# Patient Record
Sex: MALE | Race: WHITE | NOT HISPANIC OR LATINO | Employment: OTHER | URBAN - METROPOLITAN AREA
[De-identification: names, ages, dates, MRNs, and addresses within clinical notes are randomized per-mention and may not be internally consistent; named-entity substitution may affect disease eponyms.]

---

## 2017-03-17 RX ORDER — FLECAINIDE ACETATE 50 MG/1
TABLET ORAL
Qty: 60 TABLET | Refills: 5 | Status: SHIPPED | OUTPATIENT
Start: 2017-03-17 | End: 2017-10-26 | Stop reason: SDUPTHER

## 2017-03-28 ENCOUNTER — OFFICE VISIT (OUTPATIENT)
Dept: CARDIOLOGY | Facility: CLINIC | Age: 77
End: 2017-03-28

## 2017-03-28 VITALS
BODY MASS INDEX: 27.4 KG/M2 | HEIGHT: 70 IN | SYSTOLIC BLOOD PRESSURE: 122 MMHG | DIASTOLIC BLOOD PRESSURE: 70 MMHG | WEIGHT: 191.4 LBS | HEART RATE: 53 BPM

## 2017-03-28 DIAGNOSIS — I48.0 PAF (PAROXYSMAL ATRIAL FIBRILLATION) (HCC): Primary | ICD-10-CM

## 2017-03-28 DIAGNOSIS — I48.3 TYPICAL ATRIAL FLUTTER (HCC): ICD-10-CM

## 2017-03-28 DIAGNOSIS — R06.02 SHORTNESS OF BREATH: ICD-10-CM

## 2017-03-28 DIAGNOSIS — R00.1 SINUS BRADYCARDIA: ICD-10-CM

## 2017-03-28 PROCEDURE — 93000 ELECTROCARDIOGRAM COMPLETE: CPT | Performed by: INTERNAL MEDICINE

## 2017-03-28 PROCEDURE — 99212 OFFICE O/P EST SF 10 MIN: CPT | Performed by: INTERNAL MEDICINE

## 2017-03-28 NOTE — PROGRESS NOTES
"  Chief Complaint: PAF    History of Present Illness:    The stable established chief complaint is being appropriately managed with the current medical regimen (flecainide, Eliquis), is minimal to asymptomatic with treatment, and random in occurrence when happening.    Patient Active Problem List   Diagnosis   • Hypertensive cardiovascular disease   • Intermittent palpitations   • MINO on CPAP   • Seasonal rhinitis   • Chronic hypertension   • Lower extremity edema   • PAF (paroxysmal atrial fibrillation)   • Sinus bradycardia   • Paroxysmal atrial fibrillation   • Atrial flutter          Current Outpatient Prescriptions:   •  apixaban (ELIQUIS) 5 MG tablet tablet, Take 1 tablet by mouth 2 (two) times a day., Disp: , Rfl:   •  Cyanocobalamin (VITAMIN B 12 PO), Take 1,000 mcg by mouth daily., Disp: , Rfl:   •  flecainide (TAMBOCOR) 50 MG tablet, take 1 tablet by mouth twice a day, Disp: 60 tablet, Rfl: 5  •  loratadine (CLARITIN) 10 MG tablet, Take 10 mg by mouth daily as needed., Disp: , Rfl:   •  Misc Natural Products (PROSTATE HEALTH PO), Take 1 tablet by mouth daily., Disp: , Rfl:   •  Multiple Vitamins-Minerals (MULTIVITAMIN ADULT PO), Take 1 tablet by mouth daily., Disp: , Rfl:   •  telmisartan-hydrochlorothiazide (MICARDIS HCT) 80-25 MG per tablet, take 1 tablet by mouth once daily, Disp: 90 tablet, Rfl: 3   No Known Allergies     ROS:    Denies chest pain, tightness, palpitations, CALDERON, PND, or edema      /70 (BP Location: Left arm, Patient Position: Sitting)  Pulse 53  Ht 70\" (177.8 cm)  Wt 191 lb 6.4 oz (86.8 kg)  BMI 27.46 kg/m2.    Physical Exam:    Lungs: CTA, no wheezing, equal air entry bilaterally, resonant to percussion  Cor: RRR, physiologic S1, S2, no rubs, gallops, murmurs or thrills  Ext: warm, negative edema      ECG 12 Lead  Date/Time: 3/28/2017 1:52 PM  Performed by: SYLWIA TADEO  Authorized by: SYLWIA TADEO   Comparison: compared with previous ECG from 9/27/2016  Rhythm: " sinus bradycardia  Clinical impression: abnormal ECG              Diagnosis Plan   1. PAF (paroxysmal atrial fibrillation), controlled on flecainide Continue current Rx, follow-up 6 months   2. Shortness of breath     3. Sinus bradycardia, asymptomatic    4. Typical atrial flutter

## 2017-04-27 ENCOUNTER — OFFICE VISIT (OUTPATIENT)
Dept: CARDIOLOGY | Facility: CLINIC | Age: 77
End: 2017-04-27

## 2017-04-27 VITALS
HEART RATE: 66 BPM | BODY MASS INDEX: 27.94 KG/M2 | SYSTOLIC BLOOD PRESSURE: 150 MMHG | DIASTOLIC BLOOD PRESSURE: 74 MMHG | WEIGHT: 195.2 LBS | HEIGHT: 70 IN

## 2017-04-27 DIAGNOSIS — R06.09 DYSPNEA ON EXERTION: ICD-10-CM

## 2017-04-27 DIAGNOSIS — I48.0 PAF (PAROXYSMAL ATRIAL FIBRILLATION) (HCC): ICD-10-CM

## 2017-04-27 DIAGNOSIS — G47.33 OSA ON CPAP: ICD-10-CM

## 2017-04-27 DIAGNOSIS — J30.2 SEASONAL ALLERGIC RHINITIS, UNSPECIFIED ALLERGIC RHINITIS TRIGGER: ICD-10-CM

## 2017-04-27 DIAGNOSIS — R79.89 ABNORMAL THYROID STIMULATING HORMONE LEVEL: ICD-10-CM

## 2017-04-27 DIAGNOSIS — I11.9 HYPERTENSIVE HEART DISEASE WITHOUT HEART FAILURE: Primary | ICD-10-CM

## 2017-04-27 DIAGNOSIS — Z99.89 OSA ON CPAP: ICD-10-CM

## 2017-04-27 PROCEDURE — 99213 OFFICE O/P EST LOW 20 MIN: CPT | Performed by: INTERNAL MEDICINE

## 2017-04-27 RX ORDER — TELMISARTAN AND HYDROCHLORTHIAZIDE 80; 25 MG/1; MG/1
0.5 TABLET ORAL DAILY
COMMUNITY
End: 2018-02-20 | Stop reason: SDUPTHER

## 2017-04-27 NOTE — PROGRESS NOTES
Subjective:     Encounter Date:04/27/2017 - [place] Office    REFERRING PHYSICIAN/INTERNIST: Derick Phillip MD  ELECTROPHYSIOLOGIST: Gilberto Shaikh MD, FAC, RS, BSE  INTERVENTIONAL CARDIOLOGIST: hCris De La Vega MD, FAC, [facility]   SLEEP SPECIALIST: Judie Mc MD       Patient ID: Kai Dodson is a 76 y.o.  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     Chief Complaint:   Chief Complaint   Patient presents with   • Shortness of Breath     Problem List:  1. Possible hypertensive cardiovascular disease:  a. Remote progressive CCS class III-IV chest pain syndrome with normal EKG and exertional dyspnea and fatigue with normal codominant coronary arteries and preserved systolic left ventricular function, July 2015.   b. Residual CCS class I chest pain syndrome with NYHA class II exertional dyspnea and fatigue.   c. Remote apparent atrial fibrillation/flutter with increasing dyspnea and [facility] ED evaluation with unremarkable laboratory studies including negative troponin and BNP 29 with normal portable chest x-ray with spontaneous rexsolution of atrial tachyarrhythmia and subsequent acceptable 30-day event recorder, data deficit (August 2016).  d. Residual CCS class I chest pain syndrome with NYHA class II exertional dyspnea and fatigue syndrome.  2. Intermittent tachypalpitations:  a. Remote abnormal 24-hour Holter monitor demonstrating intermittent bradycardia with occasional PACs, May 2014.  b. Current abnormal 48-hour Holter monitor demonstrating occasional marked bradycardia with persistent PACs with acceptable combination Doppler echo with mild-to-moderate tricuspid regurgitation and an RVSP of 39 TORR, June 2014.  c. [facility] Emergency Department evaluation for episode of tachypalpitations. [facility] Telemetry demonstrates atrial fibrillation/flutter, April 2015, with subsequent recorder demonstrating  "predominantly sinus rhythm with occasional PVCs with an elevated CHADS VASc score of 3 and initiation of anticoagulation with apixaban, spring 2015.   d. Remote Electrophysiology consultation and evaluation with acceptable followup, February 2016.  3. Obstructive sleep apnea with CPAP therapy.   4. Intermittent seasonal rhinitis.   5. Remote nephrolithiasis.   6. Chronic hypertension, probably essential.   7. Intermittent lower extremity edema felt secondary to antihypertensive therapy.   8. Abnormal thyroid function test with reduced TSH (February 2017).    No Known Allergies      Current Outpatient Prescriptions:   •  apixaban (ELIQUIS) 5 MG tablet tablet, Take 5 mg bid., Disp: , Rfl:   •  Cyanocobalamin (VITAMIN B 12 PO), Take 1,000 mcg by mouth daily., Disp: , Rfl:   •  flecainide (TAMBOCOR) 50 MG tablet, take 1 tablet by mouth twice a day, Disp: 60 tablet, Rfl: 5  •  Misc Natural Products (PROSTATE HEALTH PO), Take 1 tablet by mouth daily., Disp: , Rfl:   •  Multiple Vitamins-Minerals (MULTIVITAMIN ADULT PO), Take 1 tablet by mouth daily., Disp: , Rfl:   · Telmisartan-hydrochlorothiazide (MICARDIS HCT) 80-25 mg one-half tablet daily    History of Present Illness Patient returns for scheduled 6-month followup. He notes that he has \"been doing about like usual, you know, I have a little shortness of breath.\"  He states that his heart rate seems to \"have been pretty good.\"  He has felt an occasional flutter but nothing that lasts long.  He has been riding a stationary bicycle for exercise \"but not enough.\"  He says he is going to start walking outside when the weather is nice, but he did not get out yesterday because he \"got busy.\"  He does not report a cough or wheeze.  Patient otherwise denies chest pain, PND, edema, palpitations, syncope or presyncope at this time.      Review of Systems   Respiratory: Positive for shortness of breath.       Obtained and otherwise negative except as outlined in problem list and " "HPI.    Procedures       Objective:       Vitals:    04/27/17 1326 04/27/17 1328   BP: 171/86 150/74   BP Location: Left arm Left arm   Patient Position: Sitting Standing   Pulse: 71 66   Weight:  195 lb 3.2 oz (88.5 kg)   Height:  70\" (177.8 cm)     Body mass index is 28.01 kg/(m^2).   Last weight  193 lbs.    Physical Exam   Constitutional: He is oriented to person, place, and time. He appears well-developed and well-nourished.   Neck: No JVD present. Carotid bruit is not present. No thyromegaly present.   Cardiovascular: Regular rhythm, S1 normal, S2 normal and normal heart sounds.  Exam reveals no gallop, no S3 and no friction rub.    No murmur heard.  Pulses:       Dorsalis pedis pulses are 2+ on the right side, and 2+ on the left side.        Posterior tibial pulses are 2+ on the right side, and 2+ on the left side.   Pulmonary/Chest: Effort normal. He has decreased breath sounds. He has no wheezes. He has no rhonchi. He has no rales.   Abdominal: Soft. He exhibits no mass. There is no hepatosplenomegaly. There is no tenderness. There is no guarding.   Lymphadenopathy:     He has no cervical adenopathy.   Neurological: He is alert and oriented to person, place, and time.   Skin: Skin is warm, dry and intact. No rash noted.   Vitals reviewed.      Lab Review:   Lab Results   Component Value Date    GLUCOSE 106 (H) 09/20/2016    BUN 16 09/20/2016    CREATININE 1.00 09/20/2016    EGFRIFNONA 73 09/20/2016    BCR 16.0 09/20/2016    CO2 24.0 09/20/2016    CALCIUM 9.5 09/20/2016    ALBUMIN 4.20 09/20/2016    LABIL2 1.6 09/20/2016    AST 33 09/20/2016    ALT 22 09/20/2016       Lab Results   Component Value Date    WBC 7.96 09/20/2016    HGB 14.5 09/20/2016    HCT 42.8 09/20/2016    MCV 90.5 09/20/2016     09/20/2016       Lab Results   Component Value Date    HGBA1C 5.2 07/01/2015       Lab Results   Component Value Date    TSH 0.214 (L) 04/17/2015       Lab Results   Component Value Date    TRIG 108 " 07/01/2015     Lab Results   Component Value Date    HDL 48 07/01/2015         Assessment:   Overall continued acceptable course with no interim cardiopulmonary complaints with acceptable functional status. We will defer additional diagnostic or therapeutic intervention from a cardiac perspective at this time but will initiate a trial of Symbicort, at his request, due to his exertional dyspnea, al though his cardiopulmonary exam is unremarkable currently.  If he has progressive disabling symptoms, he may need to consider a high resolution spiral CT scan, pulmonary function test, and possible pulmonary ventilation-perfusion scan.       Diagnosis Plan   1. Hypertensive heart disease without heart failure     2. PAF (paroxysmal atrial fibrillation)     3. MINO on CPAP     4. Seasonal allergic rhinitis, unspecified allergic rhinitis trigger     5. Dyspnea on exertion     6. Abnormal thyroid stimulating hormone level            Plan:         1. Patient to continue current medications and close follow up with the above providers.  2. Initiate Symbicort 80/4.5 one puff bid  3. Tentative cardiology follow up in October 2017, or patient may return sooner PRN.    Transcribed by Priya Dorantes for Dr. Misael Walls at 8:12 AM on 04/27/2017        IMisael MD, [facility], personally performed the services described in this documentation as scribed by the above named individual in my presence, and it is both accurate and complete. At 1:29 PM on 04/27/2017

## 2017-10-26 RX ORDER — FLECAINIDE ACETATE 50 MG/1
50 TABLET ORAL 2 TIMES DAILY
Qty: 60 TABLET | Refills: 6 | Status: SHIPPED | OUTPATIENT
Start: 2017-10-26 | End: 2018-11-19 | Stop reason: SDUPTHER

## 2017-10-27 RX ORDER — FLECAINIDE ACETATE 50 MG/1
TABLET ORAL
Qty: 60 TABLET | Refills: 6 | Status: SHIPPED | OUTPATIENT
Start: 2017-10-27 | End: 2017-11-09 | Stop reason: SDUPTHER

## 2017-11-09 ENCOUNTER — OFFICE VISIT (OUTPATIENT)
Dept: CARDIOLOGY | Facility: CLINIC | Age: 77
End: 2017-11-09

## 2017-11-09 VITALS
DIASTOLIC BLOOD PRESSURE: 62 MMHG | HEART RATE: 56 BPM | HEIGHT: 70 IN | BODY MASS INDEX: 27.77 KG/M2 | SYSTOLIC BLOOD PRESSURE: 108 MMHG | WEIGHT: 194 LBS

## 2017-11-09 DIAGNOSIS — I11.9 HYPERTENSIVE HEART DISEASE WITHOUT HEART FAILURE: Primary | ICD-10-CM

## 2017-11-09 DIAGNOSIS — R06.09 DYSPNEA ON EXERTION: ICD-10-CM

## 2017-11-09 DIAGNOSIS — G47.33 OSA ON CPAP: ICD-10-CM

## 2017-11-09 DIAGNOSIS — I48.0 PAF (PAROXYSMAL ATRIAL FIBRILLATION) (HCC): ICD-10-CM

## 2017-11-09 DIAGNOSIS — Z99.89 OSA ON CPAP: ICD-10-CM

## 2017-11-09 PROCEDURE — 93000 ELECTROCARDIOGRAM COMPLETE: CPT | Performed by: INTERNAL MEDICINE

## 2017-11-09 PROCEDURE — 99214 OFFICE O/P EST MOD 30 MIN: CPT | Performed by: INTERNAL MEDICINE

## 2017-11-09 NOTE — PROGRESS NOTES
Subjective:     Encounter Date:11/09/2017 - [place] Office    Patient ID: Kai Dodson is a 77 y.o.  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    REFERRING PHYSICIAN/INTERNIST: Derick Phillip MD  ELECTROPHYSIOLOGIST: ERNA  INTERVENTIONAL CARDIOLOGIST: Chris De La Vega MD, [facility], [facility]   SLEEP SPECIALIST: Judie Mc MD      Chief Complaint:   Chief Complaint   Patient presents with   • hypertensive heart disease   • Atrial Fibrillation     Problem List:  1. Possible hypertensive cardiovascular disease:  a. Remote progressive CCS class III-IV chest pain syndrome with normal EKG and exertional dyspnea and fatigue with normal codominant coronary arteries and preserved systolic left ventricular function, July 2015.   b. Residual CCS class I chest pain syndrome with NYHA class II exertional dyspnea and fatigue.   c. Remote apparent atrial fibrillation/flutter with increasing dyspnea and [facility] ED evaluation with unremarkable laboratory studies including negative troponin and BNP 29 with normal portable chest x-ray with spontaneous rexsolution of atrial tachyarrhythmia and subsequent acceptable 30-day event recorder, data deficit (August 2016).  d. Residual CCS class I chest pain syndrome with NYHA class II exertional dyspnea and fatigue syndrome.  2. Intermittent tachypalpitations:  a. Remote abnormal 24-hour Holter monitor demonstrating intermittent bradycardia with occasional PACs, May 2014.  b. Current abnormal 48-hour Holter monitor demonstrating occasional marked bradycardia with persistent PACs with acceptable combination Doppler echo with mild-to-moderate tricuspid regurgitation and an RVSP of 39 TORR, June 2014.  c. [facility] Emergency Department evaluation for episode of tachypalpitations. [facility] Telemetry demonstrates atrial fibrillation/flutter, April  "2015, with subsequent recorder demonstrating predominantly sinus rhythm with occasional PVCs with an elevated CHADS VASc score of 3 and initiation of anticoagulation with apixaban, spring 2015.   d. Remote Electrophysiology consultation and evaluation with acceptable followup, February 2016.  3. Obstructive sleep apnea with CPAP therapy.   4. Intermittent seasonal rhinitis.   5. Remote nephrolithiasis.   6. Chronic hypertension, probably essential.   7. Intermittent lower extremity edema felt secondary to antihypertensive therapy.   8. Abnormal thyroid function test with reduced TSH (February 2017).    No Known Allergies      Current Outpatient Prescriptions:   •  apixaban (ELIQUIS) 5 MG tablet tablet, Take 5 mg by mouth Every 12 (Twelve) Hours., Disp: , Rfl:   •  Cyanocobalamin (VITAMIN B 12 PO), Take 1,000 mcg by mouth daily., Disp: , Rfl:   •  flecainide (TAMBOCOR) 50 MG tablet, Take 1 tablet by mouth 2 (Two) Times a Day., Disp: 60 tablet, Rfl: 6  •  Multiple Vitamins-Minerals (MULTIVITAMIN ADULT PO), Take 1 tablet by mouth daily., Disp: , Rfl:   •  telmisartan-hydrochlorothiazide (MICARDIS HCT) 80-25 MG per tablet, Take 0.5 tablets by mouth Daily. Taking 0.5 tablet daily, Disp: , Rfl:     HISTORY OF PRESENT ILLNESS: Patient returns for scheduled 6-1/2 month followup. He has done well since last being seen in our office.  He is up and about every day, but sometimes he gets short of breath.  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  When he has the shortness of breath, he does not wheeze.  He does not notice his heart getting out of rhythm but will occasionally feel a \"kick.\"  He has not had any recent lab work drawn but knows that he needs to.  He has had influenza immunization and a pneumonia shot.  He has had no hospitalizations or ER " "visits since his  last visit.  He does state that he gets tired easily; he is told that is why it is important to walk on a daily basis.  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  Patient otherwise denies chest pain, severe shortness of breath, PND, edema, palpitations, syncope or presyncope at this time.      Review of Systems   Constitution: Positive for malaise/fatigue.   Respiratory: Positive for shortness of breath and snoring.    Hematologic/Lymphatic: Bruises/bleeds easily.   Neurological: Positive for excessive daytime sleepiness and focal weakness.      Obtained and otherwise negative except as outlined in problem list and HPI.      ECG 12 Lead  Date/Time: 11/9/2017 4:18 PM  Performed by: MILAGRO CAMPBELL  Authorized by: MILAGRO CAMPBELL   Comparison: not compared with previous ECG   Rhythm: sinus rhythm  Ectopy: atrial premature contractions  BPM: 60  Clinical impression: abnormal ECG  Comments: Nonspecific ST-T changes  QRS 90 ms  QTc 402 ms   ms               Objective:       Vitals:    11/09/17 1506 11/09/17 1508   BP: 122/78 108/62   BP Location: Left arm Left arm   Patient Position: Sitting Standing   Pulse: 56    Weight: 194 lb (88 kg)    Height: 70\" (177.8 cm)      Body mass index is 27.84 kg/(m^2).   Last weight:  195 lbs.    Physical Exam   Constitutional: He is oriented to person, place, and time. He appears well-developed and well-nourished.   Neck: No JVD present. Carotid bruit is not present. No thyromegaly present.   Cardiovascular: Regular rhythm, S1 normal and S2 normal.  Exam reveals no gallop, no S3 and no friction rub.    Murmur heard.   Medium-pitched harsh early systolic murmur is present with a grade of 1/6  at the upper right sternal border  Pulses:       Dorsalis pedis pulses are 2+ on the right side, and 2+ on the left side.        Posterior tibial pulses are 2+ on the right side, and 2+ on the left side.   Pulmonary/Chest: Effort normal. He has " decreased breath sounds. He has no wheezes. He has no rhonchi. He has no rales.   Abdominal: Soft. He exhibits no mass. There is no hepatosplenomegaly. There is no tenderness. There is no guarding.   Bowel sounds audible x4   Musculoskeletal: Normal range of motion. He exhibits no edema.   Lymphadenopathy:     He has no cervical adenopathy.   Neurological: He is alert and oriented to person, place, and time.   Skin: Skin is warm, dry and intact. No rash noted.   Vitals reviewed.        Lab Review:   Lab Results   Component Value Date    GLUCOSE 106 (H) 09/20/2016    BUN 16 09/20/2016    CREATININE 1.00 09/20/2016    EGFRIFNONA 73 09/20/2016    BCR 16.0 09/20/2016    CO2 24.0 09/20/2016    CALCIUM 9.5 09/20/2016    ALBUMIN 4.20 09/20/2016    LABIL2 1.6 09/20/2016    AST 33 09/20/2016    ALT 22 09/20/2016       Lab Results   Component Value Date    WBC 7.96 09/20/2016    HGB 14.5 09/20/2016    HCT 42.8 09/20/2016    MCV 90.5 09/20/2016     09/20/2016       Lab Results   Component Value Date    HGBA1C 5.2 07/01/2015       Lab Results   Component Value Date    TSH 0.214 (L) 04/17/2015       Lab Results   Component Value Date    TRIG 108 07/01/2015     Lab Results   Component Value Date    HDL 48 07/01/2015       Lab Results   Component Value Date    .0 (H) 09/20/2016           Assessment:   Overall continued acceptable course with no interim cardiopulmonary complaints with acceptable functional status. We will defer additional diagnostic or therapeutic intervention from a cardiac perspective at this time.  Hopefully, the patient will have lab work drawn in Dr. Phillip's office, and we would suggest that he have a BNP and D-dimer included.  I remain perplexed by his complaints of intermittent tachypnea and dyspnea but suspect he does have some component of physical deconditioning and have encouraged him to walk on a regular basis.       Diagnosis Plan   1. Hypertensive heart disease without heart  failure  Continue current treatment   2. PAF (paroxysmal atrial fibrillation)  Continue current treatment; no documented recurrence   3. MINO on CPAP  Compliance stressed   4. Dyspnea on exertion  Regular exercise and walking program encouraged, as well as consideration of BNP and D-dimer during followup with Dr. Phillip          Plan:         1. Patient to continue current medications and close follow up with the above providers.  2. Tentative cardiology follow up in May 2018, or patient may return sooner PRN.    Transcribed by Priya Dorantes for Dr. Misael Walls at 8:42 AM on 11/09/2017      I, Misael Walls MD, [facility], personally performed the services described in this documentation as scribed by the above named individual in my presence, and it is both accurate and complete. At 3:24 PM on 11/09/2017

## 2018-02-21 RX ORDER — TELMISARTAN AND HYDROCHLORTHIAZIDE 80; 25 MG/1; MG/1
TABLET ORAL
Qty: 90 TABLET | Refills: 3 | Status: SHIPPED | OUTPATIENT
Start: 2018-02-21 | End: 2018-05-24 | Stop reason: SDUPTHER

## 2018-05-23 NOTE — PROGRESS NOTES
Subjective:     Encounter Date:05/24/2018 - [place] Office    Patient ID: Kai Dodson is a 78 y.o.  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     REFERRING PHYSICIAN/INTERNIST: Derick Phillip MD  ELECTROPHYSIOLOGIST: ERNA  INTERVENTIONAL CARDIOLOGIST: Chris De La Vega MD, Located within [facility], [facility]   SLEEP SPECIALIST: Judie Mc MD     Chief Complaint:   Chief Complaint   Patient presents with   • hypertensive heart diseae     Problem List:  1. Possible hypertensive cardiovascular disease:  a. Remote progressive CCS class III-IV chest pain syndrome with normal EKG and exertional dyspnea and fatigue with normal codominant coronary arteries and preserved systolic left ventricular function, July 2015.   b. Residual CCS class I chest pain syndrome with NYHA class II exertional dyspnea and fatigue.   c. Remote apparent atrial fibrillation/flutter with increasing dyspnea and  ED evaluation with unremarkable laboratory studies including negative troponin and BNP 29 with normal portable chest x-ray with spontaneous rexsolution of atrial tachyarrhythmia and subsequent acceptable 30-day event recorder, data deficit (August 2016).  d. Residual CCS class I chest pain syndrome with NYHA class II exertional dyspnea and fatigue syndrome.  2. Intermittent tachypalpitations:  a. Remote abnormal 24-hour Holter monitor demonstrating intermittent bradycardia with occasional PACs, May 2014.  b. Current abnormal 48-hour Holter monitor demonstrating occasional marked bradycardia with persistent PACs with acceptable combination Doppler echo with mild-to-moderate tricuspid regurgitation and an RVSP of 39 TORR, June 2014.  c.  Emergency Department evaluation for episode of tachypalpitations. [facility] Telemetry demonstrates atrial fibrillation/flutter, April 2015, with subsequent  DISCHARGE PLANNING     Discharge to home or other facility with appropriate resources Progressing        INFECTION - ADULT     Absence or prevention of progression during hospitalization Progressing        Knowledge Deficit     Patient/family/caregiver demonstrates understanding of disease process, treatment plan, medications, and discharge instructions Progressing        PAIN - ADULT     Verbalizes/displays adequate comfort level or baseline comfort level Progressing        Potential for Falls     Patient will remain free of falls Progressing        RESPIRATORY - ADULT     Achieves optimal ventilation and oxygenation Progressing        SAFETY ADULT     Maintain or return to baseline ADL function Progressing     Maintain or return mobility status to optimal level Progressing recorder demonstrating predominantly sinus rhythm with occasional PVCs with an elevated CHADS VASc score of 3 and initiation of anticoagulation with apixaban, spring 2015.   d. Remote Electrophysiology consultation and evaluation with acceptable followup, February 2016.  3. Obstructive sleep apnea with CPAP therapy.   4. Intermittent seasonal rhinitis.   5. Remote nephrolithiasis.   6. Chronic hypertension, probably essential.   7. Intermittent lower extremity edema felt secondary to antihypertensive therapy.   8. Abnormal thyroid function test with reduced TSH (February 2017).     Not on File      Current Outpatient Prescriptions:   •  apixaban (ELIQUIS) 5 MG tablet tablet, Take 5 mg by mouth Every 12 (Twelve) Hours., Disp: , Rfl:   •  Cyanocobalamin (VITAMIN B 12 PO), Take 1,000 mcg by mouth daily., Disp: , Rfl:   •  flecainide (TAMBOCOR) 50 MG tablet, Take 1 tablet by mouth 2 (Two) Times a Day., Disp: 60 tablet, Rfl: 6  •  Multiple Vitamins-Minerals (MULTIVITAMIN ADULT PO), Take 1 tablet by mouth daily., Disp: , Rfl:   •  telmisartan-hydrochlorothiazide (MICARDIS HCT) 80-25 MG per tablet, take 1 tablet by mouth once daily, Disp: 90 tablet, Rfl: 3    HISTORY OF PRESENT ILLNESS: Patient returns for scheduled 6-month followup. He says that his blood pressure usually runs a little high when he checks it at home, and he is surprised that it is low today.  He has been trying to lose weight and has avoided breads, potatoes, and sweets.  He has been unable to walk too much because his energy level has been low.  He is encouraged that his EKG looks good today.  He has not noticed his heart beating fast.  He says that the person who took his seat in the [facility] was beat in the primary 2 days ago by a , but he was pretty sure that would happen.  The patient is up and about on a daily basis and has no chest pain, tightness, or pressure with his activities.  Patient otherwise denies chest pain, shortness  "of breath, PND, edema, palpitations, syncope or presyncope at this time other than complaints of marked fatigue.        Review of Systems   Constitution: Positive for malaise/fatigue and weight loss.   Respiratory: Positive for shortness of breath.    Endocrine: Positive for polyuria.   Hematologic/Lymphatic: Bruises/bleeds easily.      Obtained and otherwise negative except as outlined in problem list and HPI.      ECG 12 Lead  Date/Time: 5/24/2018 3:29 PM  Performed by: MILAGRO CAMPBELL  Authorized by: MILAGRO CAMPBELL   Comparison: compared with previous ECG from 11/9/2017  Similar to previous ECG  Rhythm: sinus bradycardia  BPM: 55  Clinical impression: abnormal ECG  Comments: Nonspecific ST-T changes  QRS 96 ms  QTc 394 ms   ms               Objective:       Vitals:    05/24/18 1423 05/24/18 1424 05/24/18 1446   BP: 93/57 90/58 112/76   BP Location: Left arm Left arm Left arm   Patient Position: Sitting Standing Sitting   Pulse: 59 61    Weight: 84.4 kg (186 lb)     Height: 177.8 cm (70\")       Body mass index is 26.69 kg/m².   Last weight:  194 lbs.    Physical Exam   Constitutional: He is oriented to person, place, and time. He appears well-developed and well-nourished.   Neck: No JVD present. Carotid bruit is not present. No thyromegaly present.   Cardiovascular: Regular rhythm, S1 normal, S2 normal and normal heart sounds.  Exam reveals no gallop, no S3 and no friction rub.    No murmur heard.  Pulses:       Dorsalis pedis pulses are 2+ on the right side, and 2+ on the left side.        Posterior tibial pulses are 2+ on the right side, and 2+ on the left side.   Pulmonary/Chest: Effort normal and breath sounds normal. He has no wheezes. He has no rhonchi. He has no rales.   Abdominal: Soft. He exhibits no mass. There is no hepatosplenomegaly. There is no tenderness. There is no guarding.   Bowel sounds audible x4   Musculoskeletal: Normal range of motion. He exhibits no edema.   Lymphadenopathy:     He " has no cervical adenopathy.   Neurological: He is alert and oriented to person, place, and time.   Skin: Skin is warm, dry and intact. No rash noted.   Vitals reviewed.        Lab Review:   Lab Results   Component Value Date    GLUCOSE 106 (H) 09/20/2016    BUN 16 09/20/2016    CREATININE 1.00 09/20/2016    EGFRIFNONA 73 09/20/2016    BCR 16.0 09/20/2016    CO2 24.0 09/20/2016    CALCIUM 9.5 09/20/2016    ALBUMIN 4.20 09/20/2016    LABIL2 1.6 09/20/2016    AST 33 09/20/2016    ALT 22 09/20/2016       Lab Results   Component Value Date    WBC 7.96 09/20/2016    HGB 14.5 09/20/2016    HCT 42.8 09/20/2016    MCV 90.5 09/20/2016     09/20/2016       Lab Results   Component Value Date    HGBA1C 5.2 07/01/2015       Lab Results   Component Value Date    TSH 0.214 (L) 04/17/2015       Lab Results   Component Value Date    TRIG 108 07/01/2015     Lab Results   Component Value Date    HDL 48 07/01/2015     Lab Results   Component Value Date    LDL 83 07/01/2015       Lab Results   Component Value Date    .0 (H) 09/20/2016           Assessment:   Overall continued acceptable course with no interim cardiopulmonary complaints with acceptable functional status. We will defer additional diagnostic or therapeutic intervention from a cardiac perspective at this time.       Diagnosis Plan   1. Hypertensive heart disease without heart failure  No recurrent angina pectoris or CHF.     2. Chronic hypertension  Excellent control   3. PAF (paroxysmal atrial fibrillation)  No documented recurrence   4. MINO on CPAP  Compliance stressed          Plan:         1. Patient to continue current medications and close follow up with the above providers other than to cut his telmisartan-HCTZ dose in half (40/12.5) and call us in 2 weeks and let us know how his blood pressure is running.  2. Tentative cardiology follow up in November 2018, or patient may return sooner PRN.        Transcribed by Priya Dorantes for Dr. Misael Walls  at 2:40 PM on 05/24/2018    I, Misael Walls MD, [facility], personally performed the services described in this documentation as scribed by the above named individual in my presence, and it is both accurate and complete. At 3:31 PM on 05/24/2018

## 2018-05-24 ENCOUNTER — OFFICE VISIT (OUTPATIENT)
Dept: CARDIOLOGY | Facility: CLINIC | Age: 78
End: 2018-05-24

## 2018-05-24 VITALS
DIASTOLIC BLOOD PRESSURE: 76 MMHG | SYSTOLIC BLOOD PRESSURE: 112 MMHG | BODY MASS INDEX: 26.63 KG/M2 | HEART RATE: 61 BPM | HEIGHT: 70 IN | WEIGHT: 186 LBS

## 2018-05-24 DIAGNOSIS — I10 CHRONIC HYPERTENSION: ICD-10-CM

## 2018-05-24 DIAGNOSIS — Z99.89 OSA ON CPAP: ICD-10-CM

## 2018-05-24 DIAGNOSIS — I11.9 HYPERTENSIVE HEART DISEASE WITHOUT HEART FAILURE: Primary | ICD-10-CM

## 2018-05-24 DIAGNOSIS — G47.33 OSA ON CPAP: ICD-10-CM

## 2018-05-24 DIAGNOSIS — I48.0 PAF (PAROXYSMAL ATRIAL FIBRILLATION) (HCC): ICD-10-CM

## 2018-05-24 PROCEDURE — 93000 ELECTROCARDIOGRAM COMPLETE: CPT | Performed by: INTERNAL MEDICINE

## 2018-05-24 PROCEDURE — 99214 OFFICE O/P EST MOD 30 MIN: CPT | Performed by: INTERNAL MEDICINE

## 2018-05-24 RX ORDER — TELMISARTAN AND HYDROCHLORTHIAZIDE 80; 25 MG/1; MG/1
0.5 TABLET ORAL DAILY
Qty: 90 TABLET | Refills: 3
Start: 2018-05-24 | End: 2019-03-28 | Stop reason: SDUPTHER

## 2018-09-14 ENCOUNTER — TELEPHONE (OUTPATIENT)
Dept: CARDIOLOGY | Facility: CLINIC | Age: 78
End: 2018-09-14

## 2018-09-14 NOTE — TELEPHONE ENCOUNTER
Patient called with complaints of shortness of breath and overall feeling of not feeling well.  He wants to get in to see Dr. Walls sooner than his appointment. He stated that he would come to [place] since we will not be in [place] for two weeks. States his BP is low and his heart rate is 49-50

## 2018-09-19 ENCOUNTER — OFFICE VISIT (OUTPATIENT)
Dept: CARDIOLOGY | Facility: CLINIC | Age: 78
End: 2018-09-19

## 2018-09-19 VITALS
HEART RATE: 60 BPM | SYSTOLIC BLOOD PRESSURE: 119 MMHG | WEIGHT: 190 LBS | BODY MASS INDEX: 28.14 KG/M2 | DIASTOLIC BLOOD PRESSURE: 58 MMHG | HEIGHT: 69 IN

## 2018-09-19 DIAGNOSIS — I10 CHRONIC HYPERTENSION: ICD-10-CM

## 2018-09-19 DIAGNOSIS — I48.0 PAF (PAROXYSMAL ATRIAL FIBRILLATION) (HCC): ICD-10-CM

## 2018-09-19 DIAGNOSIS — I11.9 HYPERTENSIVE HEART DISEASE WITHOUT HEART FAILURE: ICD-10-CM

## 2018-09-19 DIAGNOSIS — I48.0 PAROXYSMAL ATRIAL FIBRILLATION (HCC): Primary | ICD-10-CM

## 2018-09-19 PROCEDURE — 99214 OFFICE O/P EST MOD 30 MIN: CPT | Performed by: INTERNAL MEDICINE

## 2018-09-19 PROCEDURE — 93000 ELECTROCARDIOGRAM COMPLETE: CPT | Performed by: INTERNAL MEDICINE

## 2018-09-19 NOTE — PROGRESS NOTES
Subjective:     Encounter Date:09/19/2018    Patient ID: Kai Dodson is a 78 y.o.  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     REFERRING PHYSICIAN/INTERNIST: Derick Phillip MD  ELECTROPHYSIOLOGIST: ERNA  INTERVENTIONAL CARDIOLOGIST: Chris De La Vega MD, [facility], [facility]   SLEEP SPECIALIST: Judie Mc MD     Chief Complaint:   Chief Complaint   Patient presents with   • Hypertension     Problem List:  1. Possible hypertensive cardiovascular disease:  a. Remote progressive CCS class III-IV chest pain syndrome with normal EKG and exertional dyspnea and fatigue with normal codominant coronary arteries and preserved systolic left ventricular function, July 2015.   b. Residual CCS class I chest pain syndrome with NYHA class II exertional dyspnea and fatigue.   c. Remote apparent atrial fibrillation/flutter with increasing dyspnea and [facility] ED evaluation with unremarkable laboratory studies including negative troponin and BNP 29 with normal portable chest x-ray with spontaneous rexsolution of atrial tachyarrhythmia and subsequent acceptable 30-day event recorder, data deficit (August 2016).  d. Residual CCS class I chest pain syndrome with NYHA class II exertional dyspnea and fatigue syndrome.  2. Intermittent tachypalpitations:  a. Remote abnormal 24-hour Holter monitor demonstrating intermittent bradycardia with occasional PACs, May 2014.  b. Current abnormal 48-hour Holter monitor demonstrating occasional marked bradycardia with persistent PACs with acceptable combination Doppler echo with mild-to-moderate tricuspid regurgitation and an RVSP of 39 TORR, June 2014.  c. [facility] Emergency Department evaluation for episode of tachypalpitations. [facility] Telemetry demonstrates atrial fibrillation/flutter, April 2015, with subsequent recorder demonstrating  predominantly sinus rhythm with occasional PVCs with an elevated CHADS VASc score of 3 and initiation of anticoagulation with apixaban, spring 2015.   d. Remote Electrophysiology consultation and evaluation with acceptable followup, February 2016.  e. Recent increasing exertional dyspnea and fatigue with occasional presyncope and outpatient junctional rhythm with escape PACs, September 2018  3. Obstructive sleep apnea with CPAP therapy.   4. Intermittent seasonal rhinitis.   5. Remote nephrolithiasis.   6. Chronic hypertension, probably essential.   7. Intermittent lower extremity edema felt secondary to antihypertensive therapy.   8. Abnormal thyroid function test with reduced TSH (February 2017).    No Known Allergies      Current Outpatient Prescriptions:   •  apixaban (ELIQUIS) 5 MG tablet tablet, Take 5 mg by mouth Every 12 (Twelve) Hours., Disp: , Rfl:   •  Cyanocobalamin (VITAMIN B 12 PO), Take 1,000 mcg by mouth As Needed., Disp: , Rfl:   •  flecainide (TAMBOCOR) 50 MG tablet, Take 1 tablet by mouth 2 (Two) Times a Day., Disp: 60 tablet, Rfl: 6  •  Multiple Vitamins-Minerals (MULTIVITAMIN ADULT PO), Take 1 tablet by mouth As Needed., Disp: , Rfl:   •  telmisartan-hydrochlorothiazide (MICARDIS HCT) 80-25 MG per tablet, Take 0.5 tablets by mouth Daily., Disp: 90 tablet, Rfl: 3    HISTORY OF PRESENT ILLNESS: Patient returns early for followup after a 4-month hiatus.  He called our office 5 days ago and complained of shortness of breath and not feeling well. He was feeling fatigued for one day and stopped by EMS for them to check him out, and they did an EKG strip which showed bradycardia and occasional PACs; he hand carries that strip today.  His heart rates have been in the high 40s to 50s.  He believes he may be a little bit more short of breath but not much.  He denies any sputum production, recent illness, fevers, chills, edema, chest pressure, palpitations, presyncope, or syncope.  He has difficulties with  "urination and feels that he can start his stream but has difficulty stopping it.  His physician gave him a prescription for Cialis, and he was wondering if he could get another refill. He states that it is very expensive.  He is compliant with his CPAP nightly.  Occasionally, he has lightheadedness if he bends down to pick something up and stands up quickly.  Patient otherwise denies chest pain, severe shortness of breath, PND, edema, palpitations, syncope or presyncope at this time.      Review of Systems   Cardiovascular: Positive for irregular heartbeat.   Respiratory: Positive for shortness of breath, sleep disturbances due to breathing and snoring.    Neurological: Positive for dizziness.      Obtained and otherwise negative except as outlined in problem list and HPI.      ECG 12 Lead  Date/Time: 9/19/2018 3:10 PM  Performed by: MILAGRO CAMPBELL  Authorized by: MILAGRO CAMPBELL   Rhythm comments: Sinus bradycardia with sinus arrhythmia, otherwise normal ECG, 57 bpm,  ms,  ms,  ms                 Objective:       Vitals:    09/19/18 1441 09/19/18 1442   BP: 136/70 119/58   BP Location: Right arm Right arm   Patient Position: Sitting Standing   Pulse: 60 60   Weight: 86.2 kg (190 lb)    Height: 175.3 cm (69\")      Body mass index is 28.06 kg/m².   Last weight:  186 lbs.    Physical Exam   Constitutional: He is oriented to person, place, and time. He appears well-developed and well-nourished.   Neck: No JVD present. Carotid bruit is not present. No thyromegaly present.   Cardiovascular: S1 normal and S2 normal.  An irregular rhythm present. Exam reveals no gallop, no S3 and no friction rub.    Murmur heard.   Medium-pitched harsh early systolic murmur is present with a grade of 1/6  at the upper right sternal border  Pulses:       Dorsalis pedis pulses are 2+ on the right side, and 2+ on the left side.        Posterior tibial pulses are 2+ on the right side, and 2+ on the left side. "   Pulmonary/Chest: Effort normal and breath sounds normal. He has no wheezes. He has no rhonchi. He has no rales.   Abdominal: Soft. He exhibits no mass. There is no hepatosplenomegaly. There is no tenderness. There is no guarding.   Bowel sounds audible x4   Musculoskeletal: Normal range of motion. He exhibits no edema.   Lymphadenopathy:     He has no cervical adenopathy.   Neurological: He is alert and oriented to person, place, and time.   Skin: Skin is warm, dry and intact. No rash noted.   Vitals reviewed.        Lab Review:   Lab Results   Component Value Date    GLUCOSE 106 (H) 09/20/2016    BUN 16 09/20/2016    CREATININE 1.00 09/20/2016    EGFRIFNONA 73 09/20/2016    BCR 16.0 09/20/2016    CO2 24.0 09/20/2016    CALCIUM 9.5 09/20/2016    ALBUMIN 4.20 09/20/2016    AST 33 09/20/2016    ALT 22 09/20/2016       Lab Results   Component Value Date    WBC 7.96 09/20/2016    HGB 14.5 09/20/2016    HCT 42.8 09/20/2016    MCV 90.5 09/20/2016     09/20/2016       Lab Results   Component Value Date    HGBA1C 5.2 07/01/2015       Lab Results   Component Value Date    TSH 0.214 (L) 04/17/2015     Lab Results   Component Value Date    TRIG 108 07/01/2015     Lab Results   Component Value Date    HDL 48 07/01/2015     Lab Results   Component Value Date    LDL 83 07/01/2015       Lab Results   Component Value Date    .0 (H) 09/20/2016     Room air oximetry 97% with pulse of 60/minute; following 3 minutes of brisk walk, room air oximetry 97%, with pulse of 79/minute and regular.      Assessment:   Patient has persistent bradycardia and had a recent EKG strip obtained from EMS that demonstrated sinus bradycardia with occasional PACs and type II Mobitz.  We will have him wear a ZIO/XT patch and then possibly refer to electrophysiology (Dr. Harley Dacosta) for possible pacemaker implantation.  We are unsure if this is developing into sick sinus syndrome.     Diagnosis Plan   1. Paroxysmal atrial fibrillation  (CMS/[facility])  ZioXT placed   2. Hypertensive heart disease without heart failure  Stable   3. Chronic hypertension  Stable          Plan:         1. Patient to continue current medications and close follow up with the above providers.  2. Tentative cardiology follow up in March 2019, or patient may return sooner PRN.  3. ZIO/XT ordered and placed today  4. EP consultation with Dr. Dacosta following ZIO/XT.    Scribed for Misael Walls MD by Anita Childs, APRN. 9/19/2018  3:08 PM    I, Misael Walls MD, [facility], personally performed the services described in this documentation as scribed by the above named individual in my presence, and it is both accurate and complete. At 3:34 PM on 09/19/2018

## 2018-10-16 ENCOUNTER — OFFICE VISIT (OUTPATIENT)
Dept: CARDIOLOGY | Facility: CLINIC | Age: 78
End: 2018-10-16

## 2018-10-16 ENCOUNTER — PREP FOR SURGERY (OUTPATIENT)
Dept: OTHER | Facility: HOSPITAL | Age: 78
End: 2018-10-16

## 2018-10-16 ENCOUNTER — APPOINTMENT (OUTPATIENT)
Dept: PREADMISSION TESTING | Facility: HOSPITAL | Age: 78
End: 2018-10-16

## 2018-10-16 VITALS
SYSTOLIC BLOOD PRESSURE: 178 MMHG | DIASTOLIC BLOOD PRESSURE: 82 MMHG | WEIGHT: 195.4 LBS | BODY MASS INDEX: 28.94 KG/M2 | HEIGHT: 69 IN | HEART RATE: 60 BPM

## 2018-10-16 DIAGNOSIS — I49.5 TACHY-BRADY SYNDROME (HCC): Primary | ICD-10-CM

## 2018-10-16 DIAGNOSIS — R00.1 SINUS BRADYCARDIA: ICD-10-CM

## 2018-10-16 DIAGNOSIS — I49.5 TACHY-BRADY SYNDROME (HCC): ICD-10-CM

## 2018-10-16 DIAGNOSIS — I48.0 PAF (PAROXYSMAL ATRIAL FIBRILLATION) (HCC): Primary | ICD-10-CM

## 2018-10-16 DIAGNOSIS — I48.0 PAROXYSMAL ATRIAL FIBRILLATION (HCC): Primary | ICD-10-CM

## 2018-10-16 LAB
ALBUMIN SERPL-MCNC: 4.24 G/DL (ref 3.2–4.8)
ALBUMIN/GLOB SERPL: 2.4 G/DL (ref 1.5–2.5)
ALP SERPL-CCNC: 82 U/L (ref 25–100)
ALT SERPL W P-5'-P-CCNC: 24 U/L (ref 7–40)
ANION GAP SERPL CALCULATED.3IONS-SCNC: 5 MMOL/L (ref 3–11)
AST SERPL-CCNC: 23 U/L (ref 0–33)
BILIRUB SERPL-MCNC: 1.3 MG/DL (ref 0.3–1.2)
BUN BLD-MCNC: 17 MG/DL (ref 9–23)
BUN/CREAT SERPL: 15.9 (ref 7–25)
CALCIUM SPEC-SCNC: 9.1 MG/DL (ref 8.7–10.4)
CHLORIDE SERPL-SCNC: 105 MMOL/L (ref 99–109)
CO2 SERPL-SCNC: 32 MMOL/L (ref 20–31)
CREAT BLD-MCNC: 1.07 MG/DL (ref 0.6–1.3)
DEPRECATED RDW RBC AUTO: 46 FL (ref 37–54)
ERYTHROCYTE [DISTWIDTH] IN BLOOD BY AUTOMATED COUNT: 13.5 % (ref 11.3–14.5)
GFR SERPL CREATININE-BSD FRML MDRD: 67 ML/MIN/1.73
GLOBULIN UR ELPH-MCNC: 1.8 GM/DL
GLUCOSE BLD-MCNC: 108 MG/DL (ref 70–100)
HCT VFR BLD AUTO: 43.5 % (ref 38.9–50.9)
HGB BLD-MCNC: 14.1 G/DL (ref 13.1–17.5)
MAGNESIUM SERPL-MCNC: 1.9 MG/DL (ref 1.3–2.7)
MCH RBC QN AUTO: 30.3 PG (ref 27–31)
MCHC RBC AUTO-ENTMCNC: 32.4 G/DL (ref 32–36)
MCV RBC AUTO: 93.3 FL (ref 80–99)
PLATELET # BLD AUTO: 161 10*3/MM3 (ref 150–450)
PMV BLD AUTO: 10.1 FL (ref 6–12)
POTASSIUM BLD-SCNC: 4 MMOL/L (ref 3.5–5.5)
PROT SERPL-MCNC: 6 G/DL (ref 5.7–8.2)
RBC # BLD AUTO: 4.66 10*6/MM3 (ref 4.2–5.76)
SODIUM BLD-SCNC: 142 MMOL/L (ref 132–146)
WBC NRBC COR # BLD: 5.53 10*3/MM3 (ref 3.5–10.8)

## 2018-10-16 PROCEDURE — 85027 COMPLETE CBC AUTOMATED: CPT | Performed by: PHYSICIAN ASSISTANT

## 2018-10-16 PROCEDURE — 93000 ELECTROCARDIOGRAM COMPLETE: CPT | Performed by: INTERNAL MEDICINE

## 2018-10-16 PROCEDURE — 36415 COLL VENOUS BLD VENIPUNCTURE: CPT

## 2018-10-16 PROCEDURE — 80053 COMPREHEN METABOLIC PANEL: CPT | Performed by: PHYSICIAN ASSISTANT

## 2018-10-16 PROCEDURE — 99204 OFFICE O/P NEW MOD 45 MIN: CPT | Performed by: INTERNAL MEDICINE

## 2018-10-16 PROCEDURE — 83735 ASSAY OF MAGNESIUM: CPT | Performed by: PHYSICIAN ASSISTANT

## 2018-10-16 RX ORDER — SODIUM CHLORIDE 0.9 % (FLUSH) 0.9 %
3 SYRINGE (ML) INJECTION EVERY 12 HOURS SCHEDULED
Status: CANCELLED | OUTPATIENT
Start: 2018-10-16

## 2018-10-16 RX ORDER — SODIUM CHLORIDE 0.9 % (FLUSH) 0.9 %
3-10 SYRINGE (ML) INJECTION AS NEEDED
Status: CANCELLED | OUTPATIENT
Start: 2018-10-16

## 2018-10-16 RX ORDER — CEFAZOLIN SODIUM 2 G/100ML
2 INJECTION, SOLUTION INTRAVENOUS ONCE
Status: CANCELLED | OUTPATIENT
Start: 2018-10-16

## 2018-10-16 NOTE — PROGRESS NOTES
Kai Dodson  1940  PCP: Derick Phillip MD    SUBJECTIVE:   Kai Dodson is a 78 y.o. male seen for a consultation visit regarding the following:     Chief Complaint:   Chief Complaint   Patient presents with   • Atrial Fibrillation          Consultation is requested by Misael Walls MD for evaluation of Atrial Fibrillation        History:    Patient is a 78 year old male with a history of PAF and PACs that presents today in consultations regarding abnormal monitor. He recently presented to Dr. Walls's office with complaints of increased SOB and fatigue. He was reporting rates frequently in 40s and 50s. He wore a monitor that revealed an average rate of 57bpm and a minimum of 35. He was also in a junctional rhythm at times. He stated this has all progressed over the last couple of months. Main issue is CALDERON and fatigue with trying to do daily activities. He has not had very many episodes of afib and is well controlled on Flecainide. He denies any issues with chest pain, palpitations, or orthopnea.       Cardiac PMH: (Old records have been reviewed and summarized below)    1. Atrial Fibrillation- diagnosed in 2015.   2. Bradycardia  3. C 6/2015- normal coronaries. EF 65%   4. Monitor 9/2018- avg 57, min 35, max 94. Junctional rhythm at times. No afib. Low rates predominately while sleeping.       Past Medical History, Past Surgical History, Family history, Social History, and Medications were all reviewed with the patient today and updated as necessary.       Current Outpatient Prescriptions:   •  apixaban (ELIQUIS) 5 MG tablet tablet, Take 5 mg by mouth Every 12 (Twelve) Hours., Disp: , Rfl:   •  Cyanocobalamin (VITAMIN B 12 PO), Take 1,000 mcg by mouth As Needed., Disp: , Rfl:   •  flecainide (TAMBOCOR) 50 MG tablet, Take 1 tablet by mouth 2 (Two) Times a Day., Disp: 60 tablet, Rfl: 6  •  Multiple Vitamins-Minerals (MULTIVITAMIN ADULT PO), Take 1 tablet by mouth As Needed., Disp: , Rfl:    •  telmisartan-hydrochlorothiazide (MICARDIS HCT) 80-25 MG per tablet, Take 0.5 tablets by mouth Daily. (Patient taking differently: Take 0.5 tablets by mouth As Needed.), Disp: 90 tablet, Rfl: 3    No Known Allergies      Past Medical History:   Diagnosis Date   • Acid reflux    • Atrial fibrillation (CMS/HCC)    • Bradycardia    • Chronic hypertension     Probably essential   • Hypertension    • Hypertensive cardiovascular disease 07/2015    Recent progressive CCS class III-IV chest pain syndrome with normal EKG and exertional dyspnea and fatigue with normal codominant coronary arteries and preserved systolic  left ventricular function, July 2015.   • Intermittent palpitations 05/01/2014    Remote abnormal 24-hour Holter monitor demonstrating intermittent bradycardia with occasional PACs, May 2014.   • Lower extremity edema     Intermittent lower extremity edema felt secondary to antihypertensive therapy.    • Nephrolithiasis     Remote   • MINO on CPAP    • Prostate troubles    • Seasonal rhinitis     intermittent     Past Surgical History:   Procedure Laterality Date   • CARDIAC CATHETERIZATION       Family History   Problem Relation Age of Onset   • Other Father         cardiac arrhythmia     Social History   Substance Use Topics   • Smoking status: Former Smoker     Types: Cigarettes     Quit date: 1990   • Smokeless tobacco: Never Used   • Alcohol use No       ROS:  Review of Systems:  General: no recent weight loss/gain, + weakness and fatigue  Skin: no rashes, lumps, or other skin changes  HEENT: no dizziness, lightheadedness, or vision changes  Respiratory: no cough or hemoptysis  Cardiovascular: no palpitations, and tachycardia  Gastrointestinal: no black/tarry stools or diarrhea  Urinary: no change in frequency or urgency  Peripheral Vascular: no claudication or leg cramps  Musculoskeletal: no muscle or joint pain/stiffness  Psychiatric: no depression or excessive stress  Neurological: no sensory or  "motor loss, no syncope  Hematologic: no anemia, easy bruising or bleeding  Endocrine: no thyroid problems, nor heat or cold intolerance       PHYSICAL EXAM:   /82 (BP Location: Left arm, Patient Position: Sitting)   Pulse 60   Ht 175.3 cm (69\")   Wt 88.6 kg (195 lb 6.4 oz)   BMI 28.86 kg/m²      Wt Readings from Last 5 Encounters:   10/16/18 88.6 kg (195 lb 6.4 oz)   09/19/18 86.2 kg (190 lb)   05/24/18 84.4 kg (186 lb)   11/09/17 88 kg (194 lb)   04/27/17 88.5 kg (195 lb 3.2 oz)     BP Readings from Last 5 Encounters:   10/16/18 178/82   09/19/18 119/58   05/24/18 112/76   11/09/17 108/62   04/27/17 150/74       General-Well Nourished, Well developed. Pleasant male in NAD   Eyes - PERRLA  Neck- supple, No mass  CV- regular rate and rhythm, no MRG  Lung- clear bilaterally  Abd- soft, +BS  Musc/skel - Norm strength and range of motion  Skin- warm and dry  Neuro - Alert & Oriented x 3, appropriate mood.    Medical problems and test results were reviewed with the patient today.     Results for orders placed or performed during the hospital encounter of 09/20/16   Comprehensive Metabolic Panel   Result Value Ref Range    Glucose 106 (H) 70 - 100 mg/dL    BUN 16 9 - 23 mg/dL    Creatinine 1.00 0.60 - 1.30 mg/dL    Sodium 139 132 - 146 mmol/L    Potassium 4.4 3.5 - 5.5 mmol/L    Chloride 104 99 - 109 mmol/L    CO2 24.0 20.0 - 31.0 mmol/L    Calcium 9.5 8.7 - 10.4 mg/dL    Total Protein 6.8 5.7 - 8.2 g/dL    Albumin 4.20 3.20 - 4.80 g/dL    ALT (SGPT) 22 7 - 40 U/L    AST (SGOT) 33 0 - 33 U/L    Alkaline Phosphatase 83 25 - 100 U/L    Total Bilirubin 1.0 0.3 - 1.2 mg/dL    eGFR Non African Amer 73 >60 mL/min/1.73    Globulin 2.6 gm/dL    A/G Ratio 1.6 g/dL    BUN/Creatinine Ratio 16.0 7.0 - 25.0    Anion Gap 11.0 3.0 - 11.0 mmol/L   Lipase   Result Value Ref Range    Lipase 34 6 - 51 U/L   BNP   Result Value Ref Range    .0 (H) 0.0 - 100.0 pg/mL   CBC Auto Differential   Result Value Ref Range    WBC 7.96 " 3.50 - 10.80 10*3/mm3    RBC 4.73 4.20 - 5.76 10*6/mm3    Hemoglobin 14.5 13.1 - 17.5 g/dL    Hematocrit 42.8 38.9 - 50.9 %    MCV 90.5 80.0 - 99.0 fL    MCH 30.7 27.0 - 31.0 pg    MCHC 33.9 32.0 - 36.0 g/dL    RDW 13.6 11.3 - 14.5 %    RDW-SD 44.7 37.0 - 54.0 fl    MPV 10.1 6.0 - 12.0 fL    Platelets 196 150 - 450 10*3/mm3    Neutrophil % 63.4 41.0 - 71.0 %    Lymphocyte % 22.0 (L) 24.0 - 44.0 %    Monocyte % 11.6 0.0 - 12.0 %    Eosinophil % 2.3 0.0 - 3.0 %    Basophil % 0.3 0.0 - 1.0 %    Immature Grans % 0.4 0.0 - 0.6 %    Neutrophils, Absolute 5.06 1.50 - 8.30 10*3/mm3    Lymphocytes, Absolute 1.75 0.60 - 4.80 10*3/mm3    Monocytes, Absolute 0.92 0.00 - 1.00 10*3/mm3    Eosinophils, Absolute 0.18 0.10 - 0.30 10*3/mm3    Basophils, Absolute 0.02 0.00 - 0.20 10*3/mm3    Immature Grans, Absolute 0.03 0.00 - 0.03 10*3/mm3   POC Troponin, Rapid   Result Value Ref Range    Troponin I 0.01 0.00 - 0.60 ng/mL   POC Troponin, Rapid   Result Value Ref Range    Troponin I 0.01 0.00 - 0.60 ng/mL   Light Blue Top   Result Value Ref Range    Extra Tube hold for add-on    Green Top (Gel)   Result Value Ref Range    Extra Tube Hold for add-ons.    Lavender Top   Result Value Ref Range    Extra Tube hold for add-on    Gold Top - SST   Result Value Ref Range    Extra Tube Hold for add-ons.          Lab Results   Component Value Date    HDL 48 07/01/2015    LDL 83 07/01/2015       EKG:  (EKG/Tracing has been independently visualized by me and summarized below)      ECG 12 Lead  Date/Time: 10/16/2018 2:18 PM  Performed by: ОЛЕГ MCLAIN  Authorized by: ОЛЕГ MCLAIN   Rhythm: sinus rhythm  Rate: normal  BPM: 60  Conduction: incomplete LBBB  ST Segments: ST segments normal  T Waves: T waves normal  QRS axis: normal  Other: no other findings              ASSESSMENT and PLAN    1. Symptomatic Bradycardia/Tachybrady syndrome- recent monitor revealing bradycardia and junctional rhythm and patient is symptomatic. Normal LVEF.  Will plan for dual chamber pacemaker implant next week.     2. PAF- controlled on Flecainide. Will plan to start beta blocker following device implant.     No Follow-up on file.    Scribed for Harley Dacosta MD by Olga Vila PA-C. 10/16/2018  2:18 PM    IHarley MD, personally performed the services described in this documentation as scribed by the above named individual in my presence, and it is both accurate and complete. At 2:24 PM on 10/16/2018    Harley Dacosta M.D., F.A.C.C, F.H.R.S.  Cardiology/Electrophysiology  10/16/18  2:18 PM

## 2018-10-16 NOTE — DISCHARGE INSTRUCTIONS

## 2018-10-16 NOTE — PAT
Patient to apply Chlorhexadine wipes  to surgical area (as instructed) the night before procedure and the AM of procedure. Wipes provided.    Patient reports instructed by MD to stop taking Eliquis on 10/22. Instructed to bring all medications in original bottle on day of procedure. Verbalized understanding.

## 2018-10-24 ENCOUNTER — HOSPITAL ENCOUNTER (OUTPATIENT)
Facility: HOSPITAL | Age: 78
Discharge: HOME | End: 2018-10-25
Attending: INTERNAL MEDICINE | Admitting: INTERNAL MEDICINE

## 2018-10-24 DIAGNOSIS — I48.0 PAROXYSMAL ATRIAL FIBRILLATION (HCC): ICD-10-CM

## 2018-10-24 PROCEDURE — 94799 UNLISTED PULMONARY SVC/PX: CPT

## 2018-10-24 PROCEDURE — 25010000002 FENTANYL CITRATE (PF) 100 MCG/2ML SOLUTION: Performed by: INTERNAL MEDICINE

## 2018-10-24 PROCEDURE — A9270 NON-COVERED ITEM OR SERVICE: HCPCS | Performed by: PHYSICIAN ASSISTANT

## 2018-10-24 PROCEDURE — C1892 INTRO/SHEATH,FIXED,PEEL-AWAY: HCPCS | Performed by: INTERNAL MEDICINE

## 2018-10-24 PROCEDURE — C1898 LEAD, PMKR, OTHER THAN TRANS: HCPCS | Performed by: INTERNAL MEDICINE

## 2018-10-24 PROCEDURE — 25010000002 MIDAZOLAM PER 1 MG: Performed by: INTERNAL MEDICINE

## 2018-10-24 PROCEDURE — A9270 NON-COVERED ITEM OR SERVICE: HCPCS | Performed by: INTERNAL MEDICINE

## 2018-10-24 PROCEDURE — 99152 MOD SED SAME PHYS/QHP 5/>YRS: CPT | Performed by: INTERNAL MEDICINE

## 2018-10-24 PROCEDURE — S0260 H&P FOR SURGERY: HCPCS | Performed by: INTERNAL MEDICINE

## 2018-10-24 PROCEDURE — 33208 INSRT HEART PM ATRIAL & VENT: CPT | Performed by: INTERNAL MEDICINE

## 2018-10-24 PROCEDURE — 94660 CPAP INITIATION&MGMT: CPT

## 2018-10-24 PROCEDURE — 63710000001 FLECAINIDE 50 MG TABLET: Performed by: PHYSICIAN ASSISTANT

## 2018-10-24 PROCEDURE — 63710000001 METOPROLOL TARTRATE 50 MG TABLET: Performed by: INTERNAL MEDICINE

## 2018-10-24 PROCEDURE — 25010000003 CEFAZOLIN IN DEXTROSE 2-4 GM/100ML-% SOLUTION: Performed by: INTERNAL MEDICINE

## 2018-10-24 PROCEDURE — C1785 PMKR, DUAL, RATE-RESP: HCPCS | Performed by: INTERNAL MEDICINE

## 2018-10-24 PROCEDURE — G0378 HOSPITAL OBSERVATION PER HR: HCPCS

## 2018-10-24 PROCEDURE — 25010000003 CEFAZOLIN IN DEXTROSE 2-4 GM/100ML-% SOLUTION: Performed by: PHYSICIAN ASSISTANT

## 2018-10-24 DEVICE — IMPLANTABLE DEVICE
Type: IMPLANTABLE DEVICE | Status: FUNCTIONAL
Brand: EDORA 8 DR-T

## 2018-10-24 DEVICE — LD PM SOLIA S 45: Type: IMPLANTABLE DEVICE | Status: FUNCTIONAL

## 2018-10-24 DEVICE — LD PM SETROX S53 350974: Type: IMPLANTABLE DEVICE | Status: FUNCTIONAL

## 2018-10-24 RX ORDER — SODIUM CHLORIDE 0.9 % (FLUSH) 0.9 %
3-10 SYRINGE (ML) INJECTION AS NEEDED
Status: DISCONTINUED | OUTPATIENT
Start: 2018-10-24 | End: 2018-10-24 | Stop reason: HOSPADM

## 2018-10-24 RX ORDER — SODIUM CHLORIDE 0.9 % (FLUSH) 0.9 %
1-10 SYRINGE (ML) INJECTION AS NEEDED
Status: DISCONTINUED | OUTPATIENT
Start: 2018-10-24 | End: 2018-10-25 | Stop reason: HOSPADM

## 2018-10-24 RX ORDER — CEFAZOLIN SODIUM 2 G/100ML
2 INJECTION, SOLUTION INTRAVENOUS EVERY 8 HOURS
Status: COMPLETED | OUTPATIENT
Start: 2018-10-24 | End: 2018-10-25

## 2018-10-24 RX ORDER — METOPROLOL TARTRATE 50 MG/1
50 TABLET, FILM COATED ORAL EVERY 12 HOURS SCHEDULED
Status: DISCONTINUED | OUTPATIENT
Start: 2018-10-24 | End: 2018-10-25 | Stop reason: HOSPADM

## 2018-10-24 RX ORDER — MIDAZOLAM HYDROCHLORIDE 1 MG/ML
INJECTION INTRAMUSCULAR; INTRAVENOUS AS NEEDED
Status: DISCONTINUED | OUTPATIENT
Start: 2018-10-24 | End: 2018-10-24 | Stop reason: HOSPADM

## 2018-10-24 RX ORDER — ACETAMINOPHEN 325 MG/1
650 TABLET ORAL EVERY 4 HOURS PRN
Status: DISCONTINUED | OUTPATIENT
Start: 2018-10-24 | End: 2018-10-25 | Stop reason: HOSPADM

## 2018-10-24 RX ORDER — OXYCODONE HYDROCHLORIDE AND ACETAMINOPHEN 5; 325 MG/1; MG/1
1 TABLET ORAL EVERY 4 HOURS PRN
Status: DISCONTINUED | OUTPATIENT
Start: 2018-10-24 | End: 2018-10-25 | Stop reason: HOSPADM

## 2018-10-24 RX ORDER — FENTANYL CITRATE 50 UG/ML
INJECTION, SOLUTION INTRAMUSCULAR; INTRAVENOUS AS NEEDED
Status: DISCONTINUED | OUTPATIENT
Start: 2018-10-24 | End: 2018-10-24 | Stop reason: HOSPADM

## 2018-10-24 RX ORDER — OXYCODONE AND ACETAMINOPHEN 7.5; 325 MG/1; MG/1
2 TABLET ORAL EVERY 4 HOURS PRN
Status: DISCONTINUED | OUTPATIENT
Start: 2018-10-24 | End: 2018-10-25 | Stop reason: HOSPADM

## 2018-10-24 RX ORDER — TEMAZEPAM 15 MG/1
15 CAPSULE ORAL NIGHTLY PRN
Status: DISCONTINUED | OUTPATIENT
Start: 2018-10-24 | End: 2018-10-25 | Stop reason: HOSPADM

## 2018-10-24 RX ORDER — SODIUM CHLORIDE 9 MG/ML
1 INJECTION, SOLUTION INTRAVENOUS CONTINUOUS
Status: DISCONTINUED | OUTPATIENT
Start: 2018-10-24 | End: 2018-10-24

## 2018-10-24 RX ORDER — ONDANSETRON 2 MG/ML
4 INJECTION INTRAMUSCULAR; INTRAVENOUS EVERY 6 HOURS PRN
Status: DISCONTINUED | OUTPATIENT
Start: 2018-10-24 | End: 2018-10-25 | Stop reason: HOSPADM

## 2018-10-24 RX ORDER — LIDOCAINE HYDROCHLORIDE AND EPINEPHRINE 10; 10 MG/ML; UG/ML
INJECTION, SOLUTION INFILTRATION; PERINEURAL AS NEEDED
Status: DISCONTINUED | OUTPATIENT
Start: 2018-10-24 | End: 2018-10-24 | Stop reason: HOSPADM

## 2018-10-24 RX ORDER — BUPIVACAINE HYDROCHLORIDE 5 MG/ML
INJECTION, SOLUTION EPIDURAL; INTRACAUDAL AS NEEDED
Status: DISCONTINUED | OUTPATIENT
Start: 2018-10-24 | End: 2018-10-24 | Stop reason: HOSPADM

## 2018-10-24 RX ORDER — SODIUM CHLORIDE 0.9 % (FLUSH) 0.9 %
3 SYRINGE (ML) INJECTION EVERY 12 HOURS SCHEDULED
Status: DISCONTINUED | OUTPATIENT
Start: 2018-10-24 | End: 2018-10-24 | Stop reason: HOSPADM

## 2018-10-24 RX ORDER — SODIUM CHLORIDE 9 MG/ML
INJECTION, SOLUTION INTRAVENOUS CONTINUOUS PRN
Status: COMPLETED | OUTPATIENT
Start: 2018-10-24 | End: 2018-10-24

## 2018-10-24 RX ORDER — METOPROLOL TARTRATE 5 MG/5ML
INJECTION INTRAVENOUS AS NEEDED
Status: DISCONTINUED | OUTPATIENT
Start: 2018-10-24 | End: 2018-10-24 | Stop reason: HOSPADM

## 2018-10-24 RX ORDER — FLECAINIDE ACETATE 50 MG/1
50 TABLET ORAL EVERY 12 HOURS SCHEDULED
Status: DISCONTINUED | OUTPATIENT
Start: 2018-10-24 | End: 2018-10-25 | Stop reason: HOSPADM

## 2018-10-24 RX ORDER — ACETAMINOPHEN 650 MG/1
650 SUPPOSITORY RECTAL EVERY 4 HOURS PRN
Status: DISCONTINUED | OUTPATIENT
Start: 2018-10-24 | End: 2018-10-25 | Stop reason: HOSPADM

## 2018-10-24 RX ORDER — SODIUM CHLORIDE 0.9 % (FLUSH) 0.9 %
3 SYRINGE (ML) INJECTION EVERY 12 HOURS SCHEDULED
Status: DISCONTINUED | OUTPATIENT
Start: 2018-10-24 | End: 2018-10-25 | Stop reason: HOSPADM

## 2018-10-24 RX ORDER — CEFAZOLIN SODIUM 2 G/100ML
2 INJECTION, SOLUTION INTRAVENOUS ONCE
Status: COMPLETED | OUTPATIENT
Start: 2018-10-24 | End: 2018-10-25

## 2018-10-24 RX ORDER — ACETAMINOPHEN 160 MG/5ML
650 SOLUTION ORAL EVERY 4 HOURS PRN
Status: DISCONTINUED | OUTPATIENT
Start: 2018-10-24 | End: 2018-10-25 | Stop reason: HOSPADM

## 2018-10-24 RX ADMIN — FLECAINIDE ACETATE 50 MG: 50 TABLET ORAL at 19:59

## 2018-10-24 RX ADMIN — METOPROLOL TARTRATE 50 MG: 50 TABLET ORAL at 22:50

## 2018-10-24 RX ADMIN — CEFAZOLIN SODIUM 2 G: 2 INJECTION, SOLUTION INTRAVENOUS at 13:01

## 2018-10-24 RX ADMIN — CEFAZOLIN SODIUM 2 G: 2 INJECTION, SOLUTION INTRAVENOUS at 22:50

## 2018-10-24 RX ADMIN — SODIUM CHLORIDE 1 ML/KG/HR: 9 INJECTION, SOLUTION INTRAVENOUS at 10:36

## 2018-10-24 NOTE — PROCEDURES
PRE-ELECTROPHYSIOLOGY STUDY DIAGNOSES:  1. Bradycardia due to nonreversible symptomatic sinus node dysfunction with HR<60 BPM    PROCEDURE PERFORMED:  1. Insertion of a Biotronik MRI DDDR pacemaker.  2. Moderate sedation    Anesthesia: Cath lab moderate sedation    I was present with the patient for the duration of moderate sedation and supervised staff who had no other duties and monitored the patient for the entire procedure     Name of independent trained observer: Guillermina Chamberlain RN  Intra-Service start time: 1301  Intra-Service end time: 1349    Estimated Blood Loss: Less than 10 mL     Specimens: None     PROCEDURE IN DETAIL: The patient was brought into the EP lab in a fasting  state. The left shoulder was prepped and draped in the usual sterile  fashion. Skin anesthetized with lidocaine with epinephrine. Incision made  in the region of the deltopectoral groove. Pocket was made for the  pacemaker. Access obtained in the left subclavian vein via  the Seldinger technique x 2 over which 2 separate guidewires were placed.  Over the first guidewire, a 7-St Lucian sheath was placed through which a  Biotronik right ventricle lead, model Setrox , 53 cm was placed. The lead  achieved the following values: R waves 11.5mV, threshold  was 0.7 V at 0.5 msec pulse width. This lead was then secured to the  pectoral fascia with 0 Ti-Cron x2. Over the second guidewire, a 7-St Lucian sheath was placed through which a Biotronik right  atrial lead, model Solia , 45 cm was placed, which achieved the following  values: P waves were 5.0mV, threshold was 1.2 volt at 0.4msec pulse width.  This lead was then secured to the pectoral fascia with 0 Ti-Cron x2.  Pocket was irrigated with triple antibiotic flush. The leads were  connected to a Biotronik pacemaker, model Edora  serial #[id number]. The leads and pacemaker were then placed  in the pocket area. Fascial layer was closed with 2-0 Vicryl, followed by  a next layer of 3-0 Vicryl, followed by  a superficial layer of staples.  The wound was dressed. The patient recovered from his sedation and  transferred from the lab in a stable condition.    IMPRESSION: Successful implantation of a Biotronik MRI pacemaker  for treatment of symptomatic sinus node dysfunction bradycardia    FOLLOW UP PLAN:  1. Wound check in 12-14 days for staple removal

## 2018-10-24 NOTE — H&P
Primary Cardiologist: Dr. Walls     Chief Complaint: afib/tachybrady syndrome      Subjective:     Patient is a 78 y.o. male who presents with tachybrady syndrome for dual chamber pacemaker implant.     History:     Patient is a 78 year old male with a history of PAF and PACs that presents today in consultations regarding abnormal monitor. He recently presented to Dr. Walls's office with complaints of increased SOB and fatigue. He was reporting rates frequently in 40s and 50s. He wore a monitor that revealed an average rate of 57bpm and a minimum of 35. He was also in a junctional rhythm at times. He stated this has all progressed over the last couple of months. Main issue is CALDERON and fatigue with trying to do daily activities. He has not had very many episodes of afib and is well controlled on Flecainide. He denies any issues with chest pain, palpitations, or orthopnea.         Cardiac PMH: (Old records have been reviewed and summarized below)     1. Atrial Fibrillation- diagnosed in 2015.   2. Bradycardia  3. LHC 6/2015- normal coronaries. EF 65%   4. Monitor 9/2018- avg 57, min 35, max 94. Junctional rhythm at times. No afib. Low rates predominately while sleeping.     Past Medical History:   Diagnosis Date   • Atrial fibrillation (CMS/HCC)    • Bradycardia    • Chronic hypertension     Probably essential   • Hypertension    • Hypertensive cardiovascular disease 07/2015    Recent progressive CCS class III-IV chest pain syndrome with normal EKG and exertional dyspnea and fatigue with normal codominant coronary arteries and preserved systolic  left ventricular function, July 2015.   • Intermittent palpitations 05/01/2014    Remote abnormal 24-hour Holter monitor demonstrating intermittent bradycardia with occasional PACs, May 2014.   • Lower extremity edema     Intermittent lower extremity edema felt secondary to antihypertensive therapy.    • Nephrolithiasis     Remote- 15 years ago   • MINO on CPAP    • MINO on  "CPAP     doesn't know setting    • Prostate troubles     enlarged   • Seasonal rhinitis     intermittent      Past Surgical History:   Procedure Laterality Date   • CARDIAC CATHETERIZATION      no stents   • COLONOSCOPY     • ENDOSCOPY        No Known Allergies  Social History   Substance Use Topics   • Smoking status: Former Smoker     Types: Cigarettes     Quit date: 1990   • Smokeless tobacco: Never Used   • Alcohol use No      FH:   Family History   Problem Relation Age of Onset   • Other Father         cardiac arrhythmia          Current Facility-Administered Medications:   •  ceFAZolin in dextrose (ANCEF) IVPB solution 2 g, 2 g, Intravenous, Once, Olga Vila PA  •  sodium chloride 0.9 % flush 3 mL, 3 mL, Intravenous, Q12H, Olga Vila PA  •  sodium chloride 0.9 % flush 3-10 mL, 3-10 mL, Intravenous, PRN, Olga Vila PA  •  sodium chloride 0.9 % infusion, 1 mL/kg/hr, Intravenous, Continuous, Harley Dacosta MD, Last Rate: 86.4 mL/hr at 10/24/18 1036, 1 mL/kg/hr at 10/24/18 1036    Review of Systems  Review of Systems:  General: no recent weight loss/gain, + weakness and fatigue  Skin: no rashes, lumps, or other skin changes  HEENT: no dizziness, lightheadedness, or vision changes  Respiratory: no cough or hemoptysis  Cardiovascular: + palpitations, and tachycardia  Gastrointestinal: no black/tarry stools or diarrhea  Urinary: no change in frequency or urgency  Peripheral Vascular: no claudication or leg cramps  Musculoskeletal: no muscle or joint pain/stiffness  Psychiatric: no depression or excessive stress  Neurological: no sensory or motor loss, no syncope  Hematologic: no anemia, easy bruising or bleeding  Endocrine: no thyroid problems, nor heat or cold intolerance        Objective:       BP (!) 190/85 (BP Location: Right arm, Patient Position: Lying)   Pulse 59   Temp 97.7 °F (36.5 °C) (Tympanic)   Resp 16   Ht 177.8 cm (70\")   Wt 86.4 kg (190 lb 7.6 oz)   BMI 27.33 kg/m²     No " intake/output data recorded.  No intake/output data recorded.    Physical Exam:  General-Well Nourished, Well developed  Eyes - PERRLA  Neck- supple, No mass  CV- regular rate and rhythm, no MRG  Lung- clear bilaterally  Abd- soft, +BS  Musc/skel - Norm strength and range of motion  Skin- warm and dry  Neuro - Alert & Oriented x 3, appropriate mood.      ECG: (I have reviewed the EKG/Tracing from today and listed the findings below) unchanged from previous tracings     Data Review:     Labs reviewed.       Assessment:     Paroxysmal atrial fibrillation (CMS/HCC)    Tachy-hannah syndrome (CMS/HCC)         Plan:   1. 1. Symptomatic Bradycardia/Tachybrady syndrome- recent monitor revealing bradycardia and junctional rhythm and patient is symptomatic. Normal LVEF. Will plan for dual chamber pacemaker implant. Risks, benefits, and alternatives have been discussed and patient wishes to proceed.      2. PAF- controlled on Flecainide. Will plan to start beta blocker following device implant.     Scribed for Harley Dacosta MD by Olga Vila PA-C. 10/24/2018  10:43 AM      Halrey Dacosta M.D., F.A.C.C, F.H.R.S.  Cardiology/Electrophysiology  10/24/2018  10:43 AM

## 2018-10-25 ENCOUNTER — APPOINTMENT (OUTPATIENT)
Dept: GENERAL RADIOLOGY | Facility: HOSPITAL | Age: 78
End: 2018-10-25

## 2018-10-25 VITALS
WEIGHT: 190.48 LBS | DIASTOLIC BLOOD PRESSURE: 85 MMHG | SYSTOLIC BLOOD PRESSURE: 152 MMHG | RESPIRATION RATE: 18 BRPM | HEIGHT: 70 IN | TEMPERATURE: 97.3 F | BODY MASS INDEX: 27.27 KG/M2 | HEART RATE: 69 BPM | OXYGEN SATURATION: 96 %

## 2018-10-25 PROCEDURE — 63710000001 FLECAINIDE 50 MG TABLET: Performed by: PHYSICIAN ASSISTANT

## 2018-10-25 PROCEDURE — 93005 ELECTROCARDIOGRAM TRACING: CPT | Performed by: INTERNAL MEDICINE

## 2018-10-25 PROCEDURE — 99217 PR OBSERVATION CARE DISCHARGE MANAGEMENT: CPT | Performed by: PHYSICIAN ASSISTANT

## 2018-10-25 PROCEDURE — 25010000003 CEFAZOLIN IN DEXTROSE 2-4 GM/100ML-% SOLUTION: Performed by: INTERNAL MEDICINE

## 2018-10-25 PROCEDURE — G0378 HOSPITAL OBSERVATION PER HR: HCPCS

## 2018-10-25 PROCEDURE — A9270 NON-COVERED ITEM OR SERVICE: HCPCS | Performed by: INTERNAL MEDICINE

## 2018-10-25 PROCEDURE — 63710000001 METOPROLOL TARTRATE 50 MG TABLET: Performed by: INTERNAL MEDICINE

## 2018-10-25 PROCEDURE — 71046 X-RAY EXAM CHEST 2 VIEWS: CPT

## 2018-10-25 PROCEDURE — 93010 ELECTROCARDIOGRAM REPORT: CPT | Performed by: INTERNAL MEDICINE

## 2018-10-25 PROCEDURE — 63710000001 ACETAMINOPHEN 325 MG TABLET: Performed by: INTERNAL MEDICINE

## 2018-10-25 PROCEDURE — 94799 UNLISTED PULMONARY SVC/PX: CPT

## 2018-10-25 PROCEDURE — 94660 CPAP INITIATION&MGMT: CPT

## 2018-10-25 PROCEDURE — 93280 PM DEVICE PROGR EVAL DUAL: CPT | Performed by: INTERNAL MEDICINE

## 2018-10-25 PROCEDURE — A9270 NON-COVERED ITEM OR SERVICE: HCPCS | Performed by: PHYSICIAN ASSISTANT

## 2018-10-25 RX ORDER — CEPHALEXIN 500 MG/1
500 CAPSULE ORAL 3 TIMES DAILY
Qty: 9 CAPSULE | Refills: 0 | Status: SHIPPED | OUTPATIENT
Start: 2018-10-25 | End: 2018-10-28

## 2018-10-25 RX ORDER — METOPROLOL TARTRATE 50 MG/1
50 TABLET, FILM COATED ORAL EVERY 12 HOURS SCHEDULED
Qty: 60 TABLET | Refills: 11 | Status: SHIPPED | OUTPATIENT
Start: 2018-10-25 | End: 2019-01-29 | Stop reason: SDUPTHER

## 2018-10-25 RX ADMIN — CEFAZOLIN SODIUM 2 G: 2 INJECTION, SOLUTION INTRAVENOUS at 05:26

## 2018-10-25 RX ADMIN — FLECAINIDE ACETATE 50 MG: 50 TABLET ORAL at 08:58

## 2018-10-25 RX ADMIN — ACETAMINOPHEN 650 MG: 325 TABLET ORAL at 05:26

## 2018-10-25 RX ADMIN — METOPROLOL TARTRATE 50 MG: 50 TABLET ORAL at 08:58

## 2018-10-25 NOTE — DISCHARGE SUMMARY
Physician Discharge Summary     Patient ID:  Kai Dodson  [id number]  78 y.o.  1940    Admit date: 10/24/2018    Discharge date and time: 10/25/2018 10:14 AM     Admitting Physician: Harley Dacosta MD     Primary Physician: Derick Phillip MD    Discharge Physician: ISAC Stoll    Admission Diagnoses: Paroxysmal atrial fibrillation (CMS/HCC) [I48.0]  Paroxysmal atrial fibrillation (CMS/HCC) [I48.0]    Discharge Diagnoses:   Patient Active Problem List    Diagnosis   • *Paroxysmal atrial fibrillation (CMS/HCC) [I48.0]   • Tachy-hannah syndrome (CMS/HCC) [I49.5]   • Dyspnea on exertion [R06.09]   • Abnormal thyroid stimulating hormone level [R79.89]   • Atrial flutter (CMS/HCC) [I48.92]   • PAF (paroxysmal atrial fibrillation) (CMS/HCC) [I48.0]   • Sinus bradycardia [R00.1]   • Hypertensive cardiovascular disease [I11.9]   • Intermittent palpitations [R00.2]   • MINO on CPAP [G47.33, Z99.89]   • Seasonal rhinitis [J30.2]   • Chronic hypertension [I10]   • Lower extremity edema [R60.0]       Cardiology Procedures this admission:    1. Insertion of Biotronik MRI DDDR pacemaker     Hospital Course: Patient had cardiac device placement. Post device CXR and device check were stable. Please see operation report for full implant details.    Discharge Exam:    Vitals:    10/25/18 0900   BP: 152/85   Pulse: 69   Resp:    Temp:    SpO2: 96%      General-Well Nourished, Well developed  Eyes - PERRLA  Neck- supple, No mass  CV- regular rate and rhythm, no MRG, No edema  Lung- clear bilaterally  Abd- soft, +BS  Musc/skel - Norm strength and range of motion  Skin- warm and dry  Neuro - Alert & Oriented x 3, appropriate mood.    Disposition: Patient will be discharged home    Patient discharge medications:      Your medication list      START taking these medications      Instructions Last Dose Given Next Dose Due   cephalexin 500 MG capsule  Commonly known as:  KEFLEX      Take 1 capsule by mouth 3  (Three) Times a Day for 3 days.       metoprolol tartrate 50 MG tablet  Commonly known as:  LOPRESSOR      Take 1 tablet by mouth Every 12 (Twelve) Hours.          CHANGE how you take these medications      Instructions Last Dose Given Next Dose Due   telmisartan-hydrochlorothiazide 80-25 MG per tablet  Commonly known as:  MICARDIS HCT  What changed:  when to take this      Take 0.5 tablets by mouth Daily.          CONTINUE taking these medications      Instructions Last Dose Given Next Dose Due   ELIQUIS 5 MG tablet tablet  Generic drug:  apixaban      Take 5 mg by mouth Every 12 (Twelve) Hours.       flecainide 50 MG tablet  Commonly known as:  TAMBOCOR      Take 1 tablet by mouth 2 (Two) Times a Day.       MULTIVITAMIN ADULT PO      Take 1 tablet by mouth As Needed.       VITAMIN B 12 PO      Take 1,000 mcg by mouth As Needed.             Where to Get Your Medications      These medications were sent to [address] [address] - [phone]  - [phone] [address] [facility] [address]    Phone:  [phone]   · cephalexin 500 MG capsule  · metoprolol tartrate 50 MG tablet         Referenced discharge instructions provided by nursing for diet and activity.    Follow-up with Pacemaker/[facility] Clinic in 12 to 14 days      Signed:  ISAC Stoll  10/26/2018  7:26 AM

## 2018-10-26 ENCOUNTER — CLINICAL SUPPORT NO REQUIREMENTS (OUTPATIENT)
Dept: CARDIOLOGY | Facility: CLINIC | Age: 78
End: 2018-10-26

## 2018-10-26 DIAGNOSIS — R00.1 SINUS BRADYCARDIA: Primary | ICD-10-CM

## 2018-11-07 ENCOUNTER — OFFICE VISIT (OUTPATIENT)
Dept: CARDIOLOGY | Facility: CLINIC | Age: 78
End: 2018-11-07

## 2018-11-07 DIAGNOSIS — Z95.0 CARDIAC PACEMAKER IN SITU: Primary | ICD-10-CM

## 2018-11-07 PROCEDURE — 99024 POSTOP FOLLOW-UP VISIT: CPT | Performed by: INTERNAL MEDICINE

## 2018-11-07 PROCEDURE — 93280 PM DEVICE PROGR EVAL DUAL: CPT | Performed by: INTERNAL MEDICINE

## 2018-11-07 NOTE — PROGRESS NOTES
2018    Kai Dodson, : 1940    WOUND CHECK      Patient has fever: [] YES   [x] NO     Temperature if indicated:       Wound Location:  Left shoulder      Dressing was:  Removed       Old Dressing Appearance:  Old, bloody drainage        Wound Appearance:  Incision well-approximated with no signs or symptoms of infection        Gloves used, staples removed without diffuculty, wound cleansed with alcohol       Incision dresssed with triple antibiotic ointment, 4x4, and tegaderm with patient to remove in 3 days.  Verbal understanding from patient       Device was: Interrogated - Please see separate report        Plan:  Normal wound check      Appointment for follow-up scheduled for 3 months post procedure [x]    Future Appointments  Date Time Provider Department Center   2019 10:00 AM Harley Dacosta MD [facility] None   3/28/2019 1:45 PM Misael Walls MD [facility] None           Ramonita De León RN, 18

## 2018-11-20 RX ORDER — FLECAINIDE ACETATE 50 MG/1
TABLET ORAL
Qty: 60 TABLET | Refills: 6 | Status: SHIPPED | OUTPATIENT
Start: 2018-11-20 | End: 2019-07-11

## 2018-11-21 RX ORDER — FLECAINIDE ACETATE 50 MG/1
TABLET ORAL
Qty: 60 TABLET | Refills: 6 | Status: SHIPPED | OUTPATIENT
Start: 2018-11-21 | End: 2019-01-22

## 2019-01-16 ENCOUNTER — TELEPHONE (OUTPATIENT)
Dept: CARDIOLOGY | Facility: CLINIC | Age: 79
End: 2019-01-16

## 2019-01-16 NOTE — TELEPHONE ENCOUNTER
"Pt phone - [phone] or [phone]  Pt calls to request earlier appt with TALITA or Praneeth regarding \"just not feeling right\" since PPM placed in 10/2018. He describes pain in his chest that he has never had before. Sometimes it is painful at site and he describes a \"kick\" sensation that causes him to shake at times. No issues per remote transmission with high VR, etc.     See Praneeth or PA?   Per [facility] normal coronaries 3 years ago, able to move appt to 1/22/19 with Praneeth and pt aware and agreeable. KH  "

## 2019-01-22 ENCOUNTER — OFFICE VISIT (OUTPATIENT)
Dept: CARDIOLOGY | Facility: CLINIC | Age: 79
End: 2019-01-22

## 2019-01-22 VITALS
HEART RATE: 78 BPM | BODY MASS INDEX: 28.29 KG/M2 | SYSTOLIC BLOOD PRESSURE: 138 MMHG | WEIGHT: 197.6 LBS | DIASTOLIC BLOOD PRESSURE: 92 MMHG | OXYGEN SATURATION: 95 % | HEIGHT: 70 IN

## 2019-01-22 DIAGNOSIS — I49.5 TACHY-BRADY SYNDROME (HCC): ICD-10-CM

## 2019-01-22 DIAGNOSIS — I48.0 PAROXYSMAL ATRIAL FIBRILLATION (HCC): Primary | ICD-10-CM

## 2019-01-22 DIAGNOSIS — R00.1 SINUS BRADYCARDIA: ICD-10-CM

## 2019-01-22 PROCEDURE — 99213 OFFICE O/P EST LOW 20 MIN: CPT | Performed by: INTERNAL MEDICINE

## 2019-01-22 PROCEDURE — 93280 PM DEVICE PROGR EVAL DUAL: CPT | Performed by: INTERNAL MEDICINE

## 2019-01-22 NOTE — PROGRESS NOTES
Kai Dodson  1940  PCP: Derick Phillip MD    SUBJECTIVE:   Kai Dodson is a 78 y.o. male seen for a consultation visit regarding the following:     Chief Complaint:   Chief Complaint   Patient presents with   • Atrial Fibrillation   • Shortness of Breath        HPI:  Patient today on a work in basis. Having some palp and chest pain.    History:    Patient is a 78 year old male with a history of PAF and PACs that presents today in consultations regarding abnormal monitor. He recently presented to Dr. Walls's office with complaints of increased SOB and fatigue. He was reporting rates frequently in 40s and 50s. He wore a monitor that revealed an average rate of 57bpm and a minimum of 35. He was also in a junctional rhythm at times. He stated this has all progressed over the last couple of months. Main issue is CALDERON and fatigue with trying to do daily activities. He has not had very many episodes of afib and is well controlled on Flecainide. He denies any issues with chest pain, palpitations, or orthopnea.       Cardiac PMH: (Old records have been reviewed and summarized below)    1. Atrial Fibrillation- diagnosed in 2015.   2. Bradycardia  3. LHC 6/2015- normal coronaries. EF 65%   4. Monitor 9/2018- avg 57, min 35, max 94. Junctional rhythm at times. No afib. Low rates predominately while sleeping.       Past Medical History, Past Surgical History, Family history, Social History, and Medications were all reviewed with the patient today and updated as necessary.       Current Outpatient Medications:   •  apixaban (ELIQUIS) 5 MG tablet tablet, Take 5 mg by mouth Every 12 (Twelve) Hours., Disp: , Rfl:   •  Cyanocobalamin (VITAMIN B 12 PO), Take 1,000 mcg by mouth As Needed., Disp: , Rfl:   •  flecainide (TAMBOCOR) 50 MG tablet, take 1 tablet by mouth twice a day, Disp: 60 tablet, Rfl: 6  •  metoprolol tartrate (LOPRESSOR) 50 MG tablet, Take 1 tablet by mouth Every 12 (Twelve) Hours., Disp: 60  tablet, Rfl: 11  •  Multiple Vitamins-Minerals (MULTIVITAMIN ADULT PO), Take 1 tablet by mouth As Needed., Disp: , Rfl:   •  telmisartan-hydrochlorothiazide (MICARDIS HCT) 80-25 MG per tablet, Take 0.5 tablets by mouth Daily., Disp: 90 tablet, Rfl: 3    No Known Allergies      Past Medical History:   Diagnosis Date   • Atrial fibrillation (CMS/HCC)    • Bradycardia    • Chronic hypertension     Probably essential   • Hypertension    • Hypertensive cardiovascular disease 2015    Recent progressive CCS class III-IV chest pain syndrome with normal EKG and exertional dyspnea and fatigue with normal codominant coronary arteries and preserved systolic  left ventricular function, 2015.   • Intermittent palpitations 2014    Remote abnormal 24-hour Holter monitor demonstrating intermittent bradycardia with occasional PACs, May 2014.   • Lower extremity edema     Intermittent lower extremity edema felt secondary to antihypertensive therapy.    • Nephrolithiasis     Remote- 15 years ago   • MINO on CPAP    • MINO on CPAP     doesn't know setting    • Prostate troubles     enlarged   • Seasonal rhinitis     intermittent     Past Surgical History:   Procedure Laterality Date   • CARDIAC CATHETERIZATION      no stents   • CARDIAC ELECTROPHYSIOLOGY PROCEDURE N/A 10/24/2018    Procedure: Pacemaker DC new with Biotronik. Hold Eliquis one day prior.;  Surgeon: Harley Dacosta MD;  Location: [facility] INVASIVE LOC[facility];  Service: Cardiology   • COLONOSCOPY     • ENDOSCOPY       Family History   Problem Relation Age of Onset   • Other Father         cardiac arrhythmia     Social History     Tobacco Use   • Smoking status: Former Smoker     Packs/day: 1.00     Types: Cigarettes     Last attempt to quit: 1990     Years since quittin.0   • Smokeless tobacco: Never Used   Substance Use Topics   • Alcohol use: No            PHYSICAL EXAM:   /92 (BP Location: Left arm, Patient Position: Sitting)   Pulse 78   Ht  "177.8 cm (70\")   Wt 89.6 kg (197 lb 9.6 oz)   SpO2 95%   BMI 28.35 kg/m²      Wt Readings from Last 5 Encounters:   01/22/19 89.6 kg (197 lb 9.6 oz)   10/24/18 86.4 kg (190 lb 7.6 oz)   10/16/18 88.6 kg (195 lb 6.4 oz)   09/19/18 86.2 kg (190 lb)   05/24/18 84.4 kg (186 lb)     BP Readings from Last 5 Encounters:   01/22/19 138/92   10/25/18 152/85   10/16/18 178/82   09/19/18 119/58   05/24/18 112/76       General-Well Nourished, Well developed. Pleasant male in NAD   Eyes - PERRLA  Neck- supple, No mass  CV- regular rate and rhythm, no MRG  Lung- clear bilaterally  Abd- soft, +BS  Musc/skel - Norm strength and range of motion  Skin- warm and dry  Neuro - Alert & Oriented x 3, appropriate mood.    Medical problems and test results were reviewed with the patient today.     Results for orders placed or performed in visit on 10/16/18   CBC (No Diff)   Result Value Ref Range    WBC 5.53 3.50 - 10.80 10*3/mm3    RBC 4.66 4.20 - 5.76 10*6/mm3    Hemoglobin 14.1 13.1 - 17.5 g/dL    Hematocrit 43.5 38.9 - 50.9 %    MCV 93.3 80.0 - 99.0 fL    MCH 30.3 27.0 - 31.0 pg    MCHC 32.4 32.0 - 36.0 g/dL    RDW 13.5 11.3 - 14.5 %    RDW-SD 46.0 37.0 - 54.0 fl    MPV 10.1 6.0 - 12.0 fL    Platelets 161 150 - 450 10*3/mm3   Comprehensive Metabolic Panel   Result Value Ref Range    Glucose 108 (H) 70 - 100 mg/dL    BUN 17 9 - 23 mg/dL    Creatinine 1.07 0.60 - 1.30 mg/dL    Sodium 142 132 - 146 mmol/L    Potassium 4.0 3.5 - 5.5 mmol/L    Chloride 105 99 - 109 mmol/L    CO2 32.0 (H) 20.0 - 31.0 mmol/L    Calcium 9.1 8.7 - 10.4 mg/dL    Total Protein 6.0 5.7 - 8.2 g/dL    Albumin 4.24 3.20 - 4.80 g/dL    ALT (SGPT) 24 7 - 40 U/L    AST (SGOT) 23 0 - 33 U/L    Alkaline Phosphatase 82 25 - 100 U/L    Total Bilirubin 1.3 (H) 0.3 - 1.2 mg/dL    eGFR Non African Amer 67 >60 mL/min/1.73    Globulin 1.8 gm/dL    A/G Ratio 2.4 1.5 - 2.5 g/dL    BUN/Creatinine Ratio 15.9 7.0 - 25.0    Anion Gap 5.0 3.0 - 11.0 mmol/L   Magnesium   Result Value " Ref Range    Magnesium 1.9 1.3 - 2.7 mg/dL         Lab Results   Component Value Date    HDL 48 07/01/2015    LDL 83 07/01/2015       EKG:  (EKG/Tracing has been independently visualized by me and summarized below)      ECG 12 Lead  Date/Time: 1/22/2019 10:40 AM  Performed by: Harley Dacosta MD  Authorized by: Harley Dacosta MD   Previous ECG: no previous ECG available  Rhythm: sinus rhythm and paced  Rate: normal  BPM: 78  Clinical impression: normal ECG            ASSESSMENT and PLAN    1. Symptomatic Bradycardia/Tachybrady syndrome- Normal LVEF. Post dual chamber pacemaker .     2. PAF- controlled on Flecainide. Started beta blocker following device implant.     3. Palp - No events seen on pacemaker    4. Chest pain - Negative cath in the past. Will adjust pacemaker rates to see if symptoms improve.     Return for Next scheduled follow up.      Harley Dacosta M.D., F.A.C.C, F.H.R.S.  Cardiology/Electrophysiology  01/22/19  10:43 AM

## 2019-01-29 ENCOUNTER — OFFICE VISIT (OUTPATIENT)
Dept: CARDIOLOGY | Facility: CLINIC | Age: 79
End: 2019-01-29

## 2019-01-29 VITALS
DIASTOLIC BLOOD PRESSURE: 78 MMHG | SYSTOLIC BLOOD PRESSURE: 114 MMHG | HEART RATE: 63 BPM | BODY MASS INDEX: 28.49 KG/M2 | WEIGHT: 199 LBS | HEIGHT: 70 IN

## 2019-01-29 DIAGNOSIS — I48.0 PAF (PAROXYSMAL ATRIAL FIBRILLATION) (HCC): Primary | ICD-10-CM

## 2019-01-29 DIAGNOSIS — Z79.899 LONG TERM CURRENT USE OF ANTIARRHYTHMIC MEDICAL THERAPY: ICD-10-CM

## 2019-01-29 DIAGNOSIS — I49.5 TACHY-BRADY SYNDROME (HCC): ICD-10-CM

## 2019-01-29 PROCEDURE — 93280 PM DEVICE PROGR EVAL DUAL: CPT | Performed by: INTERNAL MEDICINE

## 2019-01-29 PROCEDURE — 99213 OFFICE O/P EST LOW 20 MIN: CPT | Performed by: INTERNAL MEDICINE

## 2019-01-29 NOTE — PROGRESS NOTES
Kai Dodson  1940  PCP: Derick Phillip MD    SUBJECTIVE:   Kai Dodson is a 78 y.o. male seen for a consultation visit regarding the following:     Chief Complaint:   Chief Complaint   Patient presents with   • Atrial Fibrillation        HPI:  Patient reports feeling some better, but still having some palp and chest pain.    History:    Patient is a 78 year old male with a history of PAF and PACs that presents today in consultations regarding abnormal monitor. He recently presented to Dr. Walls's office with complaints of increased SOB and fatigue. He was reporting rates frequently in 40s and 50s. He wore a monitor that revealed an average rate of 57bpm and a minimum of 35. He was also in a junctional rhythm at times. He stated this has all progressed over the last couple of months. Main issue is CALDERON and fatigue with trying to do daily activities. He has not had very many episodes of afib and is well controlled on Flecainide. He denies any issues with chest pain, palpitations, or orthopnea.       Cardiac PMH: (Old records have been reviewed and summarized below)    1. Atrial Fibrillation- diagnosed in 2015.   2. Bradycardia  3. C 6/2015- normal coronaries. EF 65%   4. Monitor 9/2018- avg 57, min 35, max 94. Junctional rhythm at times. No afib. Low rates predominately while sleeping.       Past Medical History, Past Surgical History, Family history, Social History, and Medications were all reviewed with the patient today and updated as necessary.       Current Outpatient Medications:   •  apixaban (ELIQUIS) 5 MG tablet tablet, Take 5 mg by mouth Every 12 (Twelve) Hours., Disp: , Rfl:   •  Cyanocobalamin (VITAMIN B 12 PO), Take 1,000 mcg by mouth As Needed., Disp: , Rfl:   •  flecainide (TAMBOCOR) 50 MG tablet, take 1 tablet by mouth twice a day, Disp: 60 tablet, Rfl: 6  •  metoprolol tartrate (LOPRESSOR) 50 MG tablet, Take 1 tablet by mouth Every 12 (Twelve) Hours., Disp: 60 tablet, Rfl:  "11  •  Multiple Vitamins-Minerals (MULTIVITAMIN ADULT PO), Take 1 tablet by mouth As Needed., Disp: , Rfl:   •  telmisartan-hydrochlorothiazide (MICARDIS HCT) 80-25 MG per tablet, Take 0.5 tablets by mouth Daily., Disp: 90 tablet, Rfl: 3    No Known Allergies      Past Medical History:   Diagnosis Date   • Atrial fibrillation (CMS/HCC)    • Bradycardia    • Chronic hypertension     Probably essential   • Hypertension    • Hypertensive cardiovascular disease 2015    Recent progressive CCS class III-IV chest pain syndrome with normal EKG and exertional dyspnea and fatigue with normal codominant coronary arteries and preserved systolic  left ventricular function, 2015.   • Intermittent palpitations 2014    Remote abnormal 24-hour Holter monitor demonstrating intermittent bradycardia with occasional PACs, May 2014.   • Lower extremity edema     Intermittent lower extremity edema felt secondary to antihypertensive therapy.    • Nephrolithiasis     Remote- 15 years ago   • MINO on CPAP    • MINO on CPAP     doesn't know setting    • Prostate troubles     enlarged   • Seasonal rhinitis     intermittent     Past Surgical History:   Procedure Laterality Date   • CARDIAC CATHETERIZATION      no stents   • CARDIAC ELECTROPHYSIOLOGY PROCEDURE N/A 10/24/2018    Procedure: Pacemaker DC new with Biotronik. Hold Eliquis one day prior.;  Surgeon: Harley Dacosta MD;  Location: [facility] INVASIVE LOCATION;  Service: Cardiology   • COLONOSCOPY     • ENDOSCOPY       Family History   Problem Relation Age of Onset   • Other Father         cardiac arrhythmia     Social History     Tobacco Use   • Smoking status: Former Smoker     Packs/day: 1.00     Types: Cigarettes     Last attempt to quit: 1990     Years since quittin.0   • Smokeless tobacco: Never Used   Substance Use Topics   • Alcohol use: No            PHYSICAL EXAM:   /78 (BP Location: Left arm, Patient Position: Sitting)   Pulse 63   Ht 177.8 cm (70\")  "  Wt 90.3 kg (199 lb)   BMI 28.55 kg/m²      Wt Readings from Last 5 Encounters:   01/29/19 90.3 kg (199 lb)   01/22/19 89.6 kg (197 lb 9.6 oz)   10/24/18 86.4 kg (190 lb 7.6 oz)   10/16/18 88.6 kg (195 lb 6.4 oz)   09/19/18 86.2 kg (190 lb)     BP Readings from Last 5 Encounters:   01/29/19 114/78   01/22/19 138/92   10/25/18 152/85   10/16/18 178/82   09/19/18 119/58       General-Well Nourished, Well developed. Pleasant male in NAD   Eyes - PERRLA  Neck- supple, No mass  CV- regular rate and rhythm, no MRG  Lung- clear bilaterally  Abd- soft, +BS  Musc/skel - Norm strength and range of motion  Skin- warm and dry  Neuro - Alert & Oriented x 3, appropriate mood.    Medical problems and test results were reviewed with the patient today.     Results for orders placed or performed in visit on 10/16/18   CBC (No Diff)   Result Value Ref Range    WBC 5.53 3.50 - 10.80 10*3/mm3    RBC 4.66 4.20 - 5.76 10*6/mm3    Hemoglobin 14.1 13.1 - 17.5 g/dL    Hematocrit 43.5 38.9 - 50.9 %    MCV 93.3 80.0 - 99.0 fL    MCH 30.3 27.0 - 31.0 pg    MCHC 32.4 32.0 - 36.0 g/dL    RDW 13.5 11.3 - 14.5 %    RDW-SD 46.0 37.0 - 54.0 fl    MPV 10.1 6.0 - 12.0 fL    Platelets 161 150 - 450 10*3/mm3   Comprehensive Metabolic Panel   Result Value Ref Range    Glucose 108 (H) 70 - 100 mg/dL    BUN 17 9 - 23 mg/dL    Creatinine 1.07 0.60 - 1.30 mg/dL    Sodium 142 132 - 146 mmol/L    Potassium 4.0 3.5 - 5.5 mmol/L    Chloride 105 99 - 109 mmol/L    CO2 32.0 (H) 20.0 - 31.0 mmol/L    Calcium 9.1 8.7 - 10.4 mg/dL    Total Protein 6.0 5.7 - 8.2 g/dL    Albumin 4.24 3.20 - 4.80 g/dL    ALT (SGPT) 24 7 - 40 U/L    AST (SGOT) 23 0 - 33 U/L    Alkaline Phosphatase 82 25 - 100 U/L    Total Bilirubin 1.3 (H) 0.3 - 1.2 mg/dL    eGFR Non African Amer 67 >60 mL/min/1.73    Globulin 1.8 gm/dL    A/G Ratio 2.4 1.5 - 2.5 g/dL    BUN/Creatinine Ratio 15.9 7.0 - 25.0    Anion Gap 5.0 3.0 - 11.0 mmol/L   Magnesium   Result Value Ref Range    Magnesium 1.9 1.3 -  2.7 mg/dL         Lab Results   Component Value Date    HDL 48 07/01/2015    LDL 83 07/01/2015       EKG:  (EKG/Tracing has been independently visualized by me and summarized below)      ECG 12 Lead  Date/Time: 1/29/2019 9:55 AM  Performed by: Harley Dacosta MD  Authorized by: Harley Dacosta MD   Rhythm: sinus rhythm and paced  Rate: normal  T flattening: V4, V5 and V6  Clinical impression: abnormal ECG            ASSESSMENT and PLAN    1. Symptomatic Bradycardia/Tachybrady syndrome- Normal LVEF. Post dual chamber pacemaker .     2. PAF- controlled on Flecainide. Started beta blocker following device implant.     3. Palp - No events seen on pacemaker    4. Chest pain - Negative cath in the past. Will adjust pacemaker rates to see if symptoms improve. Appears to be symptomatic from RV pacing. Will decrease metoprolol 25mg BID to help prevent RV pacing    Return in about 3 months (around 4/29/2019) for office visit and device check with [employer].      Harley Dacosta M.D., F.A.C.C, F.H.R.S.  Cardiology/Electrophysiology  01/29/19  9:59 AM

## 2019-03-05 ENCOUNTER — TELEPHONE (OUTPATIENT)
Dept: CARDIOLOGY | Facility: CLINIC | Age: 79
End: 2019-03-05

## 2019-03-05 NOTE — TELEPHONE ENCOUNTER
Per [facility] pt advised to stop Eliquis 24 hours prior to procedure.     Called Pt and sent Dr. Villanueva a letter.

## 2019-03-05 NOTE — TELEPHONE ENCOUNTER
Mr. Dodson is needing a tooth extraction.   Provider Dr. Kay Cortez DMD  March 13th  Needs to be off [employer] and they would like your recommendation for Eliquis.

## 2019-03-12 ENCOUNTER — CLINICAL SUPPORT NO REQUIREMENTS (OUTPATIENT)
Dept: CARDIOLOGY | Facility: CLINIC | Age: 79
End: 2019-03-12

## 2019-03-12 DIAGNOSIS — I48.0 PAROXYSMAL ATRIAL FIBRILLATION (HCC): ICD-10-CM

## 2019-03-12 PROCEDURE — 93294 REM INTERROG EVL PM/LDLS PM: CPT | Performed by: INTERNAL MEDICINE

## 2019-03-12 PROCEDURE — 93296 REM INTERROG EVL PM/IDS: CPT | Performed by: INTERNAL MEDICINE

## 2019-03-26 NOTE — PROGRESS NOTES
Subjective:     Encounter Date:03/28/2019 - [place] Office    Patient ID: Kai Dodson is a 78 y.o.  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     REFERRING PHYSICIAN/INTERNIST: Derick Phillip MD  ELECTROPHYSIOLOGIST: Harley Dacosta MD, [facility]  INTERVENTIONAL CARDIOLOGIST: Chris De La Vega MD, [facility]   SLEEP SPECIALIST: Judie Mc MD      Chief Complaint:   Chief Complaint   Patient presents with   • Atrial Fibrillation     Problem List:  1. Possible hypertensive cardiovascular disease:  a. Remote progressive CCS class III-IV chest pain syndrome with normal EKG and exertional dyspnea and fatigue with normal codominant coronary arteries and preserved systolic left ventricular function, July 2015.   b. Residual CCS class I chest pain syndrome with NYHA class II exertional dyspnea and fatigue.   c. Remote apparent atrial fibrillation/flutter with increasing dyspnea and [facility] ED evaluation with unremarkable laboratory studies including negative troponin and BNP 29 with normal portable chest x-ray with spontaneous rexsolution of atrial tachyarrhythmia and subsequent acceptable 30-day event recorder, data deficit (August 2016).  d. Residual CCS class I chest pain syndrome with NYHA class II exertional dyspnea and fatigue syndrome, 2018.  e. Recent CCS class III exertional chest pressure and dyspnea, March 2019.  2. Intermittent tachypalpitations:  a. Remote abnormal 24-hour Holter monitor demonstrating intermittent bradycardia with occasional PACs, May 2014.  b. Current abnormal 48-hour Holter monitor demonstrating occasional marked bradycardia with persistent PACs with acceptable combination Doppler echo with mild-to-moderate tricuspid regurgitation and an RVSP of 39 TORR, June 2014.  c. [facility] Emergency Department evaluation for episode of  "tachypalpitations. DMS Telemetry demonstrates atrial fibrillation/flutter, April 2015, with subsequent recorder demonstrating predominantly sinus rhythm with occasional PVCs with an elevated CHADS VASc score of 3 and initiation of anticoagulation with apixaban, spring 2015.   d. Remote Electrophysiology consultation and evaluation with acceptable followup, February 2016.  e. Recent increasing exertional dyspnea and fatigue with occasional presyncope and outpatient junctional rhythm with escape PACs, September 2018, with subsequent Biotronik MRI DDDR pacemaker implant, October 2018, with subsequent reprogramming, January 2019.  3. Obstructive sleep apnea with CPAP therapy.   4. Intermittent seasonal rhinitis.   5. Remote nephrolithiasis.   6. Chronic hypertension, probably essential.   7. Intermittent lower extremity edema felt secondary to antihypertensive therapy.   8. Abnormal thyroid function test with reduced TSH (February 2017).      No Known Allergies    Current Outpatient Medications:   •  apixaban (ELIQUIS) 5 MG tablet tablet, Take 5 mg by mouth Every 12 (Twelve) Hours., Disp: , Rfl:   •  Cyanocobalamin (VITAMIN B 12 PO), Take 1,000 mcg by mouth Daily., Disp: , Rfl:   •  flecainide (TAMBOCOR) 50 MG tablet, take 1 tablet by mouth twice a day, Disp: 60 tablet, Rfl: 6  •  metoprolol tartrate (LOPRESSOR) 25 MG tablet, Take 1 tablet by mouth Every 12 (Twelve) Hours., Disp: 180 tablet, Rfl: 3  •  Multiple Vitamins-Minerals (MULTIVITAMIN ADULT PO), Take 1 tablet by mouth As Needed., Disp: , Rfl:   •  telmisartan-hydrochlorothiazide (MICARDIS HCT) 80-25 MG per tablet, Take 0.5 tablets by mouth Daily., Disp: 90 tablet, Rfl: 3     History of Present Illness: Patient returns for scheduled 6-month followup.  He was seen by Dr. Dacosta a month after his last visit to our office, with plans for dual chamber pacemaker implant, performed on 10/25/2018 by Dr. Dacosta.  He says \"I've not been doing too good; let me tell you " "about it.\"  He says that his chest starts hurting if he walks a short distance, and the chest pain goes away when he sits down.  He notes that he had followup with Dr. Dacosta in the interim, and he adjusted his pacemaker so it \"wouldn't give me that kick,\" and that has worked well.  He states that Dr. Dacosta made changes in his pacemaker to keep his pulse around 60, but if he gets up and walks, his heart rate goes up to 80.  Patient otherwise denies prolonged unrelieved chest pain, shortness of breath, PND, edema, palpitations, syncope or presyncope at this time.  He feels he is more and more restricted and unable to tolerate even minimal activity because of his chest pain and exertional dyspnea.  There are no accompanying complaints of nausea, emesis, diaphoresis, flushing, pain radiation, or presyncope.  No recent travel or trauma.  No tobacco or NTG use.        Review of Systems   Constitution: Positive for weakness and malaise/fatigue.   Cardiovascular: Positive for chest pain.   Respiratory: Positive for shortness of breath.       Obtained and negative except as outlined in problem list and HPI.    Procedures       Objective:       Vitals:    03/28/19 1328 03/28/19 1333   BP: 150/88 142/80   BP Location: Right arm Right arm   Patient Position: Sitting Standing   Pulse: 60    SpO2: 93%    Weight: 91.6 kg (202 lb)    Height: 177.8 cm (70\")      Body mass index is 28.98 kg/m².   Last weight:  190 lbs.    Physical Exam   Constitutional: He is oriented to person, place, and time. He appears well-developed and well-nourished.   Neck: No JVD present. Carotid bruit is not present. No thyromegaly present.   Cardiovascular: Regular rhythm, S1 normal and S2 normal. Exam reveals no gallop, no S3 and no friction rub.   Murmur heard.   Medium-pitched early systolic murmur is present with a grade of 2/6 at the lower left sternal border.  Pulses:       Dorsalis pedis pulses are 2+ on the right side, and 2+ on the left " side.        Posterior tibial pulses are 2+ on the right side, and 2+ on the left side.   Pulmonary/Chest: Effort normal. He has decreased breath sounds. He has no wheezes. He has no rhonchi. He has no rales.   Left precordial pacemaker site is nominal   Abdominal: Soft. He exhibits no mass. There is no hepatosplenomegaly. There is no tenderness. There is no guarding.   Bowel sounds audible x4   Musculoskeletal: Normal range of motion. He exhibits no edema.   Lymphadenopathy:     He has no cervical adenopathy.   Neurological: He is alert and oriented to person, place, and time.   Skin: Skin is warm, dry and intact. No rash noted.   Vitals reviewed.        Lab Review: No recent laboratory studies to review    Lab Results   Component Value Date    GLUCOSE 108 (H) 10/16/2018    BUN 17 10/16/2018    CREATININE 1.07 10/16/2018    EGFRIFNONA 67 10/16/2018    BCR 15.9 10/16/2018    CO2 32.0 (H) 10/16/2018    CALCIUM 9.1 10/16/2018    ALBUMIN 4.24 10/16/2018    AST 23 10/16/2018    ALT 24 10/16/2018   Sodium - 142  Potassium - 4.0  Chloride - 105    Magnesium - 1.9 (10/16/2018)    Lab Results   Component Value Date    WBC 5.53 10/16/2018    HGB 14.1 10/16/2018    HCT 43.5 10/16/2018    MCV 93.3 10/16/2018     10/16/2018       Lab Results   Component Value Date    HGBA1C 5.2 07/01/2015       Lab Results   Component Value Date    TSH 0.214 (L) 04/17/2015     Lab Results   Component Value Date    TRIG 108 07/01/2015     Lab Results   Component Value Date    HDL 48 07/01/2015     Chest x-ray, 10/25/2018:    FINDINGS: Left-sided dual lead pacemaker is noted. The heart and vasculature appear normal in size. Lungs appear well-expanded and clear except for a couple of granulomatous calcifications and what appears to be minimal bibasilar discoid atelectasis. No edema, effusion or  pneumothorax is seen.         IMPRESSION: Pacemaker placed with no evidence of pneumothorax. Trace linear bibasilar atelectasis noted.       Assessment:       Overall suboptimal course with interim cardiopulmonary complaints with reduced functional status. We will defer additional diagnostic or therapeutic intervention from a cardiac perspective at this time other than to have him undergo a Cardiolite GXT stress test.     Diagnosis Plan   1. Chest pain, unspecified type  Stress Test With Myocardial Perfusion    BNP    D-dimer, Quantitative   2. Hypertension, unspecified type  Stress Test With Myocardial Perfusion    BNP    D-dimer, Quantitative   3. CALDERON (dyspnea on exertion)  Stress Test With Myocardial Perfusion    BNP    D-dimer, Quantitative   4. Tachy-hannah syndrome (CMS/HCC)  Continue current treatment and followup with Dr. Dacosta   5. Hypertensive heart disease without heart failure  See below          Plan:         1. Patient to continue current medications and close follow up with the above providers with the following changes:   A. Alter Micardis HCTZ to 80/25 daily   B. Add Imdur 30 mg daily   C. Add NTG 0.4 mg SL prn  2. Cardiolite GXT stress test with continuous oximetry ordered ASAP with accompanying BNP/D-dimer  3. Tentative cardiology follow up in June or July 2019, or patient may return sooner PRN.    Transcribed by Priya Dorantes for Dr. Misael Walls at 10:55 AM on 03/28/2019    I, Misael Walls MD, FACC, personally performed the services described in this documentation as scribed by the above named individual in my presence, and it is both accurate and complete. At 1:42 PM on 03/28/2019

## 2019-03-28 ENCOUNTER — OFFICE VISIT (OUTPATIENT)
Dept: CARDIOLOGY | Facility: CLINIC | Age: 79
End: 2019-03-28

## 2019-03-28 VITALS
SYSTOLIC BLOOD PRESSURE: 142 MMHG | HEIGHT: 70 IN | OXYGEN SATURATION: 93 % | HEART RATE: 60 BPM | WEIGHT: 202 LBS | BODY MASS INDEX: 28.92 KG/M2 | DIASTOLIC BLOOD PRESSURE: 80 MMHG

## 2019-03-28 DIAGNOSIS — I49.5 TACHY-BRADY SYNDROME (HCC): ICD-10-CM

## 2019-03-28 DIAGNOSIS — I10 HYPERTENSION, UNSPECIFIED TYPE: ICD-10-CM

## 2019-03-28 DIAGNOSIS — I11.9 HYPERTENSIVE HEART DISEASE WITHOUT HEART FAILURE: ICD-10-CM

## 2019-03-28 DIAGNOSIS — R06.09 DOE (DYSPNEA ON EXERTION): ICD-10-CM

## 2019-03-28 DIAGNOSIS — R07.9 CHEST PAIN, UNSPECIFIED TYPE: Primary | ICD-10-CM

## 2019-03-28 PROCEDURE — 99214 OFFICE O/P EST MOD 30 MIN: CPT | Performed by: INTERNAL MEDICINE

## 2019-03-28 RX ORDER — TELMISARTAN AND HYDROCHLORTHIAZIDE 80; 25 MG/1; MG/1
1 TABLET ORAL DAILY
Qty: 90 TABLET | Refills: 3
Start: 2019-03-28 | End: 2019-06-16 | Stop reason: SDUPTHER

## 2019-03-28 RX ORDER — NITROGLYCERIN 0.4 MG/1
TABLET SUBLINGUAL
Qty: 25 TABLET | Refills: 2 | Status: SHIPPED | OUTPATIENT
Start: 2019-03-28 | End: 2021-03-11 | Stop reason: SDUPTHER

## 2019-03-28 RX ORDER — ISOSORBIDE MONONITRATE 30 MG/1
30 TABLET, EXTENDED RELEASE ORAL DAILY
Qty: 90 TABLET | Refills: 3 | Status: SHIPPED | OUTPATIENT
Start: 2019-03-28 | End: 2022-10-11 | Stop reason: SDUPTHER

## 2019-04-02 ENCOUNTER — RESULTS ENCOUNTER (OUTPATIENT)
Dept: CARDIOLOGY | Facility: CLINIC | Age: 79
End: 2019-04-02

## 2019-04-02 DIAGNOSIS — R07.9 CHEST PAIN, UNSPECIFIED TYPE: ICD-10-CM

## 2019-04-02 DIAGNOSIS — I10 HYPERTENSION, UNSPECIFIED TYPE: ICD-10-CM

## 2019-04-02 DIAGNOSIS — R06.09 DOE (DYSPNEA ON EXERTION): ICD-10-CM

## 2019-04-23 ENCOUNTER — HOSPITAL ENCOUNTER (OUTPATIENT)
Dept: CARDIOLOGY | Facility: HOSPITAL | Age: 79
Discharge: HOME | End: 2019-04-23

## 2019-04-23 DIAGNOSIS — R07.9 CHEST PAIN, UNSPECIFIED TYPE: ICD-10-CM

## 2019-04-23 DIAGNOSIS — I10 HYPERTENSION, UNSPECIFIED TYPE: ICD-10-CM

## 2019-04-23 DIAGNOSIS — R06.09 DOE (DYSPNEA ON EXERTION): ICD-10-CM

## 2019-04-23 LAB
BH CV NUCLEAR PRIOR STUDY: 3
BH CV STRESS BP STAGE 1: NORMAL
BH CV STRESS DURATION MIN STAGE 1: 3
BH CV STRESS DURATION MIN STAGE 2: 1
BH CV STRESS DURATION SEC STAGE 1: 0
BH CV STRESS DURATION SEC STAGE 2: 23
BH CV STRESS GRADE STAGE 1: 10
BH CV STRESS GRADE STAGE 2: 12
BH CV STRESS HR STAGE 1: 108
BH CV STRESS HR STAGE 2: 122
BH CV STRESS METS STAGE 1: 5
BH CV STRESS METS STAGE 2: 7.5
BH CV STRESS O2 STAGE 1: 99
BH CV STRESS O2 STAGE 2: 97
BH CV STRESS PROTOCOL 1: NORMAL
BH CV STRESS RECOVERY BP: NORMAL MMHG
BH CV STRESS RECOVERY HR: 69 BPM
BH CV STRESS RECOVERY O2: 100 %
BH CV STRESS SPEED STAGE 1: 1.7
BH CV STRESS SPEED STAGE 2: 2.5
BH CV STRESS STAGE 1: 1
BH CV STRESS STAGE 2: 2
LV EF NUC BP: 78 %
MAXIMAL PREDICTED HEART RATE: 142 BPM
PERCENT MAX PREDICTED HR: 85.92 %
STRESS BASELINE BP: NORMAL MMHG
STRESS BASELINE HR: 60 BPM
STRESS O2 SAT REST: 100 %
STRESS PERCENT HR: 101 %
STRESS POST ESTIMATED WORKLOAD: 6.2 METS
STRESS POST EXERCISE DUR MIN: 4 MIN
STRESS POST EXERCISE DUR SEC: 23 SEC
STRESS POST O2 SAT PEAK: 97 %
STRESS POST PEAK BP: NORMAL MMHG
STRESS POST PEAK HR: 122 BPM
STRESS TARGET HR: 121 BPM

## 2019-04-23 PROCEDURE — A9500 TC99M SESTAMIBI: HCPCS | Performed by: INTERNAL MEDICINE

## 2019-04-23 PROCEDURE — 0 TECHNETIUM SESTAMIBI: Performed by: INTERNAL MEDICINE

## 2019-04-23 PROCEDURE — 93018 CV STRESS TEST I&R ONLY: CPT | Performed by: INTERNAL MEDICINE

## 2019-04-23 PROCEDURE — 78452 HT MUSCLE IMAGE SPECT MULT: CPT | Performed by: INTERNAL MEDICINE

## 2019-04-23 PROCEDURE — 93017 CV STRESS TEST TRACING ONLY: CPT

## 2019-04-23 PROCEDURE — 78452 HT MUSCLE IMAGE SPECT MULT: CPT

## 2019-04-23 RX ADMIN — TECHNETIUM TC 99M SESTAMIBI 1 DOSE: 1 INJECTION INTRAVENOUS at 10:25

## 2019-04-23 RX ADMIN — TECHNETIUM TC 99M SESTAMIBI 1 DOSE: 1 INJECTION INTRAVENOUS at 08:35

## 2019-04-29 ENCOUNTER — TELEPHONE (OUTPATIENT)
Dept: CARDIOLOGY | Facility: CLINIC | Age: 79
End: 2019-04-29

## 2019-04-29 NOTE — TELEPHONE ENCOUNTER
Mr [employer] came to the office for further explanation to the letter sent by [facility] about the Stress Text.   Pt has been c/o SOA and mild SSCP that occurs with mild activity/exertion. The pain goes away quickly with rest. Pt has not started Imdur yet but will pick it up from the pharmacy today. He will also monitor his BP and HR and send me the VSs in a few days. Pt has not had to use his NTG bc the pain subsides with rest quickly.

## 2019-04-29 NOTE — TELEPHONE ENCOUNTER
Pt called back this afternoon and he still has questions regarding his persistent shortness of breath with minimal exertion and mild chest discomfort. He HAS been taking Imdur since 3/2019 visit with [facility]. Stress test was low probability for CAD. Pt DID NOT have labs ordered at time of stress test. He was asked to go to [facility] for labs. Further recommendations from Dr. Walls after labs reviewed. He has upcoming appt with Praneeth in May.   Lab orders (BNP, D-dimer) faxed to [phone],

## 2019-05-28 ENCOUNTER — TELEPHONE (OUTPATIENT)
Dept: CARDIOLOGY | Facility: CLINIC | Age: 79
End: 2019-05-28

## 2019-05-28 ENCOUNTER — OFFICE VISIT (OUTPATIENT)
Dept: CARDIOLOGY | Facility: CLINIC | Age: 79
End: 2019-05-28

## 2019-05-28 VITALS
SYSTOLIC BLOOD PRESSURE: 130 MMHG | WEIGHT: 196.4 LBS | HEIGHT: 70 IN | HEART RATE: 60 BPM | DIASTOLIC BLOOD PRESSURE: 70 MMHG | BODY MASS INDEX: 28.12 KG/M2 | OXYGEN SATURATION: 96 %

## 2019-05-28 DIAGNOSIS — Z79.899 LONG TERM CURRENT USE OF ANTIARRHYTHMIC MEDICAL THERAPY: ICD-10-CM

## 2019-05-28 DIAGNOSIS — R07.2 PRECORDIAL PAIN: ICD-10-CM

## 2019-05-28 DIAGNOSIS — I48.0 PAF (PAROXYSMAL ATRIAL FIBRILLATION) (HCC): Primary | ICD-10-CM

## 2019-05-28 DIAGNOSIS — I49.5 TACHY-BRADY SYNDROME (HCC): ICD-10-CM

## 2019-05-28 PROCEDURE — 99214 OFFICE O/P EST MOD 30 MIN: CPT | Performed by: INTERNAL MEDICINE

## 2019-05-28 PROCEDURE — 93280 PM DEVICE PROGR EVAL DUAL: CPT | Performed by: INTERNAL MEDICINE

## 2019-05-28 RX ORDER — MAGNESIUM OXIDE 400 MG/1
400 TABLET ORAL DAILY
Qty: 90 TABLET | Refills: 3 | Status: SHIPPED | OUTPATIENT
Start: 2019-05-28 | End: 2022-10-11 | Stop reason: SDUPTHER

## 2019-05-28 RX ORDER — TADALAFIL 5 MG/1
5 TABLET ORAL AS NEEDED
Refills: 1 | Status: ON HOLD | COMMUNITY
Start: 2019-05-22

## 2019-05-28 RX ORDER — POTASSIUM CHLORIDE 750 MG/1
10 TABLET, FILM COATED, EXTENDED RELEASE ORAL DAILY
Qty: 90 TABLET | Refills: 3 | Status: SHIPPED | OUTPATIENT
Start: 2019-05-28 | End: 2020-06-26

## 2019-05-28 NOTE — TELEPHONE ENCOUNTER
Patient in to office for appt with Dr. Dacosta and brings BP's he states Dr. Walls requested.    4/29/19  128/79, HR 75  4/30/19  121/76  HR 74  5/1/19    128/82  HR 71

## 2019-05-28 NOTE — PROGRESS NOTES
Kai Dodson  1940  PCP: Derick Phillip MD    SUBJECTIVE:   Kai Dodson is a 79 y.o. male seen for a consultation visit regarding the following:     Chief Complaint:   Chief Complaint   Patient presents with   • device f/u        HPI:  Patient reports ongoing cardiac issues. Still having some palp and chest pain. Unable to walk more then a few yards before getting chest pain. Had a Nuclear stress test.     History:    Patient is a 79 year old male with a history of PAF and PACs that presents today in consultations regarding abnormal monitor. He recently presented to Dr. Walls's office with complaints of increased SOB and fatigue. He was reporting rates frequently in 40s and 50s. He wore a monitor that revealed an average rate of 57bpm and a minimum of 35. He was also in a junctional rhythm at times. He stated this has all progressed over the last couple of months. Main issue is CALDERON and fatigue with trying to do daily activities. He has not had very many episodes of afib and is well controlled on Flecainide. He denies any issues with chest pain, palpitations, or orthopnea.       Cardiac PMH: (Old records have been reviewed and summarized below)    1. Atrial Fibrillation- diagnosed in 2015.   2. Bradycardia  3. LHC 6/2015- normal coronaries. EF 65%   4. Monitor 9/2018- avg 57, min 35, max 94. Junctional rhythm at times. No afib. Low rates predominately while sleeping.       Past Medical History, Past Surgical History, Family history, Social History, and Medications were all reviewed with the patient today and updated as necessary.       Current Outpatient Medications:   •  apixaban (ELIQUIS) 5 MG tablet tablet, Take 5 mg by mouth Every 12 (Twelve) Hours., Disp: , Rfl:   •  Cyanocobalamin (VITAMIN B 12 PO), Take 1,000 mcg by mouth Daily., Disp: , Rfl:   •  flecainide (TAMBOCOR) 50 MG tablet, take 1 tablet by mouth twice a day, Disp: 60 tablet, Rfl: 6  •  isosorbide mononitrate (IMDUR) 30 MG  24 hr tablet, Take 1 tablet by mouth Daily., Disp: 90 tablet, Rfl: 3  •  metoprolol tartrate (LOPRESSOR) 25 MG tablet, Take 1 tablet by mouth Every 12 (Twelve) Hours., Disp: 180 tablet, Rfl: 3  •  Multiple Vitamins-Minerals (MULTIVITAMIN ADULT PO), Take 1 tablet by mouth As Needed., Disp: , Rfl:   •  nitroglycerin (NITROSTAT) 0.4 MG SL tablet, 1 under the tongue as needed for angina, may repeat q5mins for up three doses, Disp: 25 tablet, Rfl: 2  •  tadalafil (CIALIS) 5 MG tablet, 5 mg As Needed., Disp: , Rfl: 1  •  telmisartan-hydrochlorothiazide (MICARDIS HCT) 80-25 MG per tablet, Take 1 tablet by mouth Daily., Disp: 90 tablet, Rfl: 3  •  magnesium oxide (MAG-OX) 400 MG tablet, Take 1 tablet by mouth Daily., Disp: 90 tablet, Rfl: 3  •  potassium chloride (K-DUR) 10 MEQ CR tablet, Take 1 tablet by mouth Daily., Disp: 90 tablet, Rfl: 3    No Known Allergies      Past Medical History:   Diagnosis Date   • Atrial fibrillation (CMS/HCC)    • Bradycardia    • Chronic hypertension     Probably essential   • Hypertension    • Hypertensive cardiovascular disease 07/2015    Recent progressive CCS class III-IV chest pain syndrome with normal EKG and exertional dyspnea and fatigue with normal codominant coronary arteries and preserved systolic  left ventricular function, July 2015.   • Intermittent palpitations 05/01/2014    Remote abnormal 24-hour Holter monitor demonstrating intermittent bradycardia with occasional PACs, May 2014.   • Lower extremity edema     Intermittent lower extremity edema felt secondary to antihypertensive therapy.    • Nephrolithiasis     Remote- 15 years ago   • MINO on CPAP    • MINO on CPAP     doesn't know setting    • Prostate troubles     enlarged   • Seasonal rhinitis     intermittent     Past Surgical History:   Procedure Laterality Date   • CARDIAC CATHETERIZATION      no stents   • CARDIAC ELECTROPHYSIOLOGY PROCEDURE N/A 10/24/2018    Procedure: Pacemaker DC new with [facility]. Hold Eliquis  "one day prior.;  Surgeon: Harley Dacosta MD;  Location:  [facility];  Service: Cardiology   • COLONOSCOPY     • ENDOSCOPY       Family History   Problem Relation Age of Onset   • Other Father         cardiac arrhythmia     Social History     Tobacco Use   • Smoking status: Former Smoker     Packs/day: 1.00     Types: Cigarettes     Last attempt to quit:      Years since quittin.4   • Smokeless tobacco: Never Used   Substance Use Topics   • Alcohol use: No            PHYSICAL EXAM:   /70 (BP Location: Left arm, Patient Position: Sitting)   Pulse 60   Ht 177.8 cm (70\")   Wt 89.1 kg (196 lb 6.4 oz)   SpO2 96%   BMI 28.18 kg/m²      Wt Readings from Last 5 Encounters:   19 89.1 kg (196 lb 6.4 oz)   19 91.6 kg (202 lb)   19 90.3 kg (199 lb)   19 89.6 kg (197 lb 9.6 oz)   10/24/18 86.4 kg (190 lb 7.6 oz)     BP Readings from Last 5 Encounters:   19 130/70   19 142/80   19 114/78   19 138/92   10/25/18 152/85       General-Well Nourished, Well developed. Pleasant male in NAD   Eyes - PERRLA  Neck- supple, No mass  CV- regular rate and rhythm, no MRG  Lung- clear bilaterally  Abd- soft, +BS  Musc/skel - Norm strength and range of motion  Skin- warm and dry  Neuro - Alert & Oriented x 3, appropriate mood.    Medical problems and test results were reviewed with the patient today.     Results for orders placed or performed during the hospital encounter of 19   Stress Test With Myocardial Perfusion   Result Value Ref Range    Target HR (85%) 121 bpm    Max. Pred. HR (100%) 142 bpm     CV STRESS PROTOCOL 1 Rafa     Stage 1 1     HR Stage 1 108     BP Stage 1 124/82     O2 Stage 1 99     Duration Min Stage 1 3     Duration Sec Stage 1 0     Grade Stage 1 10     Speed Stage 1 1.7      CV STRESS METS STAGE 1 5     Stage 2 2     HR Stage 2 122     O2 Stage 2 97     Duration Min Stage 2 1     Duration Sec Stage 2 23     Grade Stage 2 12 "     Speed Stage 2 2.5     BH CV STRESS METS STAGE 2 7.5     Baseline HR 60 bpm    Baseline /98 mmHg    O2 sat rest 100 %    Peak  bpm    Percent Max Pred HR 85.92 %    Percent Target  %    Peak /70 mmHg    O2 sat peak 97 %    Recovery HR 69 bpm    Recovery /90 mmHg    Recovery O2 100 %    Exercise duration (min) 4 min    Exercise duration (sec) 23 sec    Estimated workload 6.2 METS    Nuc Stress EF 78 %    Nuclear Prior Study 3          Lab Results   Component Value Date    HDL 48 07/01/2015    LDL 83 07/01/2015       EKG:  (EKG/Tracing has been independently visualized by me and summarized below)      ECG 12 Lead  Date/Time: 5/28/2019 9:39 AM  Performed by: Harley Dacosta MD  Authorized by: Hraley Dacosta MD   Comparison: compared with previous ECG from 1/29/2019  Similar to previous ECG  Rhythm: sinus rhythm and paced  Rate: normal  BPM: 60    Clinical impression: abnormal EKG            ASSESSMENT and PLAN    1. Symptomatic Bradycardia/Tachybrady syndrome- Normal LVEF. Post dual chamber pacemaker .     2. PAF- controlled on Flecainide. Started beta blocker following device implant. 0 %    3. Palp - No events seen on pacemaker    4. Chest pain - Negative cath in 2015. Stress test in May 2019 was negative for active ischemia however some mild EKG changes were seen.  Will adjust pacemaker rates to see if symptoms improve. Appears to be symptomatic from RV pacing. We decreased metoprolol 25mg BID to help prevent RV pacing. Dr. Walls started Nitro TID that may have helped some. Dr. Walls is addressing, may consider repeat cath to better define.     5. Use of Flecainide - Stable EKG findings.     6. Pacemaker - RV pacing 8% - Adjusted AV delay further to prevent    7. Fatigue/Weakness - No clear electrophysiology etiology - Patient would like to try K/Mg to see if it will help with symptoms.     Return in about 6 months (around 11/28/2019) for office visit and device check with  [employer].      Harley Dacosta M.D., VESTA, [email or web address].  Cardiology/Electrophysiology  05/28/19  9:40 AM

## 2019-06-12 ENCOUNTER — OFFICE VISIT (OUTPATIENT)
Dept: CARDIOLOGY | Facility: CLINIC | Age: 79
End: 2019-06-12

## 2019-06-12 VITALS
WEIGHT: 197 LBS | DIASTOLIC BLOOD PRESSURE: 80 MMHG | OXYGEN SATURATION: 99 % | SYSTOLIC BLOOD PRESSURE: 150 MMHG | HEART RATE: 60 BPM | BODY MASS INDEX: 28.2 KG/M2 | HEIGHT: 70 IN

## 2019-06-12 DIAGNOSIS — I48.0 PAF (PAROXYSMAL ATRIAL FIBRILLATION) (HCC): ICD-10-CM

## 2019-06-12 DIAGNOSIS — I49.5 TACHY-BRADY SYNDROME (HCC): ICD-10-CM

## 2019-06-12 DIAGNOSIS — I10 ESSENTIAL HYPERTENSION: ICD-10-CM

## 2019-06-12 DIAGNOSIS — R00.1 SINUS BRADYCARDIA: ICD-10-CM

## 2019-06-12 DIAGNOSIS — R06.02 SOB (SHORTNESS OF BREATH): ICD-10-CM

## 2019-06-12 DIAGNOSIS — I48.3 TYPICAL ATRIAL FLUTTER (HCC): Primary | ICD-10-CM

## 2019-06-12 PROCEDURE — 99213 OFFICE O/P EST LOW 20 MIN: CPT | Performed by: PHYSICIAN ASSISTANT

## 2019-06-12 NOTE — PROGRESS NOTES
[facility]   OFFICE NOTE      Kai Dodson  1940  PCP: Derick Phillip MD    SUBJECTIVE:   Kai Dodson is a 79 y.o. male seen for a follow up visit regarding the following:     CC:SOB    HPI:   Mr. Dodson is a 79-year-old Jose presents today follow-up regarding history of recent shortness of breath fatigue atypical chest pain.  He also has a medical history of atrial fibrillation symptomatic bradycardia and recent pacemaker placement by Dr. Dacosta.  Mr. Dodson states there is since he had a pacemaker placed has been feeling poorly.  He is unable to do much physical activities without becoming short of breath.  He denies any chest pain exertion suggesting angina pectoris.  Denies any dizziness or syncope events.  He denies any worsening heart failure symptoms such as orthopnea PND peripheral day.  He also denies any previous pulmonary work-up for shortness of breath.  He has had extensive prior cardiac work-up which includes a normal catheterization 2015 and a stress test recently this year revealing notes of ischemia normal LV function.        ROS:  Review of Symptoms:  General: no recent weight loss/gain, weakness or fatigue  Skin: no rashes, lumps, or other skin changes  HEENT: no dizziness, lightheadedness, or vision changes  Respiratory: no cough or hemoptysis  Cardiovascular: no palpitations, and tachycardia  Gastrointestinal: no black/tarry stools or diarrhea  Urinary: no change in frequency or urgency  Peripheral Vascular: no claudication or leg cramps  Musculoskeletal: no muscle or joint pain/stiffness  Psychiatric: no depression or excessive stress  Neurological: no sensory or motor loss, no syncope  Hematologic: no anemia, easy bruising or bleeding  Endocrine: no thyroid problems, nor heat or cold intolerance    Cardiac PMH: (Old records have been reviewed and summarized below)  1. Atrial Fibrillation- diagnosed in 2015.   2.  Bradycardia  3. C 6/2015- normal coronaries. EF 65%   4. Monitor 9/2018- avg 57, min 35, max 94. Junctional rhythm at times. No afib. Low rates predominately while sleeping.         Past Medical History, Past Surgical History, Family history, Social History, and Medications were all reviewed with the patient today and updated as necessary.       Current Outpatient Medications:   •  apixaban (ELIQUIS) 5 MG tablet tablet, Take 5 mg by mouth Every 12 (Twelve) Hours., Disp: , Rfl:   •  Cyanocobalamin (VITAMIN B 12 PO), Take 1,000 mcg by mouth 3 (Three) Times a Week., Disp: , Rfl:   •  flecainide (TAMBOCOR) 50 MG tablet, take 1 tablet by mouth twice a day, Disp: 60 tablet, Rfl: 6  •  isosorbide mononitrate (IMDUR) 30 MG 24 hr tablet, Take 1 tablet by mouth Daily., Disp: 90 tablet, Rfl: 3  •  magnesium oxide (MAG-OX) 400 MG tablet, Take 1 tablet by mouth Daily., Disp: 90 tablet, Rfl: 3  •  metoprolol tartrate (LOPRESSOR) 25 MG tablet, Take 1 tablet by mouth Every 12 (Twelve) Hours., Disp: 180 tablet, Rfl: 3  •  Multiple Vitamins-Minerals (MULTIVITAMIN ADULT PO), Take 1 tablet by mouth As Needed., Disp: , Rfl:   •  nitroglycerin (NITROSTAT) 0.4 MG SL tablet, 1 under the tongue as needed for angina, may repeat q5mins for up three doses, Disp: 25 tablet, Rfl: 2  •  potassium chloride (K-DUR) 10 MEQ CR tablet, Take 1 tablet by mouth Daily., Disp: 90 tablet, Rfl: 3  •  tadalafil (CIALIS) 5 MG tablet, 5 mg As Needed., Disp: , Rfl: 1  •  telmisartan-hydrochlorothiazide (MICARDIS HCT) 80-25 MG per tablet, Take 1 tablet by mouth Daily., Disp: 90 tablet, Rfl: 3      No Known Allergies  Patient Active Problem List   Diagnosis   • Hypertensive cardiovascular disease   • Intermittent palpitations   • MINO on CPAP   • Seasonal rhinitis   • Hypertension   • Lower extremity edema   • PAF (paroxysmal atrial fibrillation) (CMS/HCC)   • Sinus bradycardia   • Paroxysmal atrial fibrillation (CMS/HCC)   • Atrial flutter (CMS/HCC)   • CALDERON  "(dyspnea on exertion)   • Abnormal thyroid stimulating hormone level   • Tachy-hannah syndrome (CMS/HCC)   • Chest pain   • Long term current use of antiarrhythmic medical therapy     Past Medical History:   Diagnosis Date   • Atrial fibrillation (CMS/HCC)    • Bradycardia    • Chronic hypertension     Probably essential   • Hypertension    • Hypertensive cardiovascular disease 2015    Recent progressive CCS class III-IV chest pain syndrome with normal EKG and exertional dyspnea and fatigue with normal codominant coronary arteries and preserved systolic  left ventricular function, 2015.   • Intermittent palpitations 2014    Remote abnormal 24-hour Holter monitor demonstrating intermittent bradycardia with occasional PACs, May 2014.   • Lower extremity edema     Intermittent lower extremity edema felt secondary to antihypertensive therapy.    • Nephrolithiasis     Remote- 15 years ago   • MINO on CPAP    • MINO on CPAP     doesn't know setting    • Prostate troubles     enlarged   • Seasonal rhinitis     intermittent     Past Surgical History:   Procedure Laterality Date   • CARDIAC CATHETERIZATION      no stents   • CARDIAC ELECTROPHYSIOLOGY PROCEDURE N/A 10/24/2018    Procedure: Pacemaker DC new with [employer]. Hold Eliquis one day prior.;  Surgeon: Harley Dacosta MD;  Location: [facility];  Service: Cardiology   • COLONOSCOPY     • ENDOSCOPY       Family History   Problem Relation Age of Onset   • Other Father         cardiac arrhythmia     Social History     Tobacco Use   • Smoking status: Former Smoker     Packs/day: 1.00     Types: Cigarettes     Last attempt to quit: 1990     Years since quittin.4   • Smokeless tobacco: Never Used   Substance Use Topics   • Alcohol use: No         PHYSICAL EXAM:    /80 (BP Location: Left arm, Patient Position: Sitting)   Pulse 60   Ht 177.8 cm (70\")   Wt 89.4 kg (197 lb)   SpO2 99%   BMI 28.27 kg/m²        Wt Readings from Last 5 " Encounters:   06/12/19 89.4 kg (197 lb)   05/28/19 89.1 kg (196 lb 6.4 oz)   03/28/19 91.6 kg (202 lb)   01/29/19 90.3 kg (199 lb)   01/22/19 89.6 kg (197 lb 9.6 oz)       BP Readings from Last 5 Encounters:   06/12/19 150/80   05/28/19 130/70   03/28/19 142/80   01/29/19 114/78   01/22/19 138/92       General appearance - Alert, well appearing, and in no distress   Mental status - Affect appropriate to mood.  Eyes - Sclerae anicteric,  ENMT - Hearing grossly normal bilaterally, Dental hygiene good.  Neck - Carotids upstroke normal bilaterally, no bruits, no JVD.  Resp - Clear to auscultation, no wheezes, rales or rhonchi, symmetric air entry.  Heart - Normal rate, regular rhythm, normal S1, S2, no murmurs, rubs, clicks or gallops.  GI - Soft, nontender, nondistended, no masses or organomegaly.  Neurological - Grossly intact - normal speech, no focal findings  Musculoskeletal - No joint tenderness, deformity or swelling, no muscular tenderness noted.  Extremities - Peripheral pulses normal, no pedal edema, no clubbing or cyanosis.  Skin - Normal coloration and turgor.  Psych -  oriented to person, place, and time.    Medical problems and test results were reviewed with the patient today.     No results found for this or any previous visit (from the past 672 hour(s)).      EKG: (EKG has been independently visualized by me and summarized below)    ECG 12 Lead  Date/Time: 6/13/2019 1:37 PM  Performed by: Carlos Pineda PA  Authorized by: Carlos Pineda PA   Comparison: compared with previous ECG from 5/28/2019  Rhythm: sinus rhythm  Rate: normal  Conduction: conduction normal  T Waves: T waves normal  QRS axis: normal  Other: no other findings            MPS 4/19    · Patient reported slight chest discomfort during walking but it disappeared with rest.  · O2 saturation % during stress portion; O2 saturation 100% throughout recovery.  · Expected exercise time: 6:00, actual time: 4:23 (AARON +30)  · THR of  120 reached at 3:13  · SR with ST changes noted. wide-complex regular beats, rate 120, pacs  · BMI 29.0  · Left ventricular ejection fraction is hyperdynamic (calculated EF > 70%); WNL TID = 1.08.  · Raw images reviewed with the following abnormalities noted: mild-moderate movement at rest and post stress.  · Myocardial perfusion imaging indicates a normal myocardial perfusion study with no evidence of ischemia.  · Impressions are consistent with a low risk study.  · There is no prior study available for comparison.  · Findings consistent with an abnormal ECG stress test with equivocal ischemic electrocardiographic changes at peak exercise as well as associated intermittent transient pacemaker mediated tachycardia.  · Abnormal acceptable quantitative SPECT gated Cardiolite exercise stress test suggestive of low probability for significant focal obstructive coronary artery disease with normal myocardial perfusion imaging studies with preserved systolic left ventricular function (LVEF 0.78).              ASSESSMENT   1. Tachybradycardia: PPM Dr. Dacosta  2. PAF:  Tambocor therapy.   3. Chest pain: Atypical, Normal CATH 2015.  Negative MPS 4/19  4. Depression/Anxiety   5. SOB:  CXR, PFT's    PLAN  · We will pursue  chest x-ray and Pulmonary function test regarding shortness of breath.  We discussed several possibilities for his symptoms including deconditioning possible side effect from medication.  We reassured him that he has had a acceptable cardiovascular work-up including normal catheterization and stress test recently.  He would like to consider further work-up with possible consideration for lung source for his symptoms as he seems to feel as more of a shortness of breath issue than chest pain.  · Consider discontinue Tambocor to see if his symptoms improve as this may relate to some of his symptoms.  · Could also consider Cardiac MRI in future.   · Will follow up on CXR and PFT's  · Follow up with Dr. Walls  and [facility] as scheulded      6/12/2019  5:04 PM    Will Edwin GARCIA

## 2019-06-13 PROCEDURE — 93000 ELECTROCARDIOGRAM COMPLETE: CPT | Performed by: PHYSICIAN ASSISTANT

## 2019-06-17 RX ORDER — TELMISARTAN AND HYDROCHLORTHIAZIDE 80; 25 MG/1; MG/1
1 TABLET ORAL DAILY
Qty: 90 TABLET | Refills: 1 | Status: SHIPPED | OUTPATIENT
Start: 2019-06-17 | End: 2019-12-06 | Stop reason: SDUPTHER

## 2019-06-21 ENCOUNTER — HOSPITAL ENCOUNTER (OUTPATIENT)
Dept: PULMONOLOGY | Facility: HOSPITAL | Age: 79
Discharge: HOME | End: 2019-06-21
Admitting: PHYSICIAN ASSISTANT

## 2019-06-21 ENCOUNTER — HOSPITAL ENCOUNTER (OUTPATIENT)
Dept: GENERAL RADIOLOGY | Facility: HOSPITAL | Age: 79
Discharge: HOME | End: 2019-06-21

## 2019-06-21 DIAGNOSIS — I10 ESSENTIAL HYPERTENSION: ICD-10-CM

## 2019-06-21 DIAGNOSIS — I49.5 TACHY-BRADY SYNDROME (HCC): ICD-10-CM

## 2019-06-21 DIAGNOSIS — R06.02 SOB (SHORTNESS OF BREATH): ICD-10-CM

## 2019-06-21 DIAGNOSIS — I48.0 PAF (PAROXYSMAL ATRIAL FIBRILLATION) (HCC): ICD-10-CM

## 2019-06-21 PROCEDURE — 94727 GAS DIL/WSHOT DETER LNG VOL: CPT | Performed by: INTERNAL MEDICINE

## 2019-06-21 PROCEDURE — 94727 GAS DIL/WSHOT DETER LNG VOL: CPT

## 2019-06-21 PROCEDURE — 94729 DIFFUSING CAPACITY: CPT | Performed by: INTERNAL MEDICINE

## 2019-06-21 PROCEDURE — 94010 BREATHING CAPACITY TEST: CPT

## 2019-06-21 PROCEDURE — 94729 DIFFUSING CAPACITY: CPT

## 2019-06-21 PROCEDURE — 71046 X-RAY EXAM CHEST 2 VIEWS: CPT

## 2019-06-21 PROCEDURE — 94010 BREATHING CAPACITY TEST: CPT | Performed by: INTERNAL MEDICINE

## 2019-06-25 ENCOUNTER — CLINICAL SUPPORT NO REQUIREMENTS (OUTPATIENT)
Dept: CARDIOLOGY | Facility: CLINIC | Age: 79
End: 2019-06-25

## 2019-06-25 DIAGNOSIS — I48.92 ATRIAL FLUTTER, UNSPECIFIED TYPE (HCC): ICD-10-CM

## 2019-06-25 DIAGNOSIS — I49.5 TACHY-BRADY SYNDROME (HCC): ICD-10-CM

## 2019-06-25 PROCEDURE — 93296 REM INTERROG EVL PM/IDS: CPT | Performed by: INTERNAL MEDICINE

## 2019-06-25 PROCEDURE — 93294 REM INTERROG EVL PM/LDLS PM: CPT | Performed by: INTERNAL MEDICINE

## 2019-06-27 ENCOUNTER — DOCUMENTATION (OUTPATIENT)
Dept: CARDIOLOGY | Facility: CLINIC | Age: 79
End: 2019-06-27

## 2019-06-27 NOTE — PROGRESS NOTES
Spoke with  Ivory on the phone today.  Reviewed chest x-ray and PFTs results with him.  He seems to think a lot of his symptoms of SOB started shortly after the pacemaker was placed.  This is in the same time as he started beta-blocker therapy.  Would like to hold metoprolol see if any of his symptoms improve.  He will call our office in 2 weeks to let us know how he is doing.

## 2019-07-02 NOTE — PROGRESS NOTES
Subjective:     Encounter Date:07/11/2019 - [place] Office     Patient ID: Kai Dodson is a 79 y.o.  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     REFERRING PHYSICIAN/INTERNIST: Derick Phillip MD  ELECTROPHYSIOLOGIST: Harley Dacosta MD, FAC, [facility]  INTERVENTIONAL CARDIOLOGIST: Chris De La Vega MD, [facility], [facility]   SLEEP SPECIALIST: Judie Mc MD     Chief Complaint:   Chief Complaint   Patient presents with   • Atrial Fibrillation   • Hypertension     Problem List:  1. Possible hypertensive cardiovascular disease:  a. Remote progressive CCS class III-IV chest pain syndrome with normal EKG and exertional dyspnea and fatigue with normal codominant coronary arteries and preserved systolic left ventricular function, July 2015.   b. Residual CCS class I chest pain syndrome with NYHA class II exertional dyspnea and fatigue.   c. Remote apparent atrial fibrillation/flutter with increasing dyspnea and [facility] ED evaluation with unremarkable laboratory studies including negative troponin and BNP 29 with normal portable chest x-ray with spontaneous rexsolution of atrial tachyarrhythmia and subsequent acceptable 30-day event recorder, data deficit (August 2016).  d. Residual CCS class I chest pain syndrome with NYHA class II exertional dyspnea and fatigue syndrome, 2018.  e. Recent CCS class III exertional chest pressure and dyspnea, March 2019, with abnormal acceptable quantitative SPECT gated Cardiolite exercise stress test suggestive of low probability for significant focal obstructive coronary artery disease with normal myocardial perfusion imaging studies with preserved systolic left ventricular function (LVEF 0.78), April 2019  2. Intermittent tachypalpitations:  a. Remote abnormal 24-hour Holter monitor demonstrating intermittent bradycardia with occasional PACs, May  2014.  b. Current abnormal 48-hour Holter monitor demonstrating occasional marked bradycardia with persistent PACs with acceptable combination Doppler echo with mild-to-moderate tricuspid regurgitation and an RVSP of 39 TORR, June 2014.  c. [facility] Emergency Department evaluation for episode of tachypalpitations. DMS Telemetry demonstrates atrial fibrillation/flutter, April 2015, with subsequent recorder demonstrating predominantly sinus rhythm with occasional PVCs with an elevated CHADS VASc score of 3 and initiation of anticoagulation with apixaban, spring 2015.   d. Remote Electrophysiology consultation and evaluation with acceptable followup, February 2016.  e. Recent increasing exertional dyspnea and fatigue with occasional presyncope and outpatient junctional rhythm with escape PACs, September 2018, with subsequent Biotronik MRI DDDR pacemaker implant, October 2018, with subsequent reprogramming, January 2019.  3. Obstructive sleep apnea with CPAP therapy.   4. Intermittent seasonal rhinitis.   5. Remote nephrolithiasis.   6. Chronic hypertension, probably essential.   7. Intermittent lower extremity edema felt secondary to antihypertensive therapy.   8. Abnormal thyroid function test with reduced TSH (February 2017).       No Known Allergies    Current Outpatient Medications:   •  apixaban (ELIQUIS) 5 MG tablet tablet, Take 5 mg by mouth Every 12 (Twelve) Hours., Disp: , Rfl:   •  Cyanocobalamin (VITAMIN B 12 PO), Take 1,000 mcg by mouth 3 (Three) Times a Week., Disp: , Rfl:   •  isosorbide mononitrate (IMDUR) 30 MG 24 hr tablet, Take 1 tablet by mouth Daily., Disp: 90 tablet, Rfl: 3  •  magnesium oxide (MAG-OX) 400 MG tablet, Take 1 tablet by mouth Daily., Disp: 90 tablet, Rfl: 3  •  metoprolol tartrate (LOPRESSOR) 25 MG tablet, Take 1 tablet by mouth Every 12 (Twelve) Hours., Disp: 180 tablet, Rfl: 3  •  Multiple Vitamins-Minerals (MULTIVITAMIN ADULT PO), Take 1 tablet by mouth As Needed.,  "Disp: , Rfl:   •  nitroglycerin (NITROSTAT) 0.4 MG SL tablet, 1 under the tongue as needed for angina, may repeat q5mins for up three doses, Disp: 25 tablet, Rfl: 2  •  potassium chloride (K-DUR) 10 MEQ CR tablet, Take 1 tablet by mouth Daily., Disp: 90 tablet, Rfl: 3  •  tadalafil (CIALIS) 5 MG tablet, 5 mg As Needed., Disp: , Rfl: 1  •  telmisartan-hydrochlorothiazide (MICARDIS HCT) 80-25 MG per tablet, Take 1 tablet by mouth Daily., Disp: 90 tablet, Rfl: 1    History of Present Illness: Patient returns for scheduled 3-1/2 month followup.  He has had followup with Dr. Harley Dacosta (05/28/2019) and with Mynor Pineda PA-C (06/19/2019) in the interim due to continuing complaints of shortness of breath with minimal exertion.  The patient thought his symptoms of shortness of breath started shortly after placement of his pacemaker and initiation of beta-blocker therapy.  Will suggested holding metoprolol to see if his symptoms would improve, and patient was to call back in 2 weeks to advise if he had had any improvement.  The patient states that he feels \"some better today.\"  He states that he has been off metoprolol for 7-8 days now and does feel some better.  He states that Dr. Dacosta put him on a \"thyroid pill,\" and he continues taking potassium, but he gets an occasional pain in his chest that \"hurts a little bit.\"  This pain always happens close to where his pacemaker is, but he does not feel a tingle.  He notices the pain more when he bends over.  He continues to work 8-10 hours a day and has been riding a stationary bicycle, but not every day.  Patient otherwise denies prolonged chest pain, severe shortness of breath, PND, edema, palpitations, syncope or presyncope at this time.      Review of Systems   Constitution: Positive for malaise/fatigue.   HENT: Positive for hearing loss.    Cardiovascular: Positive for chest pain.   Respiratory: Positive for snoring.    Hematologic/Lymphatic: Bruises/bleeds easily. " "  Genitourinary: Positive for frequency.   Neurological: Positive for focal weakness.      Obtained and negative except as outlined in problem list and HPI.    Procedures       Objective:       Vitals:    07/11/19 1318 07/11/19 1319   BP: 122/76 118/80   BP Location: Left arm Left arm   Patient Position: Sitting Standing   Pulse: 64    SpO2: 98%    Weight: 88.2 kg (194 lb 6.4 oz)    Height: 177.8 cm (70\")      Body mass index is 27.89 kg/m².   Last weight:  202 lbs.    Physical Exam   Constitutional: He is oriented to person, place, and time. He appears well-developed and well-nourished.   Neck: No JVD present. Carotid bruit is not present. No thyromegaly present.   Cardiovascular: Regular rhythm, S1 normal and S2 normal. Exam reveals distant heart sounds. Exam reveals no gallop, no S3 and no friction rub.   Murmur heard.   Medium-pitched early systolic murmur is present with a grade of 2/6 at the lower left sternal border.  Pulses:       Dorsalis pedis pulses are 2+ on the right side, and 2+ on the left side.        Posterior tibial pulses are 2+ on the right side, and 2+ on the left side.   Pulmonary/Chest: Effort normal. He has decreased breath sounds. He has no wheezes. He has no rhonchi. He has no rales.   Left precordial pacemaker site is nominal   Abdominal: Soft. He exhibits no mass. There is no hepatosplenomegaly. There is no tenderness. There is no guarding.   Bowel sounds audible x4   Musculoskeletal: Normal range of motion. He exhibits no edema.   Lymphadenopathy:     He has no cervical adenopathy.   Neurological: He is alert and oriented to person, place, and time.   Skin: Skin is warm, dry and intact. No rash noted.   Vitals reviewed.        Lab Review:   Lab Results   Component Value Date    GLUCOSE 108 (H) 10/16/2018    BUN 17 10/16/2018    CREATININE 1.07 10/16/2018    EGFRIFNONA 67 10/16/2018    BCR 15.9 10/16/2018    CO2 32.0 (H) 10/16/2018    CALCIUM 9.1 10/16/2018    ALBUMIN 4.24 10/16/2018    " AST 23 10/16/2018    ALT 24 10/16/2018       Lab Results   Component Value Date    WBC 5.53 10/16/2018    HGB 14.1 10/16/2018    HCT 43.5 10/16/2018    MCV 93.3 10/16/2018     10/16/2018       Lab Results   Component Value Date    HGBA1C 5.2 07/01/2015       Lab Results   Component Value Date    TSH 0.214 (L) 04/17/2015     Lab Results   Component Value Date    TRIG 108 07/01/2015     Lab Results   Component Value Date    HDL 48 07/01/2015 04/23/2019, exercise stress test:  · Patient reported slight chest discomfort during walking but it disappeared with rest.  · O2 saturation % during stress portion; O2 saturation 100% throughout recovery.  · Expected exercise time: 6:00, actual time: 4:23 (AARON +30)  · THR of 120 reached at 3:13  · SR with ST changes noted. wide-complex regular beats, rate 120, pacs  · BMI 29.0  · Left ventricular ejection fraction is hyperdynamic (calculated EF > 70%); WNL TID = 1.08.  · Raw images reviewed with the following abnormalities noted: mild-moderate movement at rest and post stress.  · Myocardial perfusion imaging indicates a normal myocardial perfusion study with no evidence of ischemia.  · Impressions are consistent with a low risk study.  · There is no prior study available for comparison.  · Findings consistent with an abnormal ECG stress test with equivocal ischemic electrocardiographic changes at peak exercise as well as associated intermittent transient pacemaker mediated tachycardia.  · Abnormal acceptable quantitative SPECT gated Cardiolite exercise stress test suggestive of low probability for significant focal obstructive coronary artery disease with normal myocardial perfusion imaging studies with preserved systolic left ventricular function (LVEF 0.78).    04/30/2019:  · NT-proBNP - 97  · D-dimer - 218    06/21/2019, chest x-ray:  FINDINGS: Cardiac size within normal limits. The pulmonary vascularity within normal limits. No focal opacification or  consolidation. No pneumothorax or significant effusion with biapical pleural parenchymal scarring and bibasilar atelectasis and scarring greatest on the left.  Degenerative changes of the spine.         IMPRESSION: Chronic changes without acute cardiopulmonary process. No overt edema or effusion.    06/21/2019, pulmonary function test with bronchodilator: Test obtained with adequate patient effort.  Spirometry reveals an FEV1 to FVC ratio of 87% indicating no obstruction.  Lung volumes reveal a total lung capacity of 5.87 L, 92%, normal.  Diffusion capacity is 19.7, 95% also normal.       Assessment:       Overall continued acceptable course with no interim cardiopulmonary complaints with acceptable functional status. We will defer additional diagnostic or therapeutic intervention from a cardiac perspective at this time.  If symptoms become recurrent and progressive, would screen him with an echocardiogram, and if there is any significant left ventricular excessive thickening, would pursue cardiac MRI scan.  I am pleased that he is improving and active and largely asymptomatic currently.  We will have him continue off metoprolol indefinitely at this time.     Diagnosis Plan   1. Hypertensive heart disease without heart failure  No recurrent angina pectoris or CHF on current activity schedule; continue current treatment     2. Essential hypertension  Acceptable control; Continue current treatment.    3. Tachy-hannah syndrome (CMS/HCC)  Continue current treatment and close followup and monitoring with Dr. Dacosta   4. MINO on CPAP  Compliance stressed          Plan:         1. Patient to continue current medications and close follow up with the above providers.  2. Tentative cardiology follow up in November 2019, or patient may return sooner PRN.    Transcribed by Priya Dorantes for Dr. Misael Walls at 9:20 AM on 07/11/2019    I, Misael Walls MD, [facility], personally performed the services described in this  documentation as scribed by the above named individual in my presence, and it is both accurate and complete. At 1:36 PM on 07/11/2019

## 2019-07-11 ENCOUNTER — OFFICE VISIT (OUTPATIENT)
Dept: CARDIOLOGY | Facility: CLINIC | Age: 79
End: 2019-07-11

## 2019-07-11 VITALS
HEIGHT: 70 IN | HEART RATE: 64 BPM | SYSTOLIC BLOOD PRESSURE: 118 MMHG | OXYGEN SATURATION: 98 % | DIASTOLIC BLOOD PRESSURE: 80 MMHG | WEIGHT: 194.4 LBS | BODY MASS INDEX: 27.83 KG/M2

## 2019-07-11 DIAGNOSIS — I11.9 HYPERTENSIVE HEART DISEASE WITHOUT HEART FAILURE: Primary | ICD-10-CM

## 2019-07-11 DIAGNOSIS — I49.5 TACHY-BRADY SYNDROME (HCC): ICD-10-CM

## 2019-07-11 DIAGNOSIS — Z99.89 OSA ON CPAP: ICD-10-CM

## 2019-07-11 DIAGNOSIS — G47.33 OSA ON CPAP: ICD-10-CM

## 2019-07-11 DIAGNOSIS — I10 ESSENTIAL HYPERTENSION: ICD-10-CM

## 2019-07-11 PROCEDURE — 99214 OFFICE O/P EST MOD 30 MIN: CPT | Performed by: INTERNAL MEDICINE

## 2019-10-08 ENCOUNTER — CLINICAL SUPPORT NO REQUIREMENTS (OUTPATIENT)
Dept: CARDIOLOGY | Facility: CLINIC | Age: 79
End: 2019-10-08

## 2019-10-08 DIAGNOSIS — I49.5 TACHY-BRADY SYNDROME (HCC): ICD-10-CM

## 2019-10-08 PROCEDURE — 93296 REM INTERROG EVL PM/IDS: CPT | Performed by: INTERNAL MEDICINE

## 2019-10-08 PROCEDURE — 93294 REM INTERROG EVL PM/LDLS PM: CPT | Performed by: INTERNAL MEDICINE

## 2019-11-12 NOTE — PROGRESS NOTES
Subjective:     Encounter Date:11/19/2019 - [place] Office     Patient ID: Kai Dodson is a 79 y.o.  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     REFERRING PHYSICIAN/INTERNIST: Derick Phillip MD  ELECTROPHYSIOLOGIST: Harley Dacosta MD, FAC, [facility]  INTERVENTIONAL CARDIOLOGIST: Chris De La Vega MD, [facility], [facility]   SLEEP SPECIALIST: Judie Mc MD     Chief Complaint:   Chief Complaint   Patient presents with   • Hypertension     Problem List:  1. Possible hypertensive cardiovascular disease:  a. Remote progressive CCS class III-IV chest pain syndrome with normal EKG and exertional dyspnea and fatigue with normal codominant coronary arteries and preserved systolic left ventricular function, July 2015.   b. Residual CCS class I chest pain syndrome with NYHA class II exertional dyspnea and fatigue.   c. Remote apparent atrial fibrillation/flutter with increasing dyspnea and [facility] ED evaluation with unremarkable laboratory studies including negative troponin and BNP 29 with normal portable chest x-ray with spontaneous rexsolution of atrial tachyarrhythmia and subsequent acceptable 30-day event recorder, data deficit (August 2016).  d. Residual CCS class I chest pain syndrome with NYHA class II exertional dyspnea and fatigue syndrome, 2018.  e. Recent CCS class III exertional chest pressure and dyspnea, March 2019, with abnormal acceptable quantitative SPECT gated Cardiolite exercise stress test suggestive of low probability for significant focal obstructive coronary artery disease with normal myocardial perfusion imaging studies with preserved systolic left ventricular function (LVEF 0.78), April 2019  f. Residual class I symptoms, November 2019  2. Intermittent tachypalpitations:  a. Remote abnormal 24-hour Holter monitor demonstrating intermittent bradycardia with  occasional PACs, May 2014.  b. Current abnormal 48-hour Holter monitor demonstrating occasional marked bradycardia with persistent PACs with acceptable combination Doppler echo with mild-to-moderate tricuspid regurgitation and an RVSP of 39 TORR, June 2014.  c. [facility] Emergency Department evaluation for episode of tachypalpitations. DMS Telemetry demonstrates atrial fibrillation/flutter, April 2015, with subsequent recorder demonstrating predominantly sinus rhythm with occasional PVCs with an elevated CHADS VASc score of 3 and initiation of anticoagulation with apixaban, spring 2015.   d. Remote electrophysiology consultation and evaluation with acceptable followup, February 2016.  e. Recent increasing exertional dyspnea and fatigue with occasional presyncope and outpatient junctional rhythm with escape PACs, September 2018, with subsequent Biotronik MRI DDDR pacemaker implant, October 2018, with subsequent reprogramming, January 2019, with acceptable PACEART assessment, October 2019  3. Obstructive sleep apnea with CPAP therapy.   4. Intermittent seasonal rhinitis.   5. Remote nephrolithiasis.   6. Chronic hypertension, probably essential.   7. Intermittent lower extremity edema felt secondary to antihypertensive therapy.   8. Abnormal thyroid function test with reduced TSH (February 2017).     No Known Allergies    Current Outpatient Medications:   •  apixaban (ELIQUIS) 5 MG tablet tablet, Take 5 mg by mouth Every 12 (Twelve) Hours., Disp: , Rfl:   •  flecainide (TAMBOCOR) 50 MG tablet, 2 (Two) Times a Day., Disp: , Rfl: 0  •  isosorbide mononitrate (IMDUR) 30 MG 24 hr tablet, Take 1 tablet by mouth Daily., Disp: 90 tablet, Rfl: 3  •  magnesium oxide (MAG-OX) 400 MG tablet, Take 1 tablet by mouth Daily., Disp: 90 tablet, Rfl: 3  •  metoprolol tartrate (LOPRESSOR) 25 MG tablet, Take 1 tablet by mouth Every 12 (Twelve) Hours., Disp: 180 tablet, Rfl: 3  •  nitroglycerin (NITROSTAT) 0.4 MG SL tablet,  "1 under the tongue as needed for angina, may repeat q5mins for up three doses, Disp: 25 tablet, Rfl: 2  •  potassium chloride (K-DUR) 10 MEQ CR tablet, Take 1 tablet by mouth Daily., Disp: 90 tablet, Rfl: 3  •  tadalafil (CIALIS) 5 MG tablet, 5 mg As Needed., Disp: , Rfl: 1  •  telmisartan-hydrochlorothiazide (MICARDIS HCT) 80-25 MG per tablet, Take 1 tablet by mouth Daily., Disp: 90 tablet, Rfl: 1    History of Present Illness: Patient returns for scheduled 4-month followup. He says he has been \"doing pretty good.\"  He notes that he thinks when he was complaining about \"it hurting in here, I was just getting used to the pacemaker kicking in.\"  He is doing better now and has followup with Dr. Dacosta in December 2019.  He is active on a daily basis and \"walks a lot of steps,\" but he does not walk per se for exercise.  He has gotten a flu shot and a Pneumovax this fall.  He believes that his blood pressures at home are similar to what it is in our office today.  He says his pacemaker site \"itches a lot, but I guess that's normal.\" His work has been busy, and he says \"I've been too busy.\"  He would like to travel more in the [place] to sight-see.  Patient otherwise denies chest pain, shortness of breath, PND, edema, palpitations, syncope or presyncope at this time.        Review of Systems   HENT: Positive for hearing loss.    Hematologic/Lymphatic: Bruises/bleeds easily.   Genitourinary: Positive for frequency.      Obtained and negative except as outlined in problem list and HPI.    Procedures       Objective:       Vitals:    11/19/19 1301 11/19/19 1306   BP: 130/68 122/64   BP Location: Right arm Right arm   Patient Position: Sitting Standing   Pulse: 66    SpO2: 98%    Weight: 88.5 kg (195 lb)    Height: 177.8 cm (70\")      Body mass index is 27.98 kg/m².   Last weight:  194 lbs.    Physical Exam   Constitutional: He is oriented to person, place, and time. He appears well-developed and well-nourished.   Neck: No JVD " present. Carotid bruit is not present. No thyromegaly present.   Cardiovascular: Regular rhythm, S1 normal and S2 normal. Exam reveals no gallop, no S3 and no friction rub.   Murmur heard.   Medium-pitched systolic murmur is present with a grade of 1/6 at the upper right sternal border.  Pulses:       Dorsalis pedis pulses are 2+ on the right side, and 2+ on the left side.        Posterior tibial pulses are 2+ on the right side, and 2+ on the left side.   Pulmonary/Chest: Effort normal and breath sounds normal. He has no wheezes. He has no rhonchi. He has no rales.   Left precordial pacemaker site is nominal   Abdominal: Soft. He exhibits no mass. There is no hepatosplenomegaly. There is no tenderness. There is no guarding.   Bowel sounds audible x4   Musculoskeletal: Normal range of motion. He exhibits no edema.   Lymphadenopathy:     He has no cervical adenopathy.   Neurological: He is alert and oriented to person, place, and time.   Skin: Skin is warm, dry and intact. No rash noted.   Vitals reviewed.        Lab Review: No recent laboratory results available for review today    Lab Results   Component Value Date    GLUCOSE 108 (H) 10/16/2018    BUN 17 10/16/2018    CREATININE 1.07 10/16/2018    EGFRIFNONA 67 10/16/2018    BCR 15.9 10/16/2018    CO2 32.0 (H) 10/16/2018    CALCIUM 9.1 10/16/2018    ALBUMIN 4.24 10/16/2018    AST 23 10/16/2018    ALT 24 10/16/2018       Lab Results   Component Value Date    WBC 5.53 10/16/2018    HGB 14.1 10/16/2018    HCT 43.5 10/16/2018    MCV 93.3 10/16/2018     10/16/2018       Lab Results   Component Value Date    HGBA1C 5.2 07/01/2015       Lab Results   Component Value Date    TSH 0.214 (L) 04/17/2015     Lab Results   Component Value Date    TRIG 108 07/01/2015     Lab Results   Component Value Date    HDL 48 07/01/2015         Assessment:       Overall continued acceptable course with no new interim cardiopulmonary complaints with good functional status. We will  defer additional diagnostic or therapeutic intervention from a cardiac perspective at this time.     Diagnosis Plan   1. Hypertensive heart disease without heart failure  No recurrent angina pectoris or CHF on current activity schedule; continue current treatment     2. Essential hypertension  Acceptable control; Continue current treatment.    3. Tachy-hannah syndrome (CMS/HCC)  Stable recent PACEART; followup as scheduled with Dr. Dacosta next month   4. MINO on CPAP  Compliance stressed          Plan:         1. Patient to continue current medications and close follow up with the above providers.  2. Tentative cardiology follow up in May 2020, or patient may return sooner PRN.    Transcribed by Priya Dorantes for Dr. Misael Walls at 9:11 AM on 11/19/2019    I, Misael Walls MD, [facility], personally performed the services described in this documentation as scribed by the above named individual in my presence, and it is both accurate and complete. At 1:13 PM on 11/19/2019

## 2019-11-19 ENCOUNTER — OFFICE VISIT (OUTPATIENT)
Dept: CARDIOLOGY | Facility: CLINIC | Age: 79
End: 2019-11-19

## 2019-11-19 VITALS
SYSTOLIC BLOOD PRESSURE: 122 MMHG | BODY MASS INDEX: 27.92 KG/M2 | OXYGEN SATURATION: 98 % | WEIGHT: 195 LBS | DIASTOLIC BLOOD PRESSURE: 64 MMHG | HEIGHT: 70 IN | HEART RATE: 66 BPM

## 2019-11-19 DIAGNOSIS — I49.5 TACHY-BRADY SYNDROME (HCC): ICD-10-CM

## 2019-11-19 DIAGNOSIS — G47.33 OSA ON CPAP: ICD-10-CM

## 2019-11-19 DIAGNOSIS — I11.9 HYPERTENSIVE HEART DISEASE WITHOUT HEART FAILURE: Primary | ICD-10-CM

## 2019-11-19 DIAGNOSIS — Z99.89 OSA ON CPAP: ICD-10-CM

## 2019-11-19 DIAGNOSIS — I10 ESSENTIAL HYPERTENSION: ICD-10-CM

## 2019-11-19 PROCEDURE — 99214 OFFICE O/P EST MOD 30 MIN: CPT | Performed by: INTERNAL MEDICINE

## 2019-11-19 RX ORDER — FLECAINIDE ACETATE 50 MG/1
TABLET ORAL 2 TIMES DAILY
Refills: 0 | COMMUNITY
Start: 2019-11-12 | End: 2019-11-27 | Stop reason: SDUPTHER

## 2019-11-27 RX ORDER — FLECAINIDE ACETATE 50 MG/1
TABLET ORAL
Qty: 180 TABLET | Refills: 1 | Status: SHIPPED | OUTPATIENT
Start: 2019-11-27 | End: 2020-06-26

## 2019-12-06 RX ORDER — TELMISARTAN AND HYDROCHLORTHIAZIDE 80; 25 MG/1; MG/1
TABLET ORAL
Qty: 90 TABLET | Refills: 1 | Status: SHIPPED | OUTPATIENT
Start: 2019-12-06 | End: 2020-08-05

## 2020-01-21 ENCOUNTER — OFFICE VISIT (OUTPATIENT)
Dept: CARDIOLOGY | Facility: CLINIC | Age: 80
End: 2020-01-21

## 2020-01-21 VITALS
WEIGHT: 188 LBS | HEART RATE: 60 BPM | BODY MASS INDEX: 26.92 KG/M2 | SYSTOLIC BLOOD PRESSURE: 112 MMHG | HEIGHT: 70 IN | DIASTOLIC BLOOD PRESSURE: 68 MMHG

## 2020-01-21 DIAGNOSIS — I48.0 PAF (PAROXYSMAL ATRIAL FIBRILLATION) (HCC): Primary | ICD-10-CM

## 2020-01-21 DIAGNOSIS — I49.5 TACHY-BRADY SYNDROME (HCC): ICD-10-CM

## 2020-01-21 DIAGNOSIS — Z79.899 LONG TERM CURRENT USE OF ANTIARRHYTHMIC MEDICAL THERAPY: ICD-10-CM

## 2020-01-21 PROCEDURE — 99214 OFFICE O/P EST MOD 30 MIN: CPT | Performed by: PHYSICIAN ASSISTANT

## 2020-01-21 PROCEDURE — 93280 PM DEVICE PROGR EVAL DUAL: CPT | Performed by: PHYSICIAN ASSISTANT

## 2020-01-21 NOTE — PROGRESS NOTES
Kai Dodson  1940  PCP: Derick Phillip MD    SUBJECTIVE:   Kai Dodson is a 79 y.o. male seen for a follow up visit regarding the following:     Chief Complaint: Follow up for PAF, SSS     HPI:    Patient is a 79 year old male with a history of symptomatic bradycardia s/p BTK dual chamber pacemaker and PAF that presents today for follow-up. He has been stable from an EP standpoint. He has not had any recurrence of afib. He notes occasionally feeling dizzy upon standing, but is not overly bothered by this. He has not had any cp, sob above baseline, edema, or palps.     Cardiac PMH: (Old records have been reviewed and summarized below)  1. Atrial Fibrillation- diagnosed in 2015.   2. Bradycardia  3. C 6/2015- normal coronaries. EF 65%   4. Monitor 9/2018- avg 57, min 35, max 94. Junctional rhythm at times. No afib. Low rates predominately while sleeping.          Current Outpatient Medications:   •  apixaban (ELIQUIS) 5 MG tablet tablet, Take 5 mg by mouth Every 12 (Twelve) Hours., Disp: , Rfl:   •  flecainide (TAMBOCOR) 50 MG tablet,  TAKE 1 TABLET BY MOUTH TWICE A DAY, Disp: 180 tablet, Rfl: 1  •  isosorbide mononitrate (IMDUR) 30 MG 24 hr tablet, Take 1 tablet by mouth Daily., Disp: 90 tablet, Rfl: 3  •  magnesium oxide (MAG-OX) 400 MG tablet, Take 1 tablet by mouth Daily., Disp: 90 tablet, Rfl: 3  •  metoprolol tartrate (LOPRESSOR) 25 MG tablet, Take 1 tablet by mouth Every 12 (Twelve) Hours., Disp: 180 tablet, Rfl: 3  •  nitroglycerin (NITROSTAT) 0.4 MG SL tablet, 1 under the tongue as needed for angina, may repeat q5mins for up three doses, Disp: 25 tablet, Rfl: 2  •  potassium chloride (K-DUR) 10 MEQ CR tablet, Take 1 tablet by mouth Daily., Disp: 90 tablet, Rfl: 3  •  tadalafil (CIALIS) 5 MG tablet, 5 mg As Needed., Disp: , Rfl: 1  •  telmisartan-hydrochlorothiazide (MICARDIS HCT) 80-25 MG per tablet, take 1 tablet by mouth once daily, Disp: 90 tablet, Rfl: 1    Past Medical  History, Past Surgical History, Family history, Social History, and Medications were all reviewed with the patient today and updated as necessary.       Patient Active Problem List   Diagnosis   • Hypertensive cardiovascular disease   • Intermittent palpitations   • MINO on CPAP   • Seasonal rhinitis   • Hypertension   • Lower extremity edema   • PAF (paroxysmal atrial fibrillation) (CMS/HCC)   • Sinus bradycardia   • Paroxysmal atrial fibrillation (CMS/HCC)   • Atrial flutter (CMS/HCC)   • CALDERON (dyspnea on exertion)   • Abnormal thyroid stimulating hormone level   • Tachy-hannah syndrome (CMS/HCC)   • Chest pain   • Long term current use of antiarrhythmic medical therapy     No Known Allergies  Past Medical History:   Diagnosis Date   • Atrial fibrillation (CMS/HCC)    • Bradycardia    • Chronic hypertension     Probably essential   • Hypertension    • Hypertensive cardiovascular disease 07/2015    Recent progressive CCS class III-IV chest pain syndrome with normal EKG and exertional dyspnea and fatigue with normal codominant coronary arteries and preserved systolic  left ventricular function, July 2015.   • Intermittent palpitations 05/01/2014    Remote abnormal 24-hour Holter monitor demonstrating intermittent bradycardia with occasional PACs, May 2014.   • Lower extremity edema     Intermittent lower extremity edema felt secondary to antihypertensive therapy.    • Nephrolithiasis     Remote- 15 years ago   • MINO on CPAP    • MINO on CPAP     doesn't know setting    • Prostate troubles     enlarged   • Seasonal rhinitis     intermittent     Past Surgical History:   Procedure Laterality Date   • CARDIAC CATHETERIZATION      no stents   • CARDIAC ELECTROPHYSIOLOGY PROCEDURE N/A 10/24/2018    Procedure: Pacemaker DC new with Biotronik. Hold Eliquis one day prior.;  Surgeon: Harley Dacosta MD;  Location: [facility] INVASIVE LOCATION;  Service: Cardiology   • COLONOSCOPY     • ENDOSCOPY     • INSERT / REPLACE / REMOVE  "PACEMAKER       Family History   Problem Relation Age of Onset   • No Known Problems Mother    • Other Father         cardiac arrhythmia     Social History     Tobacco Use   • Smoking status: Former Smoker     Packs/day: 1.00     Types: Cigarettes     Last attempt to quit: 1990     Years since quittin.0   • Smokeless tobacco: Never Used   Substance Use Topics   • Alcohol use: No         PHYSICAL EXAM:    /68 (BP Location: Right arm, Patient Position: Sitting)   Pulse 60   Ht 177.8 cm (70\")   Wt 85.3 kg (188 lb)   BMI 26.98 kg/m²        Wt Readings from Last 5 Encounters:   20 85.3 kg (188 lb)   19 88.5 kg (195 lb)   19 88.2 kg (194 lb 6.4 oz)   19 89.4 kg (197 lb)   19 89.1 kg (196 lb 6.4 oz)       BP Readings from Last 5 Encounters:   20 112/68   19 122/64   19 118/80   19 150/80   19 130/70       General-Well Nourished, Well developed  Eyes - PERRLA  Neck- supple, No mass  CV- regular rate and rhythm, no MRG, No edema  Lung- clear bilaterally  Abd- soft, +BS  Musc/skel - Norm strength and range of motion  Skin- warm and dry  Neuro - Alert & Oriented x 3, appropriate mood.        Medical problems and test results were reviewed with the patient today.     No results found for this or any previous visit (from the past 672 hour(s)).      EKG: (EKG has been independently visualized by me and summarized below)      ECG 12 Lead  Date/Time: 2020 1:42 PM  Performed by: Olga Vila PA  Authorized by: Olga Vila PA   Comparison: compared with previous ECG from 2019  Similar to previous ECG  Rhythm: paced  Rate: normal  BPM: 60  Conduction: incomplete left bundle branch block  QRS axis: normal  Other findings: non-specific ST-T wave changes    Clinical impression: abnormal EKG             ASSESSMENT and PLAN    1. PAF- currently maintaining sinus rhythm on Flecainide therapy. No recurrent events. Continue Eliquis 5mg BID. Will " decrease metoprolol to 12.5mg BID due to complaints of vivid dreams.   2. Tachybrady Syndrome s/p BTK dual chamber pacemaker. Normal function on check today.   3. Flecainide use- EKG reviewed. QRS stable.       Return in about 6 months (around 7/21/2020) for BTK.        Olga Vila PA-C   Cardiology/Electrophysiology  1/21/2020  1:46 PM

## 2020-02-25 ENCOUNTER — CLINICAL SUPPORT NO REQUIREMENTS (OUTPATIENT)
Dept: CARDIOLOGY | Facility: CLINIC | Age: 80
End: 2020-02-25

## 2020-02-25 DIAGNOSIS — Z79.899 LONG TERM CURRENT USE OF ANTIARRHYTHMIC MEDICAL THERAPY: ICD-10-CM

## 2020-02-25 DIAGNOSIS — I49.5 TACHY-BRADY SYNDROME (HCC): ICD-10-CM

## 2020-02-25 DIAGNOSIS — I48.0 PAF (PAROXYSMAL ATRIAL FIBRILLATION) (HCC): ICD-10-CM

## 2020-02-25 PROCEDURE — 93294 REM INTERROG EVL PM/LDLS PM: CPT | Performed by: INTERNAL MEDICINE

## 2020-02-25 PROCEDURE — 93296 REM INTERROG EVL PM/IDS: CPT | Performed by: INTERNAL MEDICINE

## 2020-05-26 NOTE — PROGRESS NOTES
Subjective:     Encounter Date:05/28/2020 - [place] Office     Patient ID: Kai Dodson is an 80 y.o.  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     REFERRING PHYSICIAN/INTERNIST: Derick Phillip MD  ELECTROPHYSIOLOGIST: Harley Dacosta MD, FAC, [facility]  INTERVENTIONAL CARDIOLOGIST: Chris De La Vega MD, [facility], [facility]   SLEEP SPECIALIST: Judie Mc MD     Chief Complaint:   Chief Complaint   Patient presents with   • Shortness of Breath     Problem List:  1. Possible hypertensive cardiovascular disease:  a. Remote progressive CCS class III-IV chest pain syndrome with normal EKG and exertional dyspnea and fatigue with normal codominant coronary arteries and preserved systolic left ventricular function, July 2015.   b. Residual CCS class I chest pain syndrome with NYHA class II exertional dyspnea and fatigue.   c. Remote apparent atrial fibrillation/flutter with increasing dyspnea and [facility] ED evaluation with unremarkable laboratory studies including negative troponin and BNP 29 with normal portable chest x-ray with spontaneous rexsolution of atrial tachyarrhythmia and subsequent acceptable 30-day event recorder, data deficit (August 2016).  d. Residual CCS class I chest pain syndrome with NYHA class II exertional dyspnea and fatigue syndrome, 2018.  e. Recent CCS class III exertional chest pressure and dyspnea, March 2019, with abnormal acceptable quantitative SPECT gated Cardiolite exercise stress test suggestive of low probability for significant focal obstructive coronary artery disease with normal myocardial perfusion imaging studies with preserved systolic left ventricular function (LVEF 0.78), April 2019  f. Residual class I symptoms, November 2019, May 2020  2. Intermittent tachypalpitations:  a. Remote abnormal 24-hour Holter monitor demonstrating intermittent  bradycardia with occasional PACs, May 2014.  b. Current abnormal 48-hour Holter monitor demonstrating occasional marked bradycardia with persistent PACs with acceptable combination Doppler echo with mild-to-moderate tricuspid regurgitation and an RVSP of 39 TORR, June 2014.  c. [facility] Emergency Department evaluation for episode of tachypalpitations. DMS Telemetry demonstrates atrial fibrillation/flutter, April 2015, with subsequent recorder demonstrating predominantly sinus rhythm with occasional PVCs with an elevated CHADS VASc score of 3 and initiation of anticoagulation with apixaban, spring 2015.   d. Remote electrophysiology consultation and evaluation with acceptable followup, February 2016.  e. Recent increasing exertional dyspnea and fatigue with occasional presyncope and outpatient junctional rhythm with escape PACs, September 2018, with subsequent Biotronik MRI DDDR pacemaker implant, October 2018, with subsequent reprogramming, January 2019, with acceptable PACEART assessment, October 2019, February 2020  3. Obstructive sleep apnea with CPAP therapy.   4. Intermittent seasonal rhinitis.   5. Remote nephrolithiasis.   6. Chronic hypertension, probably essential.   7. Intermittent lower extremity edema felt secondary to antihypertensive therapy.   8. Abnormal thyroid function test with reduced TSH (February 2017).     No Known Allergies    Current Outpatient Medications:   •  apixaban (ELIQUIS) 5 MG tablet tablet, Take 5 mg by mouth Every 12 (Twelve) Hours., Disp: , Rfl:   •  flecainide (TAMBOCOR) 50 MG tablet,  TAKE 1 TABLET BY MOUTH TWICE A DAY, Disp: 180 tablet, Rfl: 1  •  isosorbide mononitrate (IMDUR) 30 MG 24 hr tablet, Take 1 tablet by mouth Daily., Disp: 90 tablet, Rfl: 3  •  magnesium oxide (MAG-OX) 400 MG tablet, Take 1 tablet by mouth Daily., Disp: 90 tablet, Rfl: 3  •  metoprolol tartrate (LOPRESSOR) 25 MG tablet, TAKE 1 TABLET BY MOUTH EVERY 12 HOURS (Patient taking  "differently: Take 12.5 mg by mouth 2 (Two) Times a Day.), Disp: 60 tablet, Rfl: 11  •  nitroglycerin (NITROSTAT) 0.4 MG SL tablet, 1 under the tongue as needed for angina, may repeat q5mins for up three doses, Disp: 25 tablet, Rfl: 2  •  potassium chloride (K-DUR) 10 MEQ CR tablet, Take 1 tablet by mouth Daily., Disp: 90 tablet, Rfl: 3  •  telmisartan-hydrochlorothiazide (MICARDIS HCT) 80-25 MG per tablet, take 1 tablet by mouth once daily, Disp: 90 tablet, Rfl: 1  •  tadalafil (CIALIS) 5 MG tablet, 5 mg As Needed., Disp: , Rfl: 1    History of Present Illness: Patient returns for scheduled 6-month followup. He had EP followup in January 2020 with change in metoprolol dose to 12.5 mg bid due to vivid dreams and acceptable pacemaker interrogation at that time.  He says that he has shortness of breath, just as he did 6 months ago, and there are some times that if he bends over 2-3 times, he can \"hardly get back up.\"  He has not been very active for the past few months, but he started back at the beginning of this week riding his stationary bicycle and eating better.  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  They were able to keep their employees and bring them back.  They have also been doing some curbside service and have several hand  stations throughout the store.  He is not sure how much longer he will keep his store open; he says \"I should've let it go years ago, but what else would I do?\"  He has no chest pain, tightness, or pressure with his daily activities.  He has had no interim ER visits, hospitalizations, surgeries, or serious illnesses.        ROS   Obtained and negative except as outlined in problem list and HPI.    Procedures       Objective:       Vitals:    05/28/20 1325 05/28/20 1326   BP: 114/64 110/64   BP Location: Left arm Left arm   Patient Position: Sitting Standing   Pulse: 61    Temp: 98.6 °F (37 °C)    Weight: 88.9 kg (196 lb)  " "  Height: 177.8 cm (70\")      Body mass index is 28.12 kg/m².   Last weight:  195 lbs.    Physical Exam   Constitutional: He is oriented to person, place, and time. He appears well-developed and well-nourished.   Neck: No JVD present. Carotid bruit is not present. No thyromegaly present.   Cardiovascular: Regular rhythm, S1 normal and S2 normal. Exam reveals no gallop, no S3 and no friction rub.   Murmur heard.   Medium-pitched early systolic murmur is present with a grade of 2/6 at the lower left sternal border.  Pulses:       Dorsalis pedis pulses are 2+ on the right side, and 2+ on the left side.        Posterior tibial pulses are 2+ on the right side, and 2+ on the left side.   Pulmonary/Chest: Effort normal. He has decreased breath sounds. He has no wheezes. He has no rhonchi. He has no rales.   Left precordial pacemaker site is nominal   Abdominal: Soft. He exhibits no mass. There is no hepatosplenomegaly. There is no tenderness. There is no guarding.   Bowel sounds audible x4   Musculoskeletal: Normal range of motion. He exhibits no edema.   Lymphadenopathy:     He has no cervical adenopathy.   Neurological: He is alert and oriented to person, place, and time.   Skin: Skin is warm, dry and intact. No rash noted.   Vitals reviewed.        Lab Review: No recent laboratory results available for review today    Lab Results   Component Value Date    GLUCOSE 108 (H) 10/16/2018    BUN 17 10/16/2018    CREATININE 1.07 10/16/2018    EGFRIFNONA 67 10/16/2018    BCR 15.9 10/16/2018    CO2 32.0 (H) 10/16/2018    CALCIUM 9.1 10/16/2018    ALBUMIN 4.24 10/16/2018    AST 23 10/16/2018    ALT 24 10/16/2018       Lab Results   Component Value Date    WBC 5.53 10/16/2018    HGB 14.1 10/16/2018    HCT 43.5 10/16/2018    MCV 93.3 10/16/2018     10/16/2018       Lab Results   Component Value Date    HGBA1C 5.2 07/01/2015       Lab Results   Component Value Date    TSH 0.214 (L) 04/17/2015       Lab Results   Component " Value Date    TRIG 108 07/01/2015     Lab Results   Component Value Date    HDL 48 07/01/2015           Assessment:       Overall continued acceptable course with no new interim cardiopulmonary complaints with acceptable functional status. We will defer additional diagnostic or therapeutic intervention from a cardiac perspective at this time other than to have him undergo an echocardiogram when he returns for followup with Dr. Dacosta in July 2020.  Hopefully, we will be allowed to review any recent laboratory results with him by letter.  Would defer [facility]/C at this time.     Diagnosis Plan   1. Hypertensive heart disease without heart failure  No recurrent angina pectoris or CHF on current activity schedule; continue current treatment     2. Essential hypertension  Acceptable control; Continue current treatment.    3. Tachy-hannah syndrome (CMS/HCC)  Nominal recent PACEART assessment; continue monitoring with EP   4. MINO on CPAP  Compliance stressed   5. CALDERON (dyspnea on exertion)  Will obtain echocardiogram to screen for occult LV dysfunction, left ventricular wall thickening, or valvular heart dsiease          Plan:         1. Patient to continue current medications and close follow up with the above providers.  2. When patient returns to our office for EP followup in July 2020, obtain echocardiogram to assess residual LV function and exclude excessive left ventricular thickening that could be associated with cardiac amyloidosis.  3. Tentative cardiology follow up in November/December 2020, or patient may return sooner PRN.    Transcribed by Priya Dorantes for Dr. Misael Walls at 9:19 AM on 05/28/2020    IMisael MD, [facility], personally performed the services described in this documentation as scribed by the above named individual in my presence, and it is both accurate and complete. At 1:32 PM on 05/28/2020

## 2020-05-28 ENCOUNTER — OFFICE VISIT (OUTPATIENT)
Dept: CARDIOLOGY | Facility: CLINIC | Age: 80
End: 2020-05-28

## 2020-05-28 VITALS
HEART RATE: 61 BPM | BODY MASS INDEX: 28.06 KG/M2 | WEIGHT: 196 LBS | SYSTOLIC BLOOD PRESSURE: 110 MMHG | HEIGHT: 70 IN | DIASTOLIC BLOOD PRESSURE: 64 MMHG | TEMPERATURE: 98.6 F

## 2020-05-28 DIAGNOSIS — Z99.89 OSA ON CPAP: ICD-10-CM

## 2020-05-28 DIAGNOSIS — G47.33 OSA ON CPAP: ICD-10-CM

## 2020-05-28 DIAGNOSIS — I10 ESSENTIAL HYPERTENSION: ICD-10-CM

## 2020-05-28 DIAGNOSIS — I11.9 HYPERTENSIVE HEART DISEASE WITHOUT HEART FAILURE: Primary | ICD-10-CM

## 2020-05-28 DIAGNOSIS — R06.09 DOE (DYSPNEA ON EXERTION): ICD-10-CM

## 2020-05-28 DIAGNOSIS — I49.5 TACHY-BRADY SYNDROME (HCC): ICD-10-CM

## 2020-05-28 PROCEDURE — 99214 OFFICE O/P EST MOD 30 MIN: CPT | Performed by: INTERNAL MEDICINE

## 2020-06-26 RX ORDER — FLECAINIDE ACETATE 50 MG/1
TABLET ORAL
Qty: 180 TABLET | Refills: 0 | Status: SHIPPED | OUTPATIENT
Start: 2020-06-26 | End: 2020-10-16 | Stop reason: SDUPTHER

## 2020-06-26 RX ORDER — POTASSIUM CHLORIDE 750 MG/1
TABLET, FILM COATED, EXTENDED RELEASE ORAL
Qty: 90 TABLET | Refills: 3 | Status: SHIPPED | OUTPATIENT
Start: 2020-06-26 | End: 2021-06-24 | Stop reason: SDUPTHER

## 2020-07-28 ENCOUNTER — OFFICE VISIT (OUTPATIENT)
Dept: CARDIOLOGY | Facility: CLINIC | Age: 80
End: 2020-07-28

## 2020-07-28 ENCOUNTER — HOSPITAL ENCOUNTER (OUTPATIENT)
Dept: CARDIOLOGY | Facility: HOSPITAL | Age: 80
Discharge: HOME | End: 2020-07-28
Admitting: INTERNAL MEDICINE

## 2020-07-28 VITALS
WEIGHT: 191 LBS | HEART RATE: 60 BPM | HEIGHT: 70 IN | SYSTOLIC BLOOD PRESSURE: 124 MMHG | OXYGEN SATURATION: 98 % | DIASTOLIC BLOOD PRESSURE: 78 MMHG | TEMPERATURE: 97.7 F | BODY MASS INDEX: 27.35 KG/M2

## 2020-07-28 VITALS — WEIGHT: 196 LBS | BODY MASS INDEX: 28.06 KG/M2 | HEIGHT: 70 IN

## 2020-07-28 DIAGNOSIS — R06.09 DOE (DYSPNEA ON EXERTION): ICD-10-CM

## 2020-07-28 DIAGNOSIS — Z79.899 LONG TERM CURRENT USE OF ANTIARRHYTHMIC MEDICAL THERAPY: ICD-10-CM

## 2020-07-28 DIAGNOSIS — I49.5 TACHY-BRADY SYNDROME (HCC): ICD-10-CM

## 2020-07-28 DIAGNOSIS — I48.0 PAROXYSMAL ATRIAL FIBRILLATION (HCC): Primary | ICD-10-CM

## 2020-07-28 LAB
ASCENDING AORTA: 3.1 CM
BH CV ECHO MEAS - AI DEC SLOPE: 130.3 CM/SEC^2
BH CV ECHO MEAS - AI MAX PG: 67.2 MMHG
BH CV ECHO MEAS - AI MAX VEL: 409.7 CM/SEC
BH CV ECHO MEAS - AI P1/2T: 920.9 MSEC
BH CV ECHO MEAS - AO MAX PG (FULL): 3.2 MMHG
BH CV ECHO MEAS - AO MAX PG: 6.6 MMHG
BH CV ECHO MEAS - AO MEAN PG (FULL): 1.1 MMHG
BH CV ECHO MEAS - AO MEAN PG: 3.2 MMHG
BH CV ECHO MEAS - AO ROOT AREA (BSA CORRECTED): 1.6
BH CV ECHO MEAS - AO ROOT AREA: 8.6 CM^2
BH CV ECHO MEAS - AO ROOT DIAM: 3.3 CM
BH CV ECHO MEAS - AO V2 MAX: 128.4 CM/SEC
BH CV ECHO MEAS - AO V2 MEAN: 81.2 CM/SEC
BH CV ECHO MEAS - AO V2 VTI: 31.4 CM
BH CV ECHO MEAS - ASC AORTA: 3.1 CM
BH CV ECHO MEAS - AVA(I,A): 2.4 CM^2
BH CV ECHO MEAS - AVA(I,D): 2.4 CM^2
BH CV ECHO MEAS - AVA(V,A): 2.4 CM^2
BH CV ECHO MEAS - AVA(V,D): 2.4 CM^2
BH CV ECHO MEAS - BSA(HAYCOCK): 2.1 M^2
BH CV ECHO MEAS - BSA: 2.1 M^2
BH CV ECHO MEAS - BZI_BMI: 28.1 KILOGRAMS/M^2
BH CV ECHO MEAS - BZI_METRIC_HEIGHT: 177.8 CM
BH CV ECHO MEAS - BZI_METRIC_WEIGHT: 88.9 KG
BH CV ECHO MEAS - EDV(CUBED): 69.5 ML
BH CV ECHO MEAS - EDV(MOD-SP2): 87 ML
BH CV ECHO MEAS - EDV(MOD-SP4): 96 ML
BH CV ECHO MEAS - EDV(TEICH): 74.7 ML
BH CV ECHO MEAS - EF(CUBED): 55.4 %
BH CV ECHO MEAS - EF(MOD-BP): 53 %
BH CV ECHO MEAS - EF(MOD-SP2): 50.6 %
BH CV ECHO MEAS - EF(MOD-SP4): 53.1 %
BH CV ECHO MEAS - EF(TEICH): 47.6 %
BH CV ECHO MEAS - ESV(CUBED): 31 ML
BH CV ECHO MEAS - ESV(MOD-SP2): 43 ML
BH CV ECHO MEAS - ESV(MOD-SP4): 45 ML
BH CV ECHO MEAS - ESV(TEICH): 39.2 ML
BH CV ECHO MEAS - FS: 23.6 %
BH CV ECHO MEAS - IVS/LVPW: 1.1
BH CV ECHO MEAS - IVSD: 0.9 CM
BH CV ECHO MEAS - LA DIMENSION: 3.9 CM
BH CV ECHO MEAS - LA/AO: 1.2
BH CV ECHO MEAS - LAD MAJOR: 6.6 CM
BH CV ECHO MEAS - LAT PEAK E' VEL: 6.5 CM/SEC
BH CV ECHO MEAS - LATERAL E/E' RATIO: 10.3
BH CV ECHO MEAS - LV DIASTOLIC VOL/BSA (35-75): 46.4 ML/M^2
BH CV ECHO MEAS - LV IVRT: 0.38 SEC
BH CV ECHO MEAS - LV MASS(C)D: 108 GRAMS
BH CV ECHO MEAS - LV MASS(C)DI: 52.2 GRAMS/M^2
BH CV ECHO MEAS - LV MAX PG: 3.4 MMHG
BH CV ECHO MEAS - LV MEAN PG: 2.2 MMHG
BH CV ECHO MEAS - LV SYSTOLIC VOL/BSA (12-30): 21.7 ML/M^2
BH CV ECHO MEAS - LV V1 MAX: 91.8 CM/SEC
BH CV ECHO MEAS - LV V1 MEAN: 69.8 CM/SEC
BH CV ECHO MEAS - LV V1 VTI: 23.1 CM
BH CV ECHO MEAS - LVIDD: 4.1 CM
BH CV ECHO MEAS - LVIDS: 3.1 CM
BH CV ECHO MEAS - LVLD AP2: 7.7 CM
BH CV ECHO MEAS - LVLD AP4: 7.9 CM
BH CV ECHO MEAS - LVLS AP2: 6.3 CM
BH CV ECHO MEAS - LVLS AP4: 6.7 CM
BH CV ECHO MEAS - LVOT AREA (M): 3.1 CM^2
BH CV ECHO MEAS - LVOT AREA: 3.3 CM^2
BH CV ECHO MEAS - LVOT DIAM: 2 CM
BH CV ECHO MEAS - LVPWD: 0.8 CM
BH CV ECHO MEAS - MED PEAK E' VEL: 7.3 CM/SEC
BH CV ECHO MEAS - MEDIAL E/E' RATIO: 9.1
BH CV ECHO MEAS - MV A MAX VEL: 80.9 CM/SEC
BH CV ECHO MEAS - MV DEC SLOPE: 280.2 CM/SEC^2
BH CV ECHO MEAS - MV DEC TIME: 0.25 SEC
BH CV ECHO MEAS - MV E MAX VEL: 67.6 CM/SEC
BH CV ECHO MEAS - MV E/A: 0.84
BH CV ECHO MEAS - MV P1/2T MAX VEL: 81.2 CM/SEC
BH CV ECHO MEAS - MV P1/2T: 84.9 MSEC
BH CV ECHO MEAS - MVA P1/2T LCG: 2.7 CM^2
BH CV ECHO MEAS - MVA(P1/2T): 2.6 CM^2
BH CV ECHO MEAS - PA ACC SLOPE: 667 CM/SEC^2
BH CV ECHO MEAS - PA ACC TIME: 0.14 SEC
BH CV ECHO MEAS - PA MAX PG: 2.9 MMHG
BH CV ECHO MEAS - PA PR(ACCEL): 18 MMHG
BH CV ECHO MEAS - PA V2 MAX: 85.4 CM/SEC
BH CV ECHO MEAS - PI END-D VEL: 88.2 CM/SEC
BH CV ECHO MEAS - RAP SYSTOLE: 8 MMHG
BH CV ECHO MEAS - RVSP: 33 MMHG
BH CV ECHO MEAS - SI(AO): 130.9 ML/M^2
BH CV ECHO MEAS - SI(CUBED): 18.6 ML/M^2
BH CV ECHO MEAS - SI(LVOT): 36.8 ML/M^2
BH CV ECHO MEAS - SI(MOD-SP2): 21.3 ML/M^2
BH CV ECHO MEAS - SI(MOD-SP4): 24.6 ML/M^2
BH CV ECHO MEAS - SI(TEICH): 17.2 ML/M^2
BH CV ECHO MEAS - SV(AO): 270.8 ML
BH CV ECHO MEAS - SV(CUBED): 38.5 ML
BH CV ECHO MEAS - SV(LVOT): 76.2 ML
BH CV ECHO MEAS - SV(MOD-SP2): 44 ML
BH CV ECHO MEAS - SV(MOD-SP4): 51 ML
BH CV ECHO MEAS - SV(TEICH): 35.5 ML
BH CV ECHO MEAS - TAPSE (>1.6): 2.1 CM2
BH CV ECHO MEAS - TR MAX PG: 25 MMHG
BH CV ECHO MEAS - TR MAX VEL: 251 CM/SEC
BH CV ECHO MEASUREMENTS AVERAGE E/E' RATIO: 9.8
BH CV VAS BP LEFT ARM: NORMAL MMHG
BH CV XLRA - RV BASE: 3.6 CM
BH CV XLRA - RV LENGTH: 6.9 CM
BH CV XLRA - RV MID: 2.4 CM
BH CV XLRA - TDI S': 10.4 CM/SEC
LEFT ATRIUM VOLUME INDEX: 27.5 ML/M^2
LEFT ATRIUM VOLUME: 57 ML
LV EF 2D ECHO EST: 56 %

## 2020-07-28 PROCEDURE — 93306 TTE W/DOPPLER COMPLETE: CPT | Performed by: INTERNAL MEDICINE

## 2020-07-28 PROCEDURE — 93280 PM DEVICE PROGR EVAL DUAL: CPT | Performed by: INTERNAL MEDICINE

## 2020-07-28 PROCEDURE — 99214 OFFICE O/P EST MOD 30 MIN: CPT | Performed by: INTERNAL MEDICINE

## 2020-07-28 PROCEDURE — 93306 TTE W/DOPPLER COMPLETE: CPT

## 2020-07-28 NOTE — PROGRESS NOTES
Kai Dodson  1940  PCP: Derick Phillip MD    SUBJECTIVE:   Kai Dodson is a 80 y.o. male seen for a follow up visit regarding the following:     Chief Complaint: Follow up for PAF, SSS     HPI:    Patient is a 80 year old male with a history of symptomatic bradycardia s/p BTK dual chamber pacemaker and PAF that presents today for follow-up. Rare Palp events. No A. Fib    Cardiac PMH: (Old records have been reviewed and summarized below)  1. Atrial Fibrillation- diagnosed in 2015.   2. Bradycardia  3. LHC 6/2015- normal coronaries. EF 65%   4. Monitor 9/2018- avg 57, min 35, max 94. Junctional rhythm at times. No afib. Low rates predominately while sleeping.          Current Outpatient Medications:   •  apixaban (ELIQUIS) 5 MG tablet tablet, Take 5 mg by mouth Every 12 (Twelve) Hours., Disp: , Rfl:   •  flecainide (TAMBOCOR) 50 MG tablet, TAKE 1 TABLET BY MOUTH TWICE A DAY., Disp: 180 tablet, Rfl: 0  •  isosorbide mononitrate (IMDUR) 30 MG 24 hr tablet, Take 1 tablet by mouth Daily., Disp: 90 tablet, Rfl: 3  •  magnesium oxide (MAG-OX) 400 MG tablet, Take 1 tablet by mouth Daily., Disp: 90 tablet, Rfl: 3  •  metoprolol tartrate (LOPRESSOR) 25 MG tablet, TAKE 1 TABLET BY MOUTH EVERY 12 HOURS (Patient taking differently: Take 12.5 mg by mouth 2 (Two) Times a Day.), Disp: 60 tablet, Rfl: 11  •  nitroglycerin (NITROSTAT) 0.4 MG SL tablet, 1 under the tongue as needed for angina, may repeat q5mins for up three doses, Disp: 25 tablet, Rfl: 2  •  potassium chloride (K-DUR) 10 MEQ CR tablet, TAKE 1 TABLET BY MOUTH ONCE DAILY, Disp: 90 tablet, Rfl: 3  •  tadalafil (CIALIS) 5 MG tablet, 5 mg As Needed., Disp: , Rfl: 1  •  telmisartan-hydrochlorothiazide (MICARDIS HCT) 80-25 MG per tablet, take 1 tablet by mouth once daily, Disp: 90 tablet, Rfl: 1    Past Medical History, Past Surgical History, Family history, Social History, and Medications were all reviewed with the patient today and updated  as necessary.       Patient Active Problem List   Diagnosis   • Hypertensive cardiovascular disease   • Intermittent palpitations   • MINO on CPAP   • Seasonal rhinitis   • Hypertension   • Lower extremity edema   • PAF (paroxysmal atrial fibrillation) (CMS/HCC)   • Sinus bradycardia   • Paroxysmal atrial fibrillation (CMS/HCC)   • Atrial flutter (CMS/HCC)   • CALDERON (dyspnea on exertion)   • Abnormal thyroid stimulating hormone level   • Tachy-hannah syndrome (CMS/HCC)   • Chest pain   • Long term current use of antiarrhythmic medical therapy     No Known Allergies  Past Medical History:   Diagnosis Date   • Atrial fibrillation (CMS/HCC)    • Bradycardia    • Chronic hypertension     Probably essential   • Hypertension    • Hypertensive cardiovascular disease 07/2015    Recent progressive CCS class III-IV chest pain syndrome with normal EKG and exertional dyspnea and fatigue with normal codominant coronary arteries and preserved systolic  left ventricular function, July 2015.   • Intermittent palpitations 05/01/2014    Remote abnormal 24-hour Holter monitor demonstrating intermittent bradycardia with occasional PACs, May 2014.   • Lower extremity edema     Intermittent lower extremity edema felt secondary to antihypertensive therapy.    • Nephrolithiasis     Remote- 15 years ago   • MINO on CPAP    • MINO on CPAP     doesn't know setting    • Prostate troubles     enlarged   • Seasonal rhinitis     intermittent     Past Surgical History:   Procedure Laterality Date   • CARDIAC CATHETERIZATION      no stents   • CARDIAC ELECTROPHYSIOLOGY PROCEDURE N/A 10/24/2018    Procedure: Pacemaker DC new with Biotronik. Hold Eliquis one day prior.;  Surgeon: Harley Dacosta MD;  Location: [facility] INVASIVE LOCATION;  Service: Cardiology   • COLONOSCOPY     • ENDOSCOPY     • INSERT / REPLACE / REMOVE PACEMAKER       Family History   Problem Relation Age of Onset   • No Known Problems Mother    • Other Father         cardiac  "arrhythmia     Social History     Tobacco Use   • Smoking status: Former Smoker     Packs/day: 1.00     Types: Cigarettes     Last attempt to quit: 1990     Years since quittin.5   • Smokeless tobacco: Never Used   Substance Use Topics   • Alcohol use: No         PHYSICAL EXAM:    /78 (BP Location: Right arm, Patient Position: Sitting)   Pulse 60   Temp 97.7 °F (36.5 °C)   Ht 177.8 cm (70\")   Wt 86.6 kg (191 lb)   SpO2 98%   BMI 27.41 kg/m²        Wt Readings from Last 5 Encounters:   20 86.6 kg (191 lb)   20 88.9 kg (196 lb)   20 88.9 kg (196 lb)   20 85.3 kg (188 lb)   19 88.5 kg (195 lb)       BP Readings from Last 5 Encounters:   20 124/78   20 110/64   20 112/68   19 122/64   19 118/80       General-Well Nourished, Well developed  Eyes - PERRLA  Neck- supple, No mass  CV- regular rate and rhythm, no MRG, No edema  Lung- clear bilaterally  Abd- soft, +BS  Musc/skel - Norm strength and range of motion  Skin- warm and dry  Neuro - Alert & Oriented x 3, appropriate mood.        Medical problems and test results were reviewed with the patient today.     Recent Results (from the past 672 hour(s))   Adult Transthoracic Echo Complete W/ Cont if Necessary Per Protocol    Collection Time: 20 11:56 AM   Result Value Ref Range    BSA 2.1 m^2    IVSd 0.9 cm    LVIDd 4.1 cm    LVIDs 3.1 cm    LVPWd 0.83 cm    IVS/LVPW 1.1     FS 23.6 %    EDV(Teich) 74.7 ml    ESV(Teich) 39.2 ml    EF(Teich) 47.6 %    EDV(cubed) 69.5 ml    ESV(cubed) 31.0 ml    EF(cubed) 55.4 %    LV mass(C)d 108.0 grams    LV mass(C)dI 52.2 grams/m^2    SV(Teich) 35.5 ml    SI(Teich) 17.2 ml/m^2    SV(cubed) 38.5 ml    SI(cubed) 18.6 ml/m^2    Ao root diam 3.3 cm    Ao root area 8.6 cm^2    LA dimension 3.9 cm    asc Aorta Diam 3.1 cm    LA/Ao 1.2     LVOT diam 2.0 cm    LVOT area 3.3 cm^2    LVOT area(traced) 3.1 cm^2    LAd major 6.6 cm    LVLd ap4 7.9 cm    EDV(MOD-sp4) " 96.0 ml    LVLs ap4 6.7 cm    ESV(MOD-sp4) 45.0 ml    EF(MOD-sp4) 53.1 %    LVLd ap2 7.7 cm    EDV(MOD-sp2) 87.0 ml    LVLs ap2 6.3 cm    ESV(MOD-sp2) 43.0 ml    EF(MOD-sp2) 50.6 %    LA volume 57.0 ml    EF(MOD-bp) 50.0 %    SV(MOD-sp4) 51.0 ml    SI(MOD-sp4) 24.6 ml/m^2    SV(MOD-sp2) 44.0 ml    SI(MOD-sp2) 21.3 ml/m^2    Ao root area (BSA corrected) 1.6     LV Mishra Vol (BSA corrected) 46.4 ml/m^2    LV Sys Vol (BSA corrected) 21.7 ml/m^2    LA Volume Index 27.5 ml/m^2    MV E max deondre 67.6 cm/sec    MV A max deondre 80.9 cm/sec    MV E/A 0.84     LV IVRT 0.38 sec    MV P1/2t max deondre 81.2 cm/sec    MV P1/2t 84.9 msec    MVA(P1/2t) 2.6 cm^2    MV dec slope 280.2 cm/sec^2    MV dec time 0.25 sec    Ao pk deondre 128.4 cm/sec    Ao max PG 6.6 mmHg    Ao max PG (full) 3.2 mmHg    Ao V2 mean 81.2 cm/sec    Ao mean PG 3.2 mmHg    Ao mean PG (full) 1.1 mmHg    Ao V2 VTI 31.4 cm    JENNIFER(I,A) 2.4 cm^2    JENNIFER(I,D) 2.4 cm^2    JENNIFER(V,A) 2.4 cm^2    JENNIFER(V,D) 2.4 cm^2    AI max deondre 409.7 cm/sec    AI max PG 67.2 mmHg    AI dec slope 130.3 cm/sec^2    AI P1/2t 920.9 msec    LV V1 max PG 3.4 mmHg    LV V1 mean PG 2.2 mmHg    LV V1 max 91.8 cm/sec    LV V1 mean 69.8 cm/sec    LV V1 VTI 23.1 cm    SV(Ao) 270.8 ml    SI(Ao) 130.9 ml/m^2    SV(LVOT) 76.2 ml    SI(LVOT) 36.8 ml/m^2    PA V2 max 85.4 cm/sec    PA max PG 2.9 mmHg    PA acc slope 667.0 cm/sec^2    PA acc time 0.14 sec    PI end-d deondre 88.2 cm/sec    TR max deondre 251 cm/sec    TR max PG 25 mmHg    PA pr(Accel) 18.0 mmHg    MVA P1/2T LCG 2.7 cm^2    Lat E/e'  10.3     Med E/e' 9.1     Lat Peak E' Deondre 6.5 cm/sec    Med Peak E' Deondre 7.3 cm/sec     CV ECHO AFIA - BZI_BMI 28.1 kilograms/m^2     CV ECHO AFIA - BSA(HAYCOCK) 2.1 m^2     CV ECHO AFIA - BZI_METRIC_WEIGHT 88.9 kg     CV ECHO AFIA - BZI_METRIC_HEIGHT 177.8 cm    Avg E/e' ratio 9.80      CV VAS BP LEFT /98 mmHg    RV S' 10.40 cm/sec    RV Base 3.60 cm    RV Length 6.90 cm    RV Mid 2.40 cm    Ascending aorta 3.10  cm    RAP systole 8 mmHg    RVSP(TR) 33 mmHg    TAPSE (>1.6) 2.10 cm2         EKG: (EKG has been independently visualized by me and summarized below)    Procedures     ASSESSMENT and PLAN    1.  Atrial fibrillation-paroxysmal in nature- currently maintaining sinus rhythm on Flecainide therapy. Current burden is 0%. No recurrent events. Continue Eliquis 5mg BID. Decreased metoprolol to 12.5mg BID due to complaints of vivid dreams.   2. Tachybrady Syndrome s/p BTK dual chamber pacemaker. Normal function on check today. Adjusted rate response  3. Flecainide use- EKG reviewed. QRS stable.       Return in about 6 months (around 1/28/2021) for office visit and device check with [employer].      Harley Dacosta M.D., F.BOLA.C.C, F.H.R.S.  Cardiology/Electrophysiology  7/28/2020  13:10

## 2020-08-05 RX ORDER — TELMISARTAN AND HYDROCHLORTHIAZIDE 80; 25 MG/1; MG/1
1 TABLET ORAL DAILY
Qty: 90 TABLET | Refills: 0 | Status: SHIPPED | OUTPATIENT
Start: 2020-08-05 | End: 2020-11-17 | Stop reason: SDUPTHER

## 2020-10-16 RX ORDER — FLECAINIDE ACETATE 50 MG/1
50 TABLET ORAL 2 TIMES DAILY
Qty: 180 TABLET | Refills: 0 | Status: SHIPPED | OUTPATIENT
Start: 2020-10-16 | End: 2021-01-06

## 2020-11-18 RX ORDER — TELMISARTAN AND HYDROCHLORTHIAZIDE 80; 25 MG/1; MG/1
1 TABLET ORAL DAILY
Qty: 90 TABLET | Refills: 1 | Status: SHIPPED | OUTPATIENT
Start: 2020-11-18 | End: 2021-06-14

## 2020-12-09 NOTE — PROGRESS NOTES
Subjective:     Encounter Date:12/10/2020    Patient ID: Kai Dodson is a 80 y.o.  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      REFERRING PHYSICIAN/INTERNIST: Derick Phillip MD  ELECTROPHYSIOLOGIST: Harley Dacosta MD, [facility], [facility]  INTERVENTIONAL CARDIOLOGIST: Chris De La Vega MD, [facility], [facility]   SLEEP SPECIALIST: Judie Mc MD     Chief Complaint:   Chief Complaint   Patient presents with   • Hypertensive heart disease without heart failure       Problem List:  1. Possible hypertensive cardiovascular disease:  a. Remote progressive CCS class III-IV chest pain syndrome with normal EKG and exertional dyspnea and fatigue with normal codominant coronary arteries and preserved systolic left ventricular function, July 2015.   b. Residual CCS class I chest pain syndrome with NYHA class II exertional dyspnea and fatigue.   c. Remote apparent atrial fibrillation/flutter with increasing dyspnea and [facility] ED evaluation with unremarkable laboratory studies including negative troponin and BNP 29 with normal portable chest x-ray with spontaneous rexsolution of atrial tachyarrhythmia and subsequent acceptable 30-day event recorder, data deficit (August 2016).  d. Residual CCS class I chest pain syndrome with NYHA class II exertional dyspnea and fatigue syndrome, 2018.  e. Recent CCS class III exertional chest pressure and dyspnea, March 2019, with abnormal acceptable quantitative SPECT gated Cardiolite exercise stress test suggestive of low probability for significant focal obstructive coronary artery disease with normal myocardial perfusion imaging studies with preserved systolic left ventricular function (LVEF 0.78), April 2019  f. Residual class I symptoms, November 2019, May 2020, December 2020  2. Intermittent tachypalpitations:  a. Remote abnormal 24-hour Holter monitor  demonstrating intermittent bradycardia with occasional PACs, May 2014.  b. Current abnormal 48-hour Holter monitor demonstrating occasional marked bradycardia with persistent PACs with acceptable combination Doppler echo with mild-to-moderate tricuspid regurgitation and an RVSP of 39 TORR, June 2014.  c. [facility] Department evaluation for episode of tachypalpitations. DMS Telemetry demonstrates atrial fibrillation/flutter, April 2015, with subsequent recorder demonstrating predominantly sinus rhythm with occasional PVCs with an elevated CHADS VASc score of 3 and initiation of anticoagulation with apixaban, spring 2015.   d. Remote electrophysiology consultation and evaluation with acceptable followup, February 2016.  e. Recent increasing exertional dyspnea and fatigue with occasional presyncope and outpatient junctional rhythm with escape PACs, September 2018, with subsequent Biotronik MRI DDDR pacemaker implant, October 2018, with subsequent reprogramming, January 2019, with acceptable PACEART assessment, October 2019, February 2020  3. Obstructive sleep apnea with CPAP therapy.   4. Intermittent seasonal rhinitis.   5. Remote nephrolithiasis.   6. Chronic hypertension, probably essential.   7. Intermittent lower extremity edema felt secondary to antihypertensive therapy.   8. Abnormal thyroid function test with reduced TSH (February 2017).    No Known Allergies    Current Outpatient Medications:   •  apixaban (ELIQUIS) 5 MG tablet tablet, Take 5 mg by mouth Every 12 (Twelve) Hours., Disp: , Rfl:   •  flecainide (TAMBOCOR) 50 MG tablet, Take 1 tablet by mouth 2 (Two) Times a Day., Disp: 180 tablet, Rfl: 0  •  isosorbide mononitrate (IMDUR) 30 MG 24 hr tablet, Take 1 tablet by mouth Daily., Disp: 90 tablet, Rfl: 3  •  magnesium oxide (MAG-OX) 400 MG tablet, Take 1 tablet by mouth Daily., Disp: 90 tablet, Rfl: 3  •  metoprolol tartrate (LOPRESSOR) 25 MG tablet, TAKE 1 TABLET BY MOUTH EVERY  12 HOURS (Patient taking differently: Take 12.5 mg by mouth 2 (Two) Times a Day.), Disp: 60 tablet, Rfl: 11  •  nitroglycerin (NITROSTAT) 0.4 MG SL tablet, 1 under the tongue as needed for angina, may repeat q5mins for up three doses, Disp: 25 tablet, Rfl: 2  •  potassium chloride (K-DUR) 10 MEQ CR tablet, TAKE 1 TABLET BY MOUTH ONCE DAILY, Disp: 90 tablet, Rfl: 3  •  tadalafil (CIALIS) 5 MG tablet, 5 mg As Needed., Disp: , Rfl: 1  •  telmisartan-hydrochlorothiazide (MICARDIS HCT) 80-25 MG per tablet, Take 1 tablet by mouth Daily., Disp: 90 tablet, Rfl: 1    History of Present Illness: Patient returns for scheduled 6-month follow up. Since last visit, patient has been doing well overall and has been sheltering in place. He reports he has been working every day and walks up and down stairs with no cardiopulmonary complaints. 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 He has been sleeping well at night and feels rested when he wakes up in the morning. He has had no interim ER visits, hospitalizations, serious illnesses, or surgeries. Patient otherwise denies chest pain, shortness of breath, PND, edema, palpitations, syncope, or presyncope at this time. He received influenza immunization. 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         ROS   Obtained and negative except as outlined in problem list and HPI.      ECG 12 Lead    Date/Time: 12/10/2020 2:06 PM  Performed by: Misael Walls MD  Authorized by: Misael Walls MD   Comparison: compared with previous ECG from 1/21/2020  Similar to previous ECG  Rhythm: paced  BPM: 60  Conduction: incomplete left bundle branch block  Other findings: non-specific ST-T wave changes    Clinical impression: abnormal EKG  Comments:  ms  QTc 402 ms   ms               Objective:       Vitals:    12/10/20 1405 12/10/20 1406   BP: 136/82 134/80   BP Location: Left arm Left arm   Patient Position:  "Sitting Standing   Pulse: 60 62   Weight: 88.5 kg (195 lb)    Height: 177.8 cm (70\")      Body mass index is 27.98 kg/m².  Last weight: 196 lbs    Vitals signs reviewed.   Constitutional:       Appearance: Well-developed.   Neck:      Thyroid: No thyromegaly.      Vascular: No carotid bruit or JVD.      Lymphadenopathy: No cervical adenopathy.   Pulmonary:      Effort: Pulmonary effort is normal.      Breath sounds: Normal breath sounds. No wheezing. No rhonchi. No rales.   Chest:      Comments: Left precordial pacemaker site is nominal.   Cardiovascular:      Regular rhythm.      Murmurs: There is a grade 1/6 mid frequency early systolic murmur at the URSB.      No gallop. No S3 gallop.   Pulses:     Dorsalis pedis: 2+ bilaterally.     Posterior tibial: 2+ bilaterally.  Edema:     Peripheral edema absent.   Abdominal:      Palpations: Abdomen is soft. There is no abdominal mass.      Tenderness: There is no abdominal tenderness. There is no guarding.   Musculoskeletal: Normal range of motion.   Skin:     General: Skin is warm and dry.      Findings: No rash.   Neurological:      Mental Status: Alert and oriented to person, place, and time.           Lab Review:     08/13/2020 (reviewed with patient by letter):  • CBC: hematocrit 42, hemoglobin 13.7, WBC 5940 and normal indices, platelet count and differential  • CMP: normal electrolytes with acceptable kidney function, serum creatinine 0.8, BUN 22, glucose 96 and normal serum calcium and liver function tests.   • Serum albumin- 4  • FLP: total cholesterol 170, triglycerides 102, HDL-C 56, LDL-C 94  • TSH- low  • Vitamin B12- 474     Lab Results   Component Value Date    GLUCOSE 108 (H) 10/16/2018    BUN 17 10/16/2018    CREATININE 1.07 10/16/2018    EGFRIFNONA 67 10/16/2018    BCR 15.9 10/16/2018     10/16/2018    K 4.0 10/16/2018     10/16/2018    MG 1.9 10/16/2018    CO2 32.0 (H) 10/16/2018    CALCIUM 9.1 10/16/2018    ALBUMIN 4.24 10/16/2018    AST " 23 10/16/2018    ALT 24 10/16/2018       Lab Results   Component Value Date    WBC 5.53 10/16/2018    HGB 14.1 10/16/2018    HCT 43.5 10/16/2018    MCV 93.3 10/16/2018     10/16/2018       Lab Results   Component Value Date    HGBA1C 5.2 07/01/2015       Lab Results   Component Value Date    TSH 0.214 (L) 04/17/2015       Lab Results   Component Value Date    CHLPL 150 07/01/2015     Lab Results   Component Value Date    TRIG 108 07/01/2015     Lab Results   Component Value Date    HDL 48 07/01/2015     Lab Results   Component Value Date    LDL 83 07/01/2015     Echocardiogram, 07/28/2020 (reviewed with patient by letter):  · Mild aortic valve regurgitation is present.  · Mild to moderate tricuspid valve regurgitation is present.  · Calculated right ventricular systolic pressure from tricuspid regurgitation is 33 mmHg.  · Mild pulmonic valve regurgitation is present.  · Estimated EF = 56%.  · Left ventricular systolic function is normal.  · Left ventricular diastolic dysfunction (grade I) consistent with impaired relaxation.  · Left atrial cavity size is borderline dilated.  · Mild mitral valve regurgitation is present.  · Normal right ventricular cavity size, wall thickness, systolic function and septal motion noted.  · The aortic valve exhibits mild sclerosis.  · No evidence of pulmonary hypertension is present.  · There is no evidence of pericardial effusion.        Assessment:       Overall continued acceptable course with no new interim cardiopulmonary complaints with good functional status. We will defer additional diagnostic or therapeutic intervention from a cardiac perspective at this time.     Diagnosis Plan   1. Hypertensive heart disease without heart failure  No recurrent angina pectoris or CHF on current activity schedule; continue current treatment    2. Essential hypertension  Acceptable control; Continue current treatment.    3. Tachy-hannah syndrome (CMS/HCC)  Stable examination; continue  monitoring with Dr. Dacosta   4. MINO on CPAP  Compliance stressed          Plan:         1. Patient to continue current medications and close follow up with the above providers.  2. Tentative cardiology follow up in June or July 2021 or patient may return sooner PRN.    Scribed for Misael Walls MD by Luz Demarco. 12/10/2020  14:15 EST     I, Misael Walls MD, [facility], personally performed the services described in this documentation as scribed by the above named individual in my presence, and it is both accurate and complete. At 14:18 EST on 12/10/2020

## 2020-12-10 ENCOUNTER — OFFICE VISIT (OUTPATIENT)
Dept: CARDIOLOGY | Facility: CLINIC | Age: 80
End: 2020-12-10

## 2020-12-10 VITALS
HEIGHT: 70 IN | WEIGHT: 195 LBS | HEART RATE: 62 BPM | SYSTOLIC BLOOD PRESSURE: 134 MMHG | BODY MASS INDEX: 27.92 KG/M2 | DIASTOLIC BLOOD PRESSURE: 80 MMHG

## 2020-12-10 DIAGNOSIS — I10 ESSENTIAL HYPERTENSION: ICD-10-CM

## 2020-12-10 DIAGNOSIS — G47.33 OSA ON CPAP: ICD-10-CM

## 2020-12-10 DIAGNOSIS — I49.5 TACHY-BRADY SYNDROME (HCC): Primary | ICD-10-CM

## 2020-12-10 DIAGNOSIS — I11.9 HYPERTENSIVE HEART DISEASE WITHOUT HEART FAILURE: ICD-10-CM

## 2020-12-10 DIAGNOSIS — Z99.89 OSA ON CPAP: ICD-10-CM

## 2020-12-10 PROCEDURE — 93000 ELECTROCARDIOGRAM COMPLETE: CPT | Performed by: INTERNAL MEDICINE

## 2020-12-10 PROCEDURE — 99214 OFFICE O/P EST MOD 30 MIN: CPT | Performed by: INTERNAL MEDICINE

## 2021-01-06 RX ORDER — FLECAINIDE ACETATE 50 MG/1
TABLET ORAL
Qty: 180 TABLET | Refills: 0 | Status: SHIPPED | OUTPATIENT
Start: 2021-01-06 | End: 2021-03-26

## 2021-03-05 PROCEDURE — 93294 REM INTERROG EVL PM/LDLS PM: CPT | Performed by: INTERNAL MEDICINE

## 2021-03-05 PROCEDURE — 93296 REM INTERROG EVL PM/IDS: CPT | Performed by: INTERNAL MEDICINE

## 2021-03-11 RX ORDER — NITROGLYCERIN 0.4 MG/1
TABLET SUBLINGUAL
Qty: 50 TABLET | Refills: 5 | Status: SHIPPED | OUTPATIENT
Start: 2021-03-11 | End: 2022-10-11 | Stop reason: SDUPTHER

## 2021-03-16 ENCOUNTER — OFFICE VISIT (OUTPATIENT)
Dept: CARDIOLOGY | Facility: CLINIC | Age: 81
End: 2021-03-16

## 2021-03-16 VITALS
WEIGHT: 195 LBS | OXYGEN SATURATION: 96 % | SYSTOLIC BLOOD PRESSURE: 118 MMHG | HEART RATE: 61 BPM | HEIGHT: 70 IN | DIASTOLIC BLOOD PRESSURE: 66 MMHG | BODY MASS INDEX: 27.92 KG/M2

## 2021-03-16 DIAGNOSIS — Z79.899 LONG TERM CURRENT USE OF ANTIARRHYTHMIC MEDICAL THERAPY: Primary | ICD-10-CM

## 2021-03-16 DIAGNOSIS — I49.5 TACHY-BRADY SYNDROME (HCC): ICD-10-CM

## 2021-03-16 DIAGNOSIS — I48.0 PAROXYSMAL ATRIAL FIBRILLATION (HCC): ICD-10-CM

## 2021-03-16 DIAGNOSIS — R00.1 SINUS BRADYCARDIA: ICD-10-CM

## 2021-03-16 PROCEDURE — 99214 OFFICE O/P EST MOD 30 MIN: CPT | Performed by: INTERNAL MEDICINE

## 2021-03-16 PROCEDURE — 93280 PM DEVICE PROGR EVAL DUAL: CPT | Performed by: INTERNAL MEDICINE

## 2021-03-16 NOTE — PROGRESS NOTES
Kai Dodson  1940  PCP: Derick Phillip MD    SUBJECTIVE:   Kai Dodson is a 80 y.o. male seen for a follow up visit regarding the following:     Chief Complaint: Follow up for PAF, SSS     HPI:    Patient is a 80 year old male with a history of symptomatic bradycardia s/p BTK dual chamber pacemaker and PAF that presents today for follow-up.  No A. Fib events. Overall is feeling very well.     Cardiac PMH: (Old records have been reviewed and summarized below)  1. Atrial Fibrillation- diagnosed in 2015.   2. Bradycardia  3. LHC 6/2015- normal coronaries. EF 65%   4. Monitor 9/2018- avg 57, min 35, max 94. Junctional rhythm at times. No afib. Low rates predominately while sleeping.   5. Biotronik DDDR Pacemaker         Current Outpatient Medications:   •  apixaban (ELIQUIS) 5 MG tablet tablet, Take 5 mg by mouth Every 12 (Twelve) Hours., Disp: , Rfl:   •  flecainide (TAMBOCOR) 50 MG tablet, TAKE 1 TABLET BY MOUTH TWICE DAILY, Disp: 180 tablet, Rfl: 0  •  isosorbide mononitrate (IMDUR) 30 MG 24 hr tablet, Take 1 tablet by mouth Daily., Disp: 90 tablet, Rfl: 3  •  magnesium oxide (MAG-OX) 400 MG tablet, Take 1 tablet by mouth Daily., Disp: 90 tablet, Rfl: 3  •  metoprolol tartrate (LOPRESSOR) 25 MG tablet, TAKE 1 TABLET BY MOUTH EVERY 12 HOURS (Patient taking differently: Take 12.5 mg by mouth 2 (Two) Times a Day.), Disp: 60 tablet, Rfl: 11  •  nitroglycerin (NITROSTAT) 0.4 MG SL tablet, 1 under the tongue as needed for angina, may repeat q5mins for up three doses, Disp: 50 tablet, Rfl: 5  •  potassium chloride (K-DUR) 10 MEQ CR tablet, TAKE 1 TABLET BY MOUTH ONCE DAILY, Disp: 90 tablet, Rfl: 3  •  tadalafil (CIALIS) 5 MG tablet, 5 mg As Needed., Disp: , Rfl: 1  •  telmisartan-hydrochlorothiazide (MICARDIS HCT) 80-25 MG per tablet, Take 1 tablet by mouth Daily., Disp: 90 tablet, Rfl: 1    Past Medical History, Past Surgical History, Family history, Social History, and Medications were all  reviewed with the patient today and updated as necessary.       Patient Active Problem List   Diagnosis   • Hypertensive cardiovascular disease   • Intermittent palpitations   • MINO on CPAP   • Seasonal rhinitis   • Hypertension   • Lower extremity edema   • PAF (paroxysmal atrial fibrillation) (CMS/HCC)   • Sinus bradycardia   • Paroxysmal atrial fibrillation (CMS/HCC)   • Atrial flutter (CMS/HCC)   • CALDERON (dyspnea on exertion)   • Abnormal thyroid stimulating hormone level   • Tachy-hannah syndrome (CMS/HCC)   • Chest pain   • Long term current use of antiarrhythmic medical therapy     No Known Allergies  Past Medical History:   Diagnosis Date   • Atrial fibrillation (CMS/HCC)    • Bradycardia    • Chronic hypertension     Probably essential   • Hypertension    • Hypertensive cardiovascular disease 07/2015    Recent progressive CCS class III-IV chest pain syndrome with normal EKG and exertional dyspnea and fatigue with normal codominant coronary arteries and preserved systolic  left ventricular function, July 2015.   • Intermittent palpitations 05/01/2014    Remote abnormal 24-hour Holter monitor demonstrating intermittent bradycardia with occasional PACs, May 2014.   • Lower extremity edema     Intermittent lower extremity edema felt secondary to antihypertensive therapy.    • Nephrolithiasis     Remote- 15 years ago   • MINO on CPAP    • MINO on CPAP     doesn't know setting    • Prostate troubles     enlarged   • Seasonal rhinitis     intermittent     Past Surgical History:   Procedure Laterality Date   • CARDIAC CATHETERIZATION      no stents   • CARDIAC ELECTROPHYSIOLOGY PROCEDURE N/A 10/24/2018    Procedure: Pacemaker DC new with Biotronik. Hold Eliquis one day prior.;  Surgeon: Harley Dacosta MD;  Location: [facility] LOCATION;  Service: Cardiology   • COLONOSCOPY     • ENDOSCOPY     • INSERT / REPLACE / REMOVE PACEMAKER       Family History   Problem Relation Age of Onset   • No Known Problems  "Mother    • Other Father         cardiac arrhythmia     Social History     Tobacco Use   • Smoking status: Former Smoker     Packs/day: 1.00     Types: Cigarettes     Quit date:      Years since quittin.2   • Smokeless tobacco: Never Used   Substance Use Topics   • Alcohol use: No         PHYSICAL EXAM:    /66 (BP Location: Right arm, Patient Position: Sitting)   Pulse 61   Ht 177.8 cm (70\")   Wt 88.5 kg (195 lb)   SpO2 96%   BMI 27.98 kg/m²        Wt Readings from Last 5 Encounters:   21 88.5 kg (195 lb)   12/10/20 88.5 kg (195 lb)   20 88.9 kg (196 lb)   20 86.6 kg (191 lb)   20 88.9 kg (196 lb)       BP Readings from Last 5 Encounters:   21 118/66   12/10/20 134/80   20 124/78   20 110/64   20 112/68       General-Well Nourished, Well developed  Eyes - PERRLA  Neck- supple, No mass  CV- regular rate and rhythm, no MRG, No edema  Lung- clear bilaterally  Abd- soft, +BS  Musc/skel - Norm strength and range of motion  Skin- warm and dry  Neuro - Alert & Oriented x 3, appropriate mood.        Medical problems and test results were reviewed with the patient today.     No results found for this or any previous visit (from the past 672 hour(s)).      EKG: (EKG has been independently visualized by me and summarized below)    Procedures     ASSESSMENT and PLAN    1.  Atrial fibrillation-paroxysmal in nature- currently maintaining sinus rhythm on Flecainide therapy. Current burden is 0%. No recurrent events. Continue Eliquis 5mg BID. Decreased metoprolol to 12.5mg BID due to complaints of vivid dreams.   2. Tachybrady Syndrome s/p BTK dual chamber pacemaker. Normal function on check today. Adjusted rate response but limited secondary to AV delay and increased RV pacing  3. Flecainide use- EKGs reviewed. QRS stable.       Return in about 6 months (around 2021) for office visit and device check with [employer].      Harley Dacosta M.D., F.A.C.C, " [facility]  Cardiology/Electrophysiology  3/16/2021  11:40 EDT

## 2021-03-26 DIAGNOSIS — I48.0 PAF (PAROXYSMAL ATRIAL FIBRILLATION) (HCC): ICD-10-CM

## 2021-03-26 DIAGNOSIS — I49.5 TACHY-BRADY SYNDROME (HCC): ICD-10-CM

## 2021-03-26 DIAGNOSIS — Z79.899 LONG TERM CURRENT USE OF ANTIARRHYTHMIC MEDICAL THERAPY: ICD-10-CM

## 2021-03-26 RX ORDER — FLECAINIDE ACETATE 50 MG/1
TABLET ORAL
Qty: 180 TABLET | Refills: 1 | Status: SHIPPED | OUTPATIENT
Start: 2021-03-26 | End: 2021-09-17 | Stop reason: SDUPTHER

## 2021-06-14 RX ORDER — TELMISARTAN AND HYDROCHLORTHIAZIDE 80; 25 MG/1; MG/1
1 TABLET ORAL DAILY
Qty: 90 TABLET | Refills: 1 | Status: SHIPPED | OUTPATIENT
Start: 2021-06-14 | End: 2021-12-14

## 2021-06-14 RX ORDER — TELMISARTAN AND HYDROCHLORTHIAZIDE 80; 25 MG/1; MG/1
1 TABLET ORAL DAILY
Qty: 90 TABLET | Refills: 1 | Status: SHIPPED | OUTPATIENT
Start: 2021-06-14 | End: 2021-06-24 | Stop reason: SDUPTHER

## 2021-06-24 ENCOUNTER — OFFICE VISIT (OUTPATIENT)
Dept: CARDIOLOGY | Facility: CLINIC | Age: 81
End: 2021-06-24

## 2021-06-24 VITALS
WEIGHT: 199.2 LBS | HEART RATE: 61 BPM | OXYGEN SATURATION: 99 % | BODY MASS INDEX: 28.52 KG/M2 | HEIGHT: 70 IN | DIASTOLIC BLOOD PRESSURE: 84 MMHG | SYSTOLIC BLOOD PRESSURE: 134 MMHG

## 2021-06-24 DIAGNOSIS — I11.9 HYPERTENSIVE HEART DISEASE WITHOUT HEART FAILURE: Primary | ICD-10-CM

## 2021-06-24 DIAGNOSIS — I10 ESSENTIAL HYPERTENSION: ICD-10-CM

## 2021-06-24 DIAGNOSIS — I49.5 TACHY-BRADY SYNDROME (HCC): ICD-10-CM

## 2021-06-24 DIAGNOSIS — Z99.89 OSA ON CPAP: ICD-10-CM

## 2021-06-24 DIAGNOSIS — G47.33 OSA ON CPAP: ICD-10-CM

## 2021-06-24 PROCEDURE — 93000 ELECTROCARDIOGRAM COMPLETE: CPT | Performed by: INTERNAL MEDICINE

## 2021-06-24 PROCEDURE — 99214 OFFICE O/P EST MOD 30 MIN: CPT | Performed by: INTERNAL MEDICINE

## 2021-06-24 RX ORDER — POTASSIUM CHLORIDE 750 MG/1
10 TABLET, FILM COATED, EXTENDED RELEASE ORAL DAILY
Qty: 90 TABLET | Refills: 3 | Status: SHIPPED | OUTPATIENT
Start: 2021-06-24 | End: 2022-06-08 | Stop reason: SDUPTHER

## 2021-06-24 NOTE — PROGRESS NOTES
Subjective:     Encounter Date:06/24/2021    Patient ID: Kai Dodson is a 81 y.o.  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      REFERRING PHYSICIAN/INTERNIST: Derick Phillip MD  ELECTROPHYSIOLOGIST: aHrley Dacosta MD, [facility], [facility]  INTERVENTIONAL CARDIOLOGIST: Chris De La Vega MD, [facility], [facility]   SLEEP SPECIALIST: Judie Mc MD    Chief Complaint:   Chief Complaint   Patient presents with   • Tachy Samuel Syndrome   • Hypertensive Heart Disease without Heart Failure       Problem List:  1. Possible hypertensive cardiovascular disease:  a. Remote progressive CCS class III-IV chest pain syndrome with normal EKG and exertional dyspnea and fatigue with normal codominant coronary arteries and preserved systolic left ventricular function, July 2015.   b. Residual CCS class I chest pain syndrome with NYHA class II exertional dyspnea and fatigue.   c. Remote apparent atrial fibrillation/flutter with increasing dyspnea and [facility] ED evaluation with unremarkable laboratory studies including negative troponin and BNP 29 with normal portable chest x-ray with spontaneous rexsolution of atrial tachyarrhythmia and subsequent acceptable 30-day event recorder, data deficit (August 2016).  d. Residual CCS class I chest pain syndrome with NYHA class II exertional dyspnea and fatigue syndrome, 2018.  e. Recent CCS class III exertional chest pressure and dyspnea, March 2019, with abnormal acceptable quantitative SPECT gated Cardiolite exercise stress test suggestive of low probability for significant focal obstructive coronary artery disease with normal myocardial perfusion imaging studies with preserved systolic left ventricular function (LVEF 0.78), April 2019  f. Residual class I symptoms, November 2019, May 2020, December 2020, June 2021.  2. Intermittent  tachypalpitations:  a. Remote abnormal 24-hour Holter monitor demonstrating intermittent bradycardia with occasional PACs, May 2014.  b. Current abnormal 48-hour Holter monitor demonstrating occasional marked bradycardia with persistent PACs with acceptable combination Doppler echo with mild-to-moderate tricuspid regurgitation and an RVSP of 39 TORR, June 2014.  c. [facility] Emergency Department evaluation for episode of tachypalpitations. DMS Telemetry demonstrates atrial fibrillation/flutter, April 2015, with subsequent recorder demonstrating predominantly sinus rhythm with occasional PVCs with an elevated CHADS VASc score of 3 and initiation of anticoagulation with apixaban, spring 2015.   d. Remote electrophysiology consultation and evaluation with acceptable followup, February 2016.  e. Recent increasing exertional dyspnea and fatigue with occasional presyncope and outpatient junctional rhythm with escape PACs, September 2018, with subsequent Biotronik MRI DDDR pacemaker implant, October 2018, with subsequent reprogramming, January 2019, with acceptable PACEART assessment, October 2019, February 2020  3. Obstructive sleep apnea with CPAP therapy.   4. Intermittent seasonal rhinitis.   5. Remote nephrolithiasis.   6. Chronic hypertension, probably essential.   7. Intermittent lower extremity edema felt secondary to antihypertensive therapy.   8. Abnormal thyroid function test with reduced TSH (February 2017).    No Known Allergies    Current Outpatient Medications:   •  apixaban (ELIQUIS) 5 MG tablet tablet, Take 5 mg by mouth Every 12 (Twelve) Hours., Disp: , Rfl:   •  flecainide (TAMBOCOR) 50 MG tablet, TAKE 1 TABLET BY MOUTH TWICE DAILY, Disp: 180 tablet, Rfl: 1  •  isosorbide mononitrate (IMDUR) 30 MG 24 hr tablet, Take 1 tablet by mouth Daily., Disp: 90 tablet, Rfl: 3  •  magnesium oxide (MAG-OX) 400 MG tablet, Take 1 tablet by mouth Daily., Disp: 90 tablet, Rfl: 3  •  metoprolol tartrate  (LOPRESSOR) 25 MG tablet, Take 1 tablet by mouth Every 12 (Twelve) Hours., Disp: 60 tablet, Rfl: 11  •  nitroglycerin (NITROSTAT) 0.4 MG SL tablet, 1 under the tongue as needed for angina, may repeat q5mins for up three doses, Disp: 50 tablet, Rfl: 5  •  potassium chloride (K-DUR) 10 MEQ CR tablet, TAKE 1 TABLET BY MOUTH ONCE DAILY, Disp: 90 tablet, Rfl: 3  •  tadalafil (CIALIS) 5 MG tablet, 5 mg As Needed., Disp: , Rfl: 1  •  telmisartan-hydrochlorothiazide (MICARDIS HCT) 80-25 MG per tablet, TAKE 1 TABLET BY MOUTH DAILY, Disp: 90 tablet, Rfl: 1    History of Present Illness: Patient returns for scheduled 6-month follow up. Patient followed up with Dr. Dacosta in March 2021 and had device interrogation which showed normal function but had adjusted rate response but limited secondary to AV delay and increased RV pacing. He has been feeling well overall from a cardiovascular standpoint. Patient denies chest pain, shortness of breath, palpitations, edema, dizziness, and syncope. 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 He will become short of breath with excessive exertion but does not otherwise have cardiopulmonary limitations with activity. He has had no interim ER visits, hospitalizations, serious illnesses, or surgeries. He has received COVID immunization.      ROS   Obtained and negative except as outlined in problem list and HPI.      ECG 12 Lead    Date/Time: 6/24/2021 11:26 AM  Performed by: Misael Walls MD  Authorized by: Misael Walls MD   Comparison: compared with previous ECG from 12/20/2020  Similar to previous ECG  Rhythm: paced  BPM: 59  Conduction: incomplete left bundle branch block and 1st degree AV block  Other findings: T wave abnormality    Clinical impression: abnormal EKG  Comments: [place]: 280 ms  QRS: 112 ms  QTs: 407 ms               Objective:       Vitals:    06/24/21 1043 06/24/21 1045   BP: 140/80 134/84   BP Location: Left arm Left arm  "  Patient Position: Sitting Standing   Pulse: 59 61   SpO2: 99%    Weight: 90.4 kg (199 lb 3.2 oz)    Height: 177.8 cm (70\")      Body mass index is 28.58 kg/m².  Last weight: 195 lbs    Vitals reviewed.   Constitutional:       Appearance: Well-developed.   Neck:      Thyroid: No thyromegaly.      Vascular: No carotid bruit or JVD.      Lymphadenopathy: No cervical adenopathy.   Pulmonary:      Effort: Pulmonary effort is normal.      Breath sounds: Normal breath sounds. No wheezing. No rhonchi. No rales.   Chest:      Comments: Left precordial pacemaker site is nominal.  Cardiovascular:      Regular rhythm.      Murmurs: There is a grade 1/6 mid frequency midsystolic murmur at the URSB, radiating to the neck.      No gallop. No S3 gallop.   Pulses:     Dorsalis pedis: 2+ bilaterally.     Posterior tibial: 2+ bilaterally.  Edema:     Peripheral edema absent.   Abdominal:      Palpations: Abdomen is soft. There is no abdominal mass.      Tenderness: There is no abdominal tenderness. There is no guarding.   Musculoskeletal: Normal range of motion. Skin:     General: Skin is warm and dry.      Findings: No rash.   Neurological:      Mental Status: Alert and oriented to person, place, and time.           Lab Review:     No recent laboratory studies available for review today.    MURJ, 5/25/2021:  · Normal device function  · No events or alerts  · Battery voltage 85%        Assessment:       Overall continued acceptable course with no new interim cardiopulmonary complaints with good functional status. We will defer additional diagnostic or therapeutic intervention from a cardiac perspective at this time.     Diagnosis Plan   1. Hypertensive heart disease without heart failure  No recurrent angina pectoris or CHF on current activity schedule; continue current treatment   2. Tachy-hannah syndrome (CMS/HCC)  Stable clinical course without recurrent arrhythmias.   3. Essential hypertension  Controlled. Continue current " treatment.   4. MINO on CPAP  Compliance stressed.          Plan:         1. Patient to continue current medications and close follow up with the above providers.  2. Tentative cardiology follow up in December 2021 or patient may return sooner PRN.    Scribed for Misael Walls MD by Terell Pacheco. 6/24/2021  11:28 EDT    I, Misael Walls MD, [facility], personally performed the services described in this documentation as scribed by the above named individual in my presence, and it is both accurate and complete. At 11:28 EDT on 06/24/2021

## 2021-09-17 RX ORDER — FLECAINIDE ACETATE 50 MG/1
50 TABLET ORAL 2 TIMES DAILY
Qty: 60 TABLET | Refills: 0 | Status: SHIPPED | OUTPATIENT
Start: 2021-09-17 | End: 2021-11-05 | Stop reason: SDUPTHER

## 2021-09-21 ENCOUNTER — OFFICE VISIT (OUTPATIENT)
Dept: CARDIOLOGY | Facility: CLINIC | Age: 81
End: 2021-09-21

## 2021-09-21 VITALS
DIASTOLIC BLOOD PRESSURE: 68 MMHG | HEIGHT: 70 IN | HEART RATE: 60 BPM | WEIGHT: 195 LBS | SYSTOLIC BLOOD PRESSURE: 132 MMHG | OXYGEN SATURATION: 96 % | BODY MASS INDEX: 27.92 KG/M2

## 2021-09-21 DIAGNOSIS — I49.5 TACHY-BRADY SYNDROME (HCC): ICD-10-CM

## 2021-09-21 DIAGNOSIS — I48.0 PAF (PAROXYSMAL ATRIAL FIBRILLATION) (HCC): ICD-10-CM

## 2021-09-21 DIAGNOSIS — Z79.899 LONG TERM CURRENT USE OF ANTIARRHYTHMIC MEDICAL THERAPY: Primary | ICD-10-CM

## 2021-09-21 PROCEDURE — 93280 PM DEVICE PROGR EVAL DUAL: CPT | Performed by: PHYSICIAN ASSISTANT

## 2021-09-21 PROCEDURE — 99214 OFFICE O/P EST MOD 30 MIN: CPT | Performed by: PHYSICIAN ASSISTANT

## 2021-09-21 NOTE — PROGRESS NOTES
Kai Dodson  1940  PCP: Derick Phillip MD    SUBJECTIVE:   Kai Dodson is a 81 y.o. male seen for a follow up visit regarding the following:     Chief Complaint: Follow up for afib, tachybrady syndrome, pacemaker check     HPI:    Patient is an 80 year old male with a history of symptomatic bradycardia s/p BTK dual chamber pacemaker and PAF that presents today for follow-up. He has been stable from a cardiac standpoint. He denies any recurrence of atrial fibrillation. No chest pain, sob, edema, palps. No bleeding on Eliquis.     Cardiac PMH: (Old records have been reviewed and summarized below)  1. Atrial Fibrillation- diagnosed in 2015.   2. Bradycardia  3. LHC 6/2015- normal coronaries. EF 65%   4. Monitor 9/2018- avg 57, min 35, max 94. Junctional rhythm at times. No afib. Low rates predominately while sleeping.   5. Biotronik DDDR Pacemaker      Current Outpatient Medications:   •  apixaban (ELIQUIS) 5 MG tablet tablet, Take 5 mg by mouth Every 12 (Twelve) Hours., Disp: , Rfl:   •  flecainide (TAMBOCOR) 50 MG tablet, Take 1 tablet by mouth 2 (Two) Times a Day., Disp: 60 tablet, Rfl: 0  •  isosorbide mononitrate (IMDUR) 30 MG 24 hr tablet, Take 1 tablet by mouth Daily., Disp: 90 tablet, Rfl: 3  •  magnesium oxide (MAG-OX) 400 MG tablet, Take 1 tablet by mouth Daily., Disp: 90 tablet, Rfl: 3  •  metoprolol tartrate (LOPRESSOR) 25 MG tablet, Take 1 tablet by mouth Every 12 (Twelve) Hours. (Patient taking differently: Take 25 mg by mouth Daily.), Disp: 60 tablet, Rfl: 11  •  nitroglycerin (NITROSTAT) 0.4 MG SL tablet, 1 under the tongue as needed for angina, may repeat q5mins for up three doses, Disp: 50 tablet, Rfl: 5  •  potassium chloride 10 MEQ CR tablet, Take 1 tablet by mouth Daily., Disp: 90 tablet, Rfl: 3  •  tadalafil (CIALIS) 5 MG tablet, Take 5 mg by mouth As Needed for Erectile Dysfunction., Disp: , Rfl: 1  •  telmisartan-hydrochlorothiazide (MICARDIS HCT) 80-25 MG per  tablet, TAKE 1 TABLET BY MOUTH DAILY, Disp: 90 tablet, Rfl: 1    Past Medical History, Past Surgical History, Family history, Social History, and Medications were all reviewed with the patient today and updated as necessary.       Patient Active Problem List   Diagnosis   • Hypertensive cardiovascular disease   • Intermittent palpitations   • MINO on CPAP   • Seasonal rhinitis   • Hypertension   • Lower extremity edema   • PAF (paroxysmal atrial fibrillation) (CMS/HCC)   • Sinus bradycardia   • Paroxysmal atrial fibrillation (CMS/HCC)   • Atrial flutter (CMS/HCC)   • CALDERON (dyspnea on exertion)   • Abnormal thyroid stimulating hormone level   • Tachy-hannah syndrome (CMS/HCC)   • Chest pain   • Long term current use of antiarrhythmic medical therapy     No Known Allergies  Past Medical History:   Diagnosis Date   • Atrial fibrillation (CMS/HCC)    • Bradycardia    • Chronic hypertension     Probably essential   • Hypertension    • Hypertensive cardiovascular disease 07/2015    Recent progressive CCS class III-IV chest pain syndrome with normal EKG and exertional dyspnea and fatigue with normal codominant coronary arteries and preserved systolic  left ventricular function, July 2015.   • Intermittent palpitations 05/01/2014    Remote abnormal 24-hour Holter monitor demonstrating intermittent bradycardia with occasional PACs, May 2014.   • Lower extremity edema     Intermittent lower extremity edema felt secondary to antihypertensive therapy.    • Nephrolithiasis     Remote- 15 years ago   • MINO on CPAP    • MINO on CPAP     doesn't know setting    • Prostate troubles     enlarged   • Seasonal rhinitis     intermittent     Past Surgical History:   Procedure Laterality Date   • CARDIAC CATHETERIZATION      no stents   • CARDIAC ELECTROPHYSIOLOGY PROCEDURE N/A 10/24/2018    Procedure: Pacemaker DC new with Biotronik. Hold Eliquis one day prior.;  Surgeon: Harley Dacosta MD;  Location: [facility] INVASIVE LOCATION;   "Service: Cardiology   • COLONOSCOPY     • ENDOSCOPY     • INSERT / REPLACE / REMOVE PACEMAKER       Family History   Problem Relation Age of Onset   • No Known Problems Mother    • Other Father         cardiac arrhythmia     Social History     Tobacco Use   • Smoking status: Former Smoker     Packs/day: 1.00     Types: Cigarettes     Quit date:      Years since quittin.   • Smokeless tobacco: Never Used   Substance Use Topics   • Alcohol use: No         PHYSICAL EXAM:    /68 (BP Location: Right arm, Patient Position: Sitting, Cuff Size: Adult)   Pulse 60   Ht 177.8 cm (70\")   Wt 88.5 kg (195 lb)   SpO2 96%   BMI 27.98 kg/m²        Wt Readings from Last 5 Encounters:   21 88.5 kg (195 lb)   21 90.4 kg (199 lb 3.2 oz)   21 88.5 kg (195 lb)   12/10/20 88.5 kg (195 lb)   20 88.9 kg (196 lb)       BP Readings from Last 5 Encounters:   21 132/68   21 134/84   21 118/66   12/10/20 134/80   20 124/78       General-Well Nourished, Well developed  Eyes - PERRLA  Neck- supple, No mass  CV- regular rate and rhythm, no MRG, No edema  Lung- clear bilaterally  Abd- soft, +BS  Musc/skel - Norm strength and range of motion  Skin- warm and dry  Neuro - Alert & Oriented x 3, appropriate mood.        Medical problems and test results were reviewed with the patient today.     No results found for this or any previous visit (from the past 672 hour(s)).      EKG: (EKG has been independently visualized by me and summarized below)      ECG 12 Lead    Date/Time: 2021 1:47 PM  Performed by: Olga Vila PA  Authorized by: Olga Vila PA   Comparison: compared with previous ECG from 2021  Similar to previous ECG  Rhythm: paced  Rate: normal  BPM: 60  Conduction: incomplete left bundle branch block    Clinical impression: abnormal EKG             ASSESSMENT and PLAN    1.  Atrial fibrillation-paroxysmal in nature- currently maintaining sinus rhythm on " Flecainide therapy. Current burden is 0%. No recurrent events. Continue Eliquis 5mg BID.   2. Tachybrady Syndrome s/p BTK dual chamber pacemaker. Normal function on check today. NO mode switches. Battery 9 years. Adjusted rate response but limited secondary to AV delay and increased RV pacing at last visit.   3. Flecainide use- EKG reviewed today. QRS stable.       Return in about 6 months (around 3/21/2022) for BTK.        Olga Vila PA-C   Cardiology/Electrophysiology  9/21/2021  14:02 EDT

## 2021-10-24 ENCOUNTER — APPOINTMENT (OUTPATIENT)
Dept: GENERAL RADIOLOGY | Facility: HOSPITAL | Age: 81
End: 2021-10-24

## 2021-10-24 ENCOUNTER — HOSPITAL ENCOUNTER (EMERGENCY)
Facility: HOSPITAL | Age: 81
Discharge: HOME | End: 2021-10-24
Attending: EMERGENCY MEDICINE | Admitting: EMERGENCY MEDICINE

## 2021-10-24 VITALS
BODY MASS INDEX: 27.92 KG/M2 | WEIGHT: 195 LBS | TEMPERATURE: 97.7 F | HEIGHT: 70 IN | DIASTOLIC BLOOD PRESSURE: 97 MMHG | OXYGEN SATURATION: 97 % | SYSTOLIC BLOOD PRESSURE: 158 MMHG | HEART RATE: 66 BPM | RESPIRATION RATE: 16 BRPM

## 2021-10-24 DIAGNOSIS — M16.0 OSTEOARTHRITIS OF BOTH HIPS, UNSPECIFIED OSTEOARTHRITIS TYPE: ICD-10-CM

## 2021-10-24 DIAGNOSIS — M51.37 DDD (DEGENERATIVE DISC DISEASE), LUMBOSACRAL: Primary | ICD-10-CM

## 2021-10-24 DIAGNOSIS — M54.32 SCIATICA, LEFT SIDE: ICD-10-CM

## 2021-10-24 PROCEDURE — 99283 EMERGENCY DEPT VISIT LOW MDM: CPT

## 2021-10-24 PROCEDURE — 72100 X-RAY EXAM L-S SPINE 2/3 VWS: CPT

## 2021-10-24 PROCEDURE — 73502 X-RAY EXAM HIP UNI 2-3 VIEWS: CPT

## 2021-10-24 RX ORDER — TRAMADOL HYDROCHLORIDE 50 MG/1
50 TABLET ORAL EVERY 6 HOURS PRN
Qty: 12 TABLET | Refills: 0 | Status: SHIPPED | OUTPATIENT
Start: 2021-10-24 | End: 2022-08-25 | Stop reason: SDUPTHER

## 2021-10-24 RX ORDER — HYDROCODONE BITARTRATE AND ACETAMINOPHEN 5; 325 MG/1; MG/1
1 TABLET ORAL ONCE
Status: COMPLETED | OUTPATIENT
Start: 2021-10-24 | End: 2021-10-24

## 2021-10-24 RX ORDER — TRAMADOL HYDROCHLORIDE 50 MG/1
50 TABLET ORAL EVERY 6 HOURS PRN
Qty: 12 TABLET | Refills: 0 | Status: SHIPPED | OUTPATIENT
Start: 2021-10-24 | End: 2021-10-24 | Stop reason: SDUPTHER

## 2021-10-24 RX ADMIN — HYDROCODONE BITARTRATE AND ACETAMINOPHEN 1 TABLET: 5; 325 TABLET ORAL at 13:09

## 2021-11-05 RX ORDER — FLECAINIDE ACETATE 50 MG/1
50 TABLET ORAL 2 TIMES DAILY
Qty: 180 TABLET | Refills: 1 | Status: SHIPPED | OUTPATIENT
Start: 2021-11-05 | End: 2022-06-30 | Stop reason: SDUPTHER

## 2021-12-03 PROCEDURE — 93294 REM INTERROG EVL PM/LDLS PM: CPT | Performed by: STUDENT IN AN ORGANIZED HEALTH CARE EDUCATION/TRAINING PROGRAM

## 2021-12-03 PROCEDURE — 93296 REM INTERROG EVL PM/IDS: CPT | Performed by: STUDENT IN AN ORGANIZED HEALTH CARE EDUCATION/TRAINING PROGRAM

## 2021-12-14 RX ORDER — TELMISARTAN AND HYDROCHLORTHIAZIDE 80; 25 MG/1; MG/1
1 TABLET ORAL DAILY
Qty: 90 TABLET | Refills: 0 | Status: SHIPPED | OUTPATIENT
Start: 2021-12-14 | End: 2022-03-15

## 2022-03-04 PROCEDURE — 93294 REM INTERROG EVL PM/LDLS PM: CPT | Performed by: STUDENT IN AN ORGANIZED HEALTH CARE EDUCATION/TRAINING PROGRAM

## 2022-03-04 PROCEDURE — 93296 REM INTERROG EVL PM/IDS: CPT | Performed by: STUDENT IN AN ORGANIZED HEALTH CARE EDUCATION/TRAINING PROGRAM

## 2022-03-15 RX ORDER — TELMISARTAN AND HYDROCHLORTHIAZIDE 80; 25 MG/1; MG/1
1 TABLET ORAL DAILY
Qty: 30 TABLET | Refills: 0 | Status: SHIPPED | OUTPATIENT
Start: 2022-03-15 | End: 2022-04-25

## 2022-04-05 ENCOUNTER — OFFICE VISIT (OUTPATIENT)
Dept: CARDIOLOGY | Facility: CLINIC | Age: 82
End: 2022-04-05

## 2022-04-05 VITALS
WEIGHT: 190 LBS | HEIGHT: 70 IN | OXYGEN SATURATION: 96 % | DIASTOLIC BLOOD PRESSURE: 70 MMHG | SYSTOLIC BLOOD PRESSURE: 138 MMHG | HEART RATE: 60 BPM | BODY MASS INDEX: 27.2 KG/M2

## 2022-04-05 DIAGNOSIS — I48.0 PAROXYSMAL ATRIAL FIBRILLATION: Primary | ICD-10-CM

## 2022-04-05 DIAGNOSIS — I49.5 TACHY-BRADY SYNDROME: ICD-10-CM

## 2022-04-05 DIAGNOSIS — Z79.899 LONG TERM CURRENT USE OF ANTIARRHYTHMIC MEDICAL THERAPY: ICD-10-CM

## 2022-04-05 PROCEDURE — 93000 ELECTROCARDIOGRAM COMPLETE: CPT | Performed by: NURSE PRACTITIONER

## 2022-04-05 PROCEDURE — 93280 PM DEVICE PROGR EVAL DUAL: CPT | Performed by: NURSE PRACTITIONER

## 2022-04-05 PROCEDURE — 99213 OFFICE O/P EST LOW 20 MIN: CPT | Performed by: NURSE PRACTITIONER

## 2022-04-05 NOTE — PROGRESS NOTES
Cardiac Electrophysiology Outpatient Note  [facility] at [facility]    Office Visit     Kai Dodson  [id number]  2022    Primary Care Physician: Derick Phillip MD    Referred By: No ref. provider found    CC: Follow-up A. fib, tachybradycardia syndrome, device check    Cardiac PMH: (Old records have been reviewed and summarized below)  1. Atrial Fibrillation- diagnosed in .   2. Bradycardia  3. LHC 2015- normal coronaries. EF 65%   4. Monitor 2018- avg 57, min 35, max 94. Junctional rhythm at times. No afib. Low rates predominately while sleeping.   5. Biotronik DDDR Pacemaker    History of Present Illness:   Kai Dodson is a 81 y.o. male who presents to the electrophysiology clinic for follow up of atrial fibrillation, tachybradycardia syndrome.  Overall he feels he is doing well cardiac wise.  He is planning on traveling with his grandson this summer.  He denies any chest pain, shortness of breath, palpitations, lower extremity edema, syncope.  Energy levels are good and unchanged.  Denies any bleeding problems on Eliquis.    Past Surgical History:   Procedure Laterality Date   • CARDIAC CATHETERIZATION      no stents   • CARDIAC ELECTROPHYSIOLOGY PROCEDURE N/A 10/24/2018    Procedure: Pacemaker DC new with Biotronik. Hold Eliquis one day prior.;  Surgeon: Harley Dacosta MD;  Location: [facility] INVASIVE LOCATION;  Service: Cardiology   • COLONOSCOPY     • ENDOSCOPY     • INSERT / REPLACE / REMOVE PACEMAKER         Family History   Problem Relation Age of Onset   • No Known Problems Mother    • Other Father         cardiac arrhythmia       Social History     Socioeconomic History   • Marital status:    Tobacco Use   • Smoking status: Former Smoker     Packs/day: 1.00     Types: Cigarettes     Quit date:      Years since quittin.2   • Smokeless tobacco: Never Used   Vaping Use   • Vaping Use: Never used   Substance and Sexual  "Activity   • Alcohol use: No   • Drug use: No   • Sexual activity: Defer         Current Outpatient Medications:   •  apixaban (ELIQUIS) 5 MG tablet tablet, Take 5 mg by mouth Every 12 (Twelve) Hours., Disp: , Rfl:   •  flecainide (TAMBOCOR) 50 MG tablet, Take 1 tablet by mouth 2 (Two) Times a Day., Disp: 180 tablet, Rfl: 1  •  isosorbide mononitrate (IMDUR) 30 MG 24 hr tablet, Take 1 tablet by mouth Daily., Disp: 90 tablet, Rfl: 3  •  magnesium oxide (MAG-OX) 400 MG tablet, Take 1 tablet by mouth Daily., Disp: 90 tablet, Rfl: 3  •  metoprolol tartrate (LOPRESSOR) 25 MG tablet, Take 1 tablet by mouth Every 12 (Twelve) Hours. (Patient taking differently: Take 25 mg by mouth Daily.), Disp: 60 tablet, Rfl: 11  •  nitroglycerin (NITROSTAT) 0.4 MG SL tablet, 1 under the tongue as needed for angina, may repeat q5mins for up three doses, Disp: 50 tablet, Rfl: 5  •  potassium chloride 10 MEQ CR tablet, Take 1 tablet by mouth Daily., Disp: 90 tablet, Rfl: 3  •  tadalafil (CIALIS) 5 MG tablet, Take 5 mg by mouth As Needed for Erectile Dysfunction., Disp: , Rfl: 1  •  telmisartan-hydrochlorothiazide (MICARDIS HCT) 80-25 MG per tablet, Take 1 tablet by mouth Daily. NEED APPOINTMENT WITH DR CAMPBELL FOR FURTHER REFILLS (Patient taking differently: Take 1 tablet by mouth As Needed. NEED APPOINTMENT WITH DR CAMPBELL FOR FURTHER REFILLS), Disp: 30 tablet, Rfl: 0  •  traMADol (ULTRAM) 50 MG tablet, Take 1 tablet by mouth Every 6 (Six) Hours As Needed for Moderate Pain ., Disp: 12 tablet, Rfl: 0    Allergies:   No Known Allergies    Review of Systems:  Review of Systems   Constitutional: Negative for malaise/fatigue.   Cardiovascular: Negative for chest pain, dyspnea on exertion, irregular heartbeat, leg swelling, near-syncope, palpitations and syncope.   Hematologic/Lymphatic: Negative for bleeding problem.        Vital Signs: Blood pressure 138/70, pulse 60, height 177.8 cm (70\"), weight 86.2 kg (190 lb), SpO2 96 %.    Physical " Exam  Vitals and nursing note reviewed.   Cardiovascular:      Rate and Rhythm: Normal rate and regular rhythm.      Heart sounds: Normal heart sounds.   Musculoskeletal:      Right lower leg: No edema.      Left lower leg: No edema.   Skin:     General: Skin is warm and dry.   Neurological:      Mental Status: He is alert and oriented to person, place, and time.   Psychiatric:         Behavior: Behavior is cooperative.         Lab Results   Component Value Date    GLUCOSE 108 (H) 10/16/2018    CALCIUM 9.1 10/16/2018     10/16/2018    K 4.0 10/16/2018    CO2 32.0 (H) 10/16/2018     10/16/2018    BUN 17 10/16/2018    CREATININE 1.07 10/16/2018    EGFRIFNONA 67 10/16/2018    BCR 15.9 10/16/2018    ANIONGAP 5.0 10/16/2018     Lab Results   Component Value Date    WBC 5.53 10/16/2018    HGB 14.1 10/16/2018    HCT 43.5 10/16/2018    MCV 93.3 10/16/2018     10/16/2018     Lab Results   Component Value Date    INR 1.02 06/30/2015    INR 1.00 04/17/2015    PROTIME 10.7 06/30/2015    PROTIME 10.5 04/17/2015     Lab Results   Component Value Date    TSH 0.214 (L) 04/17/2015        Results for orders placed during the hospital encounter of 07/28/20    Adult Transthoracic Echo Complete W/ Cont if Necessary Per Protocol    Interpretation Summary  · Mild aortic valve regurgitation is present.  · Mild to moderate tricuspid valve regurgitation is present.  · Calculated right ventricular systolic pressure from tricuspid regurgitation is 33 mmHg.  · Mild pulmonic valve regurgitation is present.  · Estimated EF = 56%.  · Left ventricular systolic function is normal.  · Left ventricular diastolic dysfunction (grade I) consistent with impaired relaxation.  · Left atrial cavity size is borderline dilated.  · Mild mitral valve regurgitation is present.  · Normal right ventricular cavity size, wall thickness, systolic function and septal motion noted.  · The aortic valve exhibits mild sclerosis.  · No evidence of  pulmonary hypertension is present.  · There is no evidence of pericardial effusion.      I personally viewed and interpreted the patient's EKG/Telemetry/lab data.      ECG 12 Lead    Date/Time: 4/5/2022 2:59 PM  Performed by: Erinn Smith APRN  Authorized by: Erinn Smith APRN   Comparison: compared with previous ECG from 9/21/2021  Similar to previous ECG  Rhythm: paced  Rate: normal  BPM: 60  Conduction: incomplete left bundle branch block  QRS axis: normal  Other findings: T wave abnormality          Device check:  Biotronik dual-chamber pacemaker  RA pacing 82% RV pacing 16%.  Normal thresholds and impedances battery voltage 80%.  No A. fib detected.  He is home monitored and actively transmitting    Assessment & Plan    1. Paroxysmal atrial fibrillation (HCC)  - [id number]= 3, continue Eliquis for stroke prevention  - No A. fib detected on device check  - Continue flecainide 50 mg twice daily    2. Long term current use of antiarrhythmic medical therapy  - Continues to take flecainide without any issues.  EKG is stable today.  No A. fib noted on device check.    3. Tachy-hannah syndrome (HCC)  - Normal device check today    Follow Up:  Return in about 6 months (around 10/5/2022).    FORREST Mitchell  04/05/2022

## 2022-04-25 RX ORDER — TELMISARTAN AND HYDROCHLORTHIAZIDE 80; 25 MG/1; MG/1
1 TABLET ORAL DAILY
Qty: 30 TABLET | Refills: 0 | Status: SHIPPED | OUTPATIENT
Start: 2022-04-25 | End: 2022-06-08 | Stop reason: SDUPTHER

## 2022-06-03 PROCEDURE — 93294 REM INTERROG EVL PM/LDLS PM: CPT | Performed by: STUDENT IN AN ORGANIZED HEALTH CARE EDUCATION/TRAINING PROGRAM

## 2022-06-03 PROCEDURE — 93296 REM INTERROG EVL PM/IDS: CPT | Performed by: STUDENT IN AN ORGANIZED HEALTH CARE EDUCATION/TRAINING PROGRAM

## 2022-06-08 ENCOUNTER — TELEPHONE (OUTPATIENT)
Dept: CARDIOLOGY | Facility: CLINIC | Age: 82
End: 2022-06-08

## 2022-06-08 RX ORDER — POTASSIUM CHLORIDE 750 MG/1
10 TABLET, FILM COATED, EXTENDED RELEASE ORAL DAILY
Qty: 30 TABLET | Refills: 0 | Status: SHIPPED | OUTPATIENT
Start: 2022-06-08 | End: 2022-10-11 | Stop reason: SDUPTHER

## 2022-06-08 RX ORDER — TELMISARTAN AND HYDROCHLORTHIAZIDE 80; 25 MG/1; MG/1
1 TABLET ORAL DAILY
Qty: 30 TABLET | Refills: 0 | Status: SHIPPED | OUTPATIENT
Start: 2022-06-08 | End: 2022-10-11 | Stop reason: SDUPTHER

## 2022-06-30 RX ORDER — FLECAINIDE ACETATE 50 MG/1
50 TABLET ORAL 2 TIMES DAILY
Qty: 180 TABLET | Refills: 1 | Status: SHIPPED | OUTPATIENT
Start: 2022-06-30 | End: 2022-10-11 | Stop reason: SDUPTHER

## 2022-08-25 ENCOUNTER — APPOINTMENT (OUTPATIENT)
Dept: GENERAL RADIOLOGY | Facility: HOSPITAL | Age: 82
End: 2022-08-25

## 2022-08-25 ENCOUNTER — HOSPITAL ENCOUNTER (EMERGENCY)
Facility: HOSPITAL | Age: 82
Discharge: HOME | End: 2022-08-25
Attending: EMERGENCY MEDICINE | Admitting: EMERGENCY MEDICINE

## 2022-08-25 VITALS
DIASTOLIC BLOOD PRESSURE: 81 MMHG | WEIGHT: 192 LBS | TEMPERATURE: 98.6 F | OXYGEN SATURATION: 95 % | SYSTOLIC BLOOD PRESSURE: 170 MMHG | BODY MASS INDEX: 28.44 KG/M2 | HEIGHT: 69 IN | HEART RATE: 60 BPM | RESPIRATION RATE: 18 BRPM

## 2022-08-25 DIAGNOSIS — M51.37 DEGENERATIVE DISC DISEASE AT L5-S1 LEVEL: Primary | ICD-10-CM

## 2022-08-25 DIAGNOSIS — M51.37 DDD (DEGENERATIVE DISC DISEASE), LUMBOSACRAL: ICD-10-CM

## 2022-08-25 DIAGNOSIS — M16.0 OSTEOARTHRITIS OF BOTH HIPS, UNSPECIFIED OSTEOARTHRITIS TYPE: ICD-10-CM

## 2022-08-25 DIAGNOSIS — G89.29 CHRONIC LOW BACK PAIN, UNSPECIFIED BACK PAIN LATERALITY, UNSPECIFIED WHETHER SCIATICA PRESENT: ICD-10-CM

## 2022-08-25 DIAGNOSIS — I10 PRIMARY HYPERTENSION: ICD-10-CM

## 2022-08-25 DIAGNOSIS — M54.32 SCIATICA, LEFT SIDE: ICD-10-CM

## 2022-08-25 DIAGNOSIS — M54.50 CHRONIC LOW BACK PAIN, UNSPECIFIED BACK PAIN LATERALITY, UNSPECIFIED WHETHER SCIATICA PRESENT: ICD-10-CM

## 2022-08-25 PROCEDURE — 72100 X-RAY EXAM L-S SPINE 2/3 VWS: CPT

## 2022-08-25 PROCEDURE — 99283 EMERGENCY DEPT VISIT LOW MDM: CPT

## 2022-08-25 PROCEDURE — 73502 X-RAY EXAM HIP UNI 2-3 VIEWS: CPT

## 2022-08-25 RX ORDER — LANOLIN ALCOHOL/MO/W.PET/CERES
1 CREAM (GRAM) TOPICAL DAILY
COMMUNITY
Start: 2022-06-06 | End: 2022-08-25

## 2022-08-25 RX ORDER — ISOSORBIDE MONONITRATE 30 MG/1
30 TABLET, EXTENDED RELEASE ORAL
Status: DISCONTINUED | OUTPATIENT
Start: 2022-08-25 | End: 2022-08-25 | Stop reason: HOSPADM

## 2022-08-25 RX ORDER — TRAMADOL HYDROCHLORIDE 50 MG/1
50 TABLET ORAL EVERY 6 HOURS PRN
Qty: 12 TABLET | Refills: 0 | Status: ON HOLD | OUTPATIENT
Start: 2022-08-25

## 2022-08-25 RX ADMIN — ISOSORBIDE MONONITRATE 30 MG: 30 TABLET, EXTENDED RELEASE ORAL at 19:01

## 2022-08-25 NOTE — DISCHARGE INSTRUCTIONS
CONTROLLED SUBSTANCE(S) EDUCATION  Controlled Substances have been prescribed by your provider to treat your medical condition and associated symptoms. Although Controlled Substances can be effective in relieving your pain or other symptoms, they may also cause serious adverse effects. It is important that you understand how to safely and appropriately take these medications.  Proper Use  1. Carefully following instructions for use, including timing of doses, whether to take the  medication with or without food, and any foods or other medications to avoid while taking the medication;  2. If you have low or impaired vision you should wear glasses when taking the medication and not take the medication in the dark;  3. You should read the prescription container label each time to confirm the dosage;  4. You should never use the medication after the expiration date;  5. You must never share the medication with others;  6. You must not take the medication with alcohol or other sedatives;  7. You should not take the medication to help you sleep;  8. You should never break, crush or chew the medication;  9. If you have been prescribed a skin patch (transdermal), external heat, fever and exertion can increase the absorption of these products, leading to potentially fatal overdose;  10. You should immediately contact the physician’s office to report any adverse reaction and,  11. It is illegal to share, sell or give away Controlled Substances.  Driving and Work Safety  1. Controlled Substances may cause sleepiness, clouded thinking, decreased concentration, slower reflexes, or incoordination, all of which may create a danger to you and others when driving or operating certain type of machinery;  2. Avoid, if possible, driving or engaging in other potentially dangerous work or other activities, for a specific period of time until the initial effects of the Controlled Substances no longer create such dangers; and,  3.  Ingesting other substances, such as alcohol, benzodiazepines or some cold remedies, at the same time you are taking the Controlled Substances prescribed or dispensed may increase cognitive and motor impairment.  Pregnancy  If you are pregnant or nursing a baby, avoid using Controlled Substances, or use them on a minimal basis in strict accordance with your provider’s instructions.  Potential for Overdose and Response  1. The use of Controlled Substances creates a risk of respiratory depression, which may result in serious harm or death. You and others should be watchful for the following warning signs of overmedication:  ? intoxicated behavior, such as confusion, slurred speech, or stumbling;  ? feeling dizzy or faint;  ? acting very drowsy or groggy;  ? unusual snoring, gasping, or snorting during sleep;  ? and/or difficulty waking up from sleep or difficulty in staying awake.  2. Immediately call “911” or an emergency service upon you or your caregivers observing or experiencing any of the following conditions:  ? you cannot be aroused or waken, or are unable to talk after being awakened;  ? you have shortness of breath, slow or light breathing, or stopped breathing;  ? gurgling noises coming from your mouth or throat;  ? your body is limp, seems lifeless;  ? your face is pale or clammy;  ? your fingernails or lips are turning purple or blue; and/or  ? your heartbeat is slow, unusual or stopped  Safe Storage of Controlled Substances  1. If your Controlled Substances are not stored in a safe manner there is a potential that  partners, family members or others may improperly obtain your Controlled Substances;  2. Always keep the Controlled Substances in the original container;  3. Store Controlled Substances in a locked cabinet or other secure storage unit, that is cool, dry and out of direct sunlight, such as:  ? an existing safe;  ? a cut-proof travel bag;  ? a portable lock box designed for travel; or,  ? a  locking medical box.  4. Do not store Controlled Substances in:  ? an unlocked medicine cabinet;  ? in your car; or,  ? in a refrigerator or freezer unless specifically recommended by the prescriber or  pharmacist; and  5. Immediately notify your provider if any Controlled Substances prescribed or dispensed by the provider are stolen or improperly taken by another individual.  Proper Disposal  1. It is important to safely and appropriately dispose of unused Controlled Substances that had been prescribed or dispensed by your provider;  2. Promptly dispose of unused Controlled Substances after the expiration date of the  prescription or after you no longer require the Controlled Substances to treat your medical condition;  3. In order to safely dispose of Controlled Substances, you should turn in the unused Controlled Substances as part of an approved governmental drug take-back program. The [place] Office of Drug Control Policy has a listing of [place] Permanent Drug Disposal Locations at http://www.odcp.[place].gov - click on the [place] Prescriptions Drug Drop Map and Location on the left side of the page.  4. You should not flush Controlled Substances down the toilet; and,  5. You should personally remove any identifying information, including the prescription number, from an empty Controlled Substance container and then properly dispose of the empty container.  CONSENT FOR TREATMENT WITH CONTROLLED SUBSTANCE(S)  (This is for an initial prescription)  1. Controlled Substances  Controlled Substances are prescribed to treat a variety of conditions, including the relief  of chronic pain, to provide stimulation, promote weight loss, and treat mood disorders.  Pain relief is an important medical reason to take Controlled Substances.  Controlled Substances are drugs or chemical substances whose possession and use are  regulated under the Controlled Substances Act. The law requires that patient are informed of the risks,  benefits, and alternatives of taking Controlled Substances.  2. Adverse Effects  As with any medication, there are risks and adverse effects associated with the use of  Controlled Substances. Common adverse effects of pain medicines could include, but are not limited to: sedation or sleepiness, nausea, vomiting, constipation, pruritus (itching), confusion, respiratory depression, and urinary retention. Some of these effects may make it unsafe for you to drive a vehicle, operate heavy machinery, or perform other tasks that require concentration and coordination. Excessive use of these Controlled Substances can lead to profound sedation, respiratory depression, coma, and/or death. Regarding stimulants, adverse effects could include, but are not limited to: drug dependency, neuropsychiatric symptoms such as psychosis and bob, weight loss, cardiovascular events such as heart attack and stroke, insomnia, hypertension, and agitation. Any questions you have regarding the Controlled Substance(s) should be discussed with the prescribing provider.  3. Physical Dependence, Tolerance, and Addiction  Although uncommon when used for their clinical indications, both pain relievers and  stimulants can cause physical dependence, tolerance, and/or addiction when used for a  prolonged period. Maintenance therapy with these Controlled Substances can cause  physical dependence. This means that if these medications are abruptly stopped, or  decreased significantly over a short period of time, a patient may experience withdrawal  symptoms such as: nervousness, irritability, insomnia, sweating, abdominal cramping,  nausea, vomiting, and diarrhea. Tolerance occurs when the effects of these Controlled  Substances are decreased over a period of prolonged use making it necessary to increase the dosage. Physical dependence and tolerance are different than addiction. Addiction is a complex disease characterized by compulsive craving or seeking  and use of a substance despite its extreme negatives on a person. The risk of addiction may be increased in a patient with a history of alcoholism or other addiction.  4. Alternatives  Controlled Substances are routinely prescribed to treat moderate to severe pain or other  medical conditions. Other medicines are available to treat these conditions that are not  associated with tolerance or addiction, however, are associated with a lower level of pain  relief or stimulation. It may also be an alternative to not take any medicine to treat these  conditions, or to use alternative modalities, other than medicine to treat these conditions.  I voluntarily consent to the receipt of the above-named Controlled Substance(s) as prescribed by my provider. I have been informed of the benefits, risks, and alternatives to taking these medications. I acknowledge that I have read and understood all of the information above and I have had the opportunity to ask questions and have them answered to my satisfaction.

## 2022-08-25 NOTE — ED PROVIDER NOTES
Subjective   82-year-old male presents emergency department a day with back and hip pain.  He states he been having back and hip pain for some time he gets worse throughout the day and after he sits down or lays down for little bit seems to get better.  He did have a fall today is concerned that he had a fracture.  He states he seen his primary care doctor they have not referred him to anyone as of yet.  He does know that he has arthritis.  Patient reports that he has a history of hypertension has a history of atrial fib he is on Eliquis.  He denies any chest pain abdominal pain shortness of breath.  He has no other complaints.  He is able to bear weight he is a just with prolonged standing and movement seems to increase his pain.  Denies any numbness or tingling or loss of bowel or bladder control.  No saddle anesthesia.      History provided by:  Patient and relative   used: No    Back Pain  Associated symptoms: no abdominal pain, no chest pain, no dysuria, no fever and no headaches    Fall  Mechanism of injury: fall    Injury location: Right hip and lower back.  Incident location:  Work  Time since incident:  2 hours  Arrived directly from scene: yes    Fall:     Fall occurred:  Standing    Impact surface:  Hard floor    Point of impact:  Buttocks    Entrapped after fall: no    Suspicion of alcohol use: no    Suspicion of drug use: no    Tetanus status:  Up to date  Associated symptoms: back pain    Associated symptoms: no abdominal pain, no chest pain, no headaches, no hearing loss, no loss of consciousness, no neck pain, no seizures and no vomiting    Risk factors: anticoagulation therapy    Risk factors: no pacemaker        Review of Systems   Constitutional: Negative for chills and fever.   HENT: Negative for hearing loss.    Respiratory: Negative for chest tightness, shortness of breath and wheezing.    Cardiovascular: Negative for chest pain.   Gastrointestinal: Negative for abdominal  pain and vomiting.   Genitourinary: Negative for dysuria, frequency and urgency.   Musculoskeletal: Positive for back pain. Negative for neck pain.   Neurological: Negative for seizures, loss of consciousness and headaches.   Hematological: Negative for adenopathy.   Psychiatric/Behavioral: Negative.    All other systems reviewed and are negative.      Past Medical History:   Diagnosis Date   • Arthritis    • Atrial fibrillation (HCC)    • Bradycardia    • Chronic hypertension     Probably essential   • Hypertension    • Hypertensive cardiovascular disease 07/2015    Recent progressive CCS class III-IV chest pain syndrome with normal EKG and exertional dyspnea and fatigue with normal codominant coronary arteries and preserved systolic  left ventricular function, July 2015.   • Intermittent palpitations 05/01/2014    Remote abnormal 24-hour Holter monitor demonstrating intermittent bradycardia with occasional PACs, May 2014.   • Lower extremity edema     Intermittent lower extremity edema felt secondary to antihypertensive therapy.    • Nephrolithiasis     Remote- 15 years ago   • MINO on CPAP    • MINO on CPAP     doesn't know setting    • Prostate troubles     enlarged   • Seasonal rhinitis     intermittent       No Known Allergies    Past Surgical History:   Procedure Laterality Date   • CARDIAC CATHETERIZATION      no stents   • CARDIAC ELECTROPHYSIOLOGY PROCEDURE N/A 10/24/2018    Procedure: Pacemaker DC new with Biotronik. Hold Eliquis one day prior.;  Surgeon: Harley Dacosta MD;  Location: [facility] INVASIVE LOCATION;  Service: Cardiology   • COLONOSCOPY     • ENDOSCOPY     • INSERT / REPLACE / REMOVE PACEMAKER         Family History   Problem Relation Age of Onset   • No Known Problems Mother    • Other Father         cardiac arrhythmia       Social History     Socioeconomic History   • Marital status:    Tobacco Use   • Smoking status: Former Smoker     Packs/day: 1.00     Types: Cigarettes     Quit  date:      Years since quittin.6   • Smokeless tobacco: Never Used   Vaping Use   • Vaping Use: Never used   Substance and Sexual Activity   • Alcohol use: No   • Drug use: No   • Sexual activity: Defer           Objective   Physical Exam  Vitals and nursing note reviewed.   Constitutional:       Appearance: He is well-developed.   HENT:      Head: Normocephalic and atraumatic.      Right Ear: External ear normal.      Left Ear: External ear normal.      Nose: Nose normal.   Eyes:      General: No scleral icterus.     Conjunctiva/sclera: Conjunctivae normal.      Pupils: Pupils are equal, round, and reactive to light.   Neck:      Thyroid: No thyromegaly.   Cardiovascular:      Rate and Rhythm: Normal rate and regular rhythm.      Heart sounds: Normal heart sounds.   Pulmonary:      Effort: Pulmonary effort is normal. No respiratory distress.      Breath sounds: Normal breath sounds. No wheezing or rales.   Chest:      Chest wall: No tenderness.   Abdominal:      General: Bowel sounds are normal. There is no distension.      Palpations: Abdomen is soft.      Tenderness: There is no abdominal tenderness.   Musculoskeletal:         General: Normal range of motion.      Cervical back: Normal range of motion.      Comments: Diffuse tenderness of the lumbar spine mostly in the right SI region.  There is no ecchymosis abrasions or evidence of direct trauma.     Lymphadenopathy:      Cervical: No cervical adenopathy.   Skin:     General: Skin is warm and dry.   Neurological:      Mental Status: He is alert and oriented to person, place, and time.      Cranial Nerves: No cranial nerve deficit.      Coordination: Coordination normal.      Deep Tendon Reflexes: Reflexes are normal and symmetric. Reflexes normal.   Psychiatric:         Behavior: Behavior normal.         Thought Content: Thought content normal.         Judgment: Judgment normal.         Procedures           ED Course  ED Course as of 22 [id number]    Thu Aug 25, 2022   1839 BP(!): 214/100 [NS]   1852 To get up and ambulate.  His blood pressure is running higher than it was when he initially came here his pain is not severe.  I discussed this with Dr. Soto he is not taking his Imdur in several days he has a prescription of the pharmacy just has not picked it up we will give him a dose here. [VENUS]      ED Course User Index  [VENUS] Jose Cox PA  [NS] Derik Soto MD                No results found for this or any previous visit (from the past 24 hour(s)).  Note: In addition to lab results from this visit, the labs listed above may include labs taken at another facility or during a different encounter within the last 24 hours. Please correlate lab times with ED admission and discharge times for further clarification of the services performed during this visit.    XR Hip With or Without Pelvis 2 - 3 View Right   Final Result   1.  No evidence for displaced fracture or dislocation.   2.  Mild degenerative changes are noted.       This report was finalized on 8/25/2022 5:24 PM by Que Shea MD.          XR Spine Lumbar 2 or 3 View   Final Result   1.  No evidence for acute fracture or subluxation. No evidence for   compression fracture deformity.   2.  Mild discogenic degenerative changes and degenerative posterior   facet changes are seen throughout the lumbar spine.   3.  Evidence for bilateral nephrolithiasis measuring up to 5 mm on the   left       This report was finalized on 8/25/2022 5:24 PM by Que Shea MD.            Vitals:    08/25/22 1845 08/25/22 1900 08/25/22 1901 08/25/22 1919   BP:  (!) 191/90 (!) 191/90 170/81   BP Location:       Patient Position:       Pulse: 60 60  60   Resp:       Temp:       TempSrc:       SpO2: 97% 96%  95%   Weight:       Height:         Medications - No data to display  ECG/EMG Results (last 24 hours)     ** No results found for the last 24 hours. **        No orders to display                           IVA reviewed by Derik Soto MD       MDM  Number of Diagnoses or Management Options  Chronic low back pain, unspecified back pain laterality, unspecified whether sciatica present: new and requires workup  Degenerative disc disease at L5-S1 level: new and requires workup  Primary hypertension: new and requires workup     Amount and/or Complexity of Data Reviewed  Tests in the radiology section of CPT®: ordered and reviewed  Decide to obtain previous medical records or to obtain history from someone other than the patient: yes  Discuss the patient with other providers: yes    Patient Progress  Patient progress: stable      Final diagnoses:   Degenerative disc disease at L5-S1 level   Chronic low back pain, unspecified back pain laterality, unspecified whether sciatica present   Primary hypertension       ED Disposition  ED Disposition     ED Disposition   Discharge    Condition   Stable    Comment   --             Derick Phillip MD  [address] [facility] [address] [phone]          Sabino Salgado Jr., MD  [address] [address] [phone]      Call for appointment         Medication List      Changed    metoprolol tartrate 25 MG tablet  Commonly known as: LOPRESSOR  Take 1 tablet by mouth Every 12 (Twelve) Hours.  What changed: when to take this           Where to Get Your Medications      These medications were sent to [id number] - [place], [place] - [address] AT [facility] [address] [facility] [address] - [phone]  - [phone]   [address] [facility] [address]    Phone: [phone]   · traMADol 50 MG tablet          Jose Cox PA  08/25/22 2231

## 2022-08-26 NOTE — TELEPHONE ENCOUNTER
Patient called and left a message. When I called him back he said that everything had already been taken care of.

## 2022-08-30 DIAGNOSIS — I49.5 TACHY-BRADY SYNDROME: ICD-10-CM

## 2022-08-30 DIAGNOSIS — Z79.899 LONG TERM CURRENT USE OF ANTIARRHYTHMIC MEDICAL THERAPY: ICD-10-CM

## 2022-08-30 DIAGNOSIS — I48.0 PAF (PAROXYSMAL ATRIAL FIBRILLATION): ICD-10-CM

## 2022-09-02 PROCEDURE — 93296 REM INTERROG EVL PM/IDS: CPT | Performed by: STUDENT IN AN ORGANIZED HEALTH CARE EDUCATION/TRAINING PROGRAM

## 2022-09-02 PROCEDURE — 93294 REM INTERROG EVL PM/LDLS PM: CPT | Performed by: STUDENT IN AN ORGANIZED HEALTH CARE EDUCATION/TRAINING PROGRAM

## 2022-10-11 ENCOUNTER — OFFICE VISIT (OUTPATIENT)
Dept: CARDIOLOGY | Facility: CLINIC | Age: 82
End: 2022-10-11

## 2022-10-11 VITALS
DIASTOLIC BLOOD PRESSURE: 76 MMHG | OXYGEN SATURATION: 97 % | BODY MASS INDEX: 27.92 KG/M2 | WEIGHT: 195 LBS | HEART RATE: 60 BPM | SYSTOLIC BLOOD PRESSURE: 124 MMHG | HEIGHT: 70 IN

## 2022-10-11 DIAGNOSIS — I48.0 PAF (PAROXYSMAL ATRIAL FIBRILLATION): ICD-10-CM

## 2022-10-11 DIAGNOSIS — Z79.899 LONG TERM CURRENT USE OF ANTIARRHYTHMIC MEDICAL THERAPY: ICD-10-CM

## 2022-10-11 DIAGNOSIS — I49.5 TACHY-BRADY SYNDROME: ICD-10-CM

## 2022-10-11 PROCEDURE — 99214 OFFICE O/P EST MOD 30 MIN: CPT | Performed by: STUDENT IN AN ORGANIZED HEALTH CARE EDUCATION/TRAINING PROGRAM

## 2022-10-11 PROCEDURE — 93000 ELECTROCARDIOGRAM COMPLETE: CPT | Performed by: STUDENT IN AN ORGANIZED HEALTH CARE EDUCATION/TRAINING PROGRAM

## 2022-10-11 PROCEDURE — 93280 PM DEVICE PROGR EVAL DUAL: CPT | Performed by: STUDENT IN AN ORGANIZED HEALTH CARE EDUCATION/TRAINING PROGRAM

## 2022-10-11 RX ORDER — TELMISARTAN AND HYDROCHLORTHIAZIDE 80; 25 MG/1; MG/1
1 TABLET ORAL DAILY
Qty: 90 TABLET | Refills: 3 | Status: ON HOLD | OUTPATIENT
Start: 2022-10-11

## 2022-10-11 RX ORDER — NITROGLYCERIN 0.4 MG/1
TABLET SUBLINGUAL
Qty: 50 TABLET | Refills: 5 | Status: ON HOLD | OUTPATIENT
Start: 2022-10-11

## 2022-10-11 RX ORDER — MAGNESIUM OXIDE 400 MG/1
400 TABLET ORAL DAILY
Qty: 90 TABLET | Refills: 3 | Status: ON HOLD | OUTPATIENT
Start: 2022-10-11

## 2022-10-11 RX ORDER — ISOSORBIDE MONONITRATE 30 MG/1
30 TABLET, EXTENDED RELEASE ORAL DAILY
Qty: 90 TABLET | Refills: 3 | Status: ON HOLD | OUTPATIENT
Start: 2022-10-11

## 2022-10-11 RX ORDER — FLECAINIDE ACETATE 50 MG/1
50 TABLET ORAL 2 TIMES DAILY
Qty: 180 TABLET | Refills: 3 | Status: ON HOLD | OUTPATIENT
Start: 2022-10-11

## 2022-10-11 RX ORDER — POTASSIUM CHLORIDE 750 MG/1
10 TABLET, FILM COATED, EXTENDED RELEASE ORAL DAILY
Qty: 90 TABLET | Refills: 3 | Status: ON HOLD | OUTPATIENT
Start: 2022-10-11

## 2022-10-11 NOTE — PROGRESS NOTES
Cardiac Electrophysiology Outpatient Note  [facility]    Office Visit     Kai Dodson  [id number]  04/05/2022    Primary Care Physician: Derick Phillip MD    Referred By: No ref. provider found    CC: Follow-up A. fib, tachybradycardia syndrome, device check    Cardiac PMH: (Old records have been reviewed and summarized below)  1. Atrial Fibrillation- diagnosed in 2015.   2. Bradycardia  3. LHC 6/2015- normal coronaries. EF 65%   4. Monitor 9/2018- avg 57, min 35, max 94. Junctional rhythm at times. No afib. Low rates predominately while sleeping.   5. Biotronik DDDR Pacemaker    History of Present Illness:   Kai Dodson is a 82 y.o. male who presents to the electrophysiology clinic for follow up of atrial fibrillation, tachybradycardia syndrome.  He is overall doing well.  He gets a lot of dyspnea with exertion such as going up the stairs.  This he does not think is changed over the past couple years.  He sometimes gets a little bit of chest discomfort with this but usually improves with rest.  Symptoms he will take a nitroglycerin and this seems to help as well.  Otherwise has not any problems with his medications.  No problems with his pacemaker.    Past Surgical History:   Procedure Laterality Date   • CARDIAC CATHETERIZATION      no stents   • CARDIAC ELECTROPHYSIOLOGY PROCEDURE N/A 10/24/2018    Procedure: Pacemaker DC new with Biotronik. Hold Eliquis one day prior.;  Surgeon: Harley Dacosta MD;  Location: [facility] INVASIVE LOCATION;  Service: Cardiology   • COLONOSCOPY     • ENDOSCOPY     • INSERT / REPLACE / REMOVE PACEMAKER         Family History   Problem Relation Age of Onset   • No Known Problems Mother    • Other Father         cardiac arrhythmia       Social History     Socioeconomic History   • Marital status:    Tobacco Use   • Smoking status: Former     Packs/day: 1.00     Types: Cigarettes     Quit date: 1990     Years  since quittin.7   • Smokeless tobacco: Never   Vaping Use   • Vaping Use: Never used   Substance and Sexual Activity   • Alcohol use: No   • Drug use: No   • Sexual activity: Defer         Current Outpatient Medications:   •  apixaban (ELIQUIS) 5 MG tablet tablet, Take 1 tablet by mouth Every 12 (Twelve) Hours., Disp: 180 tablet, Rfl: 3  •  Diclofenac Sodium (VOLTAREN) 1 % gel gel, APPLY TOPICALLY TO THE AFFECTED AREA FOUR TIMES DAILY, Disp: , Rfl:   •  flecainide (TAMBOCOR) 50 MG tablet, Take 1 tablet by mouth 2 (Two) Times a Day., Disp: 180 tablet, Rfl: 3  •  isosorbide mononitrate (Imdur) 30 MG 24 hr tablet, Take 1 tablet by mouth Daily., Disp: 90 tablet, Rfl: 3  •  magnesium oxide (MAG-OX) 400 MG tablet, Take 1 tablet by mouth Daily., Disp: 90 tablet, Rfl: 3  •  metoprolol tartrate (LOPRESSOR) 25 MG tablet, Take 1 tablet by mouth Every 12 (Twelve) Hours., Disp: 180 tablet, Rfl: 3  •  nitroglycerin (NITROSTAT) 0.4 MG SL tablet, 1 under the tongue as needed for angina, may repeat q5mins for up three doses, Disp: 50 tablet, Rfl: 5  •  potassium chloride 10 MEQ CR tablet, Take 1 tablet by mouth Daily. NEED APPOINTMENT WITH DR. CAMPBELL FOR FURTHER REFILLS, Disp: 90 tablet, Rfl: 3  •  tadalafil (CIALIS) 5 MG tablet, Take 5 mg by mouth As Needed for Erectile Dysfunction., Disp: , Rfl: 1  •  telmisartan-hydrochlorothiazide (MICARDIS HCT) 80-25 MG per tablet, Take 1 tablet by mouth Daily. Schedule appointment for further refills, Disp: 90 tablet, Rfl: 3  •  traMADol (ULTRAM) 50 MG tablet, Take 1 tablet by mouth Every 6 (Six) Hours As Needed for Moderate Pain ., Disp: 12 tablet, Rfl: 0    Allergies:   No Known Allergies    Review of Systems:  Review of Systems   Constitutional: Negative for malaise/fatigue.   Cardiovascular: Negative for chest pain, dyspnea on exertion, irregular heartbeat, leg swelling, near-syncope, palpitations and syncope.   Hematologic/Lymphatic: Negative for bleeding problem.        Vital Signs:  "Blood pressure 124/76, pulse 60, height 176.5 cm (69.5\"), weight 88.5 kg (195 lb), SpO2 97 %.    Physical Exam  Vitals and nursing note reviewed.   Cardiovascular:      Rate and Rhythm: Normal rate and regular rhythm.      Heart sounds: Normal heart sounds.   Musculoskeletal:      Right lower leg: No edema.      Left lower leg: No edema.   Skin:     General: Skin is warm and dry.   Neurological:      Mental Status: He is alert and oriented to person, place, and time.   Psychiatric:         Behavior: Behavior is cooperative.         Lab Results   Component Value Date    GLUCOSE 108 (H) 10/16/2018    CALCIUM 9.1 10/16/2018     10/16/2018    K 4.0 10/16/2018    CO2 32.0 (H) 10/16/2018     10/16/2018    BUN 17 10/16/2018    CREATININE 1.07 10/16/2018    EGFRIFNONA 67 10/16/2018    BCR 15.9 10/16/2018    ANIONGAP 5.0 10/16/2018     Lab Results   Component Value Date    WBC 5.53 10/16/2018    HGB 14.1 10/16/2018    HCT 43.5 10/16/2018    MCV 93.3 10/16/2018     10/16/2018     Lab Results   Component Value Date    INR 1.02 06/30/2015    INR 1.00 04/17/2015    PROTIME 10.7 06/30/2015    PROTIME 10.5 04/17/2015     Lab Results   Component Value Date    TSH 0.214 (L) 04/17/2015        Results for orders placed during the hospital encounter of 07/28/20    Adult Transthoracic Echo Complete W/ Cont if Necessary Per Protocol    Interpretation Summary  · Mild aortic valve regurgitation is present.  · Mild to moderate tricuspid valve regurgitation is present.  · Calculated right ventricular systolic pressure from tricuspid regurgitation is 33 mmHg.  · Mild pulmonic valve regurgitation is present.  · Estimated EF = 56%.  · Left ventricular systolic function is normal.  · Left ventricular diastolic dysfunction (grade I) consistent with impaired relaxation.  · Left atrial cavity size is borderline dilated.  · Mild mitral valve regurgitation is present.  · Normal right ventricular cavity size, wall thickness, " systolic function and septal motion noted.  · The aortic valve exhibits mild sclerosis.  · No evidence of pulmonary hypertension is present.  · There is no evidence of pericardial effusion.      I personally viewed and interpreted the patient's EKG/Telemetry/lab data.      ECG 12 Lead    Date/Time: 10/11/2022 3:28 PM  Performed by: Nino Castillo MD  Authorized by: Nino Castillo MD   Comparison: compared with previous ECG from 4/5/2022  Similar to previous ECG  Comments: Atrial paced rhythm with intact AV conduction, incomplete left bundle branch block            Assessment & Plan    1. Paroxysmal atrial fibrillation (HCC)  - [id number]= 3, continue Eliquis for stroke prevention  - No A. fib detected on device check  - Continue flecainide 50 mg twice daily.  EKG today looks acceptable.      2. Long term current use of antiarrhythmic medical therapy  - Continues to take flecainide without any issues.  EKG is stable today.  No A. fib noted on device check.    3. Tachy-hannah syndrome (HCC)  - Normal device check today  - His Biotronik dual-chamber pacemaker was interrogated and is functioning well.  He is approximately 8 years of battery life remaining.  He is pacing 82% of the time in the atrium 18% of time in the ventricle.  He does have a slightly higher ventricular pacing percentage due to Mobitz 1 cycles at higher atrial pacing rates.  Overall his burden remains somewhat low with a 18% ventricular pacing burden this is acceptable.  No changes were made to his device today.    4.  Angina  -He does have symptoms that are consistent with angina.  He had previous left heart cath without significant disease.  He is taking as needed nitro.  We discussed that as long as his symptoms are well controlled we do not need further investigation of this.    Follow Up:  Return in about 6 months (around 4/11/2023).    Nino Castillo MD  04/05/2022

## 2022-12-02 PROCEDURE — 93294 REM INTERROG EVL PM/LDLS PM: CPT | Performed by: STUDENT IN AN ORGANIZED HEALTH CARE EDUCATION/TRAINING PROGRAM

## 2022-12-02 PROCEDURE — 93296 REM INTERROG EVL PM/IDS: CPT | Performed by: STUDENT IN AN ORGANIZED HEALTH CARE EDUCATION/TRAINING PROGRAM

## 2023-01-01 ENCOUNTER — APPOINTMENT (OUTPATIENT)
Dept: GENERAL RADIOLOGY | Facility: HOSPITAL | Age: 83
DRG: 870 | End: 2023-01-01
Payer: MEDICARE

## 2023-01-01 ENCOUNTER — APPOINTMENT (OUTPATIENT)
Dept: NEUROLOGY | Facility: HOSPITAL | Age: 83
DRG: 870 | End: 2023-01-01
Payer: MEDICARE

## 2023-01-01 ENCOUNTER — APPOINTMENT (OUTPATIENT)
Dept: MRI IMAGING | Facility: HOSPITAL | Age: 83
DRG: 870 | End: 2023-01-01
Payer: MEDICARE

## 2023-01-01 ENCOUNTER — ANESTHESIA EVENT (OUTPATIENT)
Dept: ICU | Facility: HOSPITAL | Age: 83
End: 2023-01-01

## 2023-01-01 ENCOUNTER — APPOINTMENT (OUTPATIENT)
Dept: CT IMAGING | Facility: HOSPITAL | Age: 83
DRG: 870 | End: 2023-01-01
Payer: MEDICARE

## 2023-01-01 ENCOUNTER — APPOINTMENT (OUTPATIENT)
Dept: CARDIOLOGY | Facility: HOSPITAL | Age: 83
DRG: 870 | End: 2023-01-01
Payer: MEDICARE

## 2023-01-01 ENCOUNTER — HOSPITAL ENCOUNTER (INPATIENT)
Facility: HOSPITAL | Age: 83
LOS: 16 days | DRG: 870 | End: 2023-04-15
Attending: EMERGENCY MEDICINE | Admitting: INTERNAL MEDICINE
Payer: MEDICARE

## 2023-01-01 ENCOUNTER — ANESTHESIA (OUTPATIENT)
Dept: ICU | Facility: HOSPITAL | Age: 83
End: 2023-01-01

## 2023-01-01 VITALS
BODY MASS INDEX: 29.16 KG/M2 | HEART RATE: 88 BPM | WEIGHT: 196.87 LBS | HEIGHT: 69 IN | OXYGEN SATURATION: 89 % | DIASTOLIC BLOOD PRESSURE: 52 MMHG | RESPIRATION RATE: 28 BRPM | TEMPERATURE: 99 F | SYSTOLIC BLOOD PRESSURE: 139 MMHG

## 2023-01-01 DIAGNOSIS — J96.01 ACUTE RESPIRATORY FAILURE WITH HYPOXIA: ICD-10-CM

## 2023-01-01 DIAGNOSIS — D69.6 THROMBOCYTOPENIA: ICD-10-CM

## 2023-01-01 DIAGNOSIS — G93.40 ENCEPHALOPATHY: Primary | ICD-10-CM

## 2023-01-01 DIAGNOSIS — G03.9 MENINGITIS: ICD-10-CM

## 2023-01-01 LAB
ALBUMIN SERPL-MCNC: 2.2 G/DL (ref 3.5–5.2)
ALBUMIN SERPL-MCNC: 2.2 G/DL (ref 3.5–5.2)
ALBUMIN SERPL-MCNC: 2.4 G/DL (ref 3.5–5.2)
ALBUMIN SERPL-MCNC: 2.4 G/DL (ref 3.5–5.2)
ALBUMIN SERPL-MCNC: 2.6 G/DL (ref 3.5–5.2)
ALBUMIN SERPL-MCNC: 2.7 G/DL (ref 3.5–5.2)
ALBUMIN SERPL-MCNC: 2.7 G/DL (ref 3.5–5.2)
ALBUMIN SERPL-MCNC: 2.8 G/DL (ref 3.5–5.2)
ALBUMIN SERPL-MCNC: 3 G/DL (ref 3.5–5.2)
ALBUMIN SERPL-MCNC: 3.2 G/DL (ref 3.5–5.2)
ALBUMIN SERPL-MCNC: 4.2 G/DL (ref 3.5–5.2)
ALBUMIN/GLOB SERPL: 0.8 G/DL
ALBUMIN/GLOB SERPL: 0.9 G/DL
ALBUMIN/GLOB SERPL: 1 G/DL
ALBUMIN/GLOB SERPL: 1.1 G/DL
ALBUMIN/GLOB SERPL: 1.3 G/DL
ALBUMIN/GLOB SERPL: 1.3 G/DL
ALBUMIN/GLOB SERPL: 1.4 G/DL
ALBUMIN/GLOB SERPL: 1.8 G/DL
ALP SERPL-CCNC: 101 U/L (ref 39–117)
ALP SERPL-CCNC: 108 U/L (ref 39–117)
ALP SERPL-CCNC: 113 U/L (ref 39–117)
ALP SERPL-CCNC: 131 U/L (ref 39–117)
ALP SERPL-CCNC: 146 U/L (ref 39–117)
ALP SERPL-CCNC: 65 U/L (ref 39–117)
ALP SERPL-CCNC: 69 U/L (ref 39–117)
ALP SERPL-CCNC: 75 U/L (ref 39–117)
ALP SERPL-CCNC: 80 U/L (ref 39–117)
ALP SERPL-CCNC: 81 U/L (ref 39–117)
ALT SERPL W P-5'-P-CCNC: 13 U/L (ref 1–41)
ALT SERPL W P-5'-P-CCNC: 15 U/L (ref 1–41)
ALT SERPL W P-5'-P-CCNC: 19 U/L (ref 1–41)
ALT SERPL W P-5'-P-CCNC: 26 U/L (ref 1–41)
ALT SERPL W P-5'-P-CCNC: 30 U/L (ref 1–41)
ALT SERPL W P-5'-P-CCNC: 36 U/L (ref 1–41)
ALT SERPL W P-5'-P-CCNC: 39 U/L (ref 1–41)
ALT SERPL W P-5'-P-CCNC: 50 U/L (ref 1–41)
ALT SERPL W P-5'-P-CCNC: 51 U/L (ref 1–41)
ALT SERPL W P-5'-P-CCNC: 60 U/L (ref 1–41)
AMMONIA BLD-SCNC: 34 UMOL/L (ref 16–60)
AMPHET+METHAMPHET UR QL: NEGATIVE
AMPHETAMINES UR QL: NEGATIVE
ANION GAP SERPL CALCULATED.3IONS-SCNC: 10 MMOL/L (ref 5–15)
ANION GAP SERPL CALCULATED.3IONS-SCNC: 13 MMOL/L (ref 5–15)
ANION GAP SERPL CALCULATED.3IONS-SCNC: 13 MMOL/L (ref 5–15)
ANION GAP SERPL CALCULATED.3IONS-SCNC: 16 MMOL/L (ref 5–15)
ANION GAP SERPL CALCULATED.3IONS-SCNC: 5 MMOL/L (ref 5–15)
ANION GAP SERPL CALCULATED.3IONS-SCNC: 5 MMOL/L (ref 5–15)
ANION GAP SERPL CALCULATED.3IONS-SCNC: 7 MMOL/L (ref 5–15)
ANION GAP SERPL CALCULATED.3IONS-SCNC: 7 MMOL/L (ref 5–15)
ANION GAP SERPL CALCULATED.3IONS-SCNC: 8 MMOL/L (ref 5–15)
ANION GAP SERPL CALCULATED.3IONS-SCNC: 9 MMOL/L (ref 5–15)
ANION GAP SERPL CALCULATED.3IONS-SCNC: 9 MMOL/L (ref 5–15)
APAP SERPL-MCNC: 6.5 MCG/ML (ref 0–30)
APTT PPP: 27 SECONDS (ref 22–39)
APTT PPP: 27.7 SECONDS (ref 60–90)
ARTERIAL PATENCY WRIST A: ABNORMAL
AST SERPL-CCNC: 18 U/L (ref 1–40)
AST SERPL-CCNC: 20 U/L (ref 1–40)
AST SERPL-CCNC: 21 U/L (ref 1–40)
AST SERPL-CCNC: 21 U/L (ref 1–40)
AST SERPL-CCNC: 26 U/L (ref 1–40)
AST SERPL-CCNC: 27 U/L (ref 1–40)
AST SERPL-CCNC: 28 U/L (ref 1–40)
AST SERPL-CCNC: 34 U/L (ref 1–40)
AST SERPL-CCNC: 43 U/L (ref 1–40)
AST SERPL-CCNC: 44 U/L (ref 1–40)
ATMOSPHERIC PRESS: ABNORMAL MM[HG]
BACTERIA BLD CULT: ABNORMAL
BACTERIA SPEC AEROBE CULT: ABNORMAL
BACTERIA SPEC AEROBE CULT: NORMAL
BACTERIA SPEC ANAEROBE CULT: NORMAL
BACTERIA SPEC RESP CULT: ABNORMAL
BARBITURATES UR QL SCN: NEGATIVE
BASE EXCESS BLDA CALC-SCNC: -0.7 MMOL/L (ref 0–2)
BASE EXCESS BLDA CALC-SCNC: -1 MMOL/L (ref 0–2)
BASE EXCESS BLDA CALC-SCNC: -1.3 MMOL/L (ref 0–2)
BASE EXCESS BLDA CALC-SCNC: -1.5 MMOL/L (ref 0–2)
BASE EXCESS BLDA CALC-SCNC: 0.3 MMOL/L (ref 0–2)
BASE EXCESS BLDA CALC-SCNC: 1.6 MMOL/L (ref 0–2)
BASE EXCESS BLDA CALC-SCNC: 1.7 MMOL/L (ref 0–2)
BASE EXCESS BLDA CALC-SCNC: 3.5 MMOL/L (ref 0–2)
BASE EXCESS BLDA CALC-SCNC: 3.5 MMOL/L (ref 0–2)
BASE EXCESS BLDV CALC-SCNC: 3.8 MMOL/L (ref -2–2)
BASOPHILS # BLD AUTO: 0.01 10*3/MM3 (ref 0–0.2)
BASOPHILS # BLD AUTO: 0.02 10*3/MM3 (ref 0–0.2)
BASOPHILS # BLD AUTO: 0.03 10*3/MM3 (ref 0–0.2)
BASOPHILS # BLD AUTO: 0.03 10*3/MM3 (ref 0–0.2)
BASOPHILS NFR BLD AUTO: 0.1 % (ref 0–1.5)
BASOPHILS NFR BLD AUTO: 0.2 % (ref 0–1.5)
BASOPHILS NFR BLD AUTO: 0.3 % (ref 0–1.5)
BASOPHILS NFR BLD AUTO: 0.3 % (ref 0–1.5)
BDY SITE: ABNORMAL
BENZODIAZ UR QL SCN: NEGATIVE
BH CV ECHO MEAS - AO MAX PG: 3.7 MMHG
BH CV ECHO MEAS - AO MEAN PG: 2 MMHG
BH CV ECHO MEAS - AO ROOT DIAM: 3.7 CM
BH CV ECHO MEAS - AO V2 MAX: 96.3 CM/SEC
BH CV ECHO MEAS - AO V2 VTI: 19.6 CM
BH CV ECHO MEAS - AVA(I,D): 2.7 CM2
BH CV ECHO MEAS - EDV(CUBED): 140.6 ML
BH CV ECHO MEAS - EDV(MOD-SP2): 75.2 ML
BH CV ECHO MEAS - EDV(MOD-SP4): 91.1 ML
BH CV ECHO MEAS - EF(MOD-BP): 60.3 %
BH CV ECHO MEAS - EF(MOD-SP2): 64.6 %
BH CV ECHO MEAS - EF(MOD-SP4): 59.7 %
BH CV ECHO MEAS - ESV(CUBED): 32.8 ML
BH CV ECHO MEAS - ESV(MOD-SP2): 26.6 ML
BH CV ECHO MEAS - ESV(MOD-SP4): 36.7 ML
BH CV ECHO MEAS - FS: 38.5 %
BH CV ECHO MEAS - IVS/LVPW: 0.9 CM
BH CV ECHO MEAS - IVSD: 0.9 CM
BH CV ECHO MEAS - LA DIMENSION: 3.6 CM
BH CV ECHO MEAS - LAT PEAK E' VEL: 12.7 CM/SEC
BH CV ECHO MEAS - LV MASS(C)D: 181.4 GRAMS
BH CV ECHO MEAS - LV MAX PG: 2.6 MMHG
BH CV ECHO MEAS - LV MEAN PG: 1 MMHG
BH CV ECHO MEAS - LV V1 MAX: 80.2 CM/SEC
BH CV ECHO MEAS - LV V1 VTI: 17 CM
BH CV ECHO MEAS - LVIDD: 5.2 CM
BH CV ECHO MEAS - LVIDS: 3.2 CM
BH CV ECHO MEAS - LVOT AREA: 3.1 CM2
BH CV ECHO MEAS - LVOT DIAM: 2 CM
BH CV ECHO MEAS - LVPWD: 1 CM
BH CV ECHO MEAS - MED PEAK E' VEL: 10.7 CM/SEC
BH CV ECHO MEAS - MV DEC SLOPE: 857 CM/SEC2
BH CV ECHO MEAS - MV DEC TIME: 0.17 MSEC
BH CV ECHO MEAS - MV E MAX VEL: 95.9 CM/SEC
BH CV ECHO MEAS - MV MAX PG: 5.3 MMHG
BH CV ECHO MEAS - MV MEAN PG: 2 MMHG
BH CV ECHO MEAS - MV P1/2T: 43.4 MSEC
BH CV ECHO MEAS - MV V2 VTI: 21.4 CM
BH CV ECHO MEAS - MVA(P1/2T): 5.1 CM2
BH CV ECHO MEAS - MVA(VTI): 2.5 CM2
BH CV ECHO MEAS - RAP SYSTOLE: 15 MMHG
BH CV ECHO MEAS - RVSP: 33 MMHG
BH CV ECHO MEAS - SV(LVOT): 53.4 ML
BH CV ECHO MEAS - SV(MOD-SP2): 48.6 ML
BH CV ECHO MEAS - SV(MOD-SP4): 54.4 ML
BH CV ECHO MEAS - TAPSE (>1.6): 1.42 CM
BH CV ECHO MEAS - TR MAX PG: 17.5 MMHG
BH CV ECHO MEAS - TR MAX VEL: 209 CM/SEC
BH CV ECHO MEASUREMENTS AVERAGE E/E' RATIO: 8.2
BH CV XLRA - RV BASE: 3.4 CM
BH CV XLRA - RV LENGTH: 7.1 CM
BH CV XLRA - RV MID: 3.6 CM
BH CV XLRA - TDI S': 10 CM/SEC
BILIRUB SERPL-MCNC: 0.5 MG/DL (ref 0–1.2)
BILIRUB SERPL-MCNC: 0.5 MG/DL (ref 0–1.2)
BILIRUB SERPL-MCNC: 0.6 MG/DL (ref 0–1.2)
BILIRUB SERPL-MCNC: 0.6 MG/DL (ref 0–1.2)
BILIRUB SERPL-MCNC: 0.7 MG/DL (ref 0–1.2)
BILIRUB SERPL-MCNC: 0.9 MG/DL (ref 0–1.2)
BILIRUB SERPL-MCNC: 1.3 MG/DL (ref 0–1.2)
BILIRUB SERPL-MCNC: 1.5 MG/DL (ref 0–1.2)
BILIRUB SERPL-MCNC: 2.7 MG/DL (ref 0–1.2)
BILIRUB SERPL-MCNC: 3.7 MG/DL (ref 0–1.2)
BILIRUB UR QL STRIP: NEGATIVE
BODY TEMPERATURE: 37 C
BUN BLDA-MCNC: 26 MG/DL (ref 8–26)
BUN SERPL-MCNC: 19 MG/DL (ref 8–23)
BUN SERPL-MCNC: 21 MG/DL (ref 8–23)
BUN SERPL-MCNC: 22 MG/DL (ref 8–23)
BUN SERPL-MCNC: 22 MG/DL (ref 8–23)
BUN SERPL-MCNC: 24 MG/DL (ref 8–23)
BUN SERPL-MCNC: 24 MG/DL (ref 8–23)
BUN SERPL-MCNC: 25 MG/DL (ref 8–23)
BUN SERPL-MCNC: 25 MG/DL (ref 8–23)
BUN SERPL-MCNC: 26 MG/DL (ref 8–23)
BUN SERPL-MCNC: 27 MG/DL (ref 8–23)
BUN SERPL-MCNC: 28 MG/DL (ref 8–23)
BUN SERPL-MCNC: 29 MG/DL (ref 8–23)
BUN SERPL-MCNC: 31 MG/DL (ref 8–23)
BUN SERPL-MCNC: 33 MG/DL (ref 8–23)
BUN/CREAT SERPL: 21.2 (ref 7–25)
BUN/CREAT SERPL: 22.9 (ref 7–25)
BUN/CREAT SERPL: 33.8 (ref 7–25)
BUN/CREAT SERPL: 37.9 (ref 7–25)
BUN/CREAT SERPL: 40 (ref 7–25)
BUN/CREAT SERPL: 43.9 (ref 7–25)
BUN/CREAT SERPL: 44.9 (ref 7–25)
BUN/CREAT SERPL: 45 (ref 7–25)
BUN/CREAT SERPL: 46.2 (ref 7–25)
BUN/CREAT SERPL: 47 (ref 7–25)
BUN/CREAT SERPL: 47.7 (ref 7–25)
BUN/CREAT SERPL: 50 (ref 7–25)
BUN/CREAT SERPL: 51 (ref 7–25)
BUN/CREAT SERPL: 53.2 (ref 7–25)
BUN/CREAT SERPL: 54.4 (ref 7–25)
BUN/CREAT SERPL: 54.7 (ref 7–25)
BUN/CREAT SERPL: 68.3 (ref 7–25)
BUPRENORPHINE SERPL-MCNC: NEGATIVE NG/ML
CA-I BLDA-SCNC: 1.26 MMOL/L (ref 1.2–1.32)
CALCIUM SPEC-SCNC: 7.8 MG/DL (ref 8.6–10.5)
CALCIUM SPEC-SCNC: 7.9 MG/DL (ref 8.6–10.5)
CALCIUM SPEC-SCNC: 7.9 MG/DL (ref 8.6–10.5)
CALCIUM SPEC-SCNC: 8 MG/DL (ref 8.6–10.5)
CALCIUM SPEC-SCNC: 8.1 MG/DL (ref 8.6–10.5)
CALCIUM SPEC-SCNC: 8.2 MG/DL (ref 8.6–10.5)
CALCIUM SPEC-SCNC: 8.3 MG/DL (ref 8.6–10.5)
CALCIUM SPEC-SCNC: 8.4 MG/DL (ref 8.6–10.5)
CALCIUM SPEC-SCNC: 8.5 MG/DL (ref 8.6–10.5)
CALCIUM SPEC-SCNC: 8.6 MG/DL (ref 8.6–10.5)
CALCIUM SPEC-SCNC: 9.4 MG/DL (ref 8.6–10.5)
CANNABINOIDS SERPL QL: NEGATIVE
CHLORIDE BLDA-SCNC: 99 MMOL/L (ref 98–109)
CHLORIDE SERPL-SCNC: 100 MMOL/L (ref 98–107)
CHLORIDE SERPL-SCNC: 101 MMOL/L (ref 98–107)
CHLORIDE SERPL-SCNC: 102 MMOL/L (ref 98–107)
CHLORIDE SERPL-SCNC: 102 MMOL/L (ref 98–107)
CHLORIDE SERPL-SCNC: 104 MMOL/L (ref 98–107)
CHLORIDE SERPL-SCNC: 104 MMOL/L (ref 98–107)
CHLORIDE SERPL-SCNC: 105 MMOL/L (ref 98–107)
CHLORIDE SERPL-SCNC: 106 MMOL/L (ref 98–107)
CHLORIDE SERPL-SCNC: 106 MMOL/L (ref 98–107)
CHLORIDE SERPL-SCNC: 107 MMOL/L (ref 98–107)
CHLORIDE SERPL-SCNC: 108 MMOL/L (ref 98–107)
CHLORIDE SERPL-SCNC: 110 MMOL/L (ref 98–107)
CHLORIDE SERPL-SCNC: 110 MMOL/L (ref 98–107)
CHLORIDE SERPL-SCNC: 111 MMOL/L (ref 98–107)
CHLORIDE SERPL-SCNC: 112 MMOL/L (ref 98–107)
CHLORIDE SERPL-SCNC: 112 MMOL/L (ref 98–107)
CHLORIDE SERPL-SCNC: 113 MMOL/L (ref 98–107)
CHOLEST SERPL-MCNC: 100 MG/DL (ref 0–200)
CHOLEST SERPL-MCNC: 149 MG/DL (ref 0–200)
CK SERPL-CCNC: 57 U/L (ref 20–200)
CLARITY UR: CLEAR
CO2 BLDA-SCNC: 22.4 MMOL/L (ref 22–33)
CO2 BLDA-SCNC: 23.6 MMOL/L (ref 22–33)
CO2 BLDA-SCNC: 24.2 MMOL/L (ref 22–33)
CO2 BLDA-SCNC: 24.3 MMOL/L (ref 22–33)
CO2 BLDA-SCNC: 25.2 MMOL/L (ref 22–33)
CO2 BLDA-SCNC: 25.6 MMOL/L (ref 22–33)
CO2 BLDA-SCNC: 26 MMOL/L (ref 24–29)
CO2 BLDA-SCNC: 26.1 MMOL/L (ref 22–33)
CO2 BLDA-SCNC: 27.6 MMOL/L (ref 22–33)
CO2 BLDA-SCNC: 27.8 MMOL/L (ref 22–33)
CO2 BLDA-SCNC: 29.2 MMOL/L (ref 22–33)
CO2 SERPL-SCNC: 20 MMOL/L (ref 22–29)
CO2 SERPL-SCNC: 21 MMOL/L (ref 22–29)
CO2 SERPL-SCNC: 21 MMOL/L (ref 22–29)
CO2 SERPL-SCNC: 22 MMOL/L (ref 22–29)
CO2 SERPL-SCNC: 23 MMOL/L (ref 22–29)
CO2 SERPL-SCNC: 24 MMOL/L (ref 22–29)
CO2 SERPL-SCNC: 25 MMOL/L (ref 22–29)
CO2 SERPL-SCNC: 25 MMOL/L (ref 22–29)
COCAINE UR QL: NEGATIVE
COHGB MFR BLD: 1 % (ref 0–2)
COHGB MFR BLD: 1.1 % (ref 0–2)
COHGB MFR BLD: 1.1 % (ref 0–2)
COHGB MFR BLD: 1.2 %
COHGB MFR BLD: 1.2 % (ref 0–2)
COLOR UR: YELLOW
CORTIS SERPL-MCNC: 34.05 MCG/DL
CREAT BLDA-MCNC: 0.9 MG/DL (ref 0.6–1.3)
CREAT SERPL-MCNC: 0.41 MG/DL (ref 0.76–1.27)
CREAT SERPL-MCNC: 0.49 MG/DL (ref 0.76–1.27)
CREAT SERPL-MCNC: 0.52 MG/DL (ref 0.76–1.27)
CREAT SERPL-MCNC: 0.52 MG/DL (ref 0.76–1.27)
CREAT SERPL-MCNC: 0.53 MG/DL (ref 0.76–1.27)
CREAT SERPL-MCNC: 0.55 MG/DL (ref 0.76–1.27)
CREAT SERPL-MCNC: 0.57 MG/DL (ref 0.76–1.27)
CREAT SERPL-MCNC: 0.57 MG/DL (ref 0.76–1.27)
CREAT SERPL-MCNC: 0.58 MG/DL (ref 0.76–1.27)
CREAT SERPL-MCNC: 0.6 MG/DL (ref 0.76–1.27)
CREAT SERPL-MCNC: 0.62 MG/DL (ref 0.76–1.27)
CREAT SERPL-MCNC: 0.65 MG/DL (ref 0.76–1.27)
CREAT SERPL-MCNC: 0.66 MG/DL (ref 0.76–1.27)
CREAT SERPL-MCNC: 0.69 MG/DL (ref 0.76–1.27)
CREAT SERPL-MCNC: 0.71 MG/DL (ref 0.76–1.27)
CREAT SERPL-MCNC: 0.83 MG/DL (ref 0.76–1.27)
CREAT SERPL-MCNC: 0.99 MG/DL (ref 0.76–1.27)
CRP SERPL-MCNC: 16.22 MG/DL (ref 0–0.5)
CRP SERPL-MCNC: 18.05 MG/DL (ref 0–0.5)
D-LACTATE SERPL-SCNC: 1 MMOL/L (ref 0.5–2)
D-LACTATE SERPL-SCNC: 1 MMOL/L (ref 0.5–2)
D-LACTATE SERPL-SCNC: 1.6 MMOL/L (ref 0.5–2)
D-LACTATE SERPL-SCNC: 1.9 MMOL/L (ref 0.5–2)
D-LACTATE SERPL-SCNC: 2 MMOL/L (ref 0.5–2)
D-LACTATE SERPL-SCNC: 2.6 MMOL/L (ref 0.5–2)
D-LACTATE SERPL-SCNC: 3.1 MMOL/L (ref 0.5–2)
D-LACTATE SERPL-SCNC: 3.3 MMOL/L (ref 0.5–2)
D-LACTATE SERPL-SCNC: 4.1 MMOL/L (ref 0.5–2)
D-LACTATE SERPL-SCNC: 4.2 MMOL/L (ref 0.5–2)
D-LACTATE SERPL-SCNC: 6.6 MMOL/L (ref 0.5–2)
DEPRECATED RDW RBC AUTO: 42.3 FL (ref 37–54)
DEPRECATED RDW RBC AUTO: 42.8 FL (ref 37–54)
DEPRECATED RDW RBC AUTO: 45.5 FL (ref 37–54)
DEPRECATED RDW RBC AUTO: 45.8 FL (ref 37–54)
DEPRECATED RDW RBC AUTO: 45.9 FL (ref 37–54)
DEPRECATED RDW RBC AUTO: 46 FL (ref 37–54)
DEPRECATED RDW RBC AUTO: 46 FL (ref 37–54)
DEPRECATED RDW RBC AUTO: 46.1 FL (ref 37–54)
DEPRECATED RDW RBC AUTO: 46.1 FL (ref 37–54)
DEPRECATED RDW RBC AUTO: 46.5 FL (ref 37–54)
DEPRECATED RDW RBC AUTO: 46.8 FL (ref 37–54)
DEPRECATED RDW RBC AUTO: 46.9 FL (ref 37–54)
DEPRECATED RDW RBC AUTO: 47.6 FL (ref 37–54)
DEPRECATED RDW RBC AUTO: 47.7 FL (ref 37–54)
DEPRECATED RDW RBC AUTO: 48 FL (ref 37–54)
EGFRCR SERPLBLD CKD-EPI 2021: 100.1 ML/MIN/1.73
EGFRCR SERPLBLD CKD-EPI 2021: 100.6 ML/MIN/1.73
EGFRCR SERPLBLD CKD-EPI 2021: 100.6 ML/MIN/1.73
EGFRCR SERPLBLD CKD-EPI 2021: 102.5 ML/MIN/1.73
EGFRCR SERPLBLD CKD-EPI 2021: 108.1 ML/MIN/1.73
EGFRCR SERPLBLD CKD-EPI 2021: 76.1 ML/MIN/1.73
EGFRCR SERPLBLD CKD-EPI 2021: 85.3 ML/MIN/1.73
EGFRCR SERPLBLD CKD-EPI 2021: 87.4 ML/MIN/1.73
EGFRCR SERPLBLD CKD-EPI 2021: 91.6 ML/MIN/1.73
EGFRCR SERPLBLD CKD-EPI 2021: 92.4 ML/MIN/1.73
EGFRCR SERPLBLD CKD-EPI 2021: 93.6 ML/MIN/1.73
EGFRCR SERPLBLD CKD-EPI 2021: 94.1 ML/MIN/1.73
EGFRCR SERPLBLD CKD-EPI 2021: 95.4 ML/MIN/1.73
EGFRCR SERPLBLD CKD-EPI 2021: 96.4 ML/MIN/1.73
EGFRCR SERPLBLD CKD-EPI 2021: 97.4 ML/MIN/1.73
EGFRCR SERPLBLD CKD-EPI 2021: 97.9 ML/MIN/1.73
EGFRCR SERPLBLD CKD-EPI 2021: 97.9 ML/MIN/1.73
EGFRCR SERPLBLD CKD-EPI 2021: 98.9 ML/MIN/1.73
EOSINOPHIL # BLD AUTO: 0 10*3/MM3 (ref 0–0.4)
EOSINOPHIL # BLD AUTO: 0.01 10*3/MM3 (ref 0–0.4)
EOSINOPHIL # BLD AUTO: 0.01 10*3/MM3 (ref 0–0.4)
EOSINOPHIL # BLD AUTO: 0.09 10*3/MM3 (ref 0–0.4)
EOSINOPHIL # BLD AUTO: 0.1 10*3/MM3 (ref 0–0.4)
EOSINOPHIL # BLD AUTO: 0.11 10*3/MM3 (ref 0–0.4)
EOSINOPHIL # BLD AUTO: 0.14 10*3/MM3 (ref 0–0.4)
EOSINOPHIL # BLD AUTO: 0.28 10*3/MM3 (ref 0–0.4)
EOSINOPHIL NFR BLD AUTO: 0 % (ref 0.3–6.2)
EOSINOPHIL NFR BLD AUTO: 0.1 % (ref 0.3–6.2)
EOSINOPHIL NFR BLD AUTO: 0.1 % (ref 0.3–6.2)
EOSINOPHIL NFR BLD AUTO: 1.1 % (ref 0.3–6.2)
EOSINOPHIL NFR BLD AUTO: 1.1 % (ref 0.3–6.2)
EOSINOPHIL NFR BLD AUTO: 1.4 % (ref 0.3–6.2)
EOSINOPHIL NFR BLD AUTO: 1.7 % (ref 0.3–6.2)
EOSINOPHIL NFR BLD AUTO: 2 % (ref 0.3–6.2)
EPAP: 0
ERYTHROCYTE [DISTWIDTH] IN BLOOD BY AUTOMATED COUNT: 12.2 % (ref 12.3–15.4)
ERYTHROCYTE [DISTWIDTH] IN BLOOD BY AUTOMATED COUNT: 12.7 % (ref 12.3–15.4)
ERYTHROCYTE [DISTWIDTH] IN BLOOD BY AUTOMATED COUNT: 12.8 % (ref 12.3–15.4)
ERYTHROCYTE [DISTWIDTH] IN BLOOD BY AUTOMATED COUNT: 12.9 % (ref 12.3–15.4)
ERYTHROCYTE [DISTWIDTH] IN BLOOD BY AUTOMATED COUNT: 13 % (ref 12.3–15.4)
ERYTHROCYTE [DISTWIDTH] IN BLOOD BY AUTOMATED COUNT: 13 % (ref 12.3–15.4)
ERYTHROCYTE [DISTWIDTH] IN BLOOD BY AUTOMATED COUNT: 13.1 % (ref 12.3–15.4)
ERYTHROCYTE [DISTWIDTH] IN BLOOD BY AUTOMATED COUNT: 13.2 % (ref 12.3–15.4)
ETHANOL BLD-MCNC: <10 MG/DL (ref 0–10)
GLOBULIN UR ELPH-MCNC: 2 GM/DL
GLOBULIN UR ELPH-MCNC: 2.1 GM/DL
GLOBULIN UR ELPH-MCNC: 2.3 GM/DL
GLOBULIN UR ELPH-MCNC: 2.4 GM/DL
GLOBULIN UR ELPH-MCNC: 2.6 GM/DL
GLOBULIN UR ELPH-MCNC: 2.7 GM/DL
GLOBULIN UR ELPH-MCNC: 2.8 GM/DL
GLOBULIN UR ELPH-MCNC: 2.8 GM/DL
GLOBULIN UR ELPH-MCNC: 2.9 GM/DL
GLOBULIN UR ELPH-MCNC: 3 GM/DL
GLUCOSE BLDC GLUCOMTR-MCNC: 100 MG/DL (ref 70–130)
GLUCOSE BLDC GLUCOMTR-MCNC: 104 MG/DL (ref 70–130)
GLUCOSE BLDC GLUCOMTR-MCNC: 106 MG/DL (ref 70–130)
GLUCOSE BLDC GLUCOMTR-MCNC: 107 MG/DL (ref 70–130)
GLUCOSE BLDC GLUCOMTR-MCNC: 107 MG/DL (ref 70–130)
GLUCOSE BLDC GLUCOMTR-MCNC: 111 MG/DL (ref 70–130)
GLUCOSE BLDC GLUCOMTR-MCNC: 112 MG/DL (ref 70–130)
GLUCOSE BLDC GLUCOMTR-MCNC: 114 MG/DL (ref 70–130)
GLUCOSE BLDC GLUCOMTR-MCNC: 116 MG/DL (ref 70–130)
GLUCOSE BLDC GLUCOMTR-MCNC: 119 MG/DL (ref 70–130)
GLUCOSE BLDC GLUCOMTR-MCNC: 120 MG/DL (ref 70–130)
GLUCOSE BLDC GLUCOMTR-MCNC: 122 MG/DL (ref 70–130)
GLUCOSE BLDC GLUCOMTR-MCNC: 122 MG/DL (ref 70–130)
GLUCOSE BLDC GLUCOMTR-MCNC: 123 MG/DL (ref 70–130)
GLUCOSE BLDC GLUCOMTR-MCNC: 124 MG/DL (ref 70–130)
GLUCOSE BLDC GLUCOMTR-MCNC: 127 MG/DL (ref 70–130)
GLUCOSE BLDC GLUCOMTR-MCNC: 128 MG/DL (ref 70–130)
GLUCOSE BLDC GLUCOMTR-MCNC: 129 MG/DL (ref 70–130)
GLUCOSE BLDC GLUCOMTR-MCNC: 129 MG/DL (ref 70–130)
GLUCOSE BLDC GLUCOMTR-MCNC: 131 MG/DL (ref 70–130)
GLUCOSE BLDC GLUCOMTR-MCNC: 131 MG/DL (ref 70–130)
GLUCOSE BLDC GLUCOMTR-MCNC: 132 MG/DL (ref 70–130)
GLUCOSE BLDC GLUCOMTR-MCNC: 132 MG/DL (ref 70–130)
GLUCOSE BLDC GLUCOMTR-MCNC: 133 MG/DL (ref 70–130)
GLUCOSE BLDC GLUCOMTR-MCNC: 133 MG/DL (ref 70–130)
GLUCOSE BLDC GLUCOMTR-MCNC: 134 MG/DL (ref 70–130)
GLUCOSE BLDC GLUCOMTR-MCNC: 134 MG/DL (ref 70–130)
GLUCOSE BLDC GLUCOMTR-MCNC: 135 MG/DL (ref 70–130)
GLUCOSE BLDC GLUCOMTR-MCNC: 135 MG/DL (ref 70–130)
GLUCOSE BLDC GLUCOMTR-MCNC: 136 MG/DL (ref 70–130)
GLUCOSE BLDC GLUCOMTR-MCNC: 139 MG/DL (ref 70–130)
GLUCOSE BLDC GLUCOMTR-MCNC: 140 MG/DL (ref 70–130)
GLUCOSE BLDC GLUCOMTR-MCNC: 142 MG/DL (ref 70–130)
GLUCOSE BLDC GLUCOMTR-MCNC: 143 MG/DL (ref 70–130)
GLUCOSE BLDC GLUCOMTR-MCNC: 143 MG/DL (ref 70–130)
GLUCOSE BLDC GLUCOMTR-MCNC: 145 MG/DL (ref 70–130)
GLUCOSE BLDC GLUCOMTR-MCNC: 152 MG/DL (ref 70–130)
GLUCOSE BLDC GLUCOMTR-MCNC: 152 MG/DL (ref 70–130)
GLUCOSE BLDC GLUCOMTR-MCNC: 154 MG/DL (ref 70–130)
GLUCOSE BLDC GLUCOMTR-MCNC: 157 MG/DL (ref 70–130)
GLUCOSE BLDC GLUCOMTR-MCNC: 159 MG/DL (ref 70–130)
GLUCOSE BLDC GLUCOMTR-MCNC: 159 MG/DL (ref 70–130)
GLUCOSE BLDC GLUCOMTR-MCNC: 161 MG/DL (ref 70–130)
GLUCOSE BLDC GLUCOMTR-MCNC: 162 MG/DL (ref 70–130)
GLUCOSE BLDC GLUCOMTR-MCNC: 164 MG/DL (ref 70–130)
GLUCOSE BLDC GLUCOMTR-MCNC: 165 MG/DL (ref 70–130)
GLUCOSE BLDC GLUCOMTR-MCNC: 167 MG/DL (ref 70–130)
GLUCOSE BLDC GLUCOMTR-MCNC: 172 MG/DL (ref 70–130)
GLUCOSE BLDC GLUCOMTR-MCNC: 182 MG/DL (ref 70–130)
GLUCOSE BLDC GLUCOMTR-MCNC: 186 MG/DL (ref 70–130)
GLUCOSE BLDC GLUCOMTR-MCNC: 186 MG/DL (ref 70–130)
GLUCOSE BLDC GLUCOMTR-MCNC: 187 MG/DL (ref 70–130)
GLUCOSE BLDC GLUCOMTR-MCNC: 189 MG/DL (ref 70–130)
GLUCOSE BLDC GLUCOMTR-MCNC: 194 MG/DL (ref 70–130)
GLUCOSE BLDC GLUCOMTR-MCNC: 70 MG/DL (ref 70–130)
GLUCOSE BLDC GLUCOMTR-MCNC: 84 MG/DL (ref 70–130)
GLUCOSE SERPL-MCNC: 102 MG/DL (ref 65–99)
GLUCOSE SERPL-MCNC: 104 MG/DL (ref 65–99)
GLUCOSE SERPL-MCNC: 108 MG/DL (ref 65–99)
GLUCOSE SERPL-MCNC: 112 MG/DL (ref 65–99)
GLUCOSE SERPL-MCNC: 130 MG/DL (ref 65–99)
GLUCOSE SERPL-MCNC: 131 MG/DL (ref 65–99)
GLUCOSE SERPL-MCNC: 132 MG/DL (ref 65–99)
GLUCOSE SERPL-MCNC: 132 MG/DL (ref 65–99)
GLUCOSE SERPL-MCNC: 134 MG/DL (ref 65–99)
GLUCOSE SERPL-MCNC: 137 MG/DL (ref 65–99)
GLUCOSE SERPL-MCNC: 140 MG/DL (ref 65–99)
GLUCOSE SERPL-MCNC: 150 MG/DL (ref 65–99)
GLUCOSE SERPL-MCNC: 158 MG/DL (ref 65–99)
GLUCOSE SERPL-MCNC: 166 MG/DL (ref 65–99)
GLUCOSE SERPL-MCNC: 168 MG/DL (ref 65–99)
GLUCOSE SERPL-MCNC: 174 MG/DL (ref 65–99)
GLUCOSE SERPL-MCNC: 179 MG/DL (ref 65–99)
GLUCOSE UR STRIP-MCNC: NEGATIVE MG/DL
GRAM STN SPEC: ABNORMAL
HBA1C MFR BLD: 5.4 % (ref 4.8–5.6)
HCO3 BLDA-SCNC: 21.5 MMOL/L (ref 20–26)
HCO3 BLDA-SCNC: 22.5 MMOL/L (ref 20–26)
HCO3 BLDA-SCNC: 23.1 MMOL/L (ref 20–26)
HCO3 BLDA-SCNC: 23.2 MMOL/L (ref 20–26)
HCO3 BLDA-SCNC: 24.2 MMOL/L (ref 20–26)
HCO3 BLDA-SCNC: 24.7 MMOL/L (ref 20–26)
HCO3 BLDA-SCNC: 25.1 MMOL/L (ref 20–26)
HCO3 BLDA-SCNC: 26.6 MMOL/L (ref 20–26)
HCO3 BLDA-SCNC: 26.7 MMOL/L (ref 20–26)
HCO3 BLDV-SCNC: 28 MMOL/L (ref 22–28)
HCT VFR BLD AUTO: 28.3 % (ref 37.5–51)
HCT VFR BLD AUTO: 28.7 % (ref 37.5–51)
HCT VFR BLD AUTO: 29.3 % (ref 37.5–51)
HCT VFR BLD AUTO: 30.6 % (ref 37.5–51)
HCT VFR BLD AUTO: 31.3 % (ref 37.5–51)
HCT VFR BLD AUTO: 32.8 % (ref 37.5–51)
HCT VFR BLD AUTO: 33.5 % (ref 37.5–51)
HCT VFR BLD AUTO: 34 % (ref 37.5–51)
HCT VFR BLD AUTO: 34.8 % (ref 37.5–51)
HCT VFR BLD AUTO: 35.2 % (ref 37.5–51)
HCT VFR BLD AUTO: 35.8 % (ref 37.5–51)
HCT VFR BLD AUTO: 36.9 % (ref 37.5–51)
HCT VFR BLD AUTO: 38.4 % (ref 37.5–51)
HCT VFR BLD AUTO: 38.5 % (ref 37.5–51)
HCT VFR BLD AUTO: 43.1 % (ref 37.5–51)
HCT VFR BLD CALC: 27.2 % (ref 38–51)
HCT VFR BLD CALC: 27.8 % (ref 38–51)
HCT VFR BLD CALC: 29 % (ref 38–51)
HCT VFR BLD CALC: 29.3 % (ref 38–51)
HCT VFR BLD CALC: 30.2 % (ref 38–51)
HCT VFR BLD CALC: 31.2 % (ref 38–51)
HCT VFR BLD CALC: 33.6 % (ref 38–51)
HCT VFR BLD CALC: 35.1 % (ref 38–51)
HCT VFR BLD CALC: 40.4 % (ref 38–51)
HCT VFR BLDA CALC: 45 % (ref 38–51)
HDLC SERPL-MCNC: 54 MG/DL (ref 40–60)
HGB BLD-MCNC: 10 G/DL (ref 13–17.7)
HGB BLD-MCNC: 10.4 G/DL (ref 13–17.7)
HGB BLD-MCNC: 10.6 G/DL (ref 13–17.7)
HGB BLD-MCNC: 11 G/DL (ref 13–17.7)
HGB BLD-MCNC: 11.3 G/DL (ref 13–17.7)
HGB BLD-MCNC: 11.7 G/DL (ref 13–17.7)
HGB BLD-MCNC: 11.9 G/DL (ref 13–17.7)
HGB BLD-MCNC: 12 G/DL (ref 13–17.7)
HGB BLD-MCNC: 12.5 G/DL (ref 13–17.7)
HGB BLD-MCNC: 12.6 G/DL (ref 13–17.7)
HGB BLD-MCNC: 14.4 G/DL (ref 13–17.7)
HGB BLD-MCNC: 8.8 G/DL (ref 13–17.7)
HGB BLD-MCNC: 9 G/DL (ref 13–17.7)
HGB BLD-MCNC: 9.4 G/DL (ref 13–17.7)
HGB BLD-MCNC: 9.7 G/DL (ref 13–17.7)
HGB BLDA-MCNC: 10.2 G/DL (ref 13.5–17.5)
HGB BLDA-MCNC: 11 G/DL (ref 13.5–17.5)
HGB BLDA-MCNC: 11.4 G/DL (ref 13.5–17.5)
HGB BLDA-MCNC: 13.1 G/DL (ref 13.5–17.5)
HGB BLDA-MCNC: 13.2 G/DL (ref 13.5–17.5)
HGB BLDA-MCNC: 15.3 G/DL (ref 12–17)
HGB BLDA-MCNC: 8.9 G/DL (ref 13.5–17.5)
HGB BLDA-MCNC: 9.1 G/DL (ref 13.5–17.5)
HGB BLDA-MCNC: 9.5 G/DL (ref 13.5–17.5)
HGB BLDA-MCNC: 9.6 G/DL (ref 13.5–17.5)
HGB BLDA-MCNC: 9.9 G/DL (ref 13.5–17.5)
HGB UR QL STRIP.AUTO: NEGATIVE
HOLD SPECIMEN: NORMAL
IMM GRANULOCYTES # BLD AUTO: 0.06 10*3/MM3 (ref 0–0.05)
IMM GRANULOCYTES # BLD AUTO: 0.06 10*3/MM3 (ref 0–0.05)
IMM GRANULOCYTES # BLD AUTO: 0.11 10*3/MM3 (ref 0–0.05)
IMM GRANULOCYTES # BLD AUTO: 0.12 10*3/MM3 (ref 0–0.05)
IMM GRANULOCYTES # BLD AUTO: 0.17 10*3/MM3 (ref 0–0.05)
IMM GRANULOCYTES # BLD AUTO: 0.19 10*3/MM3 (ref 0–0.05)
IMM GRANULOCYTES # BLD AUTO: 0.23 10*3/MM3 (ref 0–0.05)
IMM GRANULOCYTES # BLD AUTO: 0.37 10*3/MM3 (ref 0–0.05)
IMM GRANULOCYTES # BLD AUTO: 0.49 10*3/MM3 (ref 0–0.05)
IMM GRANULOCYTES # BLD AUTO: 0.61 10*3/MM3 (ref 0–0.05)
IMM GRANULOCYTES NFR BLD AUTO: 0.5 % (ref 0–0.5)
IMM GRANULOCYTES NFR BLD AUTO: 0.6 % (ref 0–0.5)
IMM GRANULOCYTES NFR BLD AUTO: 0.9 % (ref 0–0.5)
IMM GRANULOCYTES NFR BLD AUTO: 1.3 % (ref 0–0.5)
IMM GRANULOCYTES NFR BLD AUTO: 2.4 % (ref 0–0.5)
IMM GRANULOCYTES NFR BLD AUTO: 2.8 % (ref 0–0.5)
IMM GRANULOCYTES NFR BLD AUTO: 2.8 % (ref 0–0.5)
IMM GRANULOCYTES NFR BLD AUTO: 2.9 % (ref 0–0.5)
IMM GRANULOCYTES NFR BLD AUTO: 4.3 % (ref 0–0.5)
IMM GRANULOCYTES NFR BLD AUTO: 4.4 % (ref 0–0.5)
INHALED O2 CONCENTRATION: 30 %
INHALED O2 CONCENTRATION: 32 %
INHALED O2 CONCENTRATION: 35 %
INHALED O2 CONCENTRATION: 40 %
INHALED O2 CONCENTRATION: 45 %
INHALED O2 CONCENTRATION: 50 %
INR PPP: 1.14 (ref 0.84–1.13)
INR PPP: 1.5 (ref 0.8–1.2)
IPAP: 0
ISOLATED FROM: ABNORMAL
ISOLATED FROM: ABNORMAL
KETONES UR QL STRIP: ABNORMAL
LDLC SERPL CALC-MCNC: 30 MG/DL (ref 0–100)
LDLC/HDLC SERPL: 0.57 {RATIO}
LEUKOCYTE ESTERASE UR QL STRIP.AUTO: NEGATIVE
LV EF 2D ECHO EST: 60 %
LYMPHOCYTES # BLD AUTO: 0.31 10*3/MM3 (ref 0.7–3.1)
LYMPHOCYTES # BLD AUTO: 0.68 10*3/MM3 (ref 0.7–3.1)
LYMPHOCYTES # BLD AUTO: 0.71 10*3/MM3 (ref 0.7–3.1)
LYMPHOCYTES # BLD AUTO: 0.87 10*3/MM3 (ref 0.7–3.1)
LYMPHOCYTES # BLD AUTO: 0.88 10*3/MM3 (ref 0.7–3.1)
LYMPHOCYTES # BLD AUTO: 0.89 10*3/MM3 (ref 0.7–3.1)
LYMPHOCYTES # BLD AUTO: 0.97 10*3/MM3 (ref 0.7–3.1)
LYMPHOCYTES # BLD AUTO: 1.12 10*3/MM3 (ref 0.7–3.1)
LYMPHOCYTES # BLD AUTO: 1.18 10*3/MM3 (ref 0.7–3.1)
LYMPHOCYTES # BLD AUTO: 1.29 10*3/MM3 (ref 0.7–3.1)
LYMPHOCYTES NFR BLD AUTO: 10.5 % (ref 19.6–45.3)
LYMPHOCYTES NFR BLD AUTO: 10.7 % (ref 19.6–45.3)
LYMPHOCYTES NFR BLD AUTO: 13.7 % (ref 19.6–45.3)
LYMPHOCYTES NFR BLD AUTO: 16.1 % (ref 19.6–45.3)
LYMPHOCYTES NFR BLD AUTO: 3.2 % (ref 19.6–45.3)
LYMPHOCYTES NFR BLD AUTO: 5.4 % (ref 19.6–45.3)
LYMPHOCYTES NFR BLD AUTO: 5.5 % (ref 19.6–45.3)
LYMPHOCYTES NFR BLD AUTO: 8.4 % (ref 19.6–45.3)
LYMPHOCYTES NFR BLD AUTO: 9 % (ref 19.6–45.3)
LYMPHOCYTES NFR BLD AUTO: 9.1 % (ref 19.6–45.3)
MAGNESIUM SERPL-MCNC: 1.5 MG/DL (ref 1.6–2.4)
MAGNESIUM SERPL-MCNC: 1.8 MG/DL (ref 1.6–2.4)
MAGNESIUM SERPL-MCNC: 2 MG/DL (ref 1.6–2.4)
MAGNESIUM SERPL-MCNC: 2.1 MG/DL (ref 1.6–2.4)
MAGNESIUM SERPL-MCNC: 2.2 MG/DL (ref 1.6–2.4)
MAGNESIUM SERPL-MCNC: 2.2 MG/DL (ref 1.6–2.4)
MAGNESIUM SERPL-MCNC: 2.6 MG/DL (ref 1.6–2.4)
MAGNESIUM SERPL-MCNC: 2.7 MG/DL (ref 1.6–2.4)
MAGNESIUM SERPL-MCNC: 2.7 MG/DL (ref 1.6–2.4)
MAGNESIUM SERPL-MCNC: 2.9 MG/DL (ref 1.6–2.4)
MAGNESIUM SERPL-MCNC: 3 MG/DL (ref 1.6–2.4)
MAXIMAL PREDICTED HEART RATE: 138 BPM
MCH RBC QN AUTO: 30.5 PG (ref 26.6–33)
MCH RBC QN AUTO: 31 PG (ref 26.6–33)
MCH RBC QN AUTO: 31.1 PG (ref 26.6–33)
MCH RBC QN AUTO: 31.2 PG (ref 26.6–33)
MCH RBC QN AUTO: 31.2 PG (ref 26.6–33)
MCH RBC QN AUTO: 31.4 PG (ref 26.6–33)
MCH RBC QN AUTO: 31.4 PG (ref 26.6–33)
MCH RBC QN AUTO: 31.5 PG (ref 26.6–33)
MCH RBC QN AUTO: 31.6 PG (ref 26.6–33)
MCH RBC QN AUTO: 31.6 PG (ref 26.6–33)
MCH RBC QN AUTO: 31.9 PG (ref 26.6–33)
MCH RBC QN AUTO: 31.9 PG (ref 26.6–33)
MCH RBC QN AUTO: 32 PG (ref 26.6–33)
MCHC RBC AUTO-ENTMCNC: 31.1 G/DL (ref 31.5–35.7)
MCHC RBC AUTO-ENTMCNC: 31.4 G/DL (ref 31.5–35.7)
MCHC RBC AUTO-ENTMCNC: 31.6 G/DL (ref 31.5–35.7)
MCHC RBC AUTO-ENTMCNC: 31.6 G/DL (ref 31.5–35.7)
MCHC RBC AUTO-ENTMCNC: 31.7 G/DL (ref 31.5–35.7)
MCHC RBC AUTO-ENTMCNC: 31.7 G/DL (ref 31.5–35.7)
MCHC RBC AUTO-ENTMCNC: 31.9 G/DL (ref 31.5–35.7)
MCHC RBC AUTO-ENTMCNC: 32.1 G/DL (ref 31.5–35.7)
MCHC RBC AUTO-ENTMCNC: 32.2 G/DL (ref 31.5–35.7)
MCHC RBC AUTO-ENTMCNC: 32.4 G/DL (ref 31.5–35.7)
MCHC RBC AUTO-ENTMCNC: 32.5 G/DL (ref 31.5–35.7)
MCHC RBC AUTO-ENTMCNC: 32.8 G/DL (ref 31.5–35.7)
MCHC RBC AUTO-ENTMCNC: 33.2 G/DL (ref 31.5–35.7)
MCHC RBC AUTO-ENTMCNC: 33.4 G/DL (ref 31.5–35.7)
MCHC RBC AUTO-ENTMCNC: 34.5 G/DL (ref 31.5–35.7)
MCV RBC AUTO: 100.4 FL (ref 79–97)
MCV RBC AUTO: 92.6 FL (ref 79–97)
MCV RBC AUTO: 95.4 FL (ref 79–97)
MCV RBC AUTO: 95.8 FL (ref 79–97)
MCV RBC AUTO: 96.2 FL (ref 79–97)
MCV RBC AUTO: 96.2 FL (ref 79–97)
MCV RBC AUTO: 96.5 FL (ref 79–97)
MCV RBC AUTO: 97.4 FL (ref 79–97)
MCV RBC AUTO: 97.4 FL (ref 79–97)
MCV RBC AUTO: 97.5 FL (ref 79–97)
MCV RBC AUTO: 97.6 FL (ref 79–97)
MCV RBC AUTO: 98 FL (ref 79–97)
MCV RBC AUTO: 98.4 FL (ref 79–97)
MCV RBC AUTO: 99 FL (ref 79–97)
MCV RBC AUTO: 99.7 FL (ref 79–97)
METHADONE UR QL SCN: NEGATIVE
METHGB BLD QL: 0.2 % (ref 0–1.5)
METHGB BLD QL: 0.3 % (ref 0–1.5)
METHGB BLD QL: 0.3 % (ref 0–1.5)
METHGB BLD QL: 0.4 % (ref 0–1.5)
METHGB BLD QL: 0.4 % (ref 0–1.5)
METHGB BLD QL: 0.5 %
METHGB BLD QL: 0.5 % (ref 0–1.5)
METHGB BLD QL: 0.5 % (ref 0–1.5)
METHGB BLD QL: 0.6 % (ref 0–1.5)
METHGB BLD QL: ABNORMAL
MODALITY: ABNORMAL
MONOCYTES # BLD AUTO: 0.35 10*3/MM3 (ref 0.1–0.9)
MONOCYTES # BLD AUTO: 0.5 10*3/MM3 (ref 0.1–0.9)
MONOCYTES # BLD AUTO: 0.55 10*3/MM3 (ref 0.1–0.9)
MONOCYTES # BLD AUTO: 0.6 10*3/MM3 (ref 0.1–0.9)
MONOCYTES # BLD AUTO: 0.7 10*3/MM3 (ref 0.1–0.9)
MONOCYTES # BLD AUTO: 0.81 10*3/MM3 (ref 0.1–0.9)
MONOCYTES # BLD AUTO: 0.82 10*3/MM3 (ref 0.1–0.9)
MONOCYTES # BLD AUTO: 1.19 10*3/MM3 (ref 0.1–0.9)
MONOCYTES # BLD AUTO: 1.38 10*3/MM3 (ref 0.1–0.9)
MONOCYTES # BLD AUTO: 1.6 10*3/MM3 (ref 0.1–0.9)
MONOCYTES NFR BLD AUTO: 11.6 % (ref 5–12)
MONOCYTES NFR BLD AUTO: 12 % (ref 5–12)
MONOCYTES NFR BLD AUTO: 12.6 % (ref 5–12)
MONOCYTES NFR BLD AUTO: 14.3 % (ref 5–12)
MONOCYTES NFR BLD AUTO: 3.6 % (ref 5–12)
MONOCYTES NFR BLD AUTO: 4.1 % (ref 5–12)
MONOCYTES NFR BLD AUTO: 4.2 % (ref 5–12)
MONOCYTES NFR BLD AUTO: 7.2 % (ref 5–12)
MONOCYTES NFR BLD AUTO: 7.3 % (ref 5–12)
MONOCYTES NFR BLD AUTO: 8.4 % (ref 5–12)
MRSA DNA SPEC QL NAA+PROBE: POSITIVE
NEUTROPHILS NFR BLD AUTO: 10.02 10*3/MM3 (ref 1.7–7)
NEUTROPHILS NFR BLD AUTO: 10.75 10*3/MM3 (ref 1.7–7)
NEUTROPHILS NFR BLD AUTO: 11.04 10*3/MM3 (ref 1.7–7)
NEUTROPHILS NFR BLD AUTO: 11.72 10*3/MM3 (ref 1.7–7)
NEUTROPHILS NFR BLD AUTO: 4.06 10*3/MM3 (ref 1.7–7)
NEUTROPHILS NFR BLD AUTO: 4.5 10*3/MM3 (ref 1.7–7)
NEUTROPHILS NFR BLD AUTO: 6.58 10*3/MM3 (ref 1.7–7)
NEUTROPHILS NFR BLD AUTO: 67.5 % (ref 42.7–76)
NEUTROPHILS NFR BLD AUTO: 69.2 % (ref 42.7–76)
NEUTROPHILS NFR BLD AUTO: 7.05 10*3/MM3 (ref 1.7–7)
NEUTROPHILS NFR BLD AUTO: 73 % (ref 42.7–76)
NEUTROPHILS NFR BLD AUTO: 75.5 % (ref 42.7–76)
NEUTROPHILS NFR BLD AUTO: 76.1 % (ref 42.7–76)
NEUTROPHILS NFR BLD AUTO: 77.5 % (ref 42.7–76)
NEUTROPHILS NFR BLD AUTO: 8.67 10*3/MM3 (ref 1.7–7)
NEUTROPHILS NFR BLD AUTO: 8.9 10*3/MM3 (ref 1.7–7)
NEUTROPHILS NFR BLD AUTO: 80.4 % (ref 42.7–76)
NEUTROPHILS NFR BLD AUTO: 89.3 % (ref 42.7–76)
NEUTROPHILS NFR BLD AUTO: 89.8 % (ref 42.7–76)
NEUTROPHILS NFR BLD AUTO: 92.5 % (ref 42.7–76)
NITRITE UR QL STRIP: NEGATIVE
NOTE: ABNORMAL
NRBC BLD AUTO-RTO: 0 /100 WBC (ref 0–0.2)
NRBC BLD AUTO-RTO: 0.2 /100 WBC (ref 0–0.2)
NRBC BLD AUTO-RTO: 0.2 /100 WBC (ref 0–0.2)
NRBC BLD AUTO-RTO: 0.6 /100 WBC (ref 0–0.2)
OPIATES UR QL: NEGATIVE
OXYCODONE UR QL SCN: NEGATIVE
OXYHGB MFR BLDV: 66.3 %
OXYHGB MFR BLDV: 95.3 % (ref 94–99)
OXYHGB MFR BLDV: 95.9 % (ref 94–99)
OXYHGB MFR BLDV: 96.3 % (ref 94–99)
OXYHGB MFR BLDV: 96.3 % (ref 94–99)
OXYHGB MFR BLDV: 96.7 % (ref 94–99)
OXYHGB MFR BLDV: 97.1 % (ref 94–99)
OXYHGB MFR BLDV: 97.2 % (ref 94–99)
OXYHGB MFR BLDV: 97.2 % (ref 94–99)
OXYHGB MFR BLDV: 97.4 % (ref 94–99)
PAW @ PEAK INSP FLOW SETTING VENT: 0 CMH2O
PAW @ PEAK INSP FLOW SETTING VENT: 16 CMH2O
PCO2 BLDA: 30.1 MM HG (ref 35–45)
PCO2 BLDA: 32.2 MM HG (ref 35–45)
PCO2 BLDA: 34 MM HG (ref 35–45)
PCO2 BLDA: 34.4 MM HG (ref 35–45)
PCO2 BLDA: 34.4 MM HG (ref 35–45)
PCO2 BLDA: 34.8 MM HG (ref 35–45)
PCO2 BLDA: 34.8 MM HG (ref 35–45)
PCO2 BLDA: 35 MM HG (ref 35–45)
PCO2 BLDA: 35.2 MM HG (ref 35–45)
PCO2 BLDV: 39.8 MM HG (ref 41–51)
PCO2 TEMP ADJ BLD: 30.1 MM HG (ref 35–48)
PCO2 TEMP ADJ BLD: 32.2 MM HG (ref 35–48)
PCO2 TEMP ADJ BLD: 34 MM HG (ref 35–48)
PCO2 TEMP ADJ BLD: 34.4 MM HG (ref 35–48)
PCO2 TEMP ADJ BLD: 34.4 MM HG (ref 35–48)
PCO2 TEMP ADJ BLD: 34.8 MM HG (ref 35–48)
PCO2 TEMP ADJ BLD: 34.8 MM HG (ref 35–48)
PCO2 TEMP ADJ BLD: 35 MM HG (ref 35–48)
PCO2 TEMP ADJ BLD: 35.2 MM HG (ref 35–48)
PCP UR QL SCN: NEGATIVE
PEEP RESPIRATORY: 5 CM[H2O]
PH BLDA: 7.42 PH UNITS (ref 7.35–7.45)
PH BLDA: 7.43 PH UNITS (ref 7.35–7.45)
PH BLDA: 7.43 PH UNITS (ref 7.35–7.45)
PH BLDA: 7.45 PH UNITS (ref 7.35–7.45)
PH BLDA: 7.46 PH UNITS (ref 7.35–7.45)
PH BLDA: 7.47 PH UNITS (ref 7.35–7.45)
PH BLDA: 7.49 PH UNITS (ref 7.35–7.45)
PH BLDA: 7.49 PH UNITS (ref 7.35–7.45)
PH BLDA: 7.5 PH UNITS (ref 7.35–7.45)
PH BLDV: 7.46 PH UNITS (ref 7.31–7.41)
PH UR STRIP.AUTO: 7.5 [PH] (ref 5–8)
PH, TEMP CORRECTED: 7.42 PH UNITS
PH, TEMP CORRECTED: 7.43 PH UNITS
PH, TEMP CORRECTED: 7.43 PH UNITS
PH, TEMP CORRECTED: 7.45 PH UNITS
PH, TEMP CORRECTED: 7.46 PH UNITS
PH, TEMP CORRECTED: 7.47 PH UNITS
PH, TEMP CORRECTED: 7.49 PH UNITS
PH, TEMP CORRECTED: 7.49 PH UNITS
PH, TEMP CORRECTED: 7.5 PH UNITS
PHOSPHATE SERPL-MCNC: 1.6 MG/DL (ref 2.5–4.5)
PHOSPHATE SERPL-MCNC: 1.7 MG/DL (ref 2.5–4.5)
PHOSPHATE SERPL-MCNC: 1.8 MG/DL (ref 2.5–4.5)
PHOSPHATE SERPL-MCNC: 1.8 MG/DL (ref 2.5–4.5)
PHOSPHATE SERPL-MCNC: 1.9 MG/DL (ref 2.5–4.5)
PHOSPHATE SERPL-MCNC: 2 MG/DL (ref 2.5–4.5)
PHOSPHATE SERPL-MCNC: 2 MG/DL (ref 2.5–4.5)
PHOSPHATE SERPL-MCNC: 2.2 MG/DL (ref 2.5–4.5)
PHOSPHATE SERPL-MCNC: 2.3 MG/DL (ref 2.5–4.5)
PHOSPHATE SERPL-MCNC: 2.6 MG/DL (ref 2.5–4.5)
PHOSPHATE SERPL-MCNC: 2.6 MG/DL (ref 2.5–4.5)
PHOSPHATE SERPL-MCNC: 2.7 MG/DL (ref 2.5–4.5)
PHOSPHATE SERPL-MCNC: 3.1 MG/DL (ref 2.5–4.5)
PHOSPHATE SERPL-MCNC: 3.5 MG/DL (ref 2.5–4.5)
PLATELET # BLD AUTO: 109 10*3/MM3 (ref 140–450)
PLATELET # BLD AUTO: 119 10*3/MM3 (ref 140–450)
PLATELET # BLD AUTO: 122 10*3/MM3 (ref 140–450)
PLATELET # BLD AUTO: 129 10*3/MM3 (ref 140–450)
PLATELET # BLD AUTO: 136 10*3/MM3 (ref 140–450)
PLATELET # BLD AUTO: 150 10*3/MM3 (ref 140–450)
PLATELET # BLD AUTO: 152 10*3/MM3 (ref 140–450)
PLATELET # BLD AUTO: 166 10*3/MM3 (ref 140–450)
PLATELET # BLD AUTO: 171 10*3/MM3 (ref 140–450)
PLATELET # BLD AUTO: 205 10*3/MM3 (ref 140–450)
PLATELET # BLD AUTO: 212 10*3/MM3 (ref 140–450)
PLATELET # BLD AUTO: 217 10*3/MM3 (ref 140–450)
PLATELET # BLD AUTO: 218 10*3/MM3 (ref 140–450)
PLATELET # BLD AUTO: 224 10*3/MM3 (ref 140–450)
PLATELET # BLD AUTO: 256 10*3/MM3 (ref 140–450)
PMV BLD AUTO: 10.1 FL (ref 6–12)
PMV BLD AUTO: 10.2 FL (ref 6–12)
PMV BLD AUTO: 10.3 FL (ref 6–12)
PMV BLD AUTO: 10.5 FL (ref 6–12)
PMV BLD AUTO: 10.7 FL (ref 6–12)
PMV BLD AUTO: 10.8 FL (ref 6–12)
PMV BLD AUTO: 10.8 FL (ref 6–12)
PMV BLD AUTO: 11 FL (ref 6–12)
PMV BLD AUTO: 9.3 FL (ref 6–12)
PMV BLD AUTO: 9.6 FL (ref 6–12)
PMV BLD AUTO: 9.7 FL (ref 6–12)
PMV BLD AUTO: 9.8 FL (ref 6–12)
PMV BLD AUTO: 9.8 FL (ref 6–12)
PO2 BLDA: 73.8 MM HG (ref 83–108)
PO2 BLDA: 79.8 MM HG (ref 83–108)
PO2 BLDA: 81.5 MM HG (ref 83–108)
PO2 BLDA: 90.5 MM HG (ref 83–108)
PO2 BLDA: 91.2 MM HG (ref 83–108)
PO2 BLDA: 94.7 MM HG (ref 83–108)
PO2 BLDA: 96.2 MM HG (ref 83–108)
PO2 BLDA: 97.5 MM HG (ref 83–108)
PO2 BLDA: 99.1 MM HG (ref 83–108)
PO2 BLDV: 34.8 MM HG (ref 27–53)
PO2 TEMP ADJ BLD: 73.8 MM HG (ref 83–108)
PO2 TEMP ADJ BLD: 79.8 MM HG (ref 83–108)
PO2 TEMP ADJ BLD: 81.5 MM HG (ref 83–108)
PO2 TEMP ADJ BLD: 90.5 MM HG (ref 83–108)
PO2 TEMP ADJ BLD: 91.2 MM HG (ref 83–108)
PO2 TEMP ADJ BLD: 94.7 MM HG (ref 83–108)
PO2 TEMP ADJ BLD: 96.2 MM HG (ref 83–108)
PO2 TEMP ADJ BLD: 97.5 MM HG (ref 83–108)
PO2 TEMP ADJ BLD: 99.1 MM HG (ref 83–108)
POTASSIUM BLDA-SCNC: 3.4 MMOL/L (ref 3.5–4.9)
POTASSIUM SERPL-SCNC: 3.3 MMOL/L (ref 3.5–5.2)
POTASSIUM SERPL-SCNC: 3.3 MMOL/L (ref 3.5–5.2)
POTASSIUM SERPL-SCNC: 3.7 MMOL/L (ref 3.5–5.2)
POTASSIUM SERPL-SCNC: 3.8 MMOL/L (ref 3.5–5.2)
POTASSIUM SERPL-SCNC: 3.8 MMOL/L (ref 3.5–5.2)
POTASSIUM SERPL-SCNC: 3.9 MMOL/L (ref 3.5–5.2)
POTASSIUM SERPL-SCNC: 3.9 MMOL/L (ref 3.5–5.2)
POTASSIUM SERPL-SCNC: 4 MMOL/L (ref 3.5–5.2)
POTASSIUM SERPL-SCNC: 4.1 MMOL/L (ref 3.5–5.2)
POTASSIUM SERPL-SCNC: 4.2 MMOL/L (ref 3.5–5.2)
POTASSIUM SERPL-SCNC: 4.2 MMOL/L (ref 3.5–5.2)
POTASSIUM SERPL-SCNC: 4.3 MMOL/L (ref 3.5–5.2)
POTASSIUM SERPL-SCNC: 4.3 MMOL/L (ref 3.5–5.2)
POTASSIUM SERPL-SCNC: 4.4 MMOL/L (ref 3.5–5.2)
POTASSIUM SERPL-SCNC: 4.6 MMOL/L (ref 3.5–5.2)
POTASSIUM SERPL-SCNC: 4.8 MMOL/L (ref 3.5–5.2)
PREALB SERPL-MCNC: 13.6 MG/DL (ref 20–40)
PREALB SERPL-MCNC: 15.7 MG/DL (ref 20–40)
PROCALCITONIN SERPL-MCNC: 0.16 NG/ML (ref 0–0.25)
PROCALCITONIN SERPL-MCNC: 0.63 NG/ML (ref 0–0.25)
PROCALCITONIN SERPL-MCNC: 1.48 NG/ML (ref 0–0.25)
PROCALCITONIN SERPL-MCNC: 3.13 NG/ML (ref 0–0.25)
PROCALCITONIN SERPL-MCNC: 6 NG/ML (ref 0–0.25)
PROPOXYPH UR QL: NEGATIVE
PROT SERPL-MCNC: 4.6 G/DL (ref 6–8.5)
PROT SERPL-MCNC: 4.8 G/DL (ref 6–8.5)
PROT SERPL-MCNC: 4.8 G/DL (ref 6–8.5)
PROT SERPL-MCNC: 5.1 G/DL (ref 6–8.5)
PROT SERPL-MCNC: 5.2 G/DL (ref 6–8.5)
PROT SERPL-MCNC: 5.4 G/DL (ref 6–8.5)
PROT SERPL-MCNC: 5.5 G/DL (ref 6–8.5)
PROT SERPL-MCNC: 5.6 G/DL (ref 6–8.5)
PROT SERPL-MCNC: 5.7 G/DL (ref 6–8.5)
PROT SERPL-MCNC: 6.6 G/DL (ref 6–8.5)
PROT UR QL STRIP: NEGATIVE
PROTHROMBIN TIME: 14.5 SECONDS (ref 11.4–14.4)
PROTHROMBIN TIME: 17.9 SECONDS (ref 12.8–15.2)
PSV: 10 CMH2O
QT INTERVAL: 416 MS
QT INTERVAL: 432 MS
QTC INTERVAL: 425 MS
QTC INTERVAL: 456 MS
RBC # BLD AUTO: 2.82 10*6/MM3 (ref 4.14–5.8)
RBC # BLD AUTO: 2.9 10*6/MM3 (ref 4.14–5.8)
RBC # BLD AUTO: 2.99 10*6/MM3 (ref 4.14–5.8)
RBC # BLD AUTO: 3.07 10*6/MM3 (ref 4.14–5.8)
RBC # BLD AUTO: 3.21 10*6/MM3 (ref 4.14–5.8)
RBC # BLD AUTO: 3.36 10*6/MM3 (ref 4.14–5.8)
RBC # BLD AUTO: 3.47 10*6/MM3 (ref 4.14–5.8)
RBC # BLD AUTO: 3.49 10*6/MM3 (ref 4.14–5.8)
RBC # BLD AUTO: 3.64 10*6/MM3 (ref 4.14–5.8)
RBC # BLD AUTO: 3.66 10*6/MM3 (ref 4.14–5.8)
RBC # BLD AUTO: 3.76 10*6/MM3 (ref 4.14–5.8)
RBC # BLD AUTO: 3.79 10*6/MM3 (ref 4.14–5.8)
RBC # BLD AUTO: 3.99 10*6/MM3 (ref 4.14–5.8)
RBC # BLD AUTO: 4.02 10*6/MM3 (ref 4.14–5.8)
RBC # BLD AUTO: 4.52 10*6/MM3 (ref 4.14–5.8)
SALICYLATES SERPL-MCNC: 0.7 MG/DL
SODIUM BLD-SCNC: 140 MMOL/L (ref 138–146)
SODIUM SERPL-SCNC: 133 MMOL/L (ref 136–145)
SODIUM SERPL-SCNC: 134 MMOL/L (ref 136–145)
SODIUM SERPL-SCNC: 136 MMOL/L (ref 136–145)
SODIUM SERPL-SCNC: 136 MMOL/L (ref 136–145)
SODIUM SERPL-SCNC: 137 MMOL/L (ref 136–145)
SODIUM SERPL-SCNC: 138 MMOL/L (ref 136–145)
SODIUM SERPL-SCNC: 139 MMOL/L (ref 136–145)
SODIUM SERPL-SCNC: 142 MMOL/L (ref 136–145)
SODIUM SERPL-SCNC: 142 MMOL/L (ref 136–145)
SODIUM SERPL-SCNC: 143 MMOL/L (ref 136–145)
SODIUM SERPL-SCNC: 146 MMOL/L (ref 136–145)
SODIUM SERPL-SCNC: 147 MMOL/L (ref 136–145)
SP GR UR STRIP: 1.02 (ref 1–1.03)
STRESS TARGET HR: 117 BPM
TOTAL RATE: 0 BREATHS/MINUTE
TOTAL RATE: 0 BREATHS/MINUTE
TOTAL RATE: 13 BREATHS/MINUTE
TOTAL RATE: 14 BREATHS/MINUTE
TOTAL RATE: 16 BREATHS/MINUTE
TRICYCLICS UR QL SCN: NEGATIVE
TRIGL SERPL-MCNC: 185 MG/DL (ref 0–150)
TRIGL SERPL-MCNC: 75 MG/DL (ref 0–150)
TRIGL SERPL-MCNC: 77 MG/DL (ref 0–150)
TROPONIN T SERPL HS-MCNC: 17 NG/L
TSH SERPL DL<=0.05 MIU/L-ACNC: 1.28 UIU/ML (ref 0.27–4.2)
UFH PPP CHRO-ACNC: 0.22 IU/ML (ref 0.3–0.7)
UFH PPP CHRO-ACNC: 0.23 IU/ML (ref 0.3–0.7)
UFH PPP CHRO-ACNC: 0.25 IU/ML (ref 0.3–0.7)
UFH PPP CHRO-ACNC: 0.27 IU/ML (ref 0.3–0.7)
UFH PPP CHRO-ACNC: 0.28 IU/ML (ref 0.3–0.7)
UFH PPP CHRO-ACNC: 0.29 IU/ML (ref 0.3–0.7)
UFH PPP CHRO-ACNC: 0.29 IU/ML (ref 0.3–0.7)
UFH PPP CHRO-ACNC: 0.31 IU/ML (ref 0.3–0.7)
UFH PPP CHRO-ACNC: 0.32 IU/ML (ref 0.3–0.7)
UFH PPP CHRO-ACNC: 0.33 IU/ML (ref 0.3–0.7)
UFH PPP CHRO-ACNC: 0.34 IU/ML (ref 0.3–0.7)
UFH PPP CHRO-ACNC: 0.34 IU/ML (ref 0.3–0.7)
UFH PPP CHRO-ACNC: 0.35 IU/ML (ref 0.3–0.7)
UFH PPP CHRO-ACNC: 0.35 IU/ML (ref 0.3–0.7)
UFH PPP CHRO-ACNC: 0.36 IU/ML (ref 0.3–0.7)
UFH PPP CHRO-ACNC: 0.36 IU/ML (ref 0.3–0.7)
UFH PPP CHRO-ACNC: 0.38 IU/ML (ref 0.3–0.7)
UFH PPP CHRO-ACNC: 0.4 IU/ML (ref 0.3–0.7)
UFH PPP CHRO-ACNC: 0.43 IU/ML (ref 0.3–0.7)
UFH PPP CHRO-ACNC: 0.43 IU/ML (ref 0.3–0.7)
UFH PPP CHRO-ACNC: 0.49 IU/ML (ref 0.3–0.7)
UFH PPP CHRO-ACNC: 0.51 IU/ML (ref 0.3–0.7)
UFH PPP CHRO-ACNC: 0.55 IU/ML (ref 0.3–0.7)
UFH PPP CHRO-ACNC: 0.57 IU/ML (ref 0.3–0.7)
UFH PPP CHRO-ACNC: 0.6 IU/ML (ref 0.3–0.7)
UFH PPP CHRO-ACNC: 0.64 IU/ML (ref 0.3–0.7)
UROBILINOGEN UR QL STRIP: ABNORMAL
VANCOMYCIN TROUGH SERPL-MCNC: 15.4 MCG/ML (ref 5–20)
VANCOMYCIN TROUGH SERPL-MCNC: 8.1 MCG/ML (ref 5–20)
VENTILATOR MODE: ABNORMAL
VLDLC SERPL-MCNC: 16 MG/DL (ref 5–40)
VT ON VENT VENT: 0.4 ML
VT ON VENT VENT: 0.66 ML
VT ON VENT VENT: 0.72 ML
WBC NRBC COR # BLD: 11.19 10*3/MM3 (ref 3.4–10.8)
WBC NRBC COR # BLD: 12.29 10*3/MM3 (ref 3.4–10.8)
WBC NRBC COR # BLD: 13.12 10*3/MM3 (ref 3.4–10.8)
WBC NRBC COR # BLD: 13.28 10*3/MM3 (ref 3.4–10.8)
WBC NRBC COR # BLD: 13.9 10*3/MM3 (ref 3.4–10.8)
WBC NRBC COR # BLD: 14.14 10*3/MM3 (ref 3.4–10.8)
WBC NRBC COR # BLD: 14.14 10*3/MM3 (ref 3.4–10.8)
WBC NRBC COR # BLD: 14.21 10*3/MM3 (ref 3.4–10.8)
WBC NRBC COR # BLD: 14.77 10*3/MM3 (ref 3.4–10.8)
WBC NRBC COR # BLD: 15.04 10*3/MM3 (ref 3.4–10.8)
WBC NRBC COR # BLD: 6.02 10*3/MM3 (ref 3.4–10.8)
WBC NRBC COR # BLD: 6.5 10*3/MM3 (ref 3.4–10.8)
WBC NRBC COR # BLD: 8.2 10*3/MM3 (ref 3.4–10.8)
WBC NRBC COR # BLD: 9.63 10*3/MM3 (ref 3.4–10.8)
WBC NRBC COR # BLD: 9.66 10*3/MM3 (ref 3.4–10.8)
WHOLE BLOOD HOLD COAG: NORMAL
WHOLE BLOOD HOLD SPECIMEN: NORMAL

## 2023-01-01 PROCEDURE — 82375 ASSAY CARBOXYHB QUANT: CPT

## 2023-01-01 PROCEDURE — 94799 UNLISTED PULMONARY SVC/PX: CPT

## 2023-01-01 PROCEDURE — 82962 GLUCOSE BLOOD TEST: CPT

## 2023-01-01 PROCEDURE — 25010000002 AMPICILLIN PER 500 MG: Performed by: INTERNAL MEDICINE

## 2023-01-01 PROCEDURE — 84100 ASSAY OF PHOSPHORUS: CPT | Performed by: INTERNAL MEDICINE

## 2023-01-01 PROCEDURE — 25010000002 LEVETIRACETAM IN NACL 0.82% 500 MG/100ML SOLUTION: Performed by: CLINICAL NURSE SPECIALIST

## 2023-01-01 PROCEDURE — 25010000002 PROPOFOL 10 MG/ML EMULSION: Performed by: NURSE PRACTITIONER

## 2023-01-01 PROCEDURE — 99232 SBSQ HOSP IP/OBS MODERATE 35: CPT | Performed by: PSYCHIATRY & NEUROLOGY

## 2023-01-01 PROCEDURE — 99291 CRITICAL CARE FIRST HOUR: CPT | Performed by: INTERNAL MEDICINE

## 2023-01-01 PROCEDURE — 25010000002 FUROSEMIDE PER 20 MG: Performed by: INTERNAL MEDICINE

## 2023-01-01 PROCEDURE — 63710000001 INSULIN REGULAR HUMAN PER 5 UNITS

## 2023-01-01 PROCEDURE — 87186 SC STD MICRODIL/AGAR DIL: CPT | Performed by: OTOLARYNGOLOGY

## 2023-01-01 PROCEDURE — 25010000002 VANCOMYCIN 10 G RECONSTITUTED SOLUTION

## 2023-01-01 PROCEDURE — 84100 ASSAY OF PHOSPHORUS: CPT

## 2023-01-01 PROCEDURE — 25010000002 DEXAMETHASONE SODIUM PHOSPHATE 100 MG/10ML SOLUTION: Performed by: INTERNAL MEDICINE

## 2023-01-01 PROCEDURE — 83605 ASSAY OF LACTIC ACID: CPT

## 2023-01-01 PROCEDURE — 85520 HEPARIN ASSAY: CPT

## 2023-01-01 PROCEDURE — 25010000002 HEPARIN (PORCINE) 25000-0.45 UT/250ML-% SOLUTION

## 2023-01-01 PROCEDURE — 87040 BLOOD CULTURE FOR BACTERIA: CPT | Performed by: INTERNAL MEDICINE

## 2023-01-01 PROCEDURE — 25010000002 FENTANYL 10 MCG/1 ML NS: Performed by: INTERNAL MEDICINE

## 2023-01-01 PROCEDURE — 93306 TTE W/DOPPLER COMPLETE: CPT

## 2023-01-01 PROCEDURE — 82805 BLOOD GASES W/O2 SATURATION: CPT

## 2023-01-01 PROCEDURE — 99231 SBSQ HOSP IP/OBS SF/LOW 25: CPT | Performed by: PSYCHIATRY & NEUROLOGY

## 2023-01-01 PROCEDURE — 25010000002 LORAZEPAM PER 2 MG: Performed by: EMERGENCY MEDICINE

## 2023-01-01 PROCEDURE — 25010000002 CEFTRIAXONE PER 250 MG: Performed by: INTERNAL MEDICINE

## 2023-01-01 PROCEDURE — 85610 PROTHROMBIN TIME: CPT | Performed by: INTERNAL MEDICINE

## 2023-01-01 PROCEDURE — 94003 VENT MGMT INPAT SUBQ DAY: CPT

## 2023-01-01 PROCEDURE — 0042T HC CT CEREBRAL PERFUSION W/WO CONTRAST: CPT

## 2023-01-01 PROCEDURE — 80047 BASIC METABLC PNL IONIZED CA: CPT

## 2023-01-01 PROCEDURE — 25010000002 ACYCLOVIR PER 5 MG: Performed by: INTERNAL MEDICINE

## 2023-01-01 PROCEDURE — 94761 N-INVAS EAR/PLS OXIMETRY MLT: CPT

## 2023-01-01 PROCEDURE — 87147 CULTURE TYPE IMMUNOLOGIC: CPT | Performed by: INTERNAL MEDICINE

## 2023-01-01 PROCEDURE — 85025 COMPLETE CBC W/AUTO DIFF WBC: CPT | Performed by: INTERNAL MEDICINE

## 2023-01-01 PROCEDURE — 85610 PROTHROMBIN TIME: CPT

## 2023-01-01 PROCEDURE — 99291 CRITICAL CARE FIRST HOUR: CPT

## 2023-01-01 PROCEDURE — 83735 ASSAY OF MAGNESIUM: CPT | Performed by: INTERNAL MEDICINE

## 2023-01-01 PROCEDURE — 84145 PROCALCITONIN (PCT): CPT | Performed by: INTERNAL MEDICINE

## 2023-01-01 PROCEDURE — 25010000002 CEFEPIME PER 500 MG: Performed by: INTERNAL MEDICINE

## 2023-01-01 PROCEDURE — 71045 X-RAY EXAM CHEST 1 VIEW: CPT

## 2023-01-01 PROCEDURE — 25010000002 LINEZOLID 600 MG/300ML SOLUTION: Performed by: INTERNAL MEDICINE

## 2023-01-01 PROCEDURE — 25010000002 FENTANYL 10 MCG/1 ML NS: Performed by: NURSE PRACTITIONER

## 2023-01-01 PROCEDURE — 93010 ELECTROCARDIOGRAM REPORT: CPT | Performed by: INTERNAL MEDICINE

## 2023-01-01 PROCEDURE — 94002 VENT MGMT INPAT INIT DAY: CPT

## 2023-01-01 PROCEDURE — 80048 BASIC METABOLIC PNL TOTAL CA: CPT | Performed by: INTERNAL MEDICINE

## 2023-01-01 PROCEDURE — 87040 BLOOD CULTURE FOR BACTERIA: CPT

## 2023-01-01 PROCEDURE — 25010000002 HYDROMORPHONE PER 4 MG: Performed by: FAMILY MEDICINE

## 2023-01-01 PROCEDURE — 87205 SMEAR GRAM STAIN: CPT | Performed by: INTERNAL MEDICINE

## 2023-01-01 PROCEDURE — 83735 ASSAY OF MAGNESIUM: CPT | Performed by: EMERGENCY MEDICINE

## 2023-01-01 PROCEDURE — 0 POTASSIUM CHLORIDE 10 MEQ/100ML SOLUTION: Performed by: NURSE PRACTITIONER

## 2023-01-01 PROCEDURE — C1751 CATH, INF, PER/CENT/MIDLINE: HCPCS

## 2023-01-01 PROCEDURE — 83050 HGB METHEMOGLOBIN QUAN: CPT

## 2023-01-01 PROCEDURE — 80053 COMPREHEN METABOLIC PANEL: CPT | Performed by: INTERNAL MEDICINE

## 2023-01-01 PROCEDURE — 99223 1ST HOSP IP/OBS HIGH 75: CPT

## 2023-01-01 PROCEDURE — 82077 ASSAY SPEC XCP UR&BREATH IA: CPT | Performed by: EMERGENCY MEDICINE

## 2023-01-01 PROCEDURE — 83605 ASSAY OF LACTIC ACID: CPT | Performed by: INTERNAL MEDICINE

## 2023-01-01 PROCEDURE — 84134 ASSAY OF PREALBUMIN: CPT | Performed by: INTERNAL MEDICINE

## 2023-01-01 PROCEDURE — 36600 WITHDRAWAL OF ARTERIAL BLOOD: CPT

## 2023-01-01 PROCEDURE — 25010000002 LEVETRIRACETAM PER 10 MG

## 2023-01-01 PROCEDURE — 09958ZZ DRAINAGE OF RIGHT MIDDLE EAR, VIA NATURAL OR ARTIFICIAL OPENING ENDOSCOPIC: ICD-10-PCS | Performed by: OTOLARYNGOLOGY

## 2023-01-01 PROCEDURE — 25010000002 VANCOMYCIN 10 G RECONSTITUTED SOLUTION: Performed by: EMERGENCY MEDICINE

## 2023-01-01 PROCEDURE — 99233 SBSQ HOSP IP/OBS HIGH 50: CPT | Performed by: INTERNAL MEDICINE

## 2023-01-01 PROCEDURE — 82550 ASSAY OF CK (CPK): CPT

## 2023-01-01 PROCEDURE — 25010000002 MORPHINE PER 10 MG: Performed by: FAMILY MEDICINE

## 2023-01-01 PROCEDURE — 99232 SBSQ HOSP IP/OBS MODERATE 35: CPT | Performed by: PHYSICIAN ASSISTANT

## 2023-01-01 PROCEDURE — 84100 ASSAY OF PHOSPHORUS: CPT | Performed by: NURSE PRACTITIONER

## 2023-01-01 PROCEDURE — 25010000002 HALOPERIDOL LACTATE PER 5 MG: Performed by: INTERNAL MEDICINE

## 2023-01-01 PROCEDURE — 25010000002 LEVETIRACETAM IN NACL 0.82% 500 MG/100ML SOLUTION: Performed by: PSYCHIATRY & NEUROLOGY

## 2023-01-01 PROCEDURE — 85027 COMPLETE CBC AUTOMATED: CPT | Performed by: INTERNAL MEDICINE

## 2023-01-01 PROCEDURE — 70480 CT ORBIT/EAR/FOSSA W/O DYE: CPT

## 2023-01-01 PROCEDURE — 25010000002 LORAZEPAM PER 2 MG: Performed by: FAMILY MEDICINE

## 2023-01-01 PROCEDURE — 87070 CULTURE OTHR SPECIMN AEROBIC: CPT | Performed by: INTERNAL MEDICINE

## 2023-01-01 PROCEDURE — 80202 ASSAY OF VANCOMYCIN: CPT | Performed by: INTERNAL MEDICINE

## 2023-01-01 PROCEDURE — 25010000002 MAGNESIUM SULFATE 2 GM/50ML SOLUTION: Performed by: NURSE PRACTITIONER

## 2023-01-01 PROCEDURE — 80053 COMPREHEN METABOLIC PANEL: CPT

## 2023-01-01 PROCEDURE — 83036 HEMOGLOBIN GLYCOSYLATED A1C: CPT | Performed by: NURSE PRACTITIONER

## 2023-01-01 PROCEDURE — 87186 SC STD MICRODIL/AGAR DIL: CPT

## 2023-01-01 PROCEDURE — 85730 THROMBOPLASTIN TIME PARTIAL: CPT

## 2023-01-01 PROCEDURE — 80143 DRUG ASSAY ACETAMINOPHEN: CPT | Performed by: EMERGENCY MEDICINE

## 2023-01-01 PROCEDURE — 83735 ASSAY OF MAGNESIUM: CPT

## 2023-01-01 PROCEDURE — 84478 ASSAY OF TRIGLYCERIDES: CPT | Performed by: INTERNAL MEDICINE

## 2023-01-01 PROCEDURE — 87077 CULTURE AEROBIC IDENTIFY: CPT

## 2023-01-01 PROCEDURE — 80306 DRUG TEST PRSMV INSTRMNT: CPT | Performed by: EMERGENCY MEDICINE

## 2023-01-01 PROCEDURE — 25010000002 VANCOMYCIN PER 500 MG

## 2023-01-01 PROCEDURE — 25010000002 HEPARIN (PORCINE) 25000-0.45 UT/250ML-% SOLUTION: Performed by: INTERNAL MEDICINE

## 2023-01-01 PROCEDURE — 74018 RADEX ABDOMEN 1 VIEW: CPT

## 2023-01-01 PROCEDURE — 25010000002 SUCCINYLCHOLINE PER 20 MG: Performed by: EMERGENCY MEDICINE

## 2023-01-01 PROCEDURE — 80179 DRUG ASSAY SALICYLATE: CPT | Performed by: EMERGENCY MEDICINE

## 2023-01-01 PROCEDURE — 31500 INSERT EMERGENCY AIRWAY: CPT

## 2023-01-01 PROCEDURE — 84132 ASSAY OF SERUM POTASSIUM: CPT | Performed by: INTERNAL MEDICINE

## 2023-01-01 PROCEDURE — 5A1955Z RESPIRATORY VENTILATION, GREATER THAN 96 CONSECUTIVE HOURS: ICD-10-PCS | Performed by: EMERGENCY MEDICINE

## 2023-01-01 PROCEDURE — P9612 CATHETERIZE FOR URINE SPEC: HCPCS

## 2023-01-01 PROCEDURE — 87150 DNA/RNA AMPLIFIED PROBE: CPT

## 2023-01-01 PROCEDURE — A9577 INJ MULTIHANCE: HCPCS | Performed by: EMERGENCY MEDICINE

## 2023-01-01 PROCEDURE — 93306 TTE W/DOPPLER COMPLETE: CPT | Performed by: INTERNAL MEDICINE

## 2023-01-01 PROCEDURE — 94660 CPAP INITIATION&MGMT: CPT

## 2023-01-01 PROCEDURE — 36415 COLL VENOUS BLD VENIPUNCTURE: CPT

## 2023-01-01 PROCEDURE — 5A09357 ASSISTANCE WITH RESPIRATORY VENTILATION, LESS THAN 24 CONSECUTIVE HOURS, CONTINUOUS POSITIVE AIRWAY PRESSURE: ICD-10-PCS | Performed by: INTERNAL MEDICINE

## 2023-01-01 PROCEDURE — 0BH17EZ INSERTION OF ENDOTRACHEAL AIRWAY INTO TRACHEA, VIA NATURAL OR ARTIFICIAL OPENING: ICD-10-PCS | Performed by: EMERGENCY MEDICINE

## 2023-01-01 PROCEDURE — 25010000002 AMPICILLIN PER 500 MG: Performed by: EMERGENCY MEDICINE

## 2023-01-01 PROCEDURE — 25010000002 FENTANYL CITRATE (PF) 50 MCG/ML SOLUTION: Performed by: NURSE PRACTITIONER

## 2023-01-01 PROCEDURE — 80061 LIPID PANEL: CPT

## 2023-01-01 PROCEDURE — 70450 CT HEAD/BRAIN W/O DYE: CPT

## 2023-01-01 PROCEDURE — 70553 MRI BRAIN STEM W/O & W/DYE: CPT

## 2023-01-01 PROCEDURE — 70498 CT ANGIOGRAPHY NECK: CPT

## 2023-01-01 PROCEDURE — 93005 ELECTROCARDIOGRAM TRACING: CPT | Performed by: NURSE PRACTITIONER

## 2023-01-01 PROCEDURE — 0 GADOBENATE DIMEGLUMINE 529 MG/ML SOLUTION: Performed by: EMERGENCY MEDICINE

## 2023-01-01 PROCEDURE — 85025 COMPLETE CBC W/AUTO DIFF WBC: CPT

## 2023-01-01 PROCEDURE — 85027 COMPLETE CBC AUTOMATED: CPT | Performed by: NURSE PRACTITIONER

## 2023-01-01 PROCEDURE — 82140 ASSAY OF AMMONIA: CPT | Performed by: EMERGENCY MEDICINE

## 2023-01-01 PROCEDURE — 87070 CULTURE OTHR SPECIMN AEROBIC: CPT | Performed by: OTOLARYNGOLOGY

## 2023-01-01 PROCEDURE — 84443 ASSAY THYROID STIM HORMONE: CPT | Performed by: NURSE PRACTITIONER

## 2023-01-01 PROCEDURE — 83735 ASSAY OF MAGNESIUM: CPT | Performed by: NURSE PRACTITIONER

## 2023-01-01 PROCEDURE — 85520 HEPARIN ASSAY: CPT | Performed by: INTERNAL MEDICINE

## 2023-01-01 PROCEDURE — 82533 TOTAL CORTISOL: CPT | Performed by: NURSE PRACTITIONER

## 2023-01-01 PROCEDURE — 80069 RENAL FUNCTION PANEL: CPT | Performed by: INTERNAL MEDICINE

## 2023-01-01 PROCEDURE — 95819 EEG AWAKE AND ASLEEP: CPT

## 2023-01-01 PROCEDURE — 80048 BASIC METABOLIC PNL TOTAL CA: CPT | Performed by: NURSE PRACTITIONER

## 2023-01-01 PROCEDURE — 87641 MR-STAPH DNA AMP PROBE: CPT | Performed by: INTERNAL MEDICINE

## 2023-01-01 PROCEDURE — 70496 CT ANGIOGRAPHY HEAD: CPT

## 2023-01-01 PROCEDURE — 25010000002 LORAZEPAM PER 2 MG: Performed by: NURSE PRACTITIONER

## 2023-01-01 PROCEDURE — 87075 CULTR BACTERIA EXCEPT BLOOD: CPT | Performed by: OTOLARYNGOLOGY

## 2023-01-01 PROCEDURE — 80202 ASSAY OF VANCOMYCIN: CPT

## 2023-01-01 PROCEDURE — 86140 C-REACTIVE PROTEIN: CPT | Performed by: INTERNAL MEDICINE

## 2023-01-01 PROCEDURE — 25010000002 ACYCLOVIR PER 5 MG: Performed by: EMERGENCY MEDICINE

## 2023-01-01 PROCEDURE — 25010000002 LORAZEPAM PER 2 MG: Performed by: INTERNAL MEDICINE

## 2023-01-01 PROCEDURE — 25510000001 IOPAMIDOL PER 1 ML: Performed by: EMERGENCY MEDICINE

## 2023-01-01 PROCEDURE — 87186 SC STD MICRODIL/AGAR DIL: CPT | Performed by: INTERNAL MEDICINE

## 2023-01-01 PROCEDURE — 93005 ELECTROCARDIOGRAM TRACING: CPT

## 2023-01-01 PROCEDURE — 82465 ASSAY BLD/SERUM CHOLESTEROL: CPT | Performed by: INTERNAL MEDICINE

## 2023-01-01 PROCEDURE — 5A09357 ASSISTANCE WITH RESPIRATORY VENTILATION, LESS THAN 24 CONSECUTIVE HOURS, CONTINUOUS POSITIVE AIRWAY PRESSURE: ICD-10-PCS | Performed by: EMERGENCY MEDICINE

## 2023-01-01 PROCEDURE — 85014 HEMATOCRIT: CPT

## 2023-01-01 PROCEDURE — 99231 SBSQ HOSP IP/OBS SF/LOW 25: CPT

## 2023-01-01 PROCEDURE — 99232 SBSQ HOSP IP/OBS MODERATE 35: CPT

## 2023-01-01 PROCEDURE — 84484 ASSAY OF TROPONIN QUANT: CPT

## 2023-01-01 PROCEDURE — 85730 THROMBOPLASTIN TIME PARTIAL: CPT | Performed by: INTERNAL MEDICINE

## 2023-01-01 PROCEDURE — 80053 COMPREHEN METABOLIC PANEL: CPT | Performed by: EMERGENCY MEDICINE

## 2023-01-01 PROCEDURE — 25010000002 VANCOMYCIN 10 G RECONSTITUTED SOLUTION: Performed by: INTERNAL MEDICINE

## 2023-01-01 PROCEDURE — 25010000002 VANCOMYCIN PER 500 MG: Performed by: INTERNAL MEDICINE

## 2023-01-01 PROCEDURE — 87205 SMEAR GRAM STAIN: CPT | Performed by: OTOLARYNGOLOGY

## 2023-01-01 PROCEDURE — 84145 PROCALCITONIN (PCT): CPT | Performed by: EMERGENCY MEDICINE

## 2023-01-01 PROCEDURE — 81003 URINALYSIS AUTO W/O SCOPE: CPT | Performed by: EMERGENCY MEDICINE

## 2023-01-01 PROCEDURE — 80053 COMPREHEN METABOLIC PANEL: CPT | Performed by: NURSE PRACTITIONER

## 2023-01-01 RX ORDER — SODIUM CHLORIDE 0.9 % (FLUSH) 0.9 %
10 SYRINGE (ML) INJECTION AS NEEDED
Status: DISCONTINUED | OUTPATIENT
Start: 2023-01-01 | End: 2023-01-01

## 2023-01-01 RX ORDER — SODIUM CHLORIDE 0.9 % (FLUSH) 0.9 %
10 SYRINGE (ML) INJECTION EVERY 12 HOURS SCHEDULED
Status: DISCONTINUED | OUTPATIENT
Start: 2023-01-01 | End: 2023-04-15 | Stop reason: HOSPADM

## 2023-01-01 RX ORDER — FLECAINIDE ACETATE 50 MG/1
50 TABLET ORAL EVERY 12 HOURS SCHEDULED
Status: DISCONTINUED | OUTPATIENT
Start: 2023-01-01 | End: 2023-01-01

## 2023-01-01 RX ORDER — FUROSEMIDE 10 MG/ML
40 INJECTION INTRAMUSCULAR; INTRAVENOUS ONCE
Status: COMPLETED | OUTPATIENT
Start: 2023-01-01 | End: 2023-01-01

## 2023-01-01 RX ORDER — ASPIRIN 81 MG/1
81 TABLET, CHEWABLE ORAL DAILY
Status: DISCONTINUED | OUTPATIENT
Start: 2023-04-15 | End: 2023-04-15 | Stop reason: HOSPADM

## 2023-01-01 RX ORDER — HALOPERIDOL 5 MG/ML
10 INJECTION INTRAMUSCULAR ONCE
Status: COMPLETED | OUTPATIENT
Start: 2023-01-01 | End: 2023-01-01

## 2023-01-01 RX ORDER — SODIUM CHLORIDE 0.9 % (FLUSH) 0.9 %
10 SYRINGE (ML) INJECTION EVERY 12 HOURS SCHEDULED
Status: DISCONTINUED | OUTPATIENT
Start: 2023-01-01 | End: 2023-01-01

## 2023-01-01 RX ORDER — ASPIRIN 300 MG/1
300 SUPPOSITORY RECTAL DAILY
Status: DISCONTINUED | OUTPATIENT
Start: 2023-04-15 | End: 2023-04-15 | Stop reason: HOSPADM

## 2023-01-01 RX ORDER — NICOTINE POLACRILEX 4 MG
15 LOZENGE BUCCAL
Status: DISCONTINUED | OUTPATIENT
Start: 2023-01-01 | End: 2023-01-01

## 2023-01-01 RX ORDER — LORAZEPAM 2 MG/ML
1 INJECTION INTRAMUSCULAR ONCE
Status: COMPLETED | OUTPATIENT
Start: 2023-01-01 | End: 2023-01-01

## 2023-01-01 RX ORDER — DOCUSATE SODIUM 50 MG/5 ML
100 LIQUID (ML) ORAL 2 TIMES DAILY
Status: DISCONTINUED | OUTPATIENT
Start: 2023-01-01 | End: 2023-01-01

## 2023-01-01 RX ORDER — HYDROMORPHONE HYDROCHLORIDE 1 MG/ML
0.5 INJECTION, SOLUTION INTRAMUSCULAR; INTRAVENOUS; SUBCUTANEOUS
Status: DISCONTINUED | OUTPATIENT
Start: 2023-01-01 | End: 2023-01-01

## 2023-01-01 RX ORDER — LORAZEPAM 2 MG/ML
0.5 INJECTION INTRAMUSCULAR ONCE
Status: COMPLETED | OUTPATIENT
Start: 2023-01-01 | End: 2023-01-01

## 2023-01-01 RX ORDER — LORAZEPAM 2 MG/ML
1 INJECTION INTRAMUSCULAR EVERY 4 HOURS PRN
Status: DISCONTINUED | OUTPATIENT
Start: 2023-01-01 | End: 2023-04-15 | Stop reason: HOSPADM

## 2023-01-01 RX ORDER — FENTANYL CITRATE 50 UG/ML
50 INJECTION, SOLUTION INTRAMUSCULAR; INTRAVENOUS ONCE AS NEEDED
Status: DISCONTINUED | OUTPATIENT
Start: 2023-01-01 | End: 2023-04-15 | Stop reason: HOSPADM

## 2023-01-01 RX ORDER — ASPIRIN 81 MG/1
81 TABLET, CHEWABLE ORAL DAILY
Status: DISCONTINUED | OUTPATIENT
Start: 2023-01-01 | End: 2023-01-01

## 2023-01-01 RX ORDER — SODIUM CHLORIDE 9 MG/ML
40 INJECTION, SOLUTION INTRAVENOUS AS NEEDED
Status: DISCONTINUED | OUTPATIENT
Start: 2023-01-01 | End: 2023-01-01

## 2023-01-01 RX ORDER — LINEZOLID 2 MG/ML
600 INJECTION, SOLUTION INTRAVENOUS EVERY 12 HOURS
Status: COMPLETED | OUTPATIENT
Start: 2023-01-01 | End: 2023-01-01

## 2023-01-01 RX ORDER — ACETAMINOPHEN 160 MG/5ML
650 SOLUTION ORAL EVERY 6 HOURS PRN
Status: DISCONTINUED | OUTPATIENT
Start: 2023-01-01 | End: 2023-04-15 | Stop reason: HOSPADM

## 2023-01-01 RX ORDER — DEXMEDETOMIDINE HYDROCHLORIDE 4 UG/ML
.2-1.5 INJECTION, SOLUTION INTRAVENOUS
Status: DISCONTINUED | OUTPATIENT
Start: 2023-01-01 | End: 2023-01-01

## 2023-01-01 RX ORDER — ASPIRIN 300 MG/1
300 SUPPOSITORY RECTAL DAILY
Status: DISCONTINUED | OUTPATIENT
Start: 2023-01-01 | End: 2023-01-01

## 2023-01-01 RX ORDER — LEVETIRACETAM 5 MG/ML
500 INJECTION INTRAVASCULAR EVERY 12 HOURS
Status: DISCONTINUED | OUTPATIENT
Start: 2023-01-01 | End: 2023-01-01

## 2023-01-01 RX ORDER — SODIUM CHLORIDE 0.9 % (FLUSH) 0.9 %
10 SYRINGE (ML) INJECTION AS NEEDED
Status: DISCONTINUED | OUTPATIENT
Start: 2023-01-01 | End: 2023-04-15 | Stop reason: HOSPADM

## 2023-01-01 RX ORDER — ATORVASTATIN CALCIUM 40 MG/1
80 TABLET, FILM COATED ORAL NIGHTLY
Status: DISCONTINUED | OUTPATIENT
Start: 2023-01-01 | End: 2023-01-01

## 2023-01-01 RX ORDER — PHENOBARBITAL SODIUM 65 MG/ML
65 INJECTION INTRAMUSCULAR EVERY 4 HOURS PRN
Status: DISCONTINUED | OUTPATIENT
Start: 2023-01-01 | End: 2023-04-15 | Stop reason: HOSPADM

## 2023-01-01 RX ORDER — FENTANYL CITRATE 50 UG/ML
25 INJECTION, SOLUTION INTRAMUSCULAR; INTRAVENOUS
Status: DISCONTINUED | OUTPATIENT
Start: 2023-01-01 | End: 2023-01-01

## 2023-01-01 RX ORDER — LORAZEPAM 2 MG/ML
2 INJECTION INTRAMUSCULAR EVERY 4 HOURS PRN
Status: DISCONTINUED | OUTPATIENT
Start: 2023-01-01 | End: 2023-01-01

## 2023-01-01 RX ORDER — ETOMIDATE 2 MG/ML
INJECTION INTRAVENOUS
Status: COMPLETED | OUTPATIENT
Start: 2023-01-01 | End: 2023-01-01

## 2023-01-01 RX ORDER — ACETAMINOPHEN 160 MG/5ML
650 SOLUTION ORAL EVERY 6 HOURS PRN
Status: DISCONTINUED | OUTPATIENT
Start: 2023-01-01 | End: 2023-01-01

## 2023-01-01 RX ORDER — GLYCOPYRROLATE 0.2 MG/ML
0.4 INJECTION INTRAMUSCULAR; INTRAVENOUS ONCE
Status: COMPLETED | OUTPATIENT
Start: 2023-01-01 | End: 2023-01-01

## 2023-01-01 RX ORDER — HEPARIN SODIUM 1000 [USP'U]/ML
4000 INJECTION, SOLUTION INTRAVENOUS; SUBCUTANEOUS ONCE
Status: DISCONTINUED | OUTPATIENT
Start: 2023-01-01 | End: 2023-01-01

## 2023-01-01 RX ORDER — SUCCINYLCHOLINE CHLORIDE 20 MG/ML
INJECTION INTRAMUSCULAR; INTRAVENOUS
Status: COMPLETED | OUTPATIENT
Start: 2023-01-01 | End: 2023-01-01

## 2023-01-01 RX ORDER — CHLORHEXIDINE GLUCONATE 0.12 MG/ML
15 RINSE ORAL EVERY 12 HOURS SCHEDULED
Status: DISCONTINUED | OUTPATIENT
Start: 2023-01-01 | End: 2023-04-15 | Stop reason: HOSPADM

## 2023-01-01 RX ORDER — FLECAINIDE ACETATE 50 MG/1
50 TABLET ORAL EVERY 12 HOURS SCHEDULED
Status: DISCONTINUED | OUTPATIENT
Start: 2023-04-15 | End: 2023-04-15 | Stop reason: HOSPADM

## 2023-01-01 RX ORDER — HEPARIN SODIUM 10000 [USP'U]/100ML
10 INJECTION, SOLUTION INTRAVENOUS
Status: DISCONTINUED | OUTPATIENT
Start: 2023-01-01 | End: 2023-01-01

## 2023-01-01 RX ORDER — IBUPROFEN 600 MG/1
1 TABLET ORAL
Status: DISCONTINUED | OUTPATIENT
Start: 2023-01-01 | End: 2023-01-01

## 2023-01-01 RX ORDER — LOSARTAN POTASSIUM 50 MG/1
50 TABLET ORAL
Status: DISCONTINUED | OUTPATIENT
Start: 2023-01-01 | End: 2023-01-01

## 2023-01-01 RX ORDER — LORAZEPAM 2 MG/ML
0.25 INJECTION INTRAMUSCULAR ONCE
Status: COMPLETED | OUTPATIENT
Start: 2023-01-01 | End: 2023-01-01

## 2023-01-01 RX ORDER — LEVETIRACETAM 5 MG/ML
500 INJECTION INTRAVASCULAR EVERY 12 HOURS SCHEDULED
Status: DISCONTINUED | OUTPATIENT
Start: 2023-01-01 | End: 2023-04-15 | Stop reason: HOSPADM

## 2023-01-01 RX ORDER — PANTOPRAZOLE SODIUM 40 MG/10ML
40 INJECTION, POWDER, LYOPHILIZED, FOR SOLUTION INTRAVENOUS
Status: DISCONTINUED | OUTPATIENT
Start: 2023-01-01 | End: 2023-04-15 | Stop reason: HOSPADM

## 2023-01-01 RX ORDER — HALOPERIDOL 5 MG/ML
5 INJECTION INTRAMUSCULAR EVERY 6 HOURS PRN
Status: DISCONTINUED | OUTPATIENT
Start: 2023-01-01 | End: 2023-04-15 | Stop reason: HOSPADM

## 2023-01-01 RX ORDER — POLYETHYLENE GLYCOL 3350 17 G/17G
17 POWDER, FOR SOLUTION ORAL DAILY
Status: DISCONTINUED | OUTPATIENT
Start: 2023-01-01 | End: 2023-01-01

## 2023-01-01 RX ORDER — LORAZEPAM 2 MG/ML
0.5 INJECTION INTRAMUSCULAR EVERY 4 HOURS PRN
Status: DISCONTINUED | OUTPATIENT
Start: 2023-01-01 | End: 2023-01-01

## 2023-01-01 RX ORDER — AMINO ACIDS/PROTEIN HYDROLYS 11G-40/45
30 LIQUID IN PACKET (ML) ORAL 2 TIMES DAILY
Status: DISCONTINUED | OUTPATIENT
Start: 2023-01-01 | End: 2023-01-01

## 2023-01-01 RX ORDER — POTASSIUM CHLORIDE 7.45 MG/ML
10 INJECTION INTRAVENOUS
Status: COMPLETED | OUTPATIENT
Start: 2023-01-01 | End: 2023-01-01

## 2023-01-01 RX ORDER — CIPROFLOXACIN AND DEXAMETHASONE 3; 1 MG/ML; MG/ML
4 SUSPENSION/ DROPS AURICULAR (OTIC) 2 TIMES DAILY
Status: DISCONTINUED | OUTPATIENT
Start: 2023-01-01 | End: 2023-01-01

## 2023-01-01 RX ORDER — MAGNESIUM SULFATE HEPTAHYDRATE 40 MG/ML
2 INJECTION, SOLUTION INTRAVENOUS
Status: COMPLETED | OUTPATIENT
Start: 2023-01-01 | End: 2023-01-01

## 2023-01-01 RX ORDER — GLYCOPYRROLATE 0.2 MG/ML
0.4 INJECTION INTRAMUSCULAR; INTRAVENOUS EVERY 4 HOURS PRN
Status: DISCONTINUED | OUTPATIENT
Start: 2023-01-01 | End: 2023-04-15 | Stop reason: HOSPADM

## 2023-01-01 RX ORDER — LOSARTAN POTASSIUM 50 MG/1
50 TABLET ORAL
Status: DISCONTINUED | OUTPATIENT
Start: 2023-04-15 | End: 2023-04-15 | Stop reason: HOSPADM

## 2023-01-01 RX ORDER — ONDANSETRON 2 MG/ML
4 INJECTION INTRAMUSCULAR; INTRAVENOUS EVERY 6 HOURS PRN
Status: DISCONTINUED | OUTPATIENT
Start: 2023-01-01 | End: 2023-04-15 | Stop reason: HOSPADM

## 2023-01-01 RX ORDER — BUMETANIDE 0.25 MG/ML
0.5 INJECTION INTRAMUSCULAR; INTRAVENOUS ONCE
Status: COMPLETED | OUTPATIENT
Start: 2023-01-01 | End: 2023-01-01

## 2023-01-01 RX ORDER — HEPARIN SODIUM 1000 [USP'U]/ML
2000 INJECTION, SOLUTION INTRAVENOUS; SUBCUTANEOUS AS NEEDED
Status: DISCONTINUED | OUTPATIENT
Start: 2023-01-01 | End: 2023-01-01

## 2023-01-01 RX ORDER — HEPARIN SODIUM 1000 [USP'U]/ML
4000 INJECTION, SOLUTION INTRAVENOUS; SUBCUTANEOUS AS NEEDED
Status: DISCONTINUED | OUTPATIENT
Start: 2023-01-01 | End: 2023-01-01

## 2023-01-01 RX ORDER — HEPARIN SODIUM 10000 [USP'U]/100ML
14 INJECTION, SOLUTION INTRAVENOUS
Status: DISCONTINUED | OUTPATIENT
Start: 2023-01-01 | End: 2023-04-15 | Stop reason: HOSPADM

## 2023-01-01 RX ORDER — DEXTROSE MONOHYDRATE 25 G/50ML
25 INJECTION, SOLUTION INTRAVENOUS
Status: DISCONTINUED | OUTPATIENT
Start: 2023-01-01 | End: 2023-04-15 | Stop reason: HOSPADM

## 2023-01-01 RX ORDER — SODIUM CHLORIDE 9 MG/ML
40 INJECTION, SOLUTION INTRAVENOUS AS NEEDED
Status: DISCONTINUED | OUTPATIENT
Start: 2023-01-01 | End: 2023-04-15 | Stop reason: HOSPADM

## 2023-01-01 RX ORDER — LORAZEPAM 2 MG/ML
1 INJECTION INTRAMUSCULAR EVERY 4 HOURS PRN
Status: DISCONTINUED | OUTPATIENT
Start: 2023-01-01 | End: 2023-01-01

## 2023-01-01 RX ADMIN — PANTOPRAZOLE SODIUM 40 MG: 40 INJECTION, POWDER, LYOPHILIZED, FOR SOLUTION INTRAVENOUS at 06:06

## 2023-01-01 RX ADMIN — Medication 50 MCG/HR: at 21:47

## 2023-01-01 RX ADMIN — DEXMEDETOMIDINE HYDROCHLORIDE 1.5 MCG/KG/HR: 4 INJECTION, SOLUTION INTRAVENOUS at 06:22

## 2023-01-01 RX ADMIN — ASPIRIN 81 MG 81 MG: 81 TABLET ORAL at 10:49

## 2023-01-01 RX ADMIN — CEFTRIAXONE 2 G: 2 INJECTION, POWDER, FOR SOLUTION INTRAMUSCULAR; INTRAVENOUS at 12:01

## 2023-01-01 RX ADMIN — LORAZEPAM 0.25 MG: 2 INJECTION INTRAMUSCULAR; INTRAVENOUS at 13:10

## 2023-01-01 RX ADMIN — POTASSIUM & SODIUM PHOSPHATES POWDER PACK 280-160-250 MG 2 PACKET: 280-160-250 PACK at 20:45

## 2023-01-01 RX ADMIN — LEVETIRACETAM 500 MG: 5 INJECTION INTRAVASCULAR at 17:25

## 2023-01-01 RX ADMIN — ASPIRIN 81 MG CHEWABLE TABLET 81 MG: 81 TABLET CHEWABLE at 08:36

## 2023-01-01 RX ADMIN — HALOPERIDOL LACTATE 5 MG: 5 INJECTION, SOLUTION INTRAMUSCULAR at 14:02

## 2023-01-01 RX ADMIN — VANCOMYCIN HYDROCHLORIDE 750 MG: 750 INJECTION, SOLUTION INTRAVENOUS at 02:19

## 2023-01-01 RX ADMIN — FLECAINIDE ACETATE 50 MG: 50 TABLET ORAL at 20:04

## 2023-01-01 RX ADMIN — CIPROFLOXACIN AND DEXAMETHASONE 4 DROP: 3; 1 SUSPENSION/ DROPS AURICULAR (OTIC) at 08:38

## 2023-01-01 RX ADMIN — PANTOPRAZOLE SODIUM 40 MG: 40 INJECTION, POWDER, LYOPHILIZED, FOR SOLUTION INTRAVENOUS at 05:40

## 2023-01-01 RX ADMIN — PROPOFOL 20 MCG/KG/MIN: 10 INJECTION, EMULSION INTRAVENOUS at 22:52

## 2023-01-01 RX ADMIN — DOCUSATE SODIUM 100 MG: 50 LIQUID ORAL at 09:59

## 2023-01-01 RX ADMIN — INSULIN HUMAN 2 UNITS: 100 INJECTION, SOLUTION PARENTERAL at 23:35

## 2023-01-01 RX ADMIN — ASPIRIN 81 MG CHEWABLE TABLET 81 MG: 81 TABLET CHEWABLE at 08:52

## 2023-01-01 RX ADMIN — SENNOSIDES 10 ML: 8.8 LIQUID ORAL at 20:43

## 2023-01-01 RX ADMIN — CHLORHEXIDINE GLUCONATE 0.12% ORAL RINSE 15 ML: 1.2 LIQUID ORAL at 08:39

## 2023-01-01 RX ADMIN — CHLORHEXIDINE GLUCONATE 0.12% ORAL RINSE 15 ML: 1.2 LIQUID ORAL at 22:51

## 2023-01-01 RX ADMIN — CIPROFLOXACIN AND DEXAMETHASONE 4 DROP: 3; 1 SUSPENSION/ DROPS AURICULAR (OTIC) at 09:37

## 2023-01-01 RX ADMIN — SODIUM PHOSPHATE, MONOBASIC, MONOHYDRATE AND SODIUM PHOSPHATE, DIBASIC, ANHYDROUS 15 MMOL: 142; 276 INJECTION, SOLUTION INTRAVENOUS at 09:44

## 2023-01-01 RX ADMIN — PROPOFOL 20 MCG/KG/MIN: 10 INJECTION, EMULSION INTRAVENOUS at 23:16

## 2023-01-01 RX ADMIN — Medication 150 MCG/HR: at 20:44

## 2023-01-01 RX ADMIN — LEVETIRACETAM 500 MG: 5 INJECTION INTRAVASCULAR at 18:17

## 2023-01-01 RX ADMIN — CIPROFLOXACIN AND DEXAMETHASONE 4 DROP: 3; 1 SUSPENSION/ DROPS AURICULAR (OTIC) at 22:06

## 2023-01-01 RX ADMIN — INSULIN HUMAN 2 UNITS: 100 INJECTION, SOLUTION PARENTERAL at 12:21

## 2023-01-01 RX ADMIN — Medication 10 ML: at 20:45

## 2023-01-01 RX ADMIN — LEVETIRACETAM 500 MG: 100 INJECTION, SOLUTION INTRAVENOUS at 06:09

## 2023-01-01 RX ADMIN — POTASSIUM & SODIUM PHOSPHATES POWDER PACK 280-160-250 MG 2 PACKET: 280-160-250 PACK at 21:59

## 2023-01-01 RX ADMIN — DEXMEDETOMIDINE HYDROCHLORIDE 0.8 MCG/KG/HR: 4 INJECTION, SOLUTION INTRAVENOUS at 22:07

## 2023-01-01 RX ADMIN — LEVETIRACETAM 500 MG: 5 INJECTION INTRAVASCULAR at 17:05

## 2023-01-01 RX ADMIN — POTASSIUM PHOSPHATE, MONOBASIC POTASSIUM PHOSPHATE, DIBASIC 15 MMOL: 224; 236 INJECTION, SOLUTION, CONCENTRATE INTRAVENOUS at 11:48

## 2023-01-01 RX ADMIN — CHLORHEXIDINE GLUCONATE 0.12% ORAL RINSE 15 ML: 1.2 LIQUID ORAL at 08:36

## 2023-01-01 RX ADMIN — ACETAMINOPHEN 650 MG: 650 SOLUTION ORAL at 03:21

## 2023-01-01 RX ADMIN — FUROSEMIDE 40 MG: 10 INJECTION, SOLUTION INTRAMUSCULAR; INTRAVENOUS at 10:03

## 2023-01-01 RX ADMIN — LEVETIRACETAM 500 MG: 5 INJECTION INTRAVASCULAR at 06:44

## 2023-01-01 RX ADMIN — Medication 30 ML: at 20:39

## 2023-01-01 RX ADMIN — ACETAMINOPHEN 650 MG: 650 SOLUTION ORAL at 08:23

## 2023-01-01 RX ADMIN — POLYETHYLENE GLYCOL 3350 17 G: 17 POWDER, FOR SOLUTION ORAL at 09:03

## 2023-01-01 RX ADMIN — Medication 10 ML: at 20:49

## 2023-01-01 RX ADMIN — LEVETIRACETAM 500 MG: 5 INJECTION INTRAVASCULAR at 06:06

## 2023-01-01 RX ADMIN — CHLORHEXIDINE GLUCONATE 0.12% ORAL RINSE 15 ML: 1.2 LIQUID ORAL at 08:22

## 2023-01-01 RX ADMIN — CEFTRIAXONE 2 G: 2 INJECTION, POWDER, FOR SOLUTION INTRAMUSCULAR; INTRAVENOUS at 01:09

## 2023-01-01 RX ADMIN — CIPROFLOXACIN AND DEXAMETHASONE 4 DROP: 3; 1 SUSPENSION/ DROPS AURICULAR (OTIC) at 22:00

## 2023-01-01 RX ADMIN — PROPOFOL 10 MCG/KG/MIN: 10 INJECTION, EMULSION INTRAVENOUS at 18:26

## 2023-01-01 RX ADMIN — POTASSIUM & SODIUM PHOSPHATES POWDER PACK 280-160-250 MG 2 PACKET: 280-160-250 PACK at 10:14

## 2023-01-01 RX ADMIN — LEVETIRACETAM 500 MG: 5 INJECTION INTRAVASCULAR at 18:12

## 2023-01-01 RX ADMIN — CHLORHEXIDINE GLUCONATE 0.12% ORAL RINSE 15 ML: 1.2 LIQUID ORAL at 08:17

## 2023-01-01 RX ADMIN — CEFTRIAXONE 2 G: 2 INJECTION, POWDER, FOR SOLUTION INTRAMUSCULAR; INTRAVENOUS at 20:52

## 2023-01-01 RX ADMIN — POLYETHYLENE GLYCOL 3350 17 G: 17 POWDER, FOR SOLUTION ORAL at 09:59

## 2023-01-01 RX ADMIN — HEPARIN SODIUM 16 UNITS/KG/HR: 10000 INJECTION, SOLUTION INTRAVENOUS at 22:57

## 2023-01-01 RX ADMIN — GLYCOPYRROLATE 0.4 MG: 0.2 INJECTION INTRAMUSCULAR; INTRAVENOUS at 13:32

## 2023-01-01 RX ADMIN — INSULIN HUMAN 2 UNITS: 100 INJECTION, SOLUTION PARENTERAL at 06:45

## 2023-01-01 RX ADMIN — DEXMEDETOMIDINE HYDROCHLORIDE 0.2 MCG/KG/HR: 4 INJECTION, SOLUTION INTRAVENOUS at 14:49

## 2023-01-01 RX ADMIN — LINEZOLID 600 MG: 600 INJECTION, SOLUTION INTRAVENOUS at 10:03

## 2023-01-01 RX ADMIN — CEFEPIME 2 G: 2 INJECTION, POWDER, FOR SOLUTION INTRAVENOUS at 18:21

## 2023-01-01 RX ADMIN — LOSARTAN POTASSIUM 50 MG: 50 TABLET, FILM COATED ORAL at 09:58

## 2023-01-01 RX ADMIN — LORAZEPAM 2 MG: 2 INJECTION INTRAMUSCULAR; INTRAVENOUS at 17:11

## 2023-01-01 RX ADMIN — HEPARIN SODIUM 15 UNITS/KG/HR: 10000 INJECTION, SOLUTION INTRAVENOUS at 14:57

## 2023-01-01 RX ADMIN — FLECAINIDE ACETATE 50 MG: 50 TABLET ORAL at 20:52

## 2023-01-01 RX ADMIN — LINEZOLID 600 MG: 600 INJECTION, SOLUTION INTRAVENOUS at 09:34

## 2023-01-01 RX ADMIN — DOCUSATE SODIUM 100 MG: 50 LIQUID ORAL at 09:03

## 2023-01-01 RX ADMIN — Medication 30 ML: at 09:44

## 2023-01-01 RX ADMIN — ACETAMINOPHEN 650 MG: 650 SOLUTION ORAL at 09:21

## 2023-01-01 RX ADMIN — CHLORHEXIDINE GLUCONATE 0.12% ORAL RINSE 15 ML: 1.2 LIQUID ORAL at 20:45

## 2023-01-01 RX ADMIN — LOSARTAN POTASSIUM 50 MG: 50 TABLET, FILM COATED ORAL at 08:37

## 2023-01-01 RX ADMIN — LOSARTAN POTASSIUM 50 MG: 50 TABLET, FILM COATED ORAL at 09:44

## 2023-01-01 RX ADMIN — SENNOSIDES 10 ML: 8.8 LIQUID ORAL at 04:44

## 2023-01-01 RX ADMIN — HALOPERIDOL LACTATE 10 MG: 5 INJECTION, SOLUTION INTRAMUSCULAR at 17:03

## 2023-01-01 RX ADMIN — SENNOSIDES 10 ML: 8.8 LIQUID ORAL at 08:37

## 2023-01-01 RX ADMIN — CEFTRIAXONE 2 G: 2 INJECTION, POWDER, FOR SOLUTION INTRAMUSCULAR; INTRAVENOUS at 02:15

## 2023-01-01 RX ADMIN — CHLORHEXIDINE GLUCONATE 0.12% ORAL RINSE 15 ML: 1.2 LIQUID ORAL at 20:09

## 2023-01-01 RX ADMIN — ACETAMINOPHEN 650 MG: 650 SOLUTION ORAL at 20:55

## 2023-01-01 RX ADMIN — DEXAMETHASONE SODIUM PHOSPHATE 10 MG: 10 INJECTION, SOLUTION INTRAMUSCULAR; INTRAVENOUS at 10:49

## 2023-01-01 RX ADMIN — CIPROFLOXACIN AND DEXAMETHASONE 4 DROP: 3; 1 SUSPENSION/ DROPS AURICULAR (OTIC) at 08:34

## 2023-01-01 RX ADMIN — CIPROFLOXACIN AND DEXAMETHASONE 4 DROP: 3; 1 SUSPENSION/ DROPS AURICULAR (OTIC) at 22:58

## 2023-01-01 RX ADMIN — LOSARTAN POTASSIUM 50 MG: 50 TABLET, FILM COATED ORAL at 11:50

## 2023-01-01 RX ADMIN — HEPARIN SODIUM 15 UNITS/KG/HR: 10000 INJECTION, SOLUTION INTRAVENOUS at 06:36

## 2023-01-01 RX ADMIN — Medication 10 ML: at 20:29

## 2023-01-01 RX ADMIN — VANCOMYCIN HYDROCHLORIDE 1250 MG: 10 INJECTION, POWDER, LYOPHILIZED, FOR SOLUTION INTRAVENOUS at 17:26

## 2023-01-01 RX ADMIN — FLECAINIDE ACETATE 50 MG: 50 TABLET ORAL at 09:44

## 2023-01-01 RX ADMIN — HEPARIN SODIUM 15 UNITS/KG/HR: 10000 INJECTION, SOLUTION INTRAVENOUS at 02:42

## 2023-01-01 RX ADMIN — LEVETIRACETAM 500 MG: 5 INJECTION INTRAVASCULAR at 05:16

## 2023-01-01 RX ADMIN — HEPARIN SODIUM 9 UNITS/KG/HR: 10000 INJECTION, SOLUTION INTRAVENOUS at 15:34

## 2023-01-01 RX ADMIN — LINEZOLID 600 MG: 600 INJECTION, SOLUTION INTRAVENOUS at 21:47

## 2023-01-01 RX ADMIN — Medication 10 ML: at 08:40

## 2023-01-01 RX ADMIN — DOCUSATE SODIUM 100 MG: 50 LIQUID ORAL at 09:23

## 2023-01-01 RX ADMIN — LEVETIRACETAM 500 MG: 5 INJECTION INTRAVASCULAR at 18:05

## 2023-01-01 RX ADMIN — FLECAINIDE ACETATE 50 MG: 50 TABLET ORAL at 08:23

## 2023-01-01 RX ADMIN — LEVETIRACETAM 500 MG: 5 INJECTION INTRAVASCULAR at 05:02

## 2023-01-01 RX ADMIN — POTASSIUM CHLORIDE 10 MEQ: 7.46 INJECTION, SOLUTION INTRAVENOUS at 23:58

## 2023-01-01 RX ADMIN — ASPIRIN 81 MG CHEWABLE TABLET 81 MG: 81 TABLET CHEWABLE at 08:22

## 2023-01-01 RX ADMIN — Medication 10 ML: at 20:53

## 2023-01-01 RX ADMIN — Medication 10 ML: at 09:45

## 2023-01-01 RX ADMIN — LEVETIRACETAM 500 MG: 5 INJECTION INTRAVASCULAR at 17:45

## 2023-01-01 RX ADMIN — PANTOPRAZOLE SODIUM 40 MG: 40 INJECTION, POWDER, LYOPHILIZED, FOR SOLUTION INTRAVENOUS at 05:07

## 2023-01-01 RX ADMIN — PROPOFOL 10 MCG/KG/MIN: 10 INJECTION, EMULSION INTRAVENOUS at 17:54

## 2023-01-01 RX ADMIN — CEFTRIAXONE 2 G: 2 INJECTION, POWDER, FOR SOLUTION INTRAMUSCULAR; INTRAVENOUS at 08:22

## 2023-01-01 RX ADMIN — ASPIRIN 81 MG 81 MG: 81 TABLET ORAL at 08:17

## 2023-01-01 RX ADMIN — Medication 10 ML: at 20:30

## 2023-01-01 RX ADMIN — PROPOFOL 25 MCG/KG/MIN: 10 INJECTION, EMULSION INTRAVENOUS at 23:35

## 2023-01-01 RX ADMIN — DEXAMETHASONE SODIUM PHOSPHATE 10 MG: 10 INJECTION, SOLUTION INTRAMUSCULAR; INTRAVENOUS at 02:58

## 2023-01-01 RX ADMIN — DEXMEDETOMIDINE HYDROCHLORIDE 0.2 MCG/KG/HR: 4 INJECTION, SOLUTION INTRAVENOUS at 19:20

## 2023-01-01 RX ADMIN — CIPROFLOXACIN AND DEXAMETHASONE 4 DROP: 3; 1 SUSPENSION/ DROPS AURICULAR (OTIC) at 20:56

## 2023-01-01 RX ADMIN — DEXMEDETOMIDINE HYDROCHLORIDE 0.8 MCG/KG/HR: 4 INJECTION, SOLUTION INTRAVENOUS at 12:26

## 2023-01-01 RX ADMIN — POTASSIUM CHLORIDE 10 MEQ: 7.46 INJECTION, SOLUTION INTRAVENOUS at 21:27

## 2023-01-01 RX ADMIN — LINEZOLID 600 MG: 600 INJECTION, SOLUTION INTRAVENOUS at 21:54

## 2023-01-01 RX ADMIN — ASPIRIN 81 MG CHEWABLE TABLET 81 MG: 81 TABLET CHEWABLE at 09:03

## 2023-01-01 RX ADMIN — DEXMEDETOMIDINE HYDROCHLORIDE 0.2 MCG/KG/HR: 4 INJECTION, SOLUTION INTRAVENOUS at 13:58

## 2023-01-01 RX ADMIN — INSULIN HUMAN 2 UNITS: 100 INJECTION, SOLUTION PARENTERAL at 12:04

## 2023-01-01 RX ADMIN — Medication 30 ML: at 09:37

## 2023-01-01 RX ADMIN — CEFTRIAXONE 2 G: 2 INJECTION, POWDER, FOR SOLUTION INTRAMUSCULAR; INTRAVENOUS at 13:11

## 2023-01-01 RX ADMIN — SUCCINYLCHOLINE CHLORIDE 100 MG: 20 INJECTION, SOLUTION INTRAMUSCULAR; INTRAVENOUS at 14:36

## 2023-01-01 RX ADMIN — POTASSIUM CHLORIDE 10 MEQ: 7.46 INJECTION, SOLUTION INTRAVENOUS at 22:35

## 2023-01-01 RX ADMIN — CHLORHEXIDINE GLUCONATE 0.12% ORAL RINSE 15 ML: 1.2 LIQUID ORAL at 20:38

## 2023-01-01 RX ADMIN — LEVETIRACETAM 500 MG: 5 INJECTION INTRAVASCULAR at 17:12

## 2023-01-01 RX ADMIN — Medication 10 ML: at 10:00

## 2023-01-01 RX ADMIN — CHLORHEXIDINE GLUCONATE 0.12% ORAL RINSE 15 ML: 1.2 LIQUID ORAL at 09:18

## 2023-01-01 RX ADMIN — ACETAMINOPHEN 650 MG: 650 SOLUTION ORAL at 22:03

## 2023-01-01 RX ADMIN — AMPICILLIN 2 G: 2 INJECTION, POWDER, FOR SOLUTION INTRAVENOUS at 04:47

## 2023-01-01 RX ADMIN — FLECAINIDE ACETATE 50 MG: 50 TABLET ORAL at 22:00

## 2023-01-01 RX ADMIN — CEFTRIAXONE 2 G: 2 INJECTION, POWDER, FOR SOLUTION INTRAMUSCULAR; INTRAVENOUS at 09:03

## 2023-01-01 RX ADMIN — ASPIRIN 81 MG CHEWABLE TABLET 81 MG: 81 TABLET CHEWABLE at 09:18

## 2023-01-01 RX ADMIN — CEFTRIAXONE 2 G: 2 INJECTION, POWDER, FOR SOLUTION INTRAMUSCULAR; INTRAVENOUS at 23:17

## 2023-01-01 RX ADMIN — LEVETIRACETAM 500 MG: 5 INJECTION INTRAVASCULAR at 18:43

## 2023-01-01 RX ADMIN — Medication 10 ML: at 20:22

## 2023-01-01 RX ADMIN — CEFEPIME 2 G: 2 INJECTION, POWDER, FOR SOLUTION INTRAVENOUS at 02:42

## 2023-01-01 RX ADMIN — CEFTRIAXONE 2 G: 2 INJECTION, POWDER, FOR SOLUTION INTRAMUSCULAR; INTRAVENOUS at 00:13

## 2023-01-01 RX ADMIN — FLECAINIDE ACETATE 50 MG: 50 TABLET ORAL at 08:52

## 2023-01-01 RX ADMIN — CEFTRIAXONE 2 G: 2 INJECTION, POWDER, FOR SOLUTION INTRAMUSCULAR; INTRAVENOUS at 00:06

## 2023-01-01 RX ADMIN — LORAZEPAM 1 MG: 2 INJECTION INTRAMUSCULAR; INTRAVENOUS at 11:21

## 2023-01-01 RX ADMIN — VANCOMYCIN HYDROCHLORIDE 1250 MG: 10 INJECTION, POWDER, LYOPHILIZED, FOR SOLUTION INTRAVENOUS at 10:14

## 2023-01-01 RX ADMIN — PROPOFOL 20 MCG/KG/MIN: 10 INJECTION, EMULSION INTRAVENOUS at 15:32

## 2023-01-01 RX ADMIN — INSULIN HUMAN 2 UNITS: 100 INJECTION, SOLUTION PARENTERAL at 23:52

## 2023-01-01 RX ADMIN — DEXAMETHASONE SODIUM PHOSPHATE 10 MG: 10 INJECTION, SOLUTION INTRAMUSCULAR; INTRAVENOUS at 20:19

## 2023-01-01 RX ADMIN — Medication 30 ML: at 22:01

## 2023-01-01 RX ADMIN — SENNOSIDES 10 ML: 8.8 LIQUID ORAL at 08:51

## 2023-01-01 RX ADMIN — HEPARIN SODIUM 15 UNITS/KG/HR: 10000 INJECTION, SOLUTION INTRAVENOUS at 13:26

## 2023-01-01 RX ADMIN — FENTANYL CITRATE 25 MCG: 50 INJECTION, SOLUTION INTRAMUSCULAR; INTRAVENOUS at 00:59

## 2023-01-01 RX ADMIN — Medication 50 MCG/HR: at 02:55

## 2023-01-01 RX ADMIN — POLYETHYLENE GLYCOL 3350 17 G: 17 POWDER, FOR SOLUTION ORAL at 09:11

## 2023-01-01 RX ADMIN — DOCUSATE SODIUM 100 MG: 50 LIQUID ORAL at 08:23

## 2023-01-01 RX ADMIN — INSULIN HUMAN 2 UNITS: 100 INJECTION, SOLUTION PARENTERAL at 00:23

## 2023-01-01 RX ADMIN — Medication 10 ML: at 09:06

## 2023-01-01 RX ADMIN — FLECAINIDE ACETATE 50 MG: 50 TABLET ORAL at 08:36

## 2023-01-01 RX ADMIN — DEXMEDETOMIDINE HYDROCHLORIDE 0.6 MCG/KG/HR: 4 INJECTION, SOLUTION INTRAVENOUS at 20:05

## 2023-01-01 RX ADMIN — FUROSEMIDE 40 MG: 10 INJECTION, SOLUTION INTRAMUSCULAR; INTRAVENOUS at 16:25

## 2023-01-01 RX ADMIN — PANTOPRAZOLE SODIUM 40 MG: 40 INJECTION, POWDER, LYOPHILIZED, FOR SOLUTION INTRAVENOUS at 05:57

## 2023-01-01 RX ADMIN — PROPOFOL 20 MCG/KG/MIN: 10 INJECTION, EMULSION INTRAVENOUS at 06:41

## 2023-01-01 RX ADMIN — VANCOMYCIN HYDROCHLORIDE 1250 MG: 10 INJECTION, POWDER, LYOPHILIZED, FOR SOLUTION INTRAVENOUS at 18:01

## 2023-01-01 RX ADMIN — DEXAMETHASONE SODIUM PHOSPHATE 10 MG: 10 INJECTION, SOLUTION INTRAMUSCULAR; INTRAVENOUS at 20:56

## 2023-01-01 RX ADMIN — INSULIN HUMAN 2 UNITS: 100 INJECTION, SOLUTION PARENTERAL at 00:18

## 2023-01-01 RX ADMIN — LEVETIRACETAM 500 MG: 100 INJECTION, SOLUTION INTRAVENOUS at 05:45

## 2023-01-01 RX ADMIN — Medication 10 ML: at 20:52

## 2023-01-01 RX ADMIN — CEFTRIAXONE 2 G: 2 INJECTION, POWDER, FOR SOLUTION INTRAMUSCULAR; INTRAVENOUS at 12:34

## 2023-01-01 RX ADMIN — ASPIRIN 81 MG CHEWABLE TABLET 81 MG: 81 TABLET CHEWABLE at 08:23

## 2023-01-01 RX ADMIN — LEVETIRACETAM 500 MG: 5 INJECTION INTRAVASCULAR at 06:07

## 2023-01-01 RX ADMIN — Medication 10 ML: at 20:56

## 2023-01-01 RX ADMIN — CEFTRIAXONE 2 G: 2 INJECTION, POWDER, FOR SOLUTION INTRAMUSCULAR; INTRAVENOUS at 08:40

## 2023-01-01 RX ADMIN — CEFTRIAXONE 2 G: 2 INJECTION, POWDER, FOR SOLUTION INTRAMUSCULAR; INTRAVENOUS at 09:11

## 2023-01-01 RX ADMIN — GADOBENATE DIMEGLUMINE 18 ML: 529 INJECTION, SOLUTION INTRAVENOUS at 16:43

## 2023-01-01 RX ADMIN — LOSARTAN POTASSIUM 50 MG: 50 TABLET, FILM COATED ORAL at 08:52

## 2023-01-01 RX ADMIN — LORAZEPAM 0.5 MG: 2 INJECTION INTRAMUSCULAR; INTRAVENOUS at 13:32

## 2023-01-01 RX ADMIN — PANTOPRAZOLE SODIUM 40 MG: 40 INJECTION, POWDER, LYOPHILIZED, FOR SOLUTION INTRAVENOUS at 05:43

## 2023-01-01 RX ADMIN — SENNOSIDES 10 ML: 8.8 LIQUID ORAL at 09:25

## 2023-01-01 RX ADMIN — PANTOPRAZOLE SODIUM 40 MG: 40 INJECTION, POWDER, LYOPHILIZED, FOR SOLUTION INTRAVENOUS at 06:45

## 2023-01-01 RX ADMIN — CHLORHEXIDINE GLUCONATE 0.12% ORAL RINSE 15 ML: 1.2 LIQUID ORAL at 20:22

## 2023-01-01 RX ADMIN — CHLORHEXIDINE GLUCONATE 0.12% ORAL RINSE 15 ML: 1.2 LIQUID ORAL at 20:49

## 2023-01-01 RX ADMIN — AMPICILLIN 2 G: 2 INJECTION, POWDER, FOR SOLUTION INTRAVENOUS at 13:44

## 2023-01-01 RX ADMIN — PROPOFOL 20 MCG/KG/MIN: 10 INJECTION, EMULSION INTRAVENOUS at 07:39

## 2023-01-01 RX ADMIN — Medication 50 MCG/HR: at 06:34

## 2023-01-01 RX ADMIN — PROPOFOL 25 MCG/KG/MIN: 10 INJECTION, EMULSION INTRAVENOUS at 10:24

## 2023-01-01 RX ADMIN — ACETAMINOPHEN 650 MG: 650 SOLUTION ORAL at 18:05

## 2023-01-01 RX ADMIN — PROPOFOL 25 MCG/KG/MIN: 10 INJECTION, EMULSION INTRAVENOUS at 17:21

## 2023-01-01 RX ADMIN — Medication 10 ML: at 09:51

## 2023-01-01 RX ADMIN — MAGNESIUM SULFATE HEPTAHYDRATE 2 G: 2 INJECTION, SOLUTION INTRAVENOUS at 01:21

## 2023-01-01 RX ADMIN — DEXMEDETOMIDINE HYDROCHLORIDE 1.5 MCG/KG/HR: 4 INJECTION, SOLUTION INTRAVENOUS at 03:05

## 2023-01-01 RX ADMIN — INSULIN HUMAN 2 UNITS: 100 INJECTION, SOLUTION PARENTERAL at 18:05

## 2023-01-01 RX ADMIN — Medication 10 ML: at 20:32

## 2023-01-01 RX ADMIN — LINEZOLID 600 MG: 600 INJECTION, SOLUTION INTRAVENOUS at 09:59

## 2023-01-01 RX ADMIN — MORPHINE SULFATE 5 MG: 4 INJECTION, SOLUTION INTRAMUSCULAR; INTRAVENOUS at 21:30

## 2023-01-01 RX ADMIN — INSULIN HUMAN 2 UNITS: 100 INJECTION, SOLUTION PARENTERAL at 06:28

## 2023-01-01 RX ADMIN — Medication 10 ML: at 09:37

## 2023-01-01 RX ADMIN — DOCUSATE SODIUM 100 MG: 50 LIQUID ORAL at 20:45

## 2023-01-01 RX ADMIN — CHLORHEXIDINE GLUCONATE 0.12% ORAL RINSE 15 ML: 1.2 LIQUID ORAL at 21:39

## 2023-01-01 RX ADMIN — Medication 10 ML: at 20:05

## 2023-01-01 RX ADMIN — PROPOFOL 20 MCG/KG/MIN: 10 INJECTION, EMULSION INTRAVENOUS at 06:44

## 2023-01-01 RX ADMIN — DOCUSATE SODIUM 100 MG: 50 LIQUID ORAL at 20:38

## 2023-01-01 RX ADMIN — LOSARTAN POTASSIUM 50 MG: 50 TABLET, FILM COATED ORAL at 08:23

## 2023-01-01 RX ADMIN — Medication 10 ML: at 21:40

## 2023-01-01 RX ADMIN — CHLORHEXIDINE GLUCONATE 0.12% ORAL RINSE 15 ML: 1.2 LIQUID ORAL at 09:44

## 2023-01-01 RX ADMIN — ACETAMINOPHEN 650 MG: 650 SOLUTION ORAL at 20:38

## 2023-01-01 RX ADMIN — Medication 300 MCG/HR: at 06:19

## 2023-01-01 RX ADMIN — PROPOFOL 20 MCG/KG/MIN: 10 INJECTION, EMULSION INTRAVENOUS at 18:11

## 2023-01-01 RX ADMIN — PROPOFOL 20 MCG/KG/MIN: 10 INJECTION, EMULSION INTRAVENOUS at 17:46

## 2023-01-01 RX ADMIN — GLYCOPYRROLATE 0.4 MG: 0.2 INJECTION INTRAMUSCULAR; INTRAVENOUS at 00:35

## 2023-01-01 RX ADMIN — Medication 250 MCG/HR: at 12:59

## 2023-01-01 RX ADMIN — LINEZOLID 600 MG: 600 INJECTION, SOLUTION INTRAVENOUS at 21:05

## 2023-01-01 RX ADMIN — CHLORHEXIDINE GLUCONATE 0.12% ORAL RINSE 15 ML: 1.2 LIQUID ORAL at 22:28

## 2023-01-01 RX ADMIN — DEXAMETHASONE SODIUM PHOSPHATE 10 MG: 10 INJECTION, SOLUTION INTRAMUSCULAR; INTRAVENOUS at 02:44

## 2023-01-01 RX ADMIN — PROPOFOL 5 MCG/KG/MIN: 10 INJECTION, EMULSION INTRAVENOUS at 05:49

## 2023-01-01 RX ADMIN — CEFTRIAXONE 2 G: 2 INJECTION, POWDER, FOR SOLUTION INTRAMUSCULAR; INTRAVENOUS at 20:04

## 2023-01-01 RX ADMIN — DEXMEDETOMIDINE HYDROCHLORIDE 0.9 MCG/KG/HR: 4 INJECTION, SOLUTION INTRAVENOUS at 12:09

## 2023-01-01 RX ADMIN — Medication 10 ML: at 08:23

## 2023-01-01 RX ADMIN — AMPICILLIN 2 G: 2 INJECTION, POWDER, FOR SOLUTION INTRAVENOUS at 00:12

## 2023-01-01 RX ADMIN — LEVETIRACETAM 500 MG: 5 INJECTION INTRAVASCULAR at 05:57

## 2023-01-01 RX ADMIN — SENNOSIDES 10 ML: 8.8 LIQUID ORAL at 20:45

## 2023-01-01 RX ADMIN — LEVETIRACETAM 500 MG: 5 INJECTION INTRAVASCULAR at 18:28

## 2023-01-01 RX ADMIN — HYDROMORPHONE HYDROCHLORIDE 0.5 MG: 1 INJECTION, SOLUTION INTRAMUSCULAR; INTRAVENOUS; SUBCUTANEOUS at 13:32

## 2023-01-01 RX ADMIN — PANTOPRAZOLE SODIUM 40 MG: 40 INJECTION, POWDER, LYOPHILIZED, FOR SOLUTION INTRAVENOUS at 06:09

## 2023-01-01 RX ADMIN — DEXAMETHASONE SODIUM PHOSPHATE 10 MG: 10 INJECTION, SOLUTION INTRAMUSCULAR; INTRAVENOUS at 02:15

## 2023-01-01 RX ADMIN — PANTOPRAZOLE SODIUM 40 MG: 40 INJECTION, POWDER, LYOPHILIZED, FOR SOLUTION INTRAVENOUS at 13:17

## 2023-01-01 RX ADMIN — SENNOSIDES 10 ML: 8.8 LIQUID ORAL at 09:03

## 2023-01-01 RX ADMIN — FLECAINIDE ACETATE 50 MG: 50 TABLET ORAL at 20:45

## 2023-01-01 RX ADMIN — CEFTRIAXONE 2 G: 2 INJECTION, POWDER, FOR SOLUTION INTRAMUSCULAR; INTRAVENOUS at 13:47

## 2023-01-01 RX ADMIN — SENNOSIDES 10 ML: 8.8 LIQUID ORAL at 09:59

## 2023-01-01 RX ADMIN — POTASSIUM CHLORIDE 10 MEQ: 7.46 INJECTION, SOLUTION INTRAVENOUS at 01:08

## 2023-01-01 RX ADMIN — LINEZOLID 600 MG: 600 INJECTION, SOLUTION INTRAVENOUS at 09:11

## 2023-01-01 RX ADMIN — SENNOSIDES 10 ML: 8.8 LIQUID ORAL at 20:38

## 2023-01-01 RX ADMIN — CIPROFLOXACIN AND DEXAMETHASONE 4 DROP: 3; 1 SUSPENSION/ DROPS AURICULAR (OTIC) at 09:45

## 2023-01-01 RX ADMIN — DEXMEDETOMIDINE HYDROCHLORIDE 0.8 MCG/KG/HR: 4 INJECTION, SOLUTION INTRAVENOUS at 23:52

## 2023-01-01 RX ADMIN — FENTANYL CITRATE 25 MCG: 50 INJECTION, SOLUTION INTRAMUSCULAR; INTRAVENOUS at 02:19

## 2023-01-01 RX ADMIN — ASPIRIN 81 MG CHEWABLE TABLET 81 MG: 81 TABLET CHEWABLE at 09:58

## 2023-01-01 RX ADMIN — HEPARIN SODIUM 12 UNITS/KG/HR: 10000 INJECTION, SOLUTION INTRAVENOUS at 05:16

## 2023-01-01 RX ADMIN — DEXAMETHASONE SODIUM PHOSPHATE 10 MG: 10 INJECTION, SOLUTION INTRAMUSCULAR; INTRAVENOUS at 13:28

## 2023-01-01 RX ADMIN — DEXAMETHASONE SODIUM PHOSPHATE 10 MG: 10 INJECTION, SOLUTION INTRAMUSCULAR; INTRAVENOUS at 20:41

## 2023-01-01 RX ADMIN — FLECAINIDE ACETATE 50 MG: 50 TABLET ORAL at 20:09

## 2023-01-01 RX ADMIN — DEXAMETHASONE SODIUM PHOSPHATE 10 MG: 10 INJECTION, SOLUTION INTRAMUSCULAR; INTRAVENOUS at 08:37

## 2023-01-01 RX ADMIN — PANTOPRAZOLE SODIUM 40 MG: 40 INJECTION, POWDER, LYOPHILIZED, FOR SOLUTION INTRAVENOUS at 06:15

## 2023-01-01 RX ADMIN — FLECAINIDE ACETATE 50 MG: 50 TABLET ORAL at 08:37

## 2023-01-01 RX ADMIN — DOCUSATE SODIUM 100 MG: 50 LIQUID ORAL at 20:43

## 2023-01-01 RX ADMIN — ASPIRIN 300 MG: 300 SUPPOSITORY RECTAL at 18:26

## 2023-01-01 RX ADMIN — DEXMEDETOMIDINE HYDROCHLORIDE 0.2 MCG/KG/HR: 4 INJECTION, SOLUTION INTRAVENOUS at 15:02

## 2023-01-01 RX ADMIN — SENNOSIDES 10 ML: 8.8 LIQUID ORAL at 20:53

## 2023-01-01 RX ADMIN — ACETAMINOPHEN 650 MG: 650 SOLUTION ORAL at 09:59

## 2023-01-01 RX ADMIN — Medication 30 ML: at 08:28

## 2023-01-01 RX ADMIN — LINEZOLID 600 MG: 600 INJECTION, SOLUTION INTRAVENOUS at 09:46

## 2023-01-01 RX ADMIN — Medication 50 MCG/HR: at 05:13

## 2023-01-01 RX ADMIN — LEVETIRACETAM 500 MG: 5 INJECTION INTRAVASCULAR at 05:07

## 2023-01-01 RX ADMIN — FLECAINIDE ACETATE 50 MG: 50 TABLET ORAL at 20:29

## 2023-01-01 RX ADMIN — LEVETIRACETAM 500 MG: 100 INJECTION, SOLUTION INTRAVENOUS at 17:35

## 2023-01-01 RX ADMIN — Medication 10 ML: at 09:53

## 2023-01-01 RX ADMIN — CIPROFLOXACIN AND DEXAMETHASONE 4 DROP: 3; 1 SUSPENSION/ DROPS AURICULAR (OTIC) at 09:44

## 2023-01-01 RX ADMIN — DEXAMETHASONE SODIUM PHOSPHATE 10 MG: 10 INJECTION, SOLUTION INTRAMUSCULAR; INTRAVENOUS at 20:43

## 2023-01-01 RX ADMIN — DEXMEDETOMIDINE HYDROCHLORIDE 0.4 MCG/KG/HR: 4 INJECTION, SOLUTION INTRAVENOUS at 11:05

## 2023-01-01 RX ADMIN — VANCOMYCIN HYDROCHLORIDE 750 MG: 750 INJECTION, SOLUTION INTRAVENOUS at 16:02

## 2023-01-01 RX ADMIN — INSULIN HUMAN 2 UNITS: 100 INJECTION, SOLUTION PARENTERAL at 00:12

## 2023-01-01 RX ADMIN — ACETAMINOPHEN 650 MG: 650 SOLUTION ORAL at 14:29

## 2023-01-01 RX ADMIN — PANTOPRAZOLE SODIUM 40 MG: 40 INJECTION, POWDER, LYOPHILIZED, FOR SOLUTION INTRAVENOUS at 06:14

## 2023-01-01 RX ADMIN — CEFTRIAXONE 2 G: 2 INJECTION, POWDER, FOR SOLUTION INTRAMUSCULAR; INTRAVENOUS at 00:35

## 2023-01-01 RX ADMIN — CHLORHEXIDINE GLUCONATE 0.12% ORAL RINSE 15 ML: 1.2 LIQUID ORAL at 09:11

## 2023-01-01 RX ADMIN — MAGNESIUM SULFATE HEPTAHYDRATE 2 G: 2 INJECTION, SOLUTION INTRAVENOUS at 23:23

## 2023-01-01 RX ADMIN — FLECAINIDE ACETATE 50 MG: 50 TABLET ORAL at 20:38

## 2023-01-01 RX ADMIN — CHLORHEXIDINE GLUCONATE 0.12% ORAL RINSE 15 ML: 1.2 LIQUID ORAL at 09:03

## 2023-01-01 RX ADMIN — Medication 50 MCG/HR: at 04:35

## 2023-01-01 RX ADMIN — CEFEPIME 2 G: 2 INJECTION, POWDER, FOR SOLUTION INTRAVENOUS at 17:05

## 2023-01-01 RX ADMIN — Medication 30 ML: at 22:06

## 2023-01-01 RX ADMIN — Medication 10 ML: at 08:35

## 2023-01-01 RX ADMIN — CHLORHEXIDINE GLUCONATE 0.12% ORAL RINSE 15 ML: 1.2 LIQUID ORAL at 20:56

## 2023-01-01 RX ADMIN — POLYETHYLENE GLYCOL 3350 17 G: 17 POWDER, FOR SOLUTION ORAL at 09:23

## 2023-01-01 RX ADMIN — Medication 30 ML: at 21:40

## 2023-01-01 RX ADMIN — LEVETIRACETAM 500 MG: 5 INJECTION INTRAVASCULAR at 17:13

## 2023-01-01 RX ADMIN — ASPIRIN 81 MG CHEWABLE TABLET 81 MG: 81 TABLET CHEWABLE at 09:11

## 2023-01-01 RX ADMIN — DEXAMETHASONE SODIUM PHOSPHATE 10 MG: 10 INJECTION, SOLUTION INTRAMUSCULAR; INTRAVENOUS at 02:00

## 2023-01-01 RX ADMIN — LEVETIRACETAM 500 MG: 5 INJECTION INTRAVASCULAR at 18:11

## 2023-01-01 RX ADMIN — PANTOPRAZOLE SODIUM 40 MG: 40 INJECTION, POWDER, LYOPHILIZED, FOR SOLUTION INTRAVENOUS at 05:18

## 2023-01-01 RX ADMIN — CHLORHEXIDINE GLUCONATE 0.12% ORAL RINSE 15 ML: 1.2 LIQUID ORAL at 08:34

## 2023-01-01 RX ADMIN — CIPROFLOXACIN AND DEXAMETHASONE 4 DROP: 3; 1 SUSPENSION/ DROPS AURICULAR (OTIC) at 08:17

## 2023-01-01 RX ADMIN — LOSARTAN POTASSIUM 50 MG: 50 TABLET, FILM COATED ORAL at 09:11

## 2023-01-01 RX ADMIN — ASPIRIN 81 MG CHEWABLE TABLET 81 MG: 81 TABLET CHEWABLE at 09:44

## 2023-01-01 RX ADMIN — FLECAINIDE ACETATE 50 MG: 50 TABLET ORAL at 21:21

## 2023-01-01 RX ADMIN — DEXMEDETOMIDINE HYDROCHLORIDE 0.2 MCG/KG/HR: 4 INJECTION, SOLUTION INTRAVENOUS at 09:53

## 2023-01-01 RX ADMIN — DEXAMETHASONE SODIUM PHOSPHATE 10 MG: 10 INJECTION, SOLUTION INTRAMUSCULAR; INTRAVENOUS at 08:18

## 2023-01-01 RX ADMIN — LEVETIRACETAM 500 MG: 5 INJECTION INTRAVASCULAR at 06:14

## 2023-01-01 RX ADMIN — CEFTRIAXONE 2 G: 2 INJECTION, POWDER, FOR SOLUTION INTRAMUSCULAR; INTRAVENOUS at 13:16

## 2023-01-01 RX ADMIN — PANTOPRAZOLE SODIUM 40 MG: 40 INJECTION, POWDER, LYOPHILIZED, FOR SOLUTION INTRAVENOUS at 05:44

## 2023-01-01 RX ADMIN — Medication 10 ML: at 20:38

## 2023-01-01 RX ADMIN — CHLORHEXIDINE GLUCONATE 0.12% ORAL RINSE 15 ML: 1.2 LIQUID ORAL at 08:23

## 2023-01-01 RX ADMIN — CHLORHEXIDINE GLUCONATE 0.12% ORAL RINSE 15 ML: 1.2 LIQUID ORAL at 09:59

## 2023-01-01 RX ADMIN — DEXMEDETOMIDINE HYDROCHLORIDE 1.5 MCG/KG/HR: 4 INJECTION, SOLUTION INTRAVENOUS at 09:37

## 2023-01-01 RX ADMIN — CEFTRIAXONE 2 G: 2 INJECTION, POWDER, FOR SOLUTION INTRAMUSCULAR; INTRAVENOUS at 20:22

## 2023-01-01 RX ADMIN — SENNOSIDES 10 ML: 8.8 LIQUID ORAL at 08:23

## 2023-01-01 RX ADMIN — LEVETIRACETAM 500 MG: 5 INJECTION INTRAVASCULAR at 18:20

## 2023-01-01 RX ADMIN — FUROSEMIDE 40 MG: 10 INJECTION, SOLUTION INTRAMUSCULAR; INTRAVENOUS at 11:51

## 2023-01-01 RX ADMIN — SENNOSIDES 10 ML: 8.8 LIQUID ORAL at 20:22

## 2023-01-01 RX ADMIN — Medication 10 ML: at 20:09

## 2023-01-01 RX ADMIN — PANTOPRAZOLE SODIUM 40 MG: 40 INJECTION, POWDER, LYOPHILIZED, FOR SOLUTION INTRAVENOUS at 05:45

## 2023-01-01 RX ADMIN — FLECAINIDE ACETATE 50 MG: 50 TABLET ORAL at 11:50

## 2023-01-01 RX ADMIN — LEVETIRACETAM 500 MG: 5 INJECTION INTRAVASCULAR at 05:43

## 2023-01-01 RX ADMIN — LINEZOLID 600 MG: 600 INJECTION, SOLUTION INTRAVENOUS at 22:21

## 2023-01-01 RX ADMIN — FLECAINIDE ACETATE 50 MG: 50 TABLET ORAL at 09:11

## 2023-01-01 RX ADMIN — FLECAINIDE ACETATE 50 MG: 50 TABLET ORAL at 20:43

## 2023-01-01 RX ADMIN — LEVETIRACETAM 500 MG: 5 INJECTION INTRAVASCULAR at 18:26

## 2023-01-01 RX ADMIN — DEXAMETHASONE SODIUM PHOSPHATE 10 MG: 10 INJECTION, SOLUTION INTRAMUSCULAR; INTRAVENOUS at 17:13

## 2023-01-01 RX ADMIN — FLECAINIDE ACETATE 50 MG: 50 TABLET ORAL at 08:22

## 2023-01-01 RX ADMIN — VANCOMYCIN HYDROCHLORIDE 1250 MG: 10 INJECTION, POWDER, LYOPHILIZED, FOR SOLUTION INTRAVENOUS at 03:21

## 2023-01-01 RX ADMIN — CEFTRIAXONE 2 G: 2 INJECTION, POWDER, FOR SOLUTION INTRAMUSCULAR; INTRAVENOUS at 20:19

## 2023-01-01 RX ADMIN — CHLORHEXIDINE GLUCONATE 0.12% ORAL RINSE 15 ML: 1.2 LIQUID ORAL at 08:51

## 2023-01-01 RX ADMIN — ASPIRIN 81 MG CHEWABLE TABLET 81 MG: 81 TABLET CHEWABLE at 08:37

## 2023-01-01 RX ADMIN — LINEZOLID 600 MG: 600 INJECTION, SOLUTION INTRAVENOUS at 10:32

## 2023-01-01 RX ADMIN — CIPROFLOXACIN AND DEXAMETHASONE 4 DROP: 3; 1 SUSPENSION/ DROPS AURICULAR (OTIC) at 20:46

## 2023-01-01 RX ADMIN — CEFTRIAXONE 2 G: 2 INJECTION, POWDER, FOR SOLUTION INTRAMUSCULAR; INTRAVENOUS at 09:59

## 2023-01-01 RX ADMIN — FLECAINIDE ACETATE 50 MG: 50 TABLET ORAL at 09:58

## 2023-01-01 RX ADMIN — POTASSIUM PHOSPHATE, MONOBASIC POTASSIUM PHOSPHATE, DIBASIC 15 MMOL: 224; 236 INJECTION, SOLUTION, CONCENTRATE INTRAVENOUS at 20:09

## 2023-01-01 RX ADMIN — LORAZEPAM 0.5 MG: 2 INJECTION INTRAMUSCULAR; INTRAVENOUS at 17:35

## 2023-01-01 RX ADMIN — LINEZOLID 600 MG: 600 INJECTION, SOLUTION INTRAVENOUS at 20:43

## 2023-01-01 RX ADMIN — Medication 10 ML: at 22:01

## 2023-01-01 RX ADMIN — INSULIN HUMAN 2 UNITS: 100 INJECTION, SOLUTION PARENTERAL at 12:34

## 2023-01-01 RX ADMIN — DOCUSATE SODIUM 100 MG: 50 LIQUID ORAL at 20:29

## 2023-01-01 RX ADMIN — ACYCLOVIR SODIUM 720 MG: 500 INJECTION, SOLUTION INTRAVENOUS at 22:14

## 2023-01-01 RX ADMIN — Medication 10 ML: at 13:13

## 2023-01-01 RX ADMIN — CEFEPIME 2 G: 2 INJECTION, POWDER, FOR SOLUTION INTRAVENOUS at 01:49

## 2023-01-01 RX ADMIN — POLYETHYLENE GLYCOL 3350 17 G: 17 POWDER, FOR SOLUTION ORAL at 08:23

## 2023-01-01 RX ADMIN — LINEZOLID 600 MG: 600 INJECTION, SOLUTION INTRAVENOUS at 13:10

## 2023-01-01 RX ADMIN — Medication 10 ML: at 08:59

## 2023-01-01 RX ADMIN — LEVETIRACETAM 500 MG: 100 INJECTION, SOLUTION INTRAVENOUS at 18:05

## 2023-01-01 RX ADMIN — ACYCLOVIR SODIUM 720 MG: 500 INJECTION, SOLUTION INTRAVENOUS at 06:24

## 2023-01-01 RX ADMIN — INSULIN HUMAN 2 UNITS: 100 INJECTION, SOLUTION PARENTERAL at 17:44

## 2023-01-01 RX ADMIN — FUROSEMIDE 40 MG: 10 INJECTION, SOLUTION INTRAMUSCULAR; INTRAVENOUS at 20:52

## 2023-01-01 RX ADMIN — ETOMIDATE 20 MG: 2 INJECTION, SOLUTION INTRAVENOUS at 14:36

## 2023-01-01 RX ADMIN — FLECAINIDE ACETATE 50 MG: 50 TABLET ORAL at 20:22

## 2023-01-01 RX ADMIN — HYDROMORPHONE HYDROCHLORIDE 0.5 MG: 1 INJECTION, SOLUTION INTRAMUSCULAR; INTRAVENOUS; SUBCUTANEOUS at 17:35

## 2023-01-01 RX ADMIN — CEFEPIME 2 G: 2 INJECTION, POWDER, FOR SOLUTION INTRAVENOUS at 09:16

## 2023-01-01 RX ADMIN — VANCOMYCIN HYDROCHLORIDE 1750 MG: 10 INJECTION, POWDER, LYOPHILIZED, FOR SOLUTION INTRAVENOUS at 14:18

## 2023-01-01 RX ADMIN — LORAZEPAM 1 MG: 2 INJECTION INTRAMUSCULAR; INTRAVENOUS at 11:48

## 2023-01-01 RX ADMIN — PROPOFOL 15 MCG/KG/MIN: 10 INJECTION, EMULSION INTRAVENOUS at 04:17

## 2023-01-01 RX ADMIN — HEPARIN SODIUM 14 UNITS/KG/HR: 10000 INJECTION, SOLUTION INTRAVENOUS at 04:36

## 2023-01-01 RX ADMIN — SENNOSIDES 10 ML: 8.8 LIQUID ORAL at 09:22

## 2023-01-01 RX ADMIN — FLECAINIDE ACETATE 50 MG: 50 TABLET ORAL at 09:03

## 2023-01-01 RX ADMIN — DEXAMETHASONE SODIUM PHOSPHATE 10 MG: 10 INJECTION, SOLUTION INTRAMUSCULAR; INTRAVENOUS at 08:35

## 2023-01-01 RX ADMIN — IOPAMIDOL 115 ML: 755 INJECTION, SOLUTION INTRAVENOUS at 11:41

## 2023-01-01 RX ADMIN — INSULIN HUMAN 2 UNITS: 100 INJECTION, SOLUTION PARENTERAL at 00:16

## 2023-01-01 RX ADMIN — ACETAMINOPHEN 650 MG: 650 SOLUTION ORAL at 20:04

## 2023-01-01 RX ADMIN — AMPICILLIN 2 G: 2 INJECTION, POWDER, FOR SOLUTION INTRAVENOUS at 08:35

## 2023-01-01 RX ADMIN — LOSARTAN POTASSIUM 50 MG: 50 TABLET, FILM COATED ORAL at 09:18

## 2023-01-01 RX ADMIN — SODIUM CHLORIDE 500 ML: 9 INJECTION, SOLUTION INTRAVENOUS at 00:12

## 2023-01-01 RX ADMIN — DEXAMETHASONE SODIUM PHOSPHATE 10 MG: 10 INJECTION, SOLUTION INTRAMUSCULAR; INTRAVENOUS at 13:11

## 2023-01-01 RX ADMIN — SENNOSIDES 10 ML: 8.8 LIQUID ORAL at 20:29

## 2023-01-01 RX ADMIN — INSULIN HUMAN 2 UNITS: 100 INJECTION, SOLUTION PARENTERAL at 17:51

## 2023-01-01 RX ADMIN — LEVETIRACETAM 500 MG: 5 INJECTION INTRAVASCULAR at 06:15

## 2023-01-01 RX ADMIN — VANCOMYCIN HYDROCHLORIDE 1750 MG: 10 INJECTION, POWDER, LYOPHILIZED, FOR SOLUTION INTRAVENOUS at 09:16

## 2023-01-01 RX ADMIN — BUMETANIDE 0.5 MG: 0.25 INJECTION, SOLUTION INTRAMUSCULAR; INTRAVENOUS at 00:35

## 2023-01-01 RX ADMIN — INSULIN HUMAN 2 UNITS: 100 INJECTION, SOLUTION PARENTERAL at 13:29

## 2023-01-01 RX ADMIN — PROPOFOL 5 MCG/KG/MIN: 10 INJECTION, EMULSION INTRAVENOUS at 09:44

## 2023-01-01 RX ADMIN — ACETAMINOPHEN 650 MG: 650 SOLUTION ORAL at 21:47

## 2023-01-01 RX ADMIN — PANTOPRAZOLE SODIUM 40 MG: 40 INJECTION, POWDER, LYOPHILIZED, FOR SOLUTION INTRAVENOUS at 06:30

## 2023-01-01 RX ADMIN — FLECAINIDE ACETATE 50 MG: 50 TABLET ORAL at 09:18

## 2023-01-01 RX ADMIN — CEFTRIAXONE 2 G: 2 INJECTION, POWDER, FOR SOLUTION INTRAMUSCULAR; INTRAVENOUS at 13:05

## 2023-01-01 RX ADMIN — CHLORHEXIDINE GLUCONATE 0.12% ORAL RINSE 15 ML: 1.2 LIQUID ORAL at 22:00

## 2023-01-01 RX ADMIN — LOSARTAN POTASSIUM 50 MG: 50 TABLET, FILM COATED ORAL at 08:36

## 2023-01-01 RX ADMIN — CEFTRIAXONE 2 G: 2 INJECTION, POWDER, FOR SOLUTION INTRAMUSCULAR; INTRAVENOUS at 08:51

## 2023-01-01 RX ADMIN — CEFTRIAXONE 2 G: 2 INJECTION, POWDER, FOR SOLUTION INTRAMUSCULAR; INTRAVENOUS at 20:49

## 2023-01-01 RX ADMIN — HEPARIN SODIUM 15 UNITS/KG/HR: 10000 INJECTION, SOLUTION INTRAVENOUS at 12:57

## 2023-01-01 RX ADMIN — LEVETIRACETAM 500 MG: 5 INJECTION INTRAVASCULAR at 05:40

## 2023-01-01 RX ADMIN — LINEZOLID 600 MG: 600 INJECTION, SOLUTION INTRAVENOUS at 22:30

## 2023-01-01 RX ADMIN — LOSARTAN POTASSIUM 50 MG: 50 TABLET, FILM COATED ORAL at 09:03

## 2023-01-01 RX ADMIN — HEPARIN SODIUM 15 UNITS/KG/HR: 10000 INJECTION, SOLUTION INTRAVENOUS at 11:58

## 2023-01-01 RX ADMIN — DEXMEDETOMIDINE HYDROCHLORIDE 0.8 MCG/KG/HR: 4 INJECTION, SOLUTION INTRAVENOUS at 18:19

## 2023-01-01 RX ADMIN — CHLORHEXIDINE GLUCONATE 0.12% ORAL RINSE 15 ML: 1.2 LIQUID ORAL at 09:45

## 2023-01-01 RX ADMIN — DEXMEDETOMIDINE HYDROCHLORIDE 0.8 MCG/KG/HR: 4 INJECTION, SOLUTION INTRAVENOUS at 05:44

## 2023-01-01 RX ADMIN — HEPARIN SODIUM 15 UNITS/KG/HR: 10000 INJECTION, SOLUTION INTRAVENOUS at 14:04

## 2023-01-01 RX ADMIN — PROPOFOL 10 MCG/KG/MIN: 10 INJECTION, EMULSION INTRAVENOUS at 05:16

## 2023-01-01 RX ADMIN — CEFTRIAXONE 2 G: 2 INJECTION, POWDER, FOR SOLUTION INTRAMUSCULAR; INTRAVENOUS at 01:59

## 2023-01-01 RX ADMIN — SODIUM CHLORIDE 2 G: 900 INJECTION INTRAVENOUS at 13:42

## 2023-01-01 RX ADMIN — DEXMEDETOMIDINE HYDROCHLORIDE 1.5 MCG/KG/HR: 4 INJECTION, SOLUTION INTRAVENOUS at 00:05

## 2023-01-01 RX ADMIN — ACYCLOVIR SODIUM 720 MG: 500 INJECTION, SOLUTION INTRAVENOUS at 13:46

## 2023-01-01 RX ADMIN — DEXAMETHASONE SODIUM PHOSPHATE 10 MG: 10 INJECTION, SOLUTION INTRAMUSCULAR; INTRAVENOUS at 14:02

## 2023-01-01 RX ADMIN — CIPROFLOXACIN AND DEXAMETHASONE 4 DROP: 3; 1 SUSPENSION/ DROPS AURICULAR (OTIC) at 20:26

## 2023-01-01 RX ADMIN — DEXMEDETOMIDINE HYDROCHLORIDE 0.2 MCG/KG/HR: 4 INJECTION, SOLUTION INTRAVENOUS at 11:24

## 2023-01-01 RX ADMIN — DEXMEDETOMIDINE HYDROCHLORIDE 0.8 MCG/KG/HR: 4 INJECTION, SOLUTION INTRAVENOUS at 02:37

## 2023-01-01 RX ADMIN — LEVETIRACETAM 500 MG: 5 INJECTION INTRAVASCULAR at 06:28

## 2023-01-01 RX ADMIN — CEFTRIAXONE 2 G: 2 INJECTION, POWDER, FOR SOLUTION INTRAMUSCULAR; INTRAVENOUS at 20:53

## 2023-01-01 RX ADMIN — INSULIN HUMAN 2 UNITS: 100 INJECTION, SOLUTION PARENTERAL at 06:22

## 2023-01-01 RX ADMIN — HEPARIN SODIUM 11.5 UNITS/KG/HR: 10000 INJECTION, SOLUTION INTRAVENOUS at 11:58

## 2023-01-01 RX ADMIN — CHLORHEXIDINE GLUCONATE 0.12% ORAL RINSE 15 ML: 1.2 LIQUID ORAL at 08:52

## 2023-01-01 RX ADMIN — FLECAINIDE ACETATE 50 MG: 50 TABLET ORAL at 21:39

## 2023-01-01 RX ADMIN — CEFTRIAXONE 2 G: 2 INJECTION, POWDER, FOR SOLUTION INTRAMUSCULAR; INTRAVENOUS at 08:52

## 2023-01-01 RX ADMIN — FLECAINIDE ACETATE 50 MG: 50 TABLET ORAL at 20:49

## 2023-01-01 RX ADMIN — CEFEPIME 2 G: 2 INJECTION, POWDER, FOR SOLUTION INTRAVENOUS at 11:15

## 2023-01-01 RX ADMIN — PROPOFOL 10 MCG/KG/MIN: 10 INJECTION, EMULSION INTRAVENOUS at 15:37

## 2023-01-01 RX ADMIN — LEVETIRACETAM 500 MG: 5 INJECTION INTRAVASCULAR at 18:01

## 2023-01-01 RX ADMIN — AMPICILLIN 2 G: 2 INJECTION, POWDER, FOR SOLUTION INTRAVENOUS at 20:22

## 2023-01-01 RX ADMIN — LEVETIRACETAM 500 MG: 5 INJECTION INTRAVASCULAR at 05:44

## 2023-01-01 RX ADMIN — ACETAMINOPHEN 650 MG: 650 SOLUTION ORAL at 06:09

## 2023-01-01 RX ADMIN — VANCOMYCIN HYDROCHLORIDE 1500 MG: 10 INJECTION, POWDER, LYOPHILIZED, FOR SOLUTION INTRAVENOUS at 08:23

## 2023-01-01 RX ADMIN — MAGNESIUM SULFATE HEPTAHYDRATE 2 G: 2 INJECTION, SOLUTION INTRAVENOUS at 21:27

## 2023-01-01 RX ADMIN — DOCUSATE SODIUM 100 MG: 50 LIQUID ORAL at 09:11

## 2023-01-01 RX ADMIN — CHLORHEXIDINE GLUCONATE 0.12% ORAL RINSE 15 ML: 1.2 LIQUID ORAL at 20:28

## 2023-01-01 RX ADMIN — INSULIN HUMAN 2 UNITS: 100 INJECTION, SOLUTION PARENTERAL at 13:10

## 2023-01-01 RX ADMIN — Medication 50 MCG/HR: at 06:43

## 2023-01-01 RX ADMIN — INSULIN HUMAN 2 UNITS: 100 INJECTION, SOLUTION PARENTERAL at 00:21

## 2023-01-01 RX ADMIN — CHLORHEXIDINE GLUCONATE 0.12% ORAL RINSE 15 ML: 1.2 LIQUID ORAL at 20:43

## 2023-01-01 RX ADMIN — Medication 10 ML: at 09:18

## 2023-01-01 RX ADMIN — DEXAMETHASONE SODIUM PHOSPHATE 10 MG: 10 INJECTION, SOLUTION INTRAMUSCULAR; INTRAVENOUS at 14:47

## 2023-01-01 RX ADMIN — CHLORHEXIDINE GLUCONATE 0.12% ORAL RINSE 15 ML: 1.2 LIQUID ORAL at 20:52

## 2023-01-01 RX ADMIN — HEPARIN SODIUM 9 UNITS/KG/HR: 10000 INJECTION, SOLUTION INTRAVENOUS at 02:36

## 2023-01-01 RX ADMIN — PANTOPRAZOLE SODIUM 40 MG: 40 INJECTION, POWDER, LYOPHILIZED, FOR SOLUTION INTRAVENOUS at 05:02

## 2023-01-01 RX ADMIN — Medication 30 ML: at 09:17

## 2023-01-01 RX ADMIN — CEFTRIAXONE 2 G: 2 INJECTION, POWDER, FOR SOLUTION INTRAMUSCULAR; INTRAVENOUS at 20:45

## 2023-03-03 PROCEDURE — 93296 REM INTERROG EVL PM/IDS: CPT | Performed by: STUDENT IN AN ORGANIZED HEALTH CARE EDUCATION/TRAINING PROGRAM

## 2023-03-03 PROCEDURE — 93294 REM INTERROG EVL PM/LDLS PM: CPT | Performed by: STUDENT IN AN ORGANIZED HEALTH CARE EDUCATION/TRAINING PROGRAM

## 2023-03-30 PROBLEM — A41.9 SEPSIS, UNSPECIFIED ORGANISM: Status: ACTIVE | Noted: 2023-01-01

## 2023-03-30 PROBLEM — R41.82 ALTERED MENTAL STATUS: Status: ACTIVE | Noted: 2023-01-01

## 2023-03-30 PROBLEM — G03.9 MENINGITIS: Status: ACTIVE | Noted: 2023-01-01

## 2023-03-30 PROBLEM — H70.90 MASTOIDITIS: Status: ACTIVE | Noted: 2023-01-01

## 2023-03-30 PROBLEM — R41.82 AMS (ALTERED MENTAL STATUS): Status: ACTIVE | Noted: 2023-03-30

## 2023-03-30 PROBLEM — G93.41 ENCEPHALOPATHY, METABOLIC: Status: ACTIVE | Noted: 2023-03-30

## 2023-03-30 PROBLEM — G06.2 SUBDURAL EMPYEMA: Status: ACTIVE | Noted: 2023-01-01

## 2023-03-30 PROBLEM — G40.901 STATUS EPILEPTICUS: Status: ACTIVE | Noted: 2023-01-01

## 2023-03-30 NOTE — PROCEDURES
MYRINGOTOMY WITH INSERTION OF EAR TUBES  Procedure Report    Patient Name:  Kai Dodson  YOB: 1940    Date of Surgery:  3/30/2023     Indications: 82-year-old male with encephalopathy concerning that the etiology is acute otitis media resulting in a subdural empyema.  It was felt that myringotomy with possible tube placement would be indicated.  Risks and complications were discussed with the family and consent was obtained    Pre-op Diagnosis:   Acute suppurative otitis media       Post-Op Diagnosis Codes:  Acute suppurative otitis media    Procedure/CPT® Codes:      Procedure(s):  RIGHT MYRINGOTOMY     Operative description  The procedure was performed at the bedside in the ICU.  After obtaining informed consent from the family using a combination of an otoscope and surgical loupes with a headlight I cleaned cerumen from the external auditory canal on the right side.  A wide myringotomy was made in the posterior aspect of the tympanic membrane.  A small amount of purulence was expressed and cultured.  After that CSF was draining through the myringotomy.  A tube was therefore not placed.    I suspect that the myringotomy and drainage will help in resolving the acute otitis media.  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 Recommend continuing IV antibiotics and will start topical antibiotics as well.         Staff:  Surgeon(s):  Edwin Garcia MD         Anesthesia: General    Estimated Blood Loss: none    Implants:    Nothing was implanted during the procedure    Specimen:          None        Findings: small amount of purulence and CSF    Complications: none                          Edwin Garcia MD     Date: 3/30/2023  Time: 19:15 EDT

## 2023-03-30 NOTE — CONSULTS
Stroke Consult Note    Patient Name: Kai Dodson   MRN: [id number]  Age: 82 y.o.  Sex: male  : 1940    Primary Care Physician: Derick Phillip MD  Referring Physician:  Dr. Edwin Key    TIME STROKE TEAM CALLED: 1048 EST     TIME PATIENT SEEN: 1109 EST    Handedness: Unknown  Race:      Chief Complaint/Reason for Consultation: AMS, mute, and left-sided weakness    HPI: Mr. Dodson is an 82 year old male with known medical diagnoses of essential hypertension, atrial fibrillation (on chronic OAC), CAD, PPM, and remote tobacco abuse, obstructive sleep apnea on CPAP therapy who presents to the emergency department via EMS for further evaluation of altered mental status, inability to speak, and left-sided weakness.  Per EMS report he last spoke with his family last evening at 2300 and at this time was in his normal state of health.  His wife was discharged from our hospital this morning and his son was in the process of bringing her back to [facility] for inpatient rehabilitation and he became worried when he could not reach his father.  When another family member went to check on him they found him in his current state and to be tremoring in all extremities.  EMS was contacted he was brought to our facility for further evaluation.  Per the son he has never had seizures in the past and has not been recently ill, to his knowledge.    The patient is on Eliquis 5 mg twice daily for atrial fibrillation however no antiplatelet medications.  He has no history of TIA/CVA per his son.    Last Known Normal Date/Time: 23 2300 EST     Review of Systems   Unable to perform ROS: Patient nonverbal    AMS    Past Medical History:   Diagnosis Date   • Arthritis    • Atrial fibrillation (HCC)    • Bradycardia    • Chronic hypertension     Probably essential   • Hypertension    • Hypertensive cardiovascular disease 2015    Recent progressive CCS class III-IV chest pain syndrome with normal  EKG and exertional dyspnea and fatigue with normal codominant coronary arteries and preserved systolic  left ventricular function, 2015.   • Intermittent palpitations 2014    Remote abnormal 24-hour Holter monitor demonstrating intermittent bradycardia with occasional PACs, May 2014.   • Lower extremity edema     Intermittent lower extremity edema felt secondary to antihypertensive therapy.    • Nephrolithiasis     Remote- 15 years ago   • MINO on CPAP    • MINO on CPAP     doesn't know setting    • Prostate troubles     enlarged   • Seasonal rhinitis     intermittent     Past Surgical History:   Procedure Laterality Date   • CARDIAC CATHETERIZATION      no stents   • CARDIAC ELECTROPHYSIOLOGY PROCEDURE N/A 10/24/2018    Procedure: Pacemaker DC new with Biotronik. Hold Eliquis one day prior.;  Surgeon: Harley Dacosta MD;  Location: [facility] INVASIVE LOCATION;  Service: Cardiology   • COLONOSCOPY     • ENDOSCOPY     • INSERT / REPLACE / REMOVE PACEMAKER       Family History   Problem Relation Age of Onset   • No Known Problems Mother    • Other Father         cardiac arrhythmia     Social History     Socioeconomic History   • Marital status:    Tobacco Use   • Smoking status: Former     Packs/day: 1.00     Types: Cigarettes     Quit date:      Years since quittin.2   • Smokeless tobacco: Never   Vaping Use   • Vaping Use: Never used   Substance and Sexual Activity   • Alcohol use: No   • Drug use: No   • Sexual activity: Defer     No Known Allergies  Prior to Admission medications    Medication Sig Start Date End Date Taking? Authorizing Provider   apixaban (ELIQUIS) 5 MG tablet tablet Take 1 tablet by mouth Every 12 (Twelve) Hours. 10/11/22   Nino Castillo MD   Diclofenac Sodium (VOLTAREN) 1 % gel gel APPLY TOPICALLY TO THE AFFECTED AREA FOUR TIMES DAILY 22   Provider, MD Nati   flecainide (TAMBOCOR) 50 MG tablet Take 1 tablet by mouth 2 (Two) Times a Day. 10/11/22   Jonathan  Nino WYLIE MD   isosorbide mononitrate (Imdur) 30 MG 24 hr tablet Take 1 tablet by mouth Daily. 10/11/22   Nino Castillo MD   magnesium oxide (MAG-OX) 400 MG tablet Take 1 tablet by mouth Daily. 10/11/22   Nino Castillo MD   metoprolol tartrate (LOPRESSOR) 25 MG tablet Take 1 tablet by mouth Every 12 (Twelve) Hours. 10/11/22   Nino Castillo MD   nitroglycerin (NITROSTAT) 0.4 MG SL tablet 1 under the tongue as needed for angina, may repeat q5mins for up three doses 10/11/22   Nnio Castillo MD   potassium chloride 10 MEQ CR tablet Take 1 tablet by mouth Daily. NEED APPOINTMENT WITH DR. CAMPBELL FOR FURTHER REFILLS 10/11/22   Nino Castillo MD   tadalafil (CIALIS) 5 MG tablet Take 5 mg by mouth As Needed for Erectile Dysfunction. 5/22/19   ProviderNati MD   telmisartan-hydrochlorothiazide (MICARDIS HCT) 80-25 MG per tablet Take 1 tablet by mouth Daily. Schedule appointment for further refills 10/11/22   Nino Castillo MD   traMADol (ULTRAM) 50 MG tablet Take 1 tablet by mouth Every 6 (Six) Hours As Needed for Moderate Pain . 8/25/22   Derik Soto MD         Temp:  [97.8 °F (36.6 °C)] 97.8 °F (36.6 °C)  Heart Rate:  [112] 112  Resp:  [20] 20  BP: (131)/(95) 131/95  Neurological Exam  Mental Status  Alert. Orientation: Unable to assess as patient is mute. Patient is nonverbal. Global aphasia present.  Patient does not follow commands.    Cranial Nerves  CN II: Vision test: Pupils are equal, round, and sluggish. Left normal visual field. Difficult to assess however patient does not seem to react to visual threat in right visual fields.  CN III, IV, VI: Extraocular movements intact bilaterally. No nystagmus.   Right pupil: 2 mm. Round. Sluggish.   Left pupil: 2 mm. Round. Sluggish.  CN V: Unable to assess.  CN VII: Patient unable to smile but seems to have a left facial asymmetry.  CN VIII: Unable to assess.  CN IX, X: Unable to assess.    Motor  Decreased muscle bulk throughout. No fasciculations  present. Increased muscle tone. The following abnormal movements were seen: Tremoring of all extremities.    Patient with no drift of bilateral upper extremities, stiff, 5/5 strength  Bilateral lower extremities with 1/5 strength, stiff.    Sensory  Light touch abnormality: All extremities.     Coordination    Unable to assess.    Gait    Unable to assess.      Physical Exam  Vitals reviewed.   Constitutional:       General: He is in acute distress.      Appearance: He is ill-appearing.   HENT:      Head: Normocephalic.      Mouth/Throat:      Mouth: Mucous membranes are dry.   Eyes:      Extraocular Movements: Extraocular movements intact. No nystagmus.      Comments: Pupils are equal, round, and sluggish   Cardiovascular:      Rate and Rhythm: Normal rate. Rhythm irregular.   Pulmonary:      Effort: Pulmonary effort is normal. No respiratory distress.      Comments: On 4 L nasal cannula  Skin:     General: Skin is warm and dry.   Neurological:      Mental Status: He is alert.      Cranial Nerves: Cranial nerve deficit present.      Sensory: Sensory deficit present.      Motor: Weakness present.   Psychiatric:      Comments: Patient unresponsive         Acute Stroke Data    Thrombolytic Inclusion / Exclusion Criteria    Time: 11:32 EDT  Person Administering Scale: FORREST Chu    Inclusion Criteria  [x]   18 years of age or greater   []   Onset of symptoms < 4.5 hours before beginning treatment (stroke onset = time patient was last seen well or without symptoms).   []   Diagnosis of acute ischemic stroke causing measurable disabling deficit (Complete Hemianopia, Any Aphasia, Visual or Sensory Extinction, Any weakness limiting sustained effort against gravity)   []   Any remaining deficit considered potentially disabling in view of patient and practitioner   Exclusion criteria (Do not proceed with Alteplase if any are checked under exclusion criteria)  [x]   Onset unknown or GREATER than 4.5 hours    []   ICH on CT/MRI   []   CT demonstrates hypodensity representing acute or subacute infarct   []   Significant head trauma or prior stroke in the previous 3 months   []   Symptoms suggestive of subarachnoid hemorrhage   []   History of un-ruptured intracranial aneurysm GREATER than 10 mm   []   Recent intracranial or intraspinal surgery within the last 3 months   []   Arterial puncture at a non-compressible site in the previous 7 days   []   Active internal bleeding   []   Acute bleeding tendency   []   Platelet count LESS than 100,000 for known hematological diseases such as leukemia, thrombocytopenia or chronic cirrhosis   []   Current use of anticoagulant with INR GREATER than 1.7 or PT GREATER than 15 seconds, aPTT GREATER than 40 seconds   []   Heparin received within 48 hours, resulting in abnormally elevated aPTT GREATER than upper limit of normal   [x]   Current use of direct thrombin inhibitors or direct factor Xa inhibitors in the past 48 hours   []   Elevated blood pressure refractory to treatment (systolic GREATER than 185 mm/Hg or diastolic  GREATER than 110 mm/Hg   []   Suspected infective endocarditis and aortic arch dissection   []   Current use of therapeutic treatment dose of low-molecular-weight heparin (LMWH) within the previous 24 hours   []   Structural GI malignancy or bleed   Relative exclusion for all patients  []   Only minor non-disabling symptoms   []   Pregnancy   []   Seizure at onset with postictal residual neurological impairments   []   Major surgery or previous trauma within past 14 days   []   History of previous spontaneous ICH, intracranial neoplasm, or AV malformation   []   Postpartum (within previous 14 days)   []   Recent GI or urinary tract hemorrhage (within previous 21 days)   []   Recent acute MI (within previous 3 months)   []   History of un-ruptured intracranial aneurysm LESS than 10 mm   []   History of ruptured intracranial aneurysm   []   Blood glucose LESS than  50 mg/dL (2.7 mmol/L)   []   Dural puncture within the last 7 days   []   Known GREATER than 10 cerebral microbleeds   Additional exclusions for patients with symptoms onset between 3 and 4.5 hours.  [x]   Age > 80.   []   On any anticoagulants regardless of INR  >>> Warfarin (Coumadin), Heparin, Enoxaparin (Lovenox), fondaparinux (Arixtra), bivalirudin (Angiomax), Argatroban, dabigatran (Pradaxa), rivaroxaban (Xarelto), or apixaban (Eliquis)   []   Severe stroke (NIHSS > 25).   []   History of BOTH diabetes and previous ischemic stroke.   []   The risks and benefits have been discussed with the patient or family related to the administration of IV thrombolytic therapy for stroke symptoms.   []   I have discussed and reviewed the patient's case and imaging with the attending prior to IV thrombolytic therapy.   N/A Time IV thrombolytic administered       Hospital Meds:  Scheduled-    Infusions-     PRNs- •  sodium chloride    Functional Status Prior to Current Stroke/Garnett Score: 0    NIH Stroke Scale  Time: 11:32 EDT  Person Administering Scale: FORREST Chu    Interval: baseline  1a. Level of Consciousness: 0-->Alert, keenly responsive  1b. LOC Questions: 2-->Answers neither question correctly  1c. LOC Commands: 2-->Performs neither task correctly  2. Best Gaze: 0-->Normal  3. Visual: 2-->Complete hemianopia (right)  4. Facial Palsy: 0-->Normal symmetrical movements  5a. Motor Arm, Left: 0-->No drift, limb holds 90 (or 45) degrees for full 10 secs  5b. Motor Arm, Right: 0-->No drift, limb holds 90 (or 45) degrees for full 10 secs  6a. Motor Leg, Left: 3-->No effort against gravity, leg falls to bed immediately  6b. Motor Leg, Right: 3-->No effort against gravity, leg falls to bed immediately  7. Limb Ataxia: 0-->Absent  8. Sensory: 1-->Mild-to-moderate sensory loss, patient feels pinprick is less sharp or is dull on the affected side, or there is a loss of superficial pain with pinprick, but  patient is aware of being touched  9. Best Language: 3-->Mute, global aphasia, no usable speech or auditory comprehension  10. Dysarthria: 2-->Severe dysarthria, patients speech is so slurred as to be unintelligible in the absence of or out of proportion to any dysphasia, or is mute/anarthric  11. Extinction and Inattention (formerly Neglect): 0-->No abnormality    Total (NIH Stroke Scale): 18    Results Reviewed:  I have personally reviewed current lab, radiology, and data and agree with results.    CT head without contrast reveals mastoiditis with hypodensity concerning for temporal abscess.  No evidence of hemorrhage.     CTA head/neck is negative for flow-limiting stenosis or large vessel occlusive stroke.    CTP is negative for large vessel occlusive stroke.    H/H14.4/43.1  Platelets 129  Glucose 115  Sodium 142  Potassium 3.3  Creatinine 0.99, BUN 21  AST 34  ALT 26  CK 57  Lactic acid 6.6  Ammonia 34  Urinalysis and UDS pending      Results for orders placed during the hospital encounter of 07/28/20    Adult Transthoracic Echo Complete W/ Cont if Necessary Per Protocol    Interpretation Summary  · Mild aortic valve regurgitation is present.  · Mild to moderate tricuspid valve regurgitation is present.  · Calculated right ventricular systolic pressure from tricuspid regurgitation is 33 mmHg.  · Mild pulmonic valve regurgitation is present.  · Estimated EF = 56%.  · Left ventricular systolic function is normal.  · Left ventricular diastolic dysfunction (grade I) consistent with impaired relaxation.  · Left atrial cavity size is borderline dilated.  · Mild mitral valve regurgitation is present.  · Normal right ventricular cavity size, wall thickness, systolic function and septal motion noted.  · The aortic valve exhibits mild sclerosis.  · No evidence of pulmonary hypertension is present.  · There is no evidence of pericardial effusion.       Assessment/Plan:    This is an 82 year old male with known medical  diagnoses of essential hypertension, atrial fibrillation (on chronic OAC), CAD, PPM, remote tobacco abuse, and obstructive sleep apnea on CPAP therapy who presents to the emergency department via EMS for further evaluation of altered mental status, inability to speak, and left-sided weakness.  He is not a candidate for IV thrombolytic therapy secondary to chronic anticoagulation use.  He is not a candidate for endovascular therapy as CTA head/neck and CT perfusion scan are negative for large vessel occlusive stroke.    Antiplatelet PTA: None  Anticoagulant PTA: Eliquis 5 mg twice daily      1. Altered mental status  -Suspected status epilepticus secondary to acute infection +/- TIA/CVA  -Patient received total of 2 mg IV Ativan  -Stat EEG  -Seizure precautions  -Lactic acid, CK, CMP, magnesium, and blood cultures  -MRI of the brain with and without contrast  -TIA/CVA order set without thrombolytic therapy has been initiated  -N.p.o.  -A1c and lipid panel  -TTE  -PT/OT/SLP evaluation when appropriate  -Bedrest for now  -Recommend ICU admission    2. Probable acute otomastoiditis with intracranial empyema versus abscess   -MRI of the brain with and without contrast  -Dr. Key has spoken with Dr. Yen, neurosurgery, and has paged ENT for further recommendations  -Blood cultures    3. Essential hypertension  -Allow autoregulation of blood pressure for adequate cerebral blood flow  -Nicardipine for SBP >220    4. Atrial fibrillation  -Hold Eliquis at this time as patient may require LP and/or surgical intervention for abscess    5. Obstructive sleep apnea  -Management per intensivist    Plan of care was discussed with the patient's son at bedside and Dr. Key.  He will be admitted to the hospital for further work-up.  Stroke neurology will continue to follow at this time.  Please call with any questions or concerns.  Thank for this consult.    Tereza Bautista, APRN  March 30, 2023  11:32 EDT

## 2023-03-30 NOTE — PROGRESS NOTES
Pharmacy Consult-Vancomycin Dosing  Kai Dodson is a  82 y.o. male receiving vancomycin therapy.     Indication: Meningitis  Consulting Provider: Eren SMALLS Consult: No    Goal AUC: 400 - 600 mg/L*hr    Current Antimicrobial Therapy  Anti-Infectives (From admission, onward)      Ordered     Dose/Rate Route Frequency Start Stop    03/30/23 1510  vancomycin in dextrose 5% 150 mL (VANCOCIN) IVPB 750 mg        Ordering Provider: Mundo Jason MD    750 mg Intravenous Every 12 Hours 03/31/23 0200 04/07/23 0159    03/30/23 1510  cefTRIAXone (ROCEPHIN) 2 g/100 mL 0.9% NS IVPB (MBP)        Ordering Provider: Mundo Jason MD    2 g  over 30 Minutes Intravenous Every 12 Hours 03/31/23 0100 04/07/23 0059    03/30/23 1510  acyclovir (ZOVIRAX) 720 mg in sodium chloride 0.9 % 250 mL IVPB        Ordering Provider: Mundo Jason MD    10 mg/kg × 71.9 kg (Ideal)  over 60 Minutes Intravenous Every 8 Hours 03/30/23 2200 04/06/23 2159    03/30/23 1510  ampicillin 2 g/100 mL 0.9% NS (MBP)        Ordering Provider: Mundo Jason MD    2 g  over 30 Minutes Intravenous Every 4 Hours 03/30/23 1800 04/06/23 1759    03/30/23 1240  cefTRIAXone (ROCEPHIN) 2 g/100 mL 0.9% NS IVPB (MBP)        Ordering Provider: Mundo Jason MD    2 g  over 30 Minutes Intravenous Once 03/30/23 1242 03/30/23 1417    03/30/23 1231  vancomycin 1750 mg/500 mL 0.9% NS IVPB ([facility])        Ordering Provider: Que Key MD    20 mg/kg × 88.5 kg Intravenous Once 03/30/23 1233 03/30/23 1418    03/30/23 1231  ampicillin 2 g/100 mL 0.9% NS (MBP)        Ordering Provider: Que Key MD    2 g  over 30 Minutes Intravenous Once 03/30/23 1233 03/30/23 1417    03/30/23 1231  acyclovir (ZOVIRAX) 720 mg in sodium chloride 0.9 % 250 mL IVPB        Ordering Provider: Que Key MD    10 mg/kg × 71.9 kg (Ideal)  over 60 Minutes Intravenous Once 03/30/23 1233 03/30/23 1452            Allergies  Allergies as of 03/30/2023    (No Known  "Allergies)       Labs    Results from last 7 days   Lab Units 03/30/23  1121 03/30/23  1117   BUN mg/dL 21  --    CREATININE mg/dL 0.99 0.90       Results from last 7 days   Lab Units 03/30/23  1121   WBC 10*3/mm3 9.63       Evaluation of Dosing     Last Dose Received in the ED/Outside Facility: 3/30 @ 1418  Is Patient on Dialysis or Renal Replacement: no    Ht - 176.5 cm (69.49\")  Wt - 88.5 kg (195 lb)    Estimated Creatinine Clearance: 63.9 mL/min (by C-G formula based on SCr of 0.99 mg/dL).    Intake & Output (last 3 days)       None            Microbiology and Radiology  Microbiology Results (last 10 days)       ** No results found for the last 240 hours. **            Reported Vancomycin Levels                         Insight[facility] AUC Calculation:    Current AUC:   -- mg/L*hr    Predicted Steady State AUC on Current Dose: -- mg/L*hr  _________________________________    Predicted Steady State AUC on New Dose:   491 mg/L*hr    Assessment/Plan:     1. Pharmacy to dose vancomycin for possible meningitis. Goal -600 mg/L*hr.  2. Patient received a loading dose of vancomycin 1250mg (~20 mg/kg) IV on 3/30 @ 1418. Will initiate a maintenance dose of vancomycin 750 mg (~8.4mg/kg) IV Q12hr on 3/31  @ 0200.  3. Assess clearance by vancomycin level on 3/30 @ 1300, prior to third dose.  4. Pharmacy will continue to monitor renal function, cultures and sensitivities, and clinical status to adjust regimen as necessary.    Antwon Lorenzana, PharmD  3/30/2023   15:14 EDT     "

## 2023-03-30 NOTE — ED PROVIDER NOTES
"Subjective   History of Present Illness  82-year-old male presents for evaluation of altered mental status.  The patient is currently unable to provide any history.  History is obtained from EMS personnel.  The patient is typically ambulatory and conversant at baseline.  He reportedly did not feel well yesterday and EMS was called to \"check him out.\"  At that time, his mental status was reportedly at baseline and he declined medical transport.  This morning, he was found by family to have a significant change in his mental status.  He was also very altered and reportedly had left-sided weakness.  EMS was called, stroke alert was initiated, and the patient was brought to our facility to be evaluated.  They report that his last known well was last night at around 11 PM.  Of note, the patient is anticoagulated with a NOAC.  He is not hypoglycemic per EMS personnel.  Awaiting arrival of family to further corroborate his history.        Review of Systems   Unable to perform ROS: Mental status change       Past Medical History:   Diagnosis Date   • Arthritis    • Atrial fibrillation (HCC)    • Bradycardia    • Chronic hypertension     Probably essential   • Hypertension    • Hypertensive cardiovascular disease 07/2015    Recent progressive CCS class III-IV chest pain syndrome with normal EKG and exertional dyspnea and fatigue with normal codominant coronary arteries and preserved systolic  left ventricular function, July 2015.   • Intermittent palpitations 05/01/2014    Remote abnormal 24-hour Holter monitor demonstrating intermittent bradycardia with occasional PACs, May 2014.   • Lower extremity edema     Intermittent lower extremity edema felt secondary to antihypertensive therapy.    • Nephrolithiasis     Remote- 15 years ago   • MINO on CPAP    • MINO on CPAP     doesn't know setting    • Prostate troubles     enlarged   • Seasonal rhinitis     intermittent       No Known Allergies    Past Surgical History:   Procedure " Laterality Date   • CARDIAC CATHETERIZATION      no stents   • CARDIAC ELECTROPHYSIOLOGY PROCEDURE N/A 10/24/2018    Procedure: Pacemaker DC new with Biotronik. Hold Eliquis one day prior.;  Surgeon: Harley Dacosta MD;  Location: [facility] INVASIVE LOCATION;  Service: Cardiology   • COLONOSCOPY     • ENDOSCOPY     • INSERT / REPLACE / REMOVE PACEMAKER         Family History   Problem Relation Age of Onset   • No Known Problems Mother    • Other Father         cardiac arrhythmia       Social History     Socioeconomic History   • Marital status:    Tobacco Use   • Smoking status: Former     Packs/day: 1.00     Types: Cigarettes     Quit date:      Years since quittin.2   • Smokeless tobacco: Never   Vaping Use   • Vaping Use: Never used   Substance and Sexual Activity   • Alcohol use: No   • Drug use: No   • Sexual activity: Defer           Objective   Physical Exam  Vitals and nursing note reviewed.   Constitutional:       Appearance: He is well-developed. He is not diaphoretic.      Comments: Ill-appearing male with altered LOC   HENT:      Head: Normocephalic and atraumatic.      Comments: Right tympanic membrane is erythematous and not bulging, no purulence noted auditory canal, no erythema or bogginess/soft tissue swelling noted to either mastoid  Eyes:      Pupils: Pupils are equal, round, and reactive to light.   Neck:      Vascular: No carotid bruit or JVD.   Cardiovascular:      Rate and Rhythm: Normal rate and regular rhythm.      Heart sounds: Normal heart sounds. No murmur heard.    No friction rub. No gallop.   Pulmonary:      Effort: Pulmonary effort is normal. No respiratory distress.      Breath sounds: Normal breath sounds. No wheezing or rales.   Abdominal:      General: Bowel sounds are normal. There is no distension.      Palpations: Abdomen is soft. There is no mass.      Tenderness: There is no abdominal tenderness. There is no guarding.   Musculoskeletal:         General:  Normal range of motion.   Skin:     General: Skin is warm and dry.      Coloration: Skin is not pale.      Findings: No erythema or rash.   Neurological:      Comments: Patient is quite altered and aphasic, he is able to track my finger with extraocular movements, he is moving all fours and not purposefully and is unable to follow any commands   Psychiatric:      Comments: Unable to adequately evaluate         Critical Care  Performed by: Que Key MD  Authorized by: Que Key MD     Critical care provider statement:     Critical care time (minutes):  55    Critical care was necessary to treat or prevent imminent or life-threatening deterioration of the following conditions:  Respiratory failure and CNS failure or compromise    Critical care was time spent personally by me on the following activities:  Development of treatment plan with patient or surrogate, discussions with consultants, evaluation of patient's response to treatment, examination of patient, obtaining history from patient or surrogate, ordering and performing treatments and interventions, ordering and review of laboratory studies, ordering and review of radiographic studies, pulse oximetry, re-evaluation of patient's condition, review of old charts and ventilator management  Intubation    Date/Time: 3/30/2023 6:32 PM  Performed by: Que Key MD  Authorized by: Que Key MD     Consent:     Consent obtained:  Verbal    Consent given by:  Healthcare agent    Risks, benefits, and alternatives were discussed: yes    Universal protocol:     Patient identity confirmed:  Arm band  Pre-procedure details:     Indication: failure to protect airway and predicted clinical deterioration      Patient status:  Unresponsive    Look externally: no concerns      Mallampati score:  II    Obstruction: none      Pharmacologic strategy: RSI      Induction agents:  Etomidate    Paralytics:  Succinylcholine  Procedure details:      "Preoxygenation:  Nonrebreather mask    CPR in progress: no      Intubation method:  Oral    Laryngoscope blade:  Mac 3    Tube size (mm):  7.5    Tube type:  Cuffed    Number of attempts:  1    Ventilation between attempts: no      Tube visualized through cords: yes    Placement assessment:     ETT at teeth/gumline (cm):  22    Tube secured with:  ETT antonio and adhesive tape    Breath sounds:  Equal    Placement verification: chest rise, colorimetric ETCO2, CXR verification, direct visualization, equal breath sounds and tube exhalation      CXR findings:  Appropriate position  Post-procedure details:     Procedure completion:  Tolerated well, no immediate complications               ED Course  ED Course as of 03/30/23 1840   Thu Mar 30, 2023   1630 82-year-old male presents for evaluation of altered mental status.  The patient is currently unable to provide any history.  History is obtained from EMS personnel.  The patient is typically ambulatory and conversant at baseline.  He reportedly did not feel well yesterday and EMS was called to \"check him out.\"  At that time, his mental status was at baseline and he declined medical transport.  This morning, he was found by family to have a significant change in his mental status.  He was very altered and reportedly had left-sided weakness. [DD]   1631 EMS was called, a stroke alert was initiated, and the patient was brought to our facility to be evaluated.  His last known well was last night at around 11 PM. [DD]   1631 Of note, the patient is anticoagulated with a NOAC.  He was taken directly to CT where I was met by our stroke navigator.  On arrival, the patient is quite altered and aphasic.  He is moving all 4 extremities nonpurposefully and is unable to follow any commands. [DD]   1632 CT head negative for bleed but revealed mastoiditis with pneumocephalus and potential intracranial abscess.  Further advanced imaging obtained to rule out stroke obtained.  The patient " was brought back to the ED where I was able to examine him more closely.  His right tympanic membrane appears mildly injected but is not bulging.  He has no mastoid soft tissue swelling or erythema/bogginess over his mastoid region. [DD]   1633 I spoke with Dr. Yen of neurosurgery regarding the patient's condition.  I spoke with Dr. Garcia of ENT regarding the patient's condition.  As the patient is anticoagulated, I am unable to perform a lumbar puncture emergently.  I spoke with Dr. Jason, our intensivist, regarding the patient's condition as well.  All were in agreement that the most prudent course of action at this point would be to initiate broad-spectrum antibiotics to cover for meningitis/encephalitis until lumbar puncture could be performed.  Vancomycin, cefepime, ampicillin, and acyclovir given.  Steroids initiated. [DD]   1634 While boarding in the emergency department, the patient continued to decline from a mental status standpoint.  GCS of 6.  I contacted Dr. Jason who agreed to proceed with intubation.  Family is in agreement as well. [DD]   1634   Using the glide scope, the patient was intubated without complication.  Precedex initiated for sedation. [DD]   1634 MRI brain has been ordered and is pending at this time.  The patient will be admitted to the ICU under the care of Dr. Jason for further evaluation and treatment.  He is hemodynamically stable at this time and family is aware/agreeable with the plan. [DD]      ED Course User Index  [DD] Que Key MD                                       Recent Results (from the past 24 hour(s))   POC Protime / INR    Collection Time: 03/30/23 11:16 AM    Specimen: Blood   Result Value Ref Range    Protime 17.9 (H) 12.8 - 15.2 seconds    INR 1.5 (H) 0.8 - 1.2   POC CHEM 8    Collection Time: 03/30/23 11:17 AM    Specimen: Blood   Result Value Ref Range    Glucose 140 (H) 70 - 130 mg/dL    BUN 26 8 - 26 mg/dL    Creatinine 0.90 0.60 - 1.30 mg/dL     Sodium 140 138 - 146 mmol/L    POC Potassium 3.4 (L) 3.5 - 4.9 mmol/L    Chloride 99 98 - 109 mmol/L    Total CO2 26 24 - 29 mmol/L    Hemoglobin 15.3 12.0 - 17.0 g/dL    Hematocrit 45 38 - 51 %    Ionized Calcium 1.26 1.20 - 1.32 mmol/L    eGFR 85.3 >60.0 mL/min/1.73   Single High Sensitivity Troponin T    Collection Time: 03/30/23 11:21 AM    Specimen: Blood   Result Value Ref Range    HS Troponin T 17 (H) <15 ng/L   aPTT    Collection Time: 03/30/23 11:21 AM    Specimen: Blood   Result Value Ref Range    PTT 27.0 22.0 - 39.0 seconds   Green Top (Gel)    Collection Time: 03/30/23 11:21 AM   Result Value Ref Range    Extra Tube Hold for add-ons.    Lavender Top    Collection Time: 03/30/23 11:21 AM   Result Value Ref Range    Extra Tube hold for add-on    Gold Top - SST    Collection Time: 03/30/23 11:21 AM   Result Value Ref Range    Extra Tube Hold for add-ons.    Gray Top    Collection Time: 03/30/23 11:21 AM   Result Value Ref Range    Extra Tube Hold for add-ons.    Light Blue Top    Collection Time: 03/30/23 11:21 AM   Result Value Ref Range    Extra Tube Hold for add-ons.    CBC Auto Differential    Collection Time: 03/30/23 11:21 AM    Specimen: Blood   Result Value Ref Range    WBC 9.63 3.40 - 10.80 10*3/mm3    RBC 4.52 4.14 - 5.80 10*6/mm3    Hemoglobin 14.4 13.0 - 17.7 g/dL    Hematocrit 43.1 37.5 - 51.0 %    MCV 95.4 79.0 - 97.0 fL    MCH 31.9 26.6 - 33.0 pg    MCHC 33.4 31.5 - 35.7 g/dL    RDW 12.2 (L) 12.3 - 15.4 %    RDW-SD 42.3 37.0 - 54.0 fl    MPV 9.6 6.0 - 12.0 fL    Platelets 129 (L) 140 - 450 10*3/mm3    Neutrophil % 92.5 (H) 42.7 - 76.0 %    Lymphocyte % 3.2 (L) 19.6 - 45.3 %    Monocyte % 3.6 (L) 5.0 - 12.0 %    Eosinophil % 0.0 (L) 0.3 - 6.2 %    Basophil % 0.1 0.0 - 1.5 %    Immature Grans % 0.6 (H) 0.0 - 0.5 %    Neutrophils, Absolute 8.90 (H) 1.70 - 7.00 10*3/mm3    Lymphocytes, Absolute 0.31 (L) 0.70 - 3.10 10*3/mm3    Monocytes, Absolute 0.35 0.10 - 0.90 10*3/mm3    Eosinophils,  Absolute 0.00 0.00 - 0.40 10*3/mm3    Basophils, Absolute 0.01 0.00 - 0.20 10*3/mm3    Immature Grans, Absolute 0.06 (H) 0.00 - 0.05 10*3/mm3    nRBC 0.0 0.0 - 0.2 /100 WBC   Comprehensive Metabolic Panel    Collection Time: 03/30/23 11:21 AM    Specimen: Blood   Result Value Ref Range    Glucose 150 (H) 65 - 99 mg/dL    BUN 21 8 - 23 mg/dL    Creatinine 0.99 0.76 - 1.27 mg/dL    Sodium 142 136 - 145 mmol/L    Potassium 3.3 (L) 3.5 - 5.2 mmol/L    Chloride 102 98 - 107 mmol/L    CO2 24.0 22.0 - 29.0 mmol/L    Calcium 9.4 8.6 - 10.5 mg/dL    Total Protein 6.6 6.0 - 8.5 g/dL    Albumin 4.2 3.5 - 5.2 g/dL    ALT (SGPT) 26 1 - 41 U/L    AST (SGOT) 34 1 - 40 U/L    Alkaline Phosphatase 108 39 - 117 U/L    Total Bilirubin 3.7 (H) 0.0 - 1.2 mg/dL    Globulin 2.4 gm/dL    A/G Ratio 1.8 g/dL    BUN/Creatinine Ratio 21.2 7.0 - 25.0    Anion Gap 16.0 (H) 5.0 - 15.0 mmol/L    eGFR 76.1 >60.0 mL/min/1.73   Acetaminophen Level    Collection Time: 03/30/23 11:21 AM    Specimen: Blood   Result Value Ref Range    Acetaminophen 6.5 0.0 - 30.0 mcg/mL   Ethanol    Collection Time: 03/30/23 11:21 AM    Specimen: Blood   Result Value Ref Range    Ethanol <10 0 - 10 mg/dL   Salicylate Level    Collection Time: 03/30/23 11:21 AM    Specimen: Blood   Result Value Ref Range    Salicylate 0.7 <=30.0 mg/dL   Magnesium    Collection Time: 03/30/23 11:21 AM    Specimen: Blood   Result Value Ref Range    Magnesium 1.8 1.6 - 2.4 mg/dL   CK    Collection Time: 03/30/23 11:21 AM    Specimen: Blood   Result Value Ref Range    Creatine Kinase 57 20 - 200 U/L   Lactic Acid, Plasma    Collection Time: 03/30/23 11:21 AM    Specimen: Blood   Result Value Ref Range    Lactate 6.6 (C) 0.5 - 2.0 mmol/L   Procalcitonin    Collection Time: 03/30/23 11:21 AM    Specimen: Blood   Result Value Ref Range    Procalcitonin 6.00 (H) 0.00 - 0.25 ng/mL   TSH    Collection Time: 03/30/23 11:21 AM    Specimen: Blood   Result Value Ref Range    TSH 1.280 0.270 - 4.200  uIU/mL   Hemoglobin A1c    Collection Time: 03/30/23 11:21 AM    Specimen: Blood   Result Value Ref Range    Hemoglobin A1C 5.40 4.80 - 5.60 %   Urinalysis With Culture If Indicated - Straight Cath    Collection Time: 03/30/23 12:00 PM    Specimen: Straight Cath; Urine   Result Value Ref Range    Color, UA Yellow Yellow, Straw    Appearance, UA Clear Clear    pH, UA 7.5 5.0 - 8.0    Specific Gravity, UA 1.020 1.001 - 1.030    Glucose, UA Negative Negative    Ketones, UA Trace (A) Negative    Bilirubin, UA Negative Negative    Blood, UA Negative Negative    Protein, UA Negative Negative    Leuk Esterase, UA Negative Negative    Nitrite, UA Negative Negative    Urobilinogen, UA 1.0 E.U./dL 0.2 - 1.0 E.U./dL   Ammonia    Collection Time: 03/30/23 12:00 PM    Specimen: Arm, Right; Blood   Result Value Ref Range    Ammonia 34 16 - 60 umol/L   Urine Drug Screen - Straight Cath    Collection Time: 03/30/23 12:01 PM    Specimen: Straight Cath; Urine   Result Value Ref Range    THC, Screen, Urine Negative Negative    Phencyclidine (PCP), Urine Negative Negative    Cocaine Screen, Urine Negative Negative    Methamphetamine, Ur Negative Negative    Opiate Screen Negative Negative    Amphetamine Screen, Urine Negative Negative    Benzodiazepine Screen, Urine Negative Negative    Tricyclic Antidepressants Screen Negative Negative    Methadone Screen, Urine Negative Negative    Barbiturates Screen, Urine Negative Negative    Oxycodone Screen, Urine Negative Negative    Propoxyphene Screen Negative Negative    Buprenorphine, Screen, Urine Negative Negative   ECG 12 Lead ED Triage Standing Order; Acute Stroke (Onset <24 hrs)    Collection Time: 03/30/23 12:26 PM   Result Value Ref Range    QT Interval 416 ms    QTC Interval 425 ms   STAT Lactic Acid, Reflex    Collection Time: 03/30/23  2:53 PM    Specimen: Arm, Left; Blood   Result Value Ref Range    Lactate 4.1 (C) 0.5 - 2.0 mmol/L   Blood Gas, Arterial With Co-Ox    Collection  Time: 03/30/23  3:44 PM    Specimen: Arterial Blood   Result Value Ref Range    Site Left Radial     Juan A's Test N/A     pH, Arterial 7.463 (H) 7.350 - 7.450 pH units    pCO2, Arterial 30.1 (L) 35.0 - 45.0 mm Hg    pO2, Arterial 73.8 (L) 83.0 - 108.0 mm Hg    HCO3, Arterial 21.5 20.0 - 26.0 mmol/L    Base Excess, Arterial -1.3 (L) 0.0 - 2.0 mmol/L    Hemoglobin, Blood Gas 13.2 (L) 13.5 - 17.5 g/dL    Hematocrit, Blood Gas 40.4 38.0 - 51.0 %    Oxyhemoglobin 96.3 94 - 99 %    Methemoglobin      Carboxyhemoglobin 1.2 0 - 2 %    CO2 Content 22.4 22 - 33 mmol/L    Temperature 37.0 C    Barometric Pressure for Blood Gas      Modality Ventilator     FIO2 45 %    Ventilator Mode VC+/AC     Rate 0 Breaths/minute    PEEP 5.0     PIP 0 cmH2O    IPAP 0     EPAP 0     Note      pH, Temp Corrected 7.463 pH Units    pCO2, Temperature Corrected 30.1 (L) 35 - 48 mm Hg    pO2, Temperature Corrected 73.8 (L) 83 - 108 mm Hg     Note: In addition to lab results from this visit, the labs listed above may include labs taken at another facility or during a different encounter within the last 24 hours. Please correlate lab times with ED admission and discharge times for further clarification of the services performed during this visit.    MRI Brain With & Without Contrast   Final Result   Impression:       1. No evidence of a right temporal lobe abscess.   2. Findings consistent with diffuse meningitis. There is right otomastoiditis as seen on the CT of the temporal bones. There are signal voids corresponding to gas bubbles within the CSF in the upper right posterior cranial fossa.   3. Volume loss secondary to cerebral atrophy.   4. Chronic microvascular ischemia.      Electronically Signed: Juan Torrez     3/30/2023 5:48 PM EDT     Workstation ID: [id number]      XR Chest 1 View   Final Result   Impression:   Endotracheal tube tip in the midtrachea. No pneumothorax. Stable mild vascular congestion.      Electronically Signed: Jazmin Gutierrez      3/30/2023 3:08 PM EDT     Workstation ID: [id number]      XR Chest 1 View   Final Result   Impression:   Mild vascular congestion is apparent, without overt pulmonary edema or other acute disease.      Electronically Signed: Ryder Houser     3/30/2023 1:36 PM EDT     Workstation ID: [id number]      CT Angiogram Head w AI Analysis of LVO   Final Result   Impression:   No evidence of intracranial hemorrhage or definite acute territorial ischemia. There is abnormal intracranial gas present, appearing likely extra-axial in the lateral aspect of the right cerebellar hemisphere, with changes of the right mastoid present,    concerning for mastoiditis and possible subdural empyema or less likely parenchymal abscess. Contrast-enhanced MRI is recommended to further evaluate.      Dedicated CT of the temporal bones demonstrates findings of likely acute otomastoiditis on the right, with middle ear and right mastoid effusion, coalescent changes of the right mastoid and dehiscence of both the tegmen tympani and tegmen mastoideum.    There is again concern for intracranial infectious involvement, empyema versus abscess with foci of intracranial gas present.      CT perfusion demonstrates no evidence of core infarct or significant territorial tissue at risk.      CT angiogram demonstrates multifocal mild/moderate atherosclerotic changes, with minimal narrowing of the ICA origins and no evidence of intracranial flow-limiting stenosis, large vessel occlusion or aneurysmal dilatation.      Noncontrast CT head performed 3/30/2023 at 11:08 AM. Results discussed with the stroke team by Dr. Hanley in person at time of image acquisition.      Electronically Signed: Ryder Houser     3/30/2023 12:11 PM EDT     Workstation ID: [id number]      CT Angiogram Neck   Final Result   Impression:   No evidence of intracranial hemorrhage or definite acute territorial ischemia. There is abnormal intracranial gas present, appearing likely extra-axial in  the lateral aspect of the right cerebellar hemisphere, with changes of the right mastoid present,    concerning for mastoiditis and possible subdural empyema or less likely parenchymal abscess. Contrast-enhanced MRI is recommended to further evaluate.      Dedicated CT of the temporal bones demonstrates findings of likely acute otomastoiditis on the right, with middle ear and right mastoid effusion, coalescent changes of the right mastoid and dehiscence of both the tegmen tympani and tegmen mastoideum.    There is again concern for intracranial infectious involvement, empyema versus abscess with foci of intracranial gas present.      CT perfusion demonstrates no evidence of core infarct or significant territorial tissue at risk.      CT angiogram demonstrates multifocal mild/moderate atherosclerotic changes, with minimal narrowing of the ICA origins and no evidence of intracranial flow-limiting stenosis, large vessel occlusion or aneurysmal dilatation.      Noncontrast CT head performed 3/30/2023 at 11:08 AM. Results discussed with the stroke team by Dr. Hanley in person at time of image acquisition.      Electronically Signed: Ryder Houser     3/30/2023 12:11 PM EDT     Workstation ID: [id number]      CT CEREBRAL PERFUSION WITH & WITHOUT CONTRAST   Final Result   Impression:   No evidence of intracranial hemorrhage or definite acute territorial ischemia. There is abnormal intracranial gas present, appearing likely extra-axial in the lateral aspect of the right cerebellar hemisphere, with changes of the right mastoid present,    concerning for mastoiditis and possible subdural empyema or less likely parenchymal abscess. Contrast-enhanced MRI is recommended to further evaluate.      Dedicated CT of the temporal bones demonstrates findings of likely acute otomastoiditis on the right, with middle ear and right mastoid effusion, coalescent changes of the right mastoid and dehiscence of both the tegmen tympani and tegmen  mastoideum.    There is again concern for intracranial infectious involvement, empyema versus abscess with foci of intracranial gas present.      CT perfusion demonstrates no evidence of core infarct or significant territorial tissue at risk.      CT angiogram demonstrates multifocal mild/moderate atherosclerotic changes, with minimal narrowing of the ICA origins and no evidence of intracranial flow-limiting stenosis, large vessel occlusion or aneurysmal dilatation.      Noncontrast CT head performed 3/30/2023 at 11:08 AM. Results discussed with the stroke team by Dr. Hanley in person at time of image acquisition.      Electronically Signed: Ryder Garridoord     3/30/2023 12:11 PM EDT     Workstation ID: [id number]      CT Temporal Bones Without Contrast   Final Result   Impression:   No evidence of intracranial hemorrhage or definite acute territorial ischemia. There is abnormal intracranial gas present, appearing likely extra-axial in the lateral aspect of the right cerebellar hemisphere, with changes of the right mastoid present,    concerning for mastoiditis and possible subdural empyema or less likely parenchymal abscess. Contrast-enhanced MRI is recommended to further evaluate.      Dedicated CT of the temporal bones demonstrates findings of likely acute otomastoiditis on the right, with middle ear and right mastoid effusion, coalescent changes of the right mastoid and dehiscence of both the tegmen tympani and tegmen mastoideum.    There is again concern for intracranial infectious involvement, empyema versus abscess with foci of intracranial gas present.      CT perfusion demonstrates no evidence of core infarct or significant territorial tissue at risk.      CT angiogram demonstrates multifocal mild/moderate atherosclerotic changes, with minimal narrowing of the ICA origins and no evidence of intracranial flow-limiting stenosis, large vessel occlusion or aneurysmal dilatation.      Noncontrast CT head performed  3/30/2023 at 11:08 AM. Results discussed with the stroke team by Dr. Hanley in person at time of image acquisition.      Electronically Signed: Ryedr Houser     3/30/2023 12:11 PM EDT     Workstation ID: [id number]      CT Head Without Contrast Stroke Protocol   Final Result   Impression:   No evidence of intracranial hemorrhage or definite acute territorial ischemia. There is abnormal intracranial gas present, appearing likely extra-axial in the lateral aspect of the right cerebellar hemisphere, with changes of the right mastoid present,    concerning for mastoiditis and possible subdural empyema or less likely parenchymal abscess. Contrast-enhanced MRI is recommended to further evaluate.      Dedicated CT of the temporal bones demonstrates findings of likely acute otomastoiditis on the right, with middle ear and right mastoid effusion, coalescent changes of the right mastoid and dehiscence of both the tegmen tympani and tegmen mastoideum.    There is again concern for intracranial infectious involvement, empyema versus abscess with foci of intracranial gas present.      CT perfusion demonstrates no evidence of core infarct or significant territorial tissue at risk.      CT angiogram demonstrates multifocal mild/moderate atherosclerotic changes, with minimal narrowing of the ICA origins and no evidence of intracranial flow-limiting stenosis, large vessel occlusion or aneurysmal dilatation.      Noncontrast CT head performed 3/30/2023 at 11:08 AM. Results discussed with the stroke team by Dr. Hanley in person at time of image acquisition.      Electronically Signed: Ryder Garridoord     3/30/2023 12:11 PM EDT     Workstation ID: [id number]        Vitals:    03/30/23 1540 03/30/23 1550 03/30/23 1629 03/30/23 1800   BP: 169/84 162/92  124/73   BP Location:       Patient Position:       Pulse: 70 70 91 70   Resp:       Temp:       SpO2: 97% 98% 100% 99%   Weight:       Height:         Medications   sodium chloride 0.9 % flush 10  mL (has no administration in time range)   Pharmacy Consult (0 packages Does not apply Hold 3/30/23 1519)   sodium chloride 0.9 % flush 10 mL (10 mL Intravenous Given 3/30/23 1313)   sodium chloride 0.9 % infusion 40 mL (has no administration in time range)   ondansetron (ZOFRAN) injection 4 mg (has no administration in time range)   pantoprazole (PROTONIX) injection 40 mg (40 mg Intravenous Given 3/30/23 1317)   cefTRIAXone (ROCEPHIN) 2 g/100 mL 0.9% NS IVPB (MBP) (has no administration in time range)   ampicillin 2 g/100 mL 0.9% NS (MBP) (has no administration in time range)   acyclovir (ZOVIRAX) 720 mg in sodium chloride 0.9 % 250 mL IVPB (has no administration in time range)   vancomycin in dextrose 5% 150 mL (VANCOCIN) IVPB 750 mg (has no administration in time range)   dexamethasone (DECADRON) IVPB 10 mg (has no administration in time range)   sodium chloride 0.9 % flush 10 mL (has no administration in time range)   sodium chloride 0.9 % flush 10 mL (has no administration in time range)   sodium chloride 0.9 % infusion 40 mL (has no administration in time range)   atorvastatin (LIPITOR) tablet 80 mg (has no administration in time range)   niCARdipine (CARDENE) 25mg in 250mL NS infusion (0 mg/hr Intravenous Hold 3/30/23 1821)   aspirin chewable tablet 81 mg ( Oral Not Given:  See Alt 3/30/23 1826)     Or   aspirin suppository 300 mg (300 mg Rectal Given 3/30/23 1826)   levETIRAcetam in NaCl 0.82% (KEPPRA) IVPB 500 mg (500 mg Intravenous New Bag 3/30/23 1826)   propofol (DIPRIVAN) infusion 10 mg/mL 100 mL (10 mcg/kg/min × 88.5 kg Intravenous New Bag 3/30/23 1826)   fentaNYL citrate (PF) (SUBLIMAZE) injection 25 mcg (has no administration in time range)   LORazepam (ATIVAN) injection 1 mg (1 mg Intravenous Given 3/30/23 1121)   iopamidol (ISOVUE-370) 76 % injection 115 mL (115 mL Intravenous Given 3/30/23 1141)   LORazepam (ATIVAN) injection 1 mg (1 mg Intravenous Given 3/30/23 1148)   vancomycin 1750 mg/500 mL  0.9% NS IVPB ([facility]) (0 mg Intravenous Stopped 3/30/23 1602)   ampicillin 2 g/100 mL 0.9% NS (MBP) (0 g Intravenous Stopped 3/30/23 1417)   acyclovir (ZOVIRAX) 720 mg in sodium chloride 0.9 % 250 mL IVPB (0 mg Intravenous Stopped 3/30/23 1452)   cefTRIAXone (ROCEPHIN) 2 g/100 mL 0.9% NS IVPB (MBP) (0 g Intravenous Stopped 3/30/23 1417)   dexamethasone (DECADRON) IVPB 10 mg (0 mg Intravenous Stopped 3/30/23 1345)   etomidate (AMIDATE) injection (20 mg Intravenous Given 3/30/23 1436)   succinylcholine (ANECTINE) injection (100 mg Intravenous Given 3/30/23 1436)   gadobenate dimeglumine (MULTIHANCE) injection 18 mL (18 mL Intravenous Given 3/30/23 1643)     ECG/EMG Results (last 24 hours)     Procedure Component Value Units Date/Time    ECG 12 Lead ED Triage Standing Order; Acute Stroke (Onset <24 hrs) [[id number]] Collected: 03/30/23 1226     Updated: 03/30/23 1651     QT Interval 416 ms      QTC Interval 425 ms     Narrative:      Test Reason : ED Triage Standing Order~  Blood Pressure :   */*   mmHG  Vent. Rate :  63 BPM     Atrial Rate :  60 BPM     P-R Int : 136 ms          QRS Dur : 108 ms      QT Int : 416 ms       P-R-T Axes :   *  53 246 degrees     QTc Int : 425 ms    Poor data quality  Atrial-paced rhythm  ST & T wave abnormality, consider inferior ischemia  ST & T wave abnormality, consider anterolateral ischemia  Abnormal ECG  Confirmed by MD Shahid, Edwin ([id number]) on 3/30/2023 4:49:23 PM    Referred By: EDMD           Confirmed By: Edwin Key MD        ECG 12 Lead ED Triage Standing Order; Acute Stroke (Onset <24 hrs)   Final Result   Test Reason : ED Triage Standing Order~   Blood Pressure :   */*   mmHG   Vent. Rate :  63 BPM     Atrial Rate :  60 BPM      P-R Int : 136 ms          QRS Dur : 108 ms       QT Int : 416 ms       P-R-T Axes :   *  53 246 degrees      QTc Int : 425 ms      Poor data quality   Atrial-paced rhythm   ST & T wave abnormality, consider inferior ischemia   ST & T wave abnormality,  consider anterolateral ischemia   Abnormal ECG   Confirmed by MD Key Michael ([id number]) on 3/30/2023 4:49:23 PM      Referred By: EDMD           Confirmed By: Edwin Key MD                 [facility]    Final diagnoses:   Encephalopathy   Meningitis   Acute respiratory failure with hypoxia (HCC)   Thrombocytopenia (HCC)       ED Disposition  ED Disposition     ED Disposition   Decision to Admit    Condition   --    Comment   Level of Care: Critical Care [6]   Diagnosis: AMS (altered mental status) [[id number]]   Admitting Physician: CORI VENTURA [[id number]]   Attending Physician: CORI VENTURA [[id number]]   Certification: I Certify That Inpatient Hospital Services Are Medically Necessary For Greater Than 2 Midnights               No follow-up provider specified.       Medication List      No changes were made to your prescriptions during this visit.          Que Key MD  03/30/23 9460

## 2023-03-30 NOTE — CONSULTS
NEUROSURGERY CONSULTATION    Referring Provider: Dr. Key  Patient Care Team:  Derick Phillip MD as PCP - General (Family Medicine)  Nino Castillo MD as Consulting Physician (Cardiology)    Chief Complaint: Altered mental status.    History of Present Illness: Mr. Dodson is an 82-year-old gentleman who apparently developed altered mental status today and possibly some difficulty speaking with left-sided weakness.  The patient did not respond to his son's phone calls today and when he was checked on he was found to be tremorous and poorly responsive.  He was brought to this facility for further evaluation.  Patient is anticoagulated given a history of atrial fibrillation.  He also has history of hypertension and coronary artery disease.    History:  Past Medical History:   Diagnosis Date   • Arthritis    • Atrial fibrillation (HCC)    • Bradycardia    • Chronic hypertension     Probably essential   • Hypertension    • Hypertensive cardiovascular disease 07/2015    Recent progressive CCS class III-IV chest pain syndrome with normal EKG and exertional dyspnea and fatigue with normal codominant coronary arteries and preserved systolic  left ventricular function, July 2015.   • Intermittent palpitations 05/01/2014    Remote abnormal 24-hour Holter monitor demonstrating intermittent bradycardia with occasional PACs, May 2014.   • Lower extremity edema     Intermittent lower extremity edema felt secondary to antihypertensive therapy.    • Nephrolithiasis     Remote- 15 years ago   • MINO on CPAP    • MINO on CPAP     doesn't know setting    • Prostate troubles     enlarged   • Seasonal rhinitis     intermittent   ,   Past Surgical History:   Procedure Laterality Date   • CARDIAC CATHETERIZATION      no stents   • CARDIAC ELECTROPHYSIOLOGY PROCEDURE N/A 10/24/2018    Procedure: Pacemaker DC new with Biotronik. Hold Eliquis one day prior.;  Surgeon: Harley Dacosta MD;  Location: [facility] INVASIVE LOCATION;   Service: Cardiology   • COLONOSCOPY     • ENDOSCOPY     • INSERT / REPLACE / REMOVE PACEMAKER     ,   Family History   Problem Relation Age of Onset   • No Known Problems Mother    • Other Father         cardiac arrhythmia   ,   Social History     Tobacco Use   • Smoking status: Former     Packs/day: 1.00     Types: Cigarettes     Quit date:      Years since quittin.2   • Smokeless tobacco: Never   Vaping Use   • Vaping Use: Never used   Substance Use Topics   • Alcohol use: No   • Drug use: No   ,   Medications Prior to Admission   Medication Sig Dispense Refill Last Dose   • apixaban (ELIQUIS) 5 MG tablet tablet Take 1 tablet by mouth Every 12 (Twelve) Hours. 180 tablet 3    • Diclofenac Sodium (VOLTAREN) 1 % gel gel APPLY TOPICALLY TO THE AFFECTED AREA FOUR TIMES DAILY      • flecainide (TAMBOCOR) 50 MG tablet Take 1 tablet by mouth 2 (Two) Times a Day. 180 tablet 3    • isosorbide mononitrate (Imdur) 30 MG 24 hr tablet Take 1 tablet by mouth Daily. 90 tablet 3    • magnesium oxide (MAG-OX) 400 MG tablet Take 1 tablet by mouth Daily. 90 tablet 3    • metoprolol tartrate (LOPRESSOR) 25 MG tablet Take 1 tablet by mouth Every 12 (Twelve) Hours. 180 tablet 3    • nitroglycerin (NITROSTAT) 0.4 MG SL tablet 1 under the tongue as needed for angina, may repeat q5mins for up three doses 50 tablet 5    • potassium chloride 10 MEQ CR tablet Take 1 tablet by mouth Daily. NEED APPOINTMENT WITH DR. CAMPBELL FOR FURTHER REFILLS 90 tablet 3    • tadalafil (CIALIS) 5 MG tablet Take 5 mg by mouth As Needed for Erectile Dysfunction.  1    • telmisartan-hydrochlorothiazide (MICARDIS HCT) 80-25 MG per tablet Take 1 tablet by mouth Daily. Schedule appointment for further refills 90 tablet 3    • traMADol (ULTRAM) 50 MG tablet Take 1 tablet by mouth Every 6 (Six) Hours As Needed for Moderate Pain . 12 tablet 0     Allergies:  Patient has no known allergies.      Physical Exam:  Vital Signs: Blood pressure 124/73, pulse 70,  "temperature (!) 100.9 °F (38.3 °C), temperature source Bladder, resp. rate 20, height 176.5 cm (69.49\"), weight 88.5 kg (195 lb), SpO2 99 %.  The patient has just arrived to the intensive care unit.  He is sedated and intubated.  His neck is somewhat stiff.  Pupils are equal and reactive.  He does not respond to noxious stimuli.  There is no evidence of cranial trauma.    Data Review:  CT of the head demonstrates fluid within the right mastoid with some scattered punctate pneumocephalus in that region.  There is a question of some fluid in the right posterior fossa adjacent to the mastoid.  There is mild ventriculomegaly.  There is no mass effect.  There is diffuse atrophy.    CTA of the brain does not demonstrate large vessel occlusion or vascular malformation.    CTA of the neck does not demonstrate vessel occlusion.    MRI of the brain demonstrates some meningeal enhancement.  There is no empyema or parenchymal abscess.    Diagnosis:  1.  Altered mental status probably due to meningitis associated with his middle ear infection.  2.  Right otitis media.    Treatment Recommendations:  Presently, there is no role for neurosurgical intervention.  Management by ENT as well as aggressive antibiotic therapy.      Neil Yen MD  03/30/23  19:09 EDT            "

## 2023-03-30 NOTE — H&P
INTENSIVIST   INITIAL HOSPITAL VISIT NOTE     Chief Complaint: Encephalopathy    Subjective   S     Kai Dodson is a 82 y.o. male with PMH PAF on Flecainide and Eliquis, HTN, CAD, Tachy-Samuel syndrome s/p PPM and MINO on CPAP who presented to the ED with AMS, inability to speak and left sided weakness.  He was LKW at 2300 last night.  Apparently, his wife was just discharged from [facility] this morning.  Their son was driving his mom back to [facility] for inpatient rehab.  He tried to call his dad, but couldn't reach.  Another family member went to check on him and he was found down altered with tremors in all extremities.  EMS was called and was brought to the ED.  CTH, CTA H/N and CTP revealed abnormal intracranial gas present, appearing likely extra-axial in the lateral aspect of the right cerebellar hemisphere, with changes of the right mastoid present, concerning for mastoiditis and possible subdural empyema or less likely parenchymal abscess.  CT temporal bones revealed likely acute otomastoiditis on the right, with middle ear and right mastoid effusion, coalescent changes of the right mastoid and dehiscence of both the tegmen tympani and tegmen mastoideum, no ischemia or hemorrhage.  He was started on broad spectrum antibiotics.  Significant labs:  LA 6.6, PCT 6, K 3.3, PT 17.9, INR 1.5.  UA was unremarkable.  NS and Neurology were consulted.  There was concern for status epilepticus.  He received 2 mg IV Ativan.  EEG and MRI Brain have been ordered.      PMH: He  has a past medical history of Arthritis, Atrial fibrillation (HCC), Bradycardia, Chronic hypertension, Hypertension, Hypertensive cardiovascular disease (07/2015), Intermittent palpitations (05/01/2014), Lower extremity edema, Nephrolithiasis, MINO on CPAP, MINO on CPAP, Prostate troubles, and Seasonal rhinitis.   PSxH: He  has a past surgical history that includes Colonoscopy; Cardiac catheterization; Esophagogastroduodenoscopy; Cardiac  electrophysiology procedure (N/A, 10/24/2018); and Insert / replace / remove pacemaker.      Medications:  No current facility-administered medications on file prior to encounter.     Current Outpatient Medications on File Prior to Encounter   Medication Sig   • apixaban (ELIQUIS) 5 MG tablet tablet Take 1 tablet by mouth Every 12 (Twelve) Hours.   • Diclofenac Sodium (VOLTAREN) 1 % gel gel APPLY TOPICALLY TO THE AFFECTED AREA FOUR TIMES DAILY   • flecainide (TAMBOCOR) 50 MG tablet Take 1 tablet by mouth 2 (Two) Times a Day.   • isosorbide mononitrate (Imdur) 30 MG 24 hr tablet Take 1 tablet by mouth Daily.   • magnesium oxide (MAG-OX) 400 MG tablet Take 1 tablet by mouth Daily.   • metoprolol tartrate (LOPRESSOR) 25 MG tablet Take 1 tablet by mouth Every 12 (Twelve) Hours.   • nitroglycerin (NITROSTAT) 0.4 MG SL tablet 1 under the tongue as needed for angina, may repeat q5mins for up three doses   • potassium chloride 10 MEQ CR tablet Take 1 tablet by mouth Daily. NEED APPOINTMENT WITH DR. CAMPBELL FOR FURTHER REFILLS   • tadalafil (CIALIS) 5 MG tablet Take 5 mg by mouth As Needed for Erectile Dysfunction.   • telmisartan-hydrochlorothiazide (MICARDIS HCT) 80-25 MG per tablet Take 1 tablet by mouth Daily. Schedule appointment for further refills   • traMADol (ULTRAM) 50 MG tablet Take 1 tablet by mouth Every 6 (Six) Hours As Needed for Moderate Pain .     Allergies: He has No Known Allergies.   FH: His family history includes No Known Problems in his mother; Other in his father.   SH: He  reports that he quit smoking about 33 years ago. His smoking use included cigarettes. He smoked an average of 1 pack per day. He has never used smokeless tobacco. He reports that he does not drink alcohol and does not use drugs.     The patient's relevant past medical, surgical and social history were reviewed and updated in Epic as appropriate.     ROS (14): Fourteen point review of system performed and negative except for that  "mentioned in HPI.     Objective   O     Physical Examination:  Vital Signs: Blood pressure 122/78, pulse 63, temperature 97.8 °F (36.6 °C), resp. rate 20, height 176.5 cm (69.5\"), weight 88.5 kg (195 lb), SpO2 95 %.    General: Critically ill-appearing.  Encephalopathic.  HEENT:NC/AT, PERRL, Normal nasal mucosa dry appearing mucous membranes.  Neck: Trachea midline, No masses.  Chest: Clear to auscultation bilaterally but diminished air movement. No wheezing, rhonchi, or rales.  Mildly tachypneic.   Cardiac: Regular rhythm, tachycardic, S1S2 auscultated. No murmurs, rubs or gallops.   Abdomen: Soft, non-distended, no appreciable tenderness, positive bowel sounds in all four quadrants.   Extremities: No lower extremity edema. No clubbing or cyanosis.  Skin: No rashes, open wounds, or bruising. Warm, dry, well-perfused.  Neuro: Arouses to noxious stimuli and loud voice. Does not follow commands. Protecting airway at time of initial exam.    Lines, Drains & Airways     Active LDAs     Name Placement date Placement time Site Days    Peripheral IV 03/30/23 1115 Left Antecubital 03/30/23  1115  Antecubital  less than 1    Peripheral IV 03/30/23 1119 Right Antecubital 03/30/23  1119  Antecubital  less than 1              Results from last 7 days   Lab Units 03/30/23  1121 03/30/23  1117   WBC 10*3/mm3 9.63  --    HEMOGLOBIN g/dL 14.4  --    HEMOGLOBIN, POC g/dL  --  15.3   MCV fL 95.4  --    PLATELETS 10*3/mm3 129*  --      Results from last 7 days   Lab Units 03/30/23  1121 03/30/23  1117   SODIUM mmol/L 142  --    POTASSIUM mmol/L 3.3*  --    CO2 mmol/L 24.0  --    CREATININE mg/dL 0.99 0.90   MAGNESIUM mg/dL 1.8  --      Estimated Creatinine Clearance: 63.9 mL/min (by C-G formula based on SCr of 0.99 mg/dL).  Results from last 7 days   Lab Units 03/30/23  1121   ALK PHOS U/L 108   BILIRUBIN mg/dL 3.7*   ALT (SGPT) U/L 26   AST (SGOT) U/L 34     Respiratory (ie nasal cannula, HFNC, BiPAP, mechanical ventilation settings) " support: Mechanical ventilation    CTA Head (3/30/3023):  Radiology Impression:   - No evidence of intracranial hemorrhage or definite acute territorial ischemia. There is abnormal intracranial gas present, appearing likely extra-axial in the lateral aspect of the right cerebellar hemisphere, with changes of the right mastoid present,   concerning for mastoiditis and possible subdural empyema or less likely parenchymal abscess. Contrast-enhanced MRI is recommended to further evaluate.   - Dedicated CT of the temporal bones demonstrates findings of likely acute otomastoiditis on the right, with middle ear and right mastoid effusion, coalescent changes of the right mastoid and dehiscence of both the tegmen tympani and tegmen mastoideum.   There is again concern for intracranial infectious involvement, empyema versus abscess with foci of intracranial gas present.   - CT perfusion demonstrates no evidence of core infarct or significant territorial tissue at risk.   - CT angiogram demonstrates multifocal mild/moderate atherosclerotic changes, with minimal narrowing of the ICA origins and no evidence of intracranial flow-limiting stenosis, large vessel occlusion or aneurysmal dilatation.     CXR (3/20/2023):  Radiology Impression:   Endotracheal tube tip in the midtrachea. No pneumothorax. Stable mild vascular congestion.     I reviewed the patient's new laboratory and imaging results.  I independently reviewed the patient's new images.    Assessment & Plan   A / P     Active Hospital Problems:  Active Hospital Problems    Diagnosis  POA   • **Encephalopathy, metabolic [G93.41]  Yes     Priority: High   • Rt Mastoiditis [H70.90]  Yes     Priority: High   • Subdural empyema [G06.2]  Yes     Priority: High   • R/O Meningitis [G03.9]  Yes     Priority: High   • Sepsis, unspecified organism (HCC) [A41.9]  Yes     Priority: High   • Questionable Status epilepticus (HCC) [G40.901]  Yes   • Paroxysmal atrial fibrillation on  Flecainide and Eliquis [I48.0]  Yes   • MINO on CPAP [G47.33, Z99.89]  Not Applicable   • Hypertension [I10]  Yes      Resolved Hospital Problems   No resolved problems to display.     #Encephalopathy  #Sepsis w/ multi organ dysfunction  #Acute mastoiditis   #Sudural empyema  #Meningitis rule-out  #Elevated lactate   - Encephalopathy likely metabolic and due to underlying sepsis. Given radiographic evidence of subdural empyema vs [less likely] abscess there is concern for possible meningitis and LP will need to be performed. However, procedure delayed for patient's safety as he is chronically anticoagulated at home; last dose of Eliquis likely administered on 3/29/2023 AM.  For now covering with vancomycin, ceftriaxone, ampicillin and acyclovir.  Also receiving dexamethasone every 6 hours for the next 48 hours.  Both neurology and neurosurgery following.  Spoke with both teams personally on 3/30/2023.  Nothing to do at this time from a neurosurgical standpoint   - CT imaging also consistent with acute mastoiditis.  ENT consulted by ER and have personally attempted to touch base again this evening; awaiting recommendations. Antibiotics as above  - Patient ultimately intubated on 3/30/2023 for acute encephalopathy and hypoxia (see plan below for ventilator management)  - EEG performed in the ED; While there is diffuse cerebral dysfunction there is no evidence of epilepsy  - MRI pending     #Acute hypoxic respiratory failure  #MINO  - Currently intubated/sedated. Will adjust mechanical ventilation settings as able   - Post intubation CXR shows appropriate positioning of ETT    #Hypokalemia  - Replace per electrolyte protocol    F-NPO  A- Fentanyl as needed   S-Propofol   T-SCDs  H-Head of bed greater than 30 degrees  U-PPI   G-FSBS per unit protocol, correction dose insulin  S-SBT daily if able  B-Will monitor BM and provide regimen if needed  I-PIV  D-Vancomycin, ceftriaxone, ampicillin and acyclovir    Advance  Directives:   Code Status and Medical Interventions:   Ordered at: 03/30/23 1300     Code Status (Patient has no pulse and is not breathing):    CPR (Attempt to Resuscitate)     Medical Interventions (Patient has pulse or is breathing):    Full Support     Plan of care and goals reviewed during interdisciplinary rounds.  I discussed the patient's findings and my recommendations with patient, family and nursing staff    -- Alvaro Jason MD  Pulmonary/Critical Care    Time: Critical Care time spent in direct patient care 50 minutes (excluding procedure time, if applicable) including high complexity decision making to assess, manipulate, and support vital organ system failure in this individual who has impairment of one or more vital organ systems such that there is a high probability of imminent or life threatening deterioration in the patient’s condition.

## 2023-03-30 NOTE — CONSULTS
Referring Provider: Dr Key  Reason for Consultation: [facility]    Patient Care Team:  Derick Phillip MD as PCP - General (Family Medicine)  Nino Castillo MD as Consulting Physician (Cardiology)    Chief complaint patient intubated and sedated    Subjective .     History of present illness: Patient intubated and sedated.  History obtained from chart and his son.  Was found earlier today confused with mental status changes.  Was brought to the emergency department.  Imaging revealed possible subdural empyema.  hAs pneumocephalus around the mastoid as well as inflammation within the mastoid and middle ear space.  No history of middle ear surgery, cholesteatoma or chronic middle ear disease that has required surgical intervention.  No history of tube placement in the past.    Review of Systems  Review of systems could not be obtained due to   patient intubated.    History  Past Medical History:   Diagnosis Date   • Arthritis    • Atrial fibrillation (HCC)    • Bradycardia    • Chronic hypertension     Probably essential   • Hypertension    • Hypertensive cardiovascular disease 07/2015    Recent progressive CCS class III-IV chest pain syndrome with normal EKG and exertional dyspnea and fatigue with normal codominant coronary arteries and preserved systolic  left ventricular function, July 2015.   • Intermittent palpitations 05/01/2014    Remote abnormal 24-hour Holter monitor demonstrating intermittent bradycardia with occasional PACs, May 2014.   • Lower extremity edema     Intermittent lower extremity edema felt secondary to antihypertensive therapy.    • Nephrolithiasis     Remote- 15 years ago   • MINO on CPAP    • MINO on CPAP     doesn't know setting    • Prostate troubles     enlarged   • Seasonal rhinitis     intermittent   ,   Past Surgical History:   Procedure Laterality Date   • CARDIAC CATHETERIZATION      no stents   • CARDIAC ELECTROPHYSIOLOGY PROCEDURE N/A 10/24/2018    Procedure: Pacemaker DC  [facility]. Hold Eliquis one day prior.;  Surgeon: Harley Dacosta MD;  Location: [facility] INVASIVE LOCATION;  Service: Cardiology   • COLONOSCOPY     • ENDOSCOPY     • INSERT / REPLACE / REMOVE PACEMAKER     ,   Family History   Problem Relation Age of Onset   • No Known Problems Mother    • Other Father         cardiac arrhythmia   ,   Social History     Socioeconomic History   • Marital status:    Tobacco Use   • Smoking status: Former     Packs/day: 1.00     Types: Cigarettes     Quit date:      Years since quittin.2   • Smokeless tobacco: Never   Vaping Use   • Vaping Use: Never used   Substance and Sexual Activity   • Alcohol use: No   • Drug use: No   • Sexual activity: Defer     E-cigarette/Vaping   • E-cigarette/Vaping Use Never User    • Passive Exposure No    • Counseling Given No      E-cigarette/Vaping Substances     E-cigarette/Vaping Devices       ,   Medications Prior to Admission   Medication Sig Dispense Refill Last Dose   • apixaban (ELIQUIS) 5 MG tablet tablet Take 1 tablet by mouth Every 12 (Twelve) Hours. 180 tablet 3    • Diclofenac Sodium (VOLTAREN) 1 % gel gel APPLY TOPICALLY TO THE AFFECTED AREA FOUR TIMES DAILY      • flecainide (TAMBOCOR) 50 MG tablet Take 1 tablet by mouth 2 (Two) Times a Day. 180 tablet 3    • isosorbide mononitrate (Imdur) 30 MG 24 hr tablet Take 1 tablet by mouth Daily. 90 tablet 3    • magnesium oxide (MAG-OX) 400 MG tablet Take 1 tablet by mouth Daily. 90 tablet 3    • metoprolol tartrate (LOPRESSOR) 25 MG tablet Take 1 tablet by mouth Every 12 (Twelve) Hours. 180 tablet 3    • nitroglycerin (NITROSTAT) 0.4 MG SL tablet 1 under the tongue as needed for angina, may repeat q5mins for up three doses 50 tablet 5    • potassium chloride 10 MEQ CR tablet Take 1 tablet by mouth Daily. NEED APPOINTMENT WITH DR. CAMPBELL FOR FURTHER REFILLS 90 tablet 3    • tadalafil (CIALIS) 5 MG tablet Take 5 mg by mouth As Needed for Erectile Dysfunction.  1    •  telmisartan-hydrochlorothiazide (MICARDIS HCT) 80-25 MG per tablet Take 1 tablet by mouth Daily. Schedule appointment for further refills 90 tablet 3    • traMADol (ULTRAM) 50 MG tablet Take 1 tablet by mouth Every 6 (Six) Hours As Needed for Moderate Pain . 12 tablet 0    , Scheduled Meds:  acyclovir, 10 mg/kg (Ideal), Intravenous, Q8H  ampicillin, 2 g, Intravenous, Q4H  aspirin, 81 mg, Oral, Daily   Or  aspirin, 300 mg, Rectal, Daily  atorvastatin, 80 mg, Oral, Nightly  [START ON 3/31/2023] cefTRIAXone, 2 g, Intravenous, Q12H  dexamethasone, 10 mg, Intravenous, Q6H  levETIRAcetam, 500 mg, Intravenous, Q12H  pantoprazole, 40 mg, Intravenous, Q AM  Pharmacy Consult, , Does not apply, Daily  sodium chloride, 10 mL, Intravenous, Q12H  sodium chloride, 10 mL, Intravenous, Q12H  [START ON 3/31/2023] vancomycin, 750 mg, Intravenous, Q12H    , Continuous Infusions:  niCARdipine, 5-15 mg/hr, Last Rate: Stopped (03/30/23 1821)  propofol, 5-50 mcg/kg/min, Last Rate: 10 mcg/kg/min (03/30/23 1826)    , PRN Meds:  •  fentaNYL citrate (PF)  •  ondansetron  •  sodium chloride  •  sodium chloride  •  sodium chloride  •  sodium chloride and Allergies:  Patient has no known allergies.    Objective     Vital Signs   Temp:  [97.8 °F (36.6 °C)] 97.8 °F (36.6 °C)  Heart Rate:  [] 91  Resp:  [20] 20  BP: (108-169)/(71-96) 162/92  FiO2 (%):  [45 %] 45 %    Physical Exam:     Sedated and not responsive  Intubated  Auricles normal  Mastoid with no erythema, edema or bulging on either side  Erythematous dull tympanic membrane on the right.  Normal on the left  Neck is nontender with no lymphadenopathy  Voice not accessible    Results Review:   I reviewed the patient's new clinical results.   Personally reviewed and interpreted the CT of the temporal bone which shows inflammation in the mastoid and middle ear space with pneumocephalus around the mastoid      Assessment & Plan       Encephalopathy, metabolic    MINO on CPAP     Hypertension    Paroxysmal atrial fibrillation on Flecainide and Eliquis    Rt Mastoiditis    Subdural empyema    R/O Meningitis    Questionable Status epilepticus (HCC)    Sepsis, unspecified organism (HCC)      Acute suppurative otitis media of the right ear.  Possibly resulting in the subdural empyema.  Neck step would be ear tube placement to drain the middle ear space purulence and continue IV antibiotics with topical otic drops as well.  Since he does not have chronic ear disease mastoidectomy is not indicated.  Somewhat unusual that he has no findings over the mastoid and that he would develop this with no history of middle ear disease.  Spoke to his son and consent obtained to place a tube in the right ear.  We will try to do this at the bedside as the operating room is booked for several hours.    I discussed the patients findings and my recommendations with family and nursing staff    Edwin Garcia MD  03/30/23  18:26 EDT

## 2023-03-30 NOTE — SIGNIFICANT NOTE
Orders received and chart consult completed.  Pt. currently intubated.  Will place on hold and monitor for readiness.

## 2023-03-30 NOTE — Clinical Note
Level of Care: Critical Care [6]   Admitting Physician: CORI VENTURA [[id number]]   Attending Physician: CORI VENTURA [[id number]]

## 2023-03-31 NOTE — CONSULTS
INFECTIOUS DISEASE CONSULT/INITIAL HOSPITAL VISIT    Kai Dodson  1940  [id number]    Date of consult: 3/31/2023    Admit date: 3/30/2023    Requesting Provider: Dr. Jason  Evaluating physician: Que Alonso MD  Reason for Consultation: meningitis  Chief Complaint: altered mental status      Subjective   History of present illness:  Kai Dodson is a  82 y.o.  Yr old male with a past medical history of hypertension, coronary artery disease, paroxysmal A. Fib on flecainide and Eliquis, tachycardia-bradycardia syndrome status post PPM and obstructive sleep apnea on CPAP who presented to the ER yesterday with encephalopathy, inability to speak, and left-sided weakness.  Due to the patient's mental state and intubated status, history is obtained from medical records.  The patient's wife had just been discharged yesterday morning from [facility] and their son was driving the patient's wife back to [facility] for inpatient rehab.  The patient's son tried to call him and could not reach him.  Other family members went to check on him and found him altered with tremors in all the extremities.  EMS was called and he was transported to the ER.    Since arrival, T-max has been 100.9°F.  He was initially tachycardic up to 112 bpm but this is improved. He had some hypotension but this has improved. The patient was intubated for airway protection and remains on the ventilator with an FiO2 of 35%. Initial lactic acid was 6.6, pro-calcitonin was 6.0, hemoglobin A1c was 5.4, [place] has been normal.  LFTs are within normal limits. White blood cell count was normal on arrival at 9.63, has now trended up to 14.14. platelet count has decreased to 109.sensitivity troponin was slightly elevated at 17. Urine drug screen was negative. 2 sets of admission blood cultures are growing GPC's in pairs and chains identified as Streptococcus pneumoniae by the BCID2 PCR. The patient underwent CTA head and neck  "that showed \"no evidence of intracranial hemorrhage or definite acute territorial ischemia.  There is abnormal intracranial gas present, appearing likely extra-axial in the lateral aspect of the right cerebellar hemisphere, with changes of the right mastoid present, concerning for mastoiditis and possibly subdural empyema or less likely parenchymal abscess.\" \"Dedicated CT of the temporal bones demonstrates findings of likely acute otomastoiditis on the right, with middle ear and right mastoid effusion, coalescent changes of the right mastoid and dehiscence of both the tegmen tympani and tegmen mastoideum. There is again concern for intracranial infectious involvement, empyema versus abscess with foci of intracranial gas present.\" chest x-ray was without any acute disease. MRI brain showed \"no evidence of right temporal lobe abscess.  Findings consistent with diffuse meningitis.  There is right otomastoiditis as seen on the CT of the temporal bones.  There are signal voids corresponding to gas bubbles within the CSF in the upper right posterior cranial fossa.\"  ENT has been consulted and Dr. Garcia evaluated the patient with right myringotomy procedure performed at bedside in the ICU. Wide myringotomy was made in the posterior aspect of the tympanic membrane.  A small amount of purulence was expressed and cultured.  After that CSF was draining through the myringotomy.  A tube was therefore not placed.  Neurosurgery has evaluated the patient and recommended that there is no role for neurosurgical intervention at this time. The patient has been started on empiric acyclovir, ampicillin, ceftriaxone 2 g IV every 12 hours, and IV vancomycin by the ICU team.  He has additionally been placed on dexamethasone.  ID has been consultated for antibiotic recommendations.    Past Medical History:   Diagnosis Date   • Arthritis    • Atrial fibrillation (HCC)    • Bradycardia    • Chronic hypertension     Probably essential   • " Hypertension    • Hypertensive cardiovascular disease 07/2015    Recent progressive CCS class III-IV chest pain syndrome with normal EKG and exertional dyspnea and fatigue with normal codominant coronary arteries and preserved systolic  left ventricular function, July 2015.   • Intermittent palpitations 05/01/2014    Remote abnormal 24-hour Holter monitor demonstrating intermittent bradycardia with occasional PACs, May 2014.   • Lower extremity edema     Intermittent lower extremity edema felt secondary to antihypertensive therapy.    • Nephrolithiasis     Remote- 15 years ago   • MINO on CPAP    • MINO on CPAP     doesn't know setting    • Prostate troubles     enlarged   • Seasonal rhinitis     intermittent       Past Surgical History:   Procedure Laterality Date   • CARDIAC CATHETERIZATION      no stents   • CARDIAC ELECTROPHYSIOLOGY PROCEDURE N/A 10/24/2018    Procedure: Pacemaker DC new with Biotronik. Hold Eliquis one day prior.;  Surgeon: Harley Dacosta MD;  Location: [facility] INVASIVE LOCATION;  Service: Cardiology   • COLONOSCOPY     • ENDOSCOPY     • INSERT / REPLACE / REMOVE PACEMAKER         Pediatric History   Patient Parents   • Not on file     Other Topics Concern   • Not on file   Social History Narrative    PT DRINKS 2 SERVINGS OF CAFFEINE PER DAY.    PT LIVES AT HOME WITH WIFE.       family history includes No Known Problems in his mother; Other in his father.    No Known Allergies    There is no immunization history for the selected administration types on file for this patient.    Medication:    Current Facility-Administered Medications:   •  acyclovir (ZOVIRAX) 720 mg in sodium chloride 0.9 % 250 mL IVPB, 10 mg/kg (Ideal), Intravenous, Q8H, Mundo Jason MD, 720 mg at 03/31/23 0624  •  ampicillin 2 g/100 mL 0.9% NS (MBP), 2 g, Intravenous, Q4H, Mundo Jason MD, 2 g at 03/31/23 0447  •  aspirin chewable tablet 81 mg, 81 mg, Oral, Daily **OR** aspirin suppository 300 mg, 300 mg, Rectal,  Daily, Tereza Bautista APRN, 300 mg at 03/30/23 1826  •  Calcium Replacement - Follow Nurse / BPA Driven Protocol, , Does not apply, PRN, Natalia Cruz APRN  •  cefTRIAXone (ROCEPHIN) 2 g/100 mL 0.9% NS IVPB (MBP), 2 g, Intravenous, Q12H, Mundo Jason MD, 2 g at 03/31/23 0109  •  chlorhexidine (PERIDEX) 0.12 % solution 15 mL, 15 mL, Mouth/Throat, Q12H, Mundo Jason MD, 15 mL at 03/30/23 2228  •  ciprofloxacin-dexamethasone (CIPRODEX) 0.3-0.1 % otic suspension 4 drop, 4 drop, Right Ear, BID, Edwin Garcia MD, 4 drop at 03/30/23 2026  •  dexamethasone (DECADRON) IVPB 10 mg, 10 mg, Intravenous, Q6H, Mundo Jason MD, 10 mg at 03/31/23 0258  •  fentaNYL (SUBLIMAZE) bolus from bag 10 mcg/mL 50 mcg, 50 mcg, Intravenous, Q30 Min PRN, Natalia Cruz APRN  •  fentaNYL 2500 mcg/250 mL NS infusion,  mcg/hr, Intravenous, Titrated, Natalia Cruz APRN, Last Rate: 10 mL/hr at 03/31/23 0419, 100 mcg/hr at 03/31/23 0419  •  levETIRAcetam in NaCl 0.82% (KEPPRA) IVPB 500 mg, 500 mg, Intravenous, Q12H, Meena Burks, FORREST, 500 mg at 03/31/23 0607  •  Magnesium Standard Dose Replacement - Follow Nurse / BPA Driven Protocol, , Does not apply, PRN, Natalia Cruz APRHECTOR  •  niCARdipine (CARDENE) 25mg in 250mL NS infusion, 5-15 mg/hr, Intravenous, Titrated, Tereza Bautista APRN, Held at 03/30/23 1821  •  ondansetron (ZOFRAN) injection 4 mg, 4 mg, Intravenous, Q6H PRN, Antonietta Phillips APRN  •  pantoprazole (PROTONIX) injection 40 mg, 40 mg, Intravenous, Q AM, Antonietta Phillips APRN, 40 mg at 03/31/23 0609  •  Phosphorus Replacement - Follow Nurse / BPA Driven Protocol, , Does not apply, PRHECTOR, Natalia Cruz, APRHECTOR  •  Potassium Replacement - Follow Nurse / BPA Driven Protocol, , Does not apply, IWONA, Natalia Cruz, APRN  •  propofol (DIPRIVAN) infusion 10 mg/mL 100 mL, 5-50 mcg/kg/min, Intravenous, Titrated, Antonietta Phillips, FORREST, Stopped at 03/30/23 8386  •  " sodium chloride 0.9 % flush 10 mL, 10 mL, Intravenous, PRN, Emergency, Triage Protocol, MD  •  sodium chloride 0.9 % flush 10 mL, 10 mL, Intravenous, Q12H, Antonietta Phillips APRN, 10 mL at 03/30/23 2029  •  sodium chloride 0.9 % flush 10 mL, 10 mL, Intravenous, Q12H, Tereza Bautista APRN, 10 mL at 03/30/23 2030  •  sodium chloride 0.9 % flush 10 mL, 10 mL, Intravenous, PRN, Tereza Bautista APRN  •  sodium chloride 0.9 % infusion 40 mL, 40 mL, Intravenous, PRN, Antonietta Phillips APRN  •  sodium chloride 0.9 % infusion 40 mL, 40 mL, Intravenous, PRN, Tereza Bautista APRN  •  vancomycin in dextrose 5% 150 mL (VANCOCIN) IVPB 750 mg, 750 mg, Intravenous, Q12H, Mundo Jason MD, 750 mg at 03/31/23 0219    Please refer to the medical record for a full medication list    Review of Systems:  Unable to obtain a 12 point review of systems from the patient given his clinical state    Physical Exam:   Vital Signs   Temp:  [97.8 °F (36.6 °C)-100.9 °F (38.3 °C)] 98.4 °F (36.9 °C)  Heart Rate:  [] 70  Resp:  [14-29] 14  BP: ()/() 126/65  FiO2 (%):  [35 %-45 %] 35 %    Temp  Min: 97.8 °F (36.6 °C)  Max: 100.9 °F (38.3 °C)  BP  Min: 72/47  Max: 187/146  Pulse  Min: 51  Max: 112  Resp  Min: 14  Max: 29  SpO2  Min: 87 %  Max: 100 %    Blood pressure 126/65, pulse 70, temperature 98.4 °F (36.9 °C), temperature source Bladder, resp. rate 14, height 176.5 cm (69.49\"), weight 86.1 kg (189 lb 13.1 oz), SpO2 98 %.  GENERAL: critically ill-appearing on the ventilator.  HEENT:  Normocephalic, atraumatic.  ET tube in place.  No external oral lesions noted.  EYES: pupils equal, round, and react to light.  No conjunctival injection.  Chest: Left chest wall PPM pocket site without any erythema or drainage.  HEART: RRR, no murmur  LUNGS: clear to auscultation anteriorly.  On the ventilator with an FiO2 of 35%  ABDOMEN: Soft, nontender, nondistended. No appreciable HSM.  " Bowel sounds normal.  GENITAL: + Doran catheter  SKIN: no generalized rashes.  No peripheral stigmata of infective endocarditis noted.  PSYCHIATRIC: unable to assess  EXT:  No cellulitic change.  NEURO: sedated on the ventilator    Results Review:   I reviewed the patient's new clinical results.    Results from last 7 days   Lab Units 03/31/23  0451 03/30/23  1121 03/30/23  1117   WBC 10*3/mm3 14.14* 9.63  --    HEMOGLOBIN g/dL 12.0* 14.4  --    HEMOGLOBIN, POC g/dL  --   --  15.3   HEMATOCRIT % 34.8* 43.1  --    HEMATOCRIT POC %  --   --  45   PLATELETS 10*3/mm3 109* 129*  --      Results from last 7 days   Lab Units 03/31/23  0451   SODIUM mmol/L 138   POTASSIUM mmol/L 3.9   CHLORIDE mmol/L 106   CO2 mmol/L 22.0   BUN mg/dL 19   CREATININE mg/dL 0.83   GLUCOSE mg/dL 140*   CALCIUM mg/dL 8.2*     Results from last 7 days   Lab Units 03/31/23  0451   ALK PHOS U/L 75   BILIRUBIN mg/dL 2.7*   ALT (SGPT) U/L 19   AST (SGOT) U/L 28                 Results from last 7 days   Lab Units 03/31/23  0451   LACTATE mmol/L 2.6*     Estimated Creatinine Clearance: 83.6 mL/min (by C-G formula based on SCr of 0.83 mg/dL).  CPK    Common Labsle 3/30/23   Creatine Kinase 57            Procalitonin Results:      Lab 03/30/23  1121   PROCALCITONIN 6.00*      Brief Urine Lab Results  (Last result in the past 365 days)      Color   Clarity   Blood   Leuk Est   Nitrite   Protein   CREAT   Urine HCG        03/30/23 1200 Yellow   Clear   Negative   Negative   Negative   Negative                Site   Date Value Ref Range Status   03/30/2023 Left Radial  Final     Juan A's Test   Date Value Ref Range Status   03/30/2023 N/A  Final     pH, Arterial   Date Value Ref Range Status   03/30/2023 7.463 (H) 7.350 - 7.450 pH units Final     Comment:     83 Value above reference range     pCO2, Arterial   Date Value Ref Range Status   03/30/2023 30.1 (L) 35.0 - 45.0 mm Hg Final     Comment:     84 Value below reference range     pO2, Arterial   Date  Value Ref Range Status   03/30/2023 73.8 (L) 83.0 - 108.0 mm Hg Final     Comment:     84 Value below reference range     HCO3, Arterial   Date Value Ref Range Status   03/30/2023 21.5 20.0 - 26.0 mmol/L Final     Base Excess, Arterial   Date Value Ref Range Status   03/30/2023 -1.3 (L) 0.0 - 2.0 mmol/L Final     Hemoglobin, Blood Gas   Date Value Ref Range Status   03/30/2023 13.2 (L) 13.5 - 17.5 g/dL Final     Hematocrit, Blood Gas   Date Value Ref Range Status   03/30/2023 40.4 38.0 - 51.0 % Final     Oxyhemoglobin   Date Value Ref Range Status   03/30/2023 96.3 94 - 99 % Final     Methemoglobin   Date Value Ref Range Status   03/30/2023   Final     Comment:     94 Value below reportable range < _0.1     Carboxyhemoglobin   Date Value Ref Range Status   03/30/2023 1.2 0 - 2 % Final     CO2 Content   Date Value Ref Range Status   03/30/2023 22.4 22 - 33 mmol/L Final     Barometric Pressure for Blood Gas   Date Value Ref Range Status   03/30/2023   Final     Comment:     N/A     Modality   Date Value Ref Range Status   03/30/2023 Ventilator  Final     FIO2   Date Value Ref Range Status   03/30/2023 45 % Final        Microbiology:  Blood Culture   Date Value Ref Range Status   03/30/2023 Abnormal Stain (C)  Preliminary     BCID, PCR   Date Value Ref Range Status   03/30/2023 (A) Negative by BCID PCR. Culture to Follow. Final    Streptococcus pneumoniae. Identification by BCID2 PCR.         Blood Culture   Date Value Ref Range Status   03/30/2023 Abnormal Stain (C)  Preliminary     BCID, PCR   Date Value Ref Range Status   03/30/2023 (A) Negative by BCID PCR. Culture to Follow. Final    Streptococcus pneumoniae. Identification by BCID2 PCR.   (      Radiology:  Imaging Results (Last 72 Hours)     Procedure Component Value Units Date/Time    MRI Brain With & Without Contrast [[id number]] Collected: 03/30/23 1727     Updated: 03/30/23 1752    Narrative:        MRI BRAIN W WO CONTRAST    Date of Exam: 3/30/2023 3:32 PM  EDT    Indication: History of a right sided acute otomastoiditis with concern of an empyema versus abscess in the right temporal lobe.  Clinically the patient has a history of falls and confusion     Comparison: CT the head and CT of the temporal bones performed on 3/30/2023.    Technique:  Routine multiplanar/multisequence sequence images of the brain were obtained before and after the uneventful administration of  18 cc of Multihance.    Findings:   There is no evidence of a right temporal lobe abscess. There are signal voids within the CSF in the upper right posterior cranial fossa consistent with the soft tissue gas as seen on the CT study. There is diffuse meningeal enhancement. This includes   enhancement of the  meninges along the brainstem. This is consistent with meningitis. There is abnormal fluid/mucosal thickening in the right mastoid sinus and middle ear cavity with the associated enhancement consistent with acute otomastoiditis. The   patient has chronic volume loss. There is prominence of the sulci, fissures, ventricles, and basal cisterns. There are abnormal subcortical and periventricular white matter signal changes consistent with the chronic microvascular ischemia. There are   normal signal voids within the intracranial arteries and the major dural sinuses. There is no acute hemorrhage or midline shift. There is no restricted diffusion to indicate acute ischemia. The orbital contents are normal. The paranasal and left mastoid   sinuses are clear.      Impression:      Impression:     1. No evidence of a right temporal lobe abscess.  2. Findings consistent with diffuse meningitis. There is right otomastoiditis as seen on the CT of the temporal bones. There are signal voids corresponding to gas bubbles within the CSF in the upper right posterior cranial fossa.  3. Volume loss secondary to cerebral atrophy.  4. Chronic microvascular ischemia.    Electronically Signed: Juan Torrez    3/30/2023 5:48 PM  EDT    Workstation ID: [id number]    XR Chest 1 View [[id number]] Collected: 03/30/23 1508     Updated: 03/30/23 1512    Narrative:      XR CHEST 1 VW    Date of Exam: 3/30/2023 2:45 PM EDT    Indication: post intubation xray.    Comparison: 3/30/2023.    Findings:  Endotracheal tube tip in the midtrachea. There is a left subclavian ACD/pacemaker device. Lung volumes are low. There is indistinctness of the pulmonary vasculature. No significant pleural effusion identified. No pneumothorax. No acute osseous   abnormality identified.      Impression:      Impression:  Endotracheal tube tip in the midtrachea. No pneumothorax. Stable mild vascular congestion.    Electronically Signed: Jazmin Gutierrez    3/30/2023 3:08 PM EDT    Workstation ID: [id number]    XR Chest 1 View [[id number]] Collected: 03/30/23 1335     Updated: 03/30/23 1339    Narrative:      XR CHEST 1 VW    Date of Exam: 3/30/2023 12:55 PM EDT    Indication: Acute Stroke Protocol (onset < 12 hrs).    Comparison: 6/21/2019    Findings:  Central interstitial opacities are present, suggesting vascular congestion, without overt pulmonary edema. There is no effusion or pneumothorax. There is no focal lobar consolidation. Unremarkable heart and mediastinal contours.      Impression:      Impression:  Mild vascular congestion is apparent, without overt pulmonary edema or other acute disease.    Electronically Signed: Ryder Houser    3/30/2023 1:36 PM EDT    Workstation ID: [id number]    CT Head Without Contrast Stroke Protocol [[id number]] Collected: 03/30/23 1145     Updated: 03/30/23 1214    Narrative:      CT HEAD WO CONTRAST STROKE PROTOCOL, CT TEMPORAL BONES WO CONTRAST, CT CEREBRAL PERFUSION W WO CONTRAST, CT ANGIOGRAM NECK, CT ANGIOGRAM HEAD W AI ANALYSIS OF LVO    Date of Exam: 3/30/2023 11:11 AM EDT    Indication: Neuro deficit, acute, stroke suspected  Neuro deficit, acute stroke suspected.    Comparison: None available.    Technique: Axial CT images were obtained  of the head, in addition to dedicated CT of the temporal bones, without contrast administration.  Reconstructed coronal and sagittal images were also obtained. Automated exposure control and iterative construction   methods were used.    Axial CT images of the brain were obtained prior to and after the administration of 115 mL Isovue-370. Core blood volume, core blood flow, mean transit time, and Tmax images were obtained utilizing the Rapid software protocol. A limited CT angiogram of   the head was also performed to measure the blood vessel density.     Axial IV arterial phase contrast-enhanced CT angiogram of the head and neck. Three-dimensional reconstructions were postprocessed.      Findings:  CT head: Generalized volume loss is present, in addition to hypoattenuating periventricular white matter changes favored to reflect typical sequela of chronic microvascular ischemia. There is no evidence of intracranial hemorrhage. There is nonspecific   intracranial gas present along the lateral aspect of the right cerebellar hemisphere, adjacent to the right distal transverse and sigmoid sinus as well as the right mastoid which demonstrates fairly extensive effusion and coalescent change, with some   abnormal soft tissue present within the partially visualized right middle year. There is ex vacuo prominence of the ventricles and sulci. The orbits are normal. The paranasal sinuses are grossly clear.    CT temporal bones: The right temporal bone demonstrates abnormal fluid and soft tissue attenuation along the tympanic membrane, with component of extensive middle ear effusion and some mild blunting of the scutum. Some mild ossicular erosion is likely   present involving the stapes is poorly visualized. There is adjacent dehiscence of the tegmen tympani and there is extensive right-sided mastoid effusion, with some loss of the usual extensive trabeculation and lytic change present suggesting acute   cholecystitis  mastoiditis. Abnormal adjacent intracranial air is present concerning for component of concurrent empyema as above. The left external auditory canal is patent. The tympanic membrane is normal. The scutum is not blunted. There is no middle   ear effusion. The cochlea and semicircular canals are normally formed. There is some thinning of the tegmen tympani and tegmen mastoideum, without pavel dehiscence.    CT PERFUSION: Color maps demonstrate no evidence of core infarct or significant territorial ischemic tissue at risk.    CT ANGIOGRAM: The lung apices demonstrate no acute or suspicious findings. Prominent standing fluid is seen within the upper thoracic esophagus, placing the patient at risk for aspiration. The neck soft tissues demonstrate no evidence of pathologic   adenopathy or unexpected soft tissue neck mass. The osseous structures demonstrate no evidence of acute fracture or aggressive osseous lesion, with multilevel spondylosis present. Patent aortic arch with common origin of the brachiocephalic and left   common carotid arteries. The visualized subclavian arteries are patent. On the right, the ICA origin demonstrates some calcific atherosclerotic changes without significant resultant luminal narrowing, 0% by NASCET criteria. Similar 0% narrowing is   present on the left. The right vertebral artery is patent, diffusely diminutive. Patent dominant left vertebral artery. Intracranially, the carotid siphons demonstrate calcific atherosclerotic change with some mild to moderate narrowing of the   supraclinoid segments bilaterally. The anterior cerebral arteries are normal in course and caliber bilaterally. The right middle cerebral artery is normal in course and caliber. The left middle cerebral artery is similarly normal. The vertebrobasilar   system demonstrates diminutive right intradural vertebral artery appearing to likely terminate in PICA. The left vertebral artery and basilar artery are patent. The  posterior cerebral arteries demonstrate some multifocal mild to moderate narrowing,   without evidence of large vessel occlusion.      Impression:      Impression:  No evidence of intracranial hemorrhage or definite acute territorial ischemia. There is abnormal intracranial gas present, appearing likely extra-axial in the lateral aspect of the right cerebellar hemisphere, with changes of the right mastoid present,   concerning for mastoiditis and possible subdural empyema or less likely parenchymal abscess. Contrast-enhanced MRI is recommended to further evaluate.    Dedicated CT of the temporal bones demonstrates findings of likely acute otomastoiditis on the right, with middle ear and right mastoid effusion, coalescent changes of the right mastoid and dehiscence of both the tegmen tympani and tegmen mastoideum.   There is again concern for intracranial infectious involvement, empyema versus abscess with foci of intracranial gas present.    CT perfusion demonstrates no evidence of core infarct or significant territorial tissue at risk.    CT angiogram demonstrates multifocal mild/moderate atherosclerotic changes, with minimal narrowing of the ICA origins and no evidence of intracranial flow-limiting stenosis, large vessel occlusion or aneurysmal dilatation.    Noncontrast CT head performed 3/30/2023 at 11:08 AM. Results discussed with the stroke team by Dr. Hanley in person at time of image acquisition.    Electronically Signed: Ryder Houser    3/30/2023 12:11 PM EDT    Workstation ID: [id number]    CT Angiogram Head w AI Analysis of LVO [[id number]] Collected: 03/30/23 1145     Updated: 03/30/23 1214    Narrative:      CT HEAD WO CONTRAST STROKE PROTOCOL, CT TEMPORAL BONES WO CONTRAST, CT CEREBRAL PERFUSION W WO CONTRAST, CT ANGIOGRAM NECK, CT ANGIOGRAM HEAD W AI ANALYSIS OF LVO    Date of Exam: 3/30/2023 11:11 AM EDT    Indication: Neuro deficit, acute, stroke suspected  Neuro deficit, acute stroke  suspected.    Comparison: None available.    Technique: Axial CT images were obtained of the head, in addition to dedicated CT of the temporal bones, without contrast administration.  Reconstructed coronal and sagittal images were also obtained. Automated exposure control and iterative construction   methods were used.    Axial CT images of the brain were obtained prior to and after the administration of 115 mL Isovue-370. Core blood volume, core blood flow, mean transit time, and Tmax images were obtained utilizing the Rapid software protocol. A limited CT angiogram of   the head was also performed to measure the blood vessel density.     Axial IV arterial phase contrast-enhanced CT angiogram of the head and neck. Three-dimensional reconstructions were postprocessed.      Findings:  CT head: Generalized volume loss is present, in addition to hypoattenuating periventricular white matter changes favored to reflect typical sequela of chronic microvascular ischemia. There is no evidence of intracranial hemorrhage. There is nonspecific   intracranial gas present along the lateral aspect of the right cerebellar hemisphere, adjacent to the right distal transverse and sigmoid sinus as well as the right mastoid which demonstrates fairly extensive effusion and coalescent change, with some   abnormal soft tissue present within the partially visualized right middle year. There is ex vacuo prominence of the ventricles and sulci. The orbits are normal. The paranasal sinuses are grossly clear.    CT temporal bones: The right temporal bone demonstrates abnormal fluid and soft tissue attenuation along the tympanic membrane, with component of extensive middle ear effusion and some mild blunting of the scutum. Some mild ossicular erosion is likely   present involving the stapes is poorly visualized. There is adjacent dehiscence of the tegmen tympani and there is extensive right-sided mastoid effusion, with some loss of the usual  extensive trabeculation and lytic change present suggesting acute   cholecystitis mastoiditis. Abnormal adjacent intracranial air is present concerning for component of concurrent empyema as above. The left external auditory canal is patent. The tympanic membrane is normal. The scutum is not blunted. There is no middle   ear effusion. The cochlea and semicircular canals are normally formed. There is some thinning of the tegmen tympani and tegmen mastoideum, without pavel dehiscence.    CT PERFUSION: Color maps demonstrate no evidence of core infarct or significant territorial ischemic tissue at risk.    CT ANGIOGRAM: The lung apices demonstrate no acute or suspicious findings. Prominent standing fluid is seen within the upper thoracic esophagus, placing the patient at risk for aspiration. The neck soft tissues demonstrate no evidence of pathologic   adenopathy or unexpected soft tissue neck mass. The osseous structures demonstrate no evidence of acute fracture or aggressive osseous lesion, with multilevel spondylosis present. Patent aortic arch with common origin of the brachiocephalic and left   common carotid arteries. The visualized subclavian arteries are patent. On the right, the ICA origin demonstrates some calcific atherosclerotic changes without significant resultant luminal narrowing, 0% by NASCET criteria. Similar 0% narrowing is   present on the left. The right vertebral artery is patent, diffusely diminutive. Patent dominant left vertebral artery. Intracranially, the carotid siphons demonstrate calcific atherosclerotic change with some mild to moderate narrowing of the   supraclinoid segments bilaterally. The anterior cerebral arteries are normal in course and caliber bilaterally. The right middle cerebral artery is normal in course and caliber. The left middle cerebral artery is similarly normal. The vertebrobasilar   system demonstrates diminutive right intradural vertebral artery appearing to likely  terminate in PICA. The left vertebral artery and basilar artery are patent. The posterior cerebral arteries demonstrate some multifocal mild to moderate narrowing,   without evidence of large vessel occlusion.      Impression:      Impression:  No evidence of intracranial hemorrhage or definite acute territorial ischemia. There is abnormal intracranial gas present, appearing likely extra-axial in the lateral aspect of the right cerebellar hemisphere, with changes of the right mastoid present,   concerning for mastoiditis and possible subdural empyema or less likely parenchymal abscess. Contrast-enhanced MRI is recommended to further evaluate.    Dedicated CT of the temporal bones demonstrates findings of likely acute otomastoiditis on the right, with middle ear and right mastoid effusion, coalescent changes of the right mastoid and dehiscence of both the tegmen tympani and tegmen mastoideum.   There is again concern for intracranial infectious involvement, empyema versus abscess with foci of intracranial gas present.    CT perfusion demonstrates no evidence of core infarct or significant territorial tissue at risk.    CT angiogram demonstrates multifocal mild/moderate atherosclerotic changes, with minimal narrowing of the ICA origins and no evidence of intracranial flow-limiting stenosis, large vessel occlusion or aneurysmal dilatation.    Noncontrast CT head performed 3/30/2023 at 11:08 AM. Results discussed with the stroke team by Dr. Hanley in person at time of image acquisition.    Electronically Signed: Ryder Houser    3/30/2023 12:11 PM EDT    Workstation ID: [id number]    CT Angiogram Neck [[id number]] Collected: 03/30/23 1145     Updated: 03/30/23 1214    Narrative:      CT HEAD WO CONTRAST STROKE PROTOCOL, CT TEMPORAL BONES WO CONTRAST, CT CEREBRAL PERFUSION W WO CONTRAST, CT ANGIOGRAM NECK, CT ANGIOGRAM HEAD W AI ANALYSIS OF LVO    Date of Exam: 3/30/2023 11:11 AM EDT    Indication: Neuro deficit, acute,  stroke suspected  Neuro deficit, acute stroke suspected.    Comparison: None available.    Technique: Axial CT images were obtained of the head, in addition to dedicated CT of the temporal bones, without contrast administration.  Reconstructed coronal and sagittal images were also obtained. Automated exposure control and iterative construction   methods were used.    Axial CT images of the brain were obtained prior to and after the administration of 115 mL Isovue-370. Core blood volume, core blood flow, mean transit time, and Tmax images were obtained utilizing the Rapid software protocol. A limited CT angiogram of   the head was also performed to measure the blood vessel density.     Axial IV arterial phase contrast-enhanced CT angiogram of the head and neck. Three-dimensional reconstructions were postprocessed.      Findings:  CT head: Generalized volume loss is present, in addition to hypoattenuating periventricular white matter changes favored to reflect typical sequela of chronic microvascular ischemia. There is no evidence of intracranial hemorrhage. There is nonspecific   intracranial gas present along the lateral aspect of the right cerebellar hemisphere, adjacent to the right distal transverse and sigmoid sinus as well as the right mastoid which demonstrates fairly extensive effusion and coalescent change, with some   abnormal soft tissue present within the partially visualized right middle year. There is ex vacuo prominence of the ventricles and sulci. The orbits are normal. The paranasal sinuses are grossly clear.    CT temporal bones: The right temporal bone demonstrates abnormal fluid and soft tissue attenuation along the tympanic membrane, with component of extensive middle ear effusion and some mild blunting of the scutum. Some mild ossicular erosion is likely   present involving the stapes is poorly visualized. There is adjacent dehiscence of the tegmen tympani and there is extensive right-sided  mastoid effusion, with some loss of the usual extensive trabeculation and lytic change present suggesting acute   cholecystitis mastoiditis. Abnormal adjacent intracranial air is present concerning for component of concurrent empyema as above. The left external auditory canal is patent. The tympanic membrane is normal. The scutum is not blunted. There is no middle   ear effusion. The cochlea and semicircular canals are normally formed. There is some thinning of the tegmen tympani and tegmen mastoideum, without pavel dehiscence.    CT PERFUSION: Color maps demonstrate no evidence of core infarct or significant territorial ischemic tissue at risk.    CT ANGIOGRAM: The lung apices demonstrate no acute or suspicious findings. Prominent standing fluid is seen within the upper thoracic esophagus, placing the patient at risk for aspiration. The neck soft tissues demonstrate no evidence of pathologic   adenopathy or unexpected soft tissue neck mass. The osseous structures demonstrate no evidence of acute fracture or aggressive osseous lesion, with multilevel spondylosis present. Patent aortic arch with common origin of the brachiocephalic and left   common carotid arteries. The visualized subclavian arteries are patent. On the right, the ICA origin demonstrates some calcific atherosclerotic changes without significant resultant luminal narrowing, 0% by NASCET criteria. Similar 0% narrowing is   present on the left. The right vertebral artery is patent, diffusely diminutive. Patent dominant left vertebral artery. Intracranially, the carotid siphons demonstrate calcific atherosclerotic change with some mild to moderate narrowing of the   supraclinoid segments bilaterally. The anterior cerebral arteries are normal in course and caliber bilaterally. The right middle cerebral artery is normal in course and caliber. The left middle cerebral artery is similarly normal. The vertebrobasilar   system demonstrates diminutive right  intradural vertebral artery appearing to likely terminate in PICA. The left vertebral artery and basilar artery are patent. The posterior cerebral arteries demonstrate some multifocal mild to moderate narrowing,   without evidence of large vessel occlusion.      Impression:      Impression:  No evidence of intracranial hemorrhage or definite acute territorial ischemia. There is abnormal intracranial gas present, appearing likely extra-axial in the lateral aspect of the right cerebellar hemisphere, with changes of the right mastoid present,   concerning for mastoiditis and possible subdural empyema or less likely parenchymal abscess. Contrast-enhanced MRI is recommended to further evaluate.    Dedicated CT of the temporal bones demonstrates findings of likely acute otomastoiditis on the right, with middle ear and right mastoid effusion, coalescent changes of the right mastoid and dehiscence of both the tegmen tympani and tegmen mastoideum.   There is again concern for intracranial infectious involvement, empyema versus abscess with foci of intracranial gas present.    CT perfusion demonstrates no evidence of core infarct or significant territorial tissue at risk.    CT angiogram demonstrates multifocal mild/moderate atherosclerotic changes, with minimal narrowing of the ICA origins and no evidence of intracranial flow-limiting stenosis, large vessel occlusion or aneurysmal dilatation.    Noncontrast CT head performed 3/30/2023 at 11:08 AM. Results discussed with the stroke team by Dr. Hanley in person at time of image acquisition.    Electronically Signed: Ryder Houser    3/30/2023 12:11 PM EDT    Workstation ID: [id number]    CT CEREBRAL PERFUSION WITH & WITHOUT CONTRAST [[id number]] Collected: 03/30/23 1145     Updated: 03/30/23 1214    Narrative:      CT HEAD WO CONTRAST STROKE PROTOCOL, CT TEMPORAL BONES WO CONTRAST, CT CEREBRAL PERFUSION W WO CONTRAST, CT ANGIOGRAM NECK, CT ANGIOGRAM HEAD W AI ANALYSIS OF  LVO    Date of Exam: 3/30/2023 11:11 AM EDT    Indication: Neuro deficit, acute, stroke suspected  Neuro deficit, acute stroke suspected.    Comparison: None available.    Technique: Axial CT images were obtained of the head, in addition to dedicated CT of the temporal bones, without contrast administration.  Reconstructed coronal and sagittal images were also obtained. Automated exposure control and iterative construction   methods were used.    Axial CT images of the brain were obtained prior to and after the administration of 115 mL Isovue-370. Core blood volume, core blood flow, mean transit time, and Tmax images were obtained utilizing the Rapid software protocol. A limited CT angiogram of   the head was also performed to measure the blood vessel density.     Axial IV arterial phase contrast-enhanced CT angiogram of the head and neck. Three-dimensional reconstructions were postprocessed.      Findings:  CT head: Generalized volume loss is present, in addition to hypoattenuating periventricular white matter changes favored to reflect typical sequela of chronic microvascular ischemia. There is no evidence of intracranial hemorrhage. There is nonspecific   intracranial gas present along the lateral aspect of the right cerebellar hemisphere, adjacent to the right distal transverse and sigmoid sinus as well as the right mastoid which demonstrates fairly extensive effusion and coalescent change, with some   abnormal soft tissue present within the partially visualized right middle year. There is ex vacuo prominence of the ventricles and sulci. The orbits are normal. The paranasal sinuses are grossly clear.    CT temporal bones: The right temporal bone demonstrates abnormal fluid and soft tissue attenuation along the tympanic membrane, with component of extensive middle ear effusion and some mild blunting of the scutum. Some mild ossicular erosion is likely   present involving the stapes is poorly visualized. There is  adjacent dehiscence of the tegmen tympani and there is extensive right-sided mastoid effusion, with some loss of the usual extensive trabeculation and lytic change present suggesting acute   cholecystitis mastoiditis. Abnormal adjacent intracranial air is present concerning for component of concurrent empyema as above. The left external auditory canal is patent. The tympanic membrane is normal. The scutum is not blunted. There is no middle   ear effusion. The cochlea and semicircular canals are normally formed. There is some thinning of the tegmen tympani and tegmen mastoideum, without pavel dehiscence.    CT PERFUSION: Color maps demonstrate no evidence of core infarct or significant territorial ischemic tissue at risk.    CT ANGIOGRAM: The lung apices demonstrate no acute or suspicious findings. Prominent standing fluid is seen within the upper thoracic esophagus, placing the patient at risk for aspiration. The neck soft tissues demonstrate no evidence of pathologic   adenopathy or unexpected soft tissue neck mass. The osseous structures demonstrate no evidence of acute fracture or aggressive osseous lesion, with multilevel spondylosis present. Patent aortic arch with common origin of the brachiocephalic and left   common carotid arteries. The visualized subclavian arteries are patent. On the right, the ICA origin demonstrates some calcific atherosclerotic changes without significant resultant luminal narrowing, 0% by NASCET criteria. Similar 0% narrowing is   present on the left. The right vertebral artery is patent, diffusely diminutive. Patent dominant left vertebral artery. Intracranially, the carotid siphons demonstrate calcific atherosclerotic change with some mild to moderate narrowing of the   supraclinoid segments bilaterally. The anterior cerebral arteries are normal in course and caliber bilaterally. The right middle cerebral artery is normal in course and caliber. The left middle cerebral artery is  similarly normal. The vertebrobasilar   system demonstrates diminutive right intradural vertebral artery appearing to likely terminate in PICA. The left vertebral artery and basilar artery are patent. The posterior cerebral arteries demonstrate some multifocal mild to moderate narrowing,   without evidence of large vessel occlusion.      Impression:      Impression:  No evidence of intracranial hemorrhage or definite acute territorial ischemia. There is abnormal intracranial gas present, appearing likely extra-axial in the lateral aspect of the right cerebellar hemisphere, with changes of the right mastoid present,   concerning for mastoiditis and possible subdural empyema or less likely parenchymal abscess. Contrast-enhanced MRI is recommended to further evaluate.    Dedicated CT of the temporal bones demonstrates findings of likely acute otomastoiditis on the right, with middle ear and right mastoid effusion, coalescent changes of the right mastoid and dehiscence of both the tegmen tympani and tegmen mastoideum.   There is again concern for intracranial infectious involvement, empyema versus abscess with foci of intracranial gas present.    CT perfusion demonstrates no evidence of core infarct or significant territorial tissue at risk.    CT angiogram demonstrates multifocal mild/moderate atherosclerotic changes, with minimal narrowing of the ICA origins and no evidence of intracranial flow-limiting stenosis, large vessel occlusion or aneurysmal dilatation.    Noncontrast CT head performed 3/30/2023 at 11:08 AM. Results discussed with the stroke team by Dr. Hanley in person at time of image acquisition.    Electronically Signed: Ryder Houser    3/30/2023 12:11 PM EDT    Workstation ID: [id number]    CT Temporal Bones Without Contrast [[id number]] Collected: 03/30/23 1145     Updated: 03/30/23 1214    Narrative:      CT HEAD WO CONTRAST STROKE PROTOCOL, CT TEMPORAL BONES WO CONTRAST, CT CEREBRAL PERFUSION W WO  CONTRAST, CT ANGIOGRAM NECK, CT ANGIOGRAM HEAD W AI ANALYSIS OF LVO    Date of Exam: 3/30/2023 11:11 AM EDT    Indication: Neuro deficit, acute, stroke suspected  Neuro deficit, acute stroke suspected.    Comparison: None available.    Technique: Axial CT images were obtained of the head, in addition to dedicated CT of the temporal bones, without contrast administration.  Reconstructed coronal and sagittal images were also obtained. Automated exposure control and iterative construction   methods were used.    Axial CT images of the brain were obtained prior to and after the administration of 115 mL Isovue-370. Core blood volume, core blood flow, mean transit time, and Tmax images were obtained utilizing the Rapid software protocol. A limited CT angiogram of   the head was also performed to measure the blood vessel density.     Axial IV arterial phase contrast-enhanced CT angiogram of the head and neck. Three-dimensional reconstructions were postprocessed.      Findings:  CT head: Generalized volume loss is present, in addition to hypoattenuating periventricular white matter changes favored to reflect typical sequela of chronic microvascular ischemia. There is no evidence of intracranial hemorrhage. There is nonspecific   intracranial gas present along the lateral aspect of the right cerebellar hemisphere, adjacent to the right distal transverse and sigmoid sinus as well as the right mastoid which demonstrates fairly extensive effusion and coalescent change, with some   abnormal soft tissue present within the partially visualized right middle year. There is ex vacuo prominence of the ventricles and sulci. The orbits are normal. The paranasal sinuses are grossly clear.    CT temporal bones: The right temporal bone demonstrates abnormal fluid and soft tissue attenuation along the tympanic membrane, with component of extensive middle ear effusion and some mild blunting of the scutum. Some mild ossicular erosion is  likely   present involving the stapes is poorly visualized. There is adjacent dehiscence of the tegmen tympani and there is extensive right-sided mastoid effusion, with some loss of the usual extensive trabeculation and lytic change present suggesting acute   cholecystitis mastoiditis. Abnormal adjacent intracranial air is present concerning for component of concurrent empyema as above. The left external auditory canal is patent. The tympanic membrane is normal. The scutum is not blunted. There is no middle   ear effusion. The cochlea and semicircular canals are normally formed. There is some thinning of the tegmen tympani and tegmen mastoideum, without pavel dehiscence.    CT PERFUSION: Color maps demonstrate no evidence of core infarct or significant territorial ischemic tissue at risk.    CT ANGIOGRAM: The lung apices demonstrate no acute or suspicious findings. Prominent standing fluid is seen within the upper thoracic esophagus, placing the patient at risk for aspiration. The neck soft tissues demonstrate no evidence of pathologic   adenopathy or unexpected soft tissue neck mass. The osseous structures demonstrate no evidence of acute fracture or aggressive osseous lesion, with multilevel spondylosis present. Patent aortic arch with common origin of the brachiocephalic and left   common carotid arteries. The visualized subclavian arteries are patent. On the right, the ICA origin demonstrates some calcific atherosclerotic changes without significant resultant luminal narrowing, 0% by NASCET criteria. Similar 0% narrowing is   present on the left. The right vertebral artery is patent, diffusely diminutive. Patent dominant left vertebral artery. Intracranially, the carotid siphons demonstrate calcific atherosclerotic change with some mild to moderate narrowing of the   supraclinoid segments bilaterally. The anterior cerebral arteries are normal in course and caliber bilaterally. The right middle cerebral artery is  normal in course and caliber. The left middle cerebral artery is similarly normal. The vertebrobasilar   system demonstrates diminutive right intradural vertebral artery appearing to likely terminate in PICA. The left vertebral artery and basilar artery are patent. The posterior cerebral arteries demonstrate some multifocal mild to moderate narrowing,   without evidence of large vessel occlusion.      Impression:      Impression:  No evidence of intracranial hemorrhage or definite acute territorial ischemia. There is abnormal intracranial gas present, appearing likely extra-axial in the lateral aspect of the right cerebellar hemisphere, with changes of the right mastoid present,   concerning for mastoiditis and possible subdural empyema or less likely parenchymal abscess. Contrast-enhanced MRI is recommended to further evaluate.    Dedicated CT of the temporal bones demonstrates findings of likely acute otomastoiditis on the right, with middle ear and right mastoid effusion, coalescent changes of the right mastoid and dehiscence of both the tegmen tympani and tegmen mastoideum.   There is again concern for intracranial infectious involvement, empyema versus abscess with foci of intracranial gas present.    CT perfusion demonstrates no evidence of core infarct or significant territorial tissue at risk.    CT angiogram demonstrates multifocal mild/moderate atherosclerotic changes, with minimal narrowing of the ICA origins and no evidence of intracranial flow-limiting stenosis, large vessel occlusion or aneurysmal dilatation.    Noncontrast CT head performed 3/30/2023 at 11:08 AM. Results discussed with the stroke team by Dr. Hanley in person at time of image acquisition.    Electronically Signed: Ryder Houser    3/30/2023 12:11 PM EDT    Workstation ID: [id number]      I independently reviewed the patient's radiographs    IMPRESSION:     Problems:  1. Streptococcus pneumoniae septicemia-due to acute  otitis  2. 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  No LP done yet but given the Streptococcus pneumonia septicemia and clinical presentation, we may not need an LP.  3. Acute otitis media and right-sided mastoiditis resulting in a subdural empyema with fistulous tract between the middle ear space and the dura  4. Acute toxic/metabolic encephalopathy-due to the above infection  5. Acute hypoxic respiratory failure, intubated for airway protection  6. leukocytosis with neutrophilia-was not present on arrival.  Could be due to infection but steroids likely contributing.  7. Hypokalemia  8. Hypomagnesemia  9. tachycardia-bradycardia syndrome status post PPM- complicating factor in the setting of bacteremia as at some risk for seeding PPM leads with bacteria but streptococcus pneumoniae is an uncommon bacteria to do this.  10. paroxysmal A. Fib on flecainide and Eliquis  11. coronary artery disease  12. obstructive sleep apnea on CPAP nightly at home  13. History of hypertension    RECOMMENDATIONS:    -continue to follow blood cultures  -Follow culture from the right ear drainage  -recommend TTE  -patient is on dexamethasone 10 mg IV every 6 hours ×4 days in the setting of his Streptococcus pneumoniae meningitis  -ENT service is following and patient is status post right myringotomy procedure.  Appreciate their assistance  -continue ceftriaxone 2 g IV every 12 hours  -Continue IV vancomycin for now pending susceptibilities for Streptococcus pneumoniae  -okay to stop acyclovir and ampicillin  -PICC placement soon    We will likely need to treat his infection more like brain abscess given the subdural empyema and the severity of his infection.  He will likely need a 6 week course of IV antibiotic therapy.    I discussed with nursing at bedside today    Thank you for asking me to see Kai Dodson.  Our group would be pleased to follow this patient over the course of  their hospitalization and assist with outpatient antimicrobial therapy, as indicated.  Further recommendations depend on the results of the cultures and clinical course.    Complex MDM. 45 minutes of critical care time spent on the patient's case today.    Que Alonso MD  3/31/2023

## 2023-03-31 NOTE — PROGRESS NOTES
[facility]     Progress Note    Patient Name: Kai Dodson  : 1940  MRN: [id number]  Primary Care Physician:  Derick Phillip MD  Date of admission: 3/30/2023    Subjective   Subjective         HPI:  Patient Reports intubated and sedated        Objective   Objective     Vitals:   Temp:  [96.8 °F (36 °C)-100.9 °F (38.3 °C)] 96.8 °F (36 °C)  Heart Rate:  [51-74] 70  Resp:  [14-29] 14  BP: ()/() 136/73  FiO2 (%):  [35 %-40 %] 35 %  Physical Exam   Sedated and intubated  Ears - auricles normal  Right xanthochromic otorrhea with no purulence  Neck no lymphadenopathy      Result Review          Assessment & Plan   Assessment / Plan     Brief Patient Summary:      Active Hospital Problems:  Active Hospital Problems    Diagnosis    • **Encephalopathy, metabolic    • Rt Mastoiditis    • Subdural empyema    • R/O Meningitis    • Questionable Status epilepticus (HCC)    • Sepsis, unspecified organism (HCC)    • Paroxysmal atrial fibrillation on Flecainide and Eliquis    • MINO on CPAP    • Hypertension      Plan: Meningitis likely secondary to acute suppurative otitis media.  Incision and drainage performed on the middle ear on the right yesterday.  Only obtained a small amount of purulent fluid.  Now has CSF otorrhea due to the communication through the tegmen.  I suspect this will likely scarred down with antibiotic therapy controlling the infection.  If not may need neurosurgical intervention to close the defect.  We will follow       DVT prophylaxis:  Mechanical DVT prophylaxis orders are present.    CODE STATUS:   Code Status (Patient has no pulse and is not breathing): CPR (Attempt to Resuscitate)  Medical Interventions (Patient has pulse or is breathing): Full Support        Edwin Garcia MD

## 2023-03-31 NOTE — SIGNIFICANT NOTE
MRI brain reviewed with Dr. Reno.  No evidence of stroke.      Stroke order set discontinued.  Will have general neurology assume care of patient and consult ID.    Tereza Bautista MSN, APRN, [facility]  Stroke Neurology

## 2023-03-31 NOTE — PROGRESS NOTES
Neurology       Patient Care Team:  Derick Phillip MD as PCP - General (Family Medicine)  Nino Castillo MD as Consulting Physician (Cardiology)    Chief complaint: Meningitis    History: Case was reviewed.  82-year-old man with recurrent draining ear over many years presents with mastoiditis and CT changes highly suggestive of meningitis.    He is going to coccus pneumonia from his blood.    He is sedated on the vent.    When he was taken off fentanyl and put on Precedex maximum doses were not able to keep him calm.          Past Medical History:   Diagnosis Date   • Arthritis    • Atrial fibrillation (HCC)    • Bradycardia    • Chronic hypertension     Probably essential   • Hypertension    • Hypertensive cardiovascular disease 07/2015    Recent progressive CCS class III-IV chest pain syndrome with normal EKG and exertional dyspnea and fatigue with normal codominant coronary arteries and preserved systolic  left ventricular function, July 2015.   • Intermittent palpitations 05/01/2014    Remote abnormal 24-hour Holter monitor demonstrating intermittent bradycardia with occasional PACs, May 2014.   • Lower extremity edema     Intermittent lower extremity edema felt secondary to antihypertensive therapy.    • Nephrolithiasis     Remote- 15 years ago   • MINO on CPAP    • MINO on CPAP     doesn't know setting    • Prostate troubles     enlarged   • Seasonal rhinitis     intermittent       Vital Signs   Vitals:    03/31/23 1130 03/31/23 1145 03/31/23 1200 03/31/23 1215   BP:  116/66 131/78 141/78   BP Location:       Patient Position:       Pulse: 73 70 70 70   Resp:       Temp:       TempSrc:       SpO2: 98% 97% 97% 98%   Weight:       Height:           Physical Exam:   General: Sedated              Neuro: Pinpoint pupils.    Supple neck.    Eyes conjugate.    Deep tendon reflexes plus minus throughout.    Decreased tone throughout.    Results Review: Lactate is currently 2.6.    White count is 14,140With  92 with 93% neutrophils.    CT of the head was reviewed and shows changes compatible with meningitis as does the MRI brain which is also personally reviewed.    Results from last 7 days   Lab Units 03/31/23  0451   WBC 10*3/mm3 14.14*   HEMOGLOBIN g/dL 12.0*   HEMATOCRIT % 34.8*   PLATELETS 10*3/mm3 109*     Results from last 7 days   Lab Units 03/31/23  0451 03/30/23  1912 03/30/23  1121 03/30/23  1117   SODIUM mmol/L 138  --  142  --    POTASSIUM mmol/L 3.9 3.3* 3.3*  --    CHLORIDE mmol/L 106  --  102  --    CO2 mmol/L 22.0  --  24.0  --    BUN mg/dL 19  --  21  --    CREATININE mg/dL 0.83  --  0.99 0.90   CALCIUM mg/dL 8.2*  --  9.4  --    BILIRUBIN mg/dL 2.7*  --  3.7*  --    ALK PHOS U/L 75  --  108  --    ALT (SGPT) U/L 19  --  26  --    AST (SGOT) U/L 28  --  34  --    GLUCOSE mg/dL 140*  --  150*  --        Imaging Results (Last 24 Hours)     Procedure Component Value Units Date/Time    MRI Brain With & Without Contrast [[id number]] Collected: 03/30/23 1727     Updated: 03/30/23 1752    Narrative:        MRI BRAIN W WO CONTRAST    Date of Exam: 3/30/2023 3:32 PM EDT    Indication: History of a right sided acute otomastoiditis with concern of an empyema versus abscess in the right temporal lobe.  Clinically the patient has a history of falls and confusion     Comparison: CT the head and CT of the temporal bones performed on 3/30/2023.    Technique:  Routine multiplanar/multisequence sequence images of the brain were obtained before and after the uneventful administration of  18 cc of Multihance.    Findings:   There is no evidence of a right temporal lobe abscess. There are signal voids within the CSF in the upper right posterior cranial fossa consistent with the soft tissue gas as seen on the CT study. There is diffuse meningeal enhancement. This includes   enhancement of the  meninges along the brainstem. This is consistent with meningitis. There is abnormal fluid/mucosal thickening in the right mastoid  sinus and middle ear cavity with the associated enhancement consistent with acute otomastoiditis. The   patient has chronic volume loss. There is prominence of the sulci, fissures, ventricles, and basal cisterns. There are abnormal subcortical and periventricular white matter signal changes consistent with the chronic microvascular ischemia. There are   normal signal voids within the intracranial arteries and the major dural sinuses. There is no acute hemorrhage or midline shift. There is no restricted diffusion to indicate acute ischemia. The orbital contents are normal. The paranasal and left mastoid   sinuses are clear.      Impression:      Impression:     1. No evidence of a right temporal lobe abscess.  2. Findings consistent with diffuse meningitis. There is right otomastoiditis as seen on the CT of the temporal bones. There are signal voids corresponding to gas bubbles within the CSF in the upper right posterior cranial fossa.  3. Volume loss secondary to cerebral atrophy.  4. Chronic microvascular ischemia.    Electronically Signed: Juan Torrez    3/30/2023 5:48 PM EDT    Workstation ID: [id number]    XR Chest 1 View [[id number]] Collected: 03/30/23 1508     Updated: 03/30/23 1512    Narrative:      XR CHEST 1 VW    Date of Exam: 3/30/2023 2:45 PM EDT    Indication: post intubation xray.    Comparison: 3/30/2023.    Findings:  Endotracheal tube tip in the midtrachea. There is a left subclavian ACD/pacemaker device. Lung volumes are low. There is indistinctness of the pulmonary vasculature. No significant pleural effusion identified. No pneumothorax. No acute osseous   abnormality identified.      Impression:      Impression:  Endotracheal tube tip in the midtrachea. No pneumothorax. Stable mild vascular congestion.    Electronically Signed: Jazmin Gutierrez    3/30/2023 3:08 PM EDT    Workstation ID: [id number]    XR Chest 1 View [[id number]] Collected: 03/30/23 1335     Updated: 03/30/23 1339    Narrative:      XR  CHEST 1 VW    Date of Exam: 3/30/2023 12:55 PM EDT    Indication: Acute Stroke Protocol (onset < 12 hrs).    Comparison: 6/21/2019    Findings:  Central interstitial opacities are present, suggesting vascular congestion, without overt pulmonary edema. There is no effusion or pneumothorax. There is no focal lobar consolidation. Unremarkable heart and mediastinal contours.      Impression:      Impression:  Mild vascular congestion is apparent, without overt pulmonary edema or other acute disease.    Electronically Signed: Ryder Houser    3/30/2023 1:36 PM EDT    Workstation ID: [id number]          Assessment:  Streptococcal meningitis secondary to mastoiditis.    Septicemia with Streptococcus likely septic shock.          Plan:  Defer LP due to chronic Eliquis which has been stopped questionably on Wednesday    Comment:  The source of the patient's infection and the nature seems relatively clear.    We did discuss the need for lumbar puncture in the next day or 2.    Prognosis discussed with the patient's son and he is quite guarded in view of the patient's advanced age         I discussed the patients findings and my recommendations with family, nursing staff and primary care team    Chaz Kahn MD  03/31/23  12:46 EDT

## 2023-03-31 NOTE — CASE MANAGEMENT/SOCIAL WORK
Discharge Planning Assessment  [facility]     Patient Name: Kai Dodson  MRN: [id number]  Today's Date: 3/31/2023    Admit Date: 3/30/2023    Plan: Ongoing   Discharge Needs Assessment     Row Name 03/31/23 1504       Living Environment    People in Home spouse    Name(s) of People in Home Wife Priya Dodson    Current Living Arrangements home    Primary Care Provided by self    Provides Primary Care For no one    Family Caregiver if Needed child(gissel), adult    Quality of Family Relationships helpful;involved;supportive       Resource/Environmental Concerns    Resource/Environmental Concerns none       Food Insecurity    Within the past 12 months, you worried that your food would run out before you got the money to buy more. Never true    Within the past 12 months, the food you bought just didn't last and you didn't have money to get more. Never true       Transition Planning    Patient/Family Anticipated Services at Transition     Transportation Anticipated family or friend will provide       Discharge Needs Assessment    Readmission Within the Last 30 Days no previous admission in last 30 days    Equipment Currently Used at Home cane, straight    Concerns to be Addressed discharge planning    Anticipated Changes Related to Illness inability to care for self    Current Discharge Risk chronically ill;dependent with mobility/activities of daily living               Discharge Plan     Row Name 03/31/23 1508       Plan    Plan Ongoing      Plan Comments I spoke with Mr. Dodson's son Damion today over the phone to discuss discharge planning. Mr. Dodson lives in [facility] with his wife Priya. Prior to admission he was independent, used a straight cane, and was not current with any therapy. He is currently intubated and sedated on the ventilator and in restraints. Case management will continue to follow Mr. Dodson's progress and provide for him a safe discharge plan.              Continued Care  and Services - Admitted Since 3/30/2023    Coordination has not been started for this encounter.       Expected Discharge Date and Time     Expected Discharge Date Expected Discharge Time    Apr 7, 2023          Demographic Summary     Row Name 03/31/23 1503       General Information    General Information Comments Primary provider is Derick Pihllip. Insurance provider is [employer]. No advanced directive or living will.               Functional Status     Row Name 03/31/23 1504       Functional Status, IADL    Medications independent    Meal Preparation independent    Housekeeping independent    Laundry independent    Shopping independent       Mental Status Summary    Recent Changes in Mental Status/Cognitive Functioning unable to assess       Employment/    Employment Status retired         Keily Rodriguez RN

## 2023-03-31 NOTE — PROGRESS NOTES
Pharmacy Consult-Vancomycin Dosing  Kai Dodson is a  82 y.o. male receiving vancomycin therapy.     Indication: Meningitis  Consulting Provider: Eren SMALLS Consult: Yes    Goal AUC: 400 - 600 mg/L*hr    Current Antimicrobial Therapy  Anti-Infectives (From admission, onward)      Ordered     Dose/Rate Route Frequency Start Stop    03/31/23 1434  vancomycin 1250 mg/250 mL 0.9% NS IVPB ([facility])        Ordering Provider: Antwon Lorenzana RPH    1,250 mg Intravenous Every 12 Hours 03/31/23 1500 04/06/23 1529    03/31/23 1344  Pharmacy to dose vancomycin        Ordering Provider: Mundo Jason MD     Does not apply Continuous PRN 03/31/23 1343 04/07/23 1342    03/30/23 1510  cefTRIAXone (ROCEPHIN) 2 g/100 mL 0.9% NS IVPB ([facility])        Ordering Provider: Que Alonso MD    2 g  over 30 Minutes Intravenous Every 12 Hours 03/31/23 0100 05/12/23 0059    03/30/23 1240  cefTRIAXone (ROCEPHIN) 2 g/100 mL 0.9% NS IVPB ([facility])        Ordering Provider: Mundo Jason MD    2 g  over 30 Minutes Intravenous Once 03/30/23 1242 03/30/23 1417    03/30/23 1231  vancomycin 1750 mg/500 mL 0.9% NS IVPB ([facility])        Ordering Provider: Que Key MD    20 mg/kg × 88.5 kg Intravenous Once 03/30/23 1233 03/30/23 1602    03/30/23 1231  ampicillin 2 g/100 mL 0.9% NS ([facility])        Ordering Provider: Que Key MD    2 g  over 30 Minutes Intravenous Once 03/30/23 1233 03/30/23 1417    03/30/23 1231  acyclovir (ZOVIRAX) 720 mg in sodium chloride 0.9 % 250 mL IVPB        Ordering Provider: Que Key MD    10 mg/kg × 71.9 kg (Ideal)  over 60 Minutes Intravenous Once 03/30/23 1233 03/30/23 1452            Allergies  Allergies as of 03/30/2023    (No Known Allergies)       Labs    Results from last 7 days   Lab Units 03/31/23  0451 03/30/23  1121 03/30/23  1117   BUN mg/dL 19 21  --    CREATININE mg/dL 0.83 0.99 0.90       Results from last 7 days   Lab Units 03/31/23  0451 03/30/23  1121   WBC 10*3/mm3 14.14*  "9.63       Evaluation of Dosing     Last Dose Received in the ED/Outside Facility: 3/30 @ 1418  Is Patient on Dialysis or Renal Replacement: no    Ht - 176.5 cm (69.49\")  Wt - 86.1 kg (189 lb 13.1 oz)    Estimated Creatinine Clearance: 83.6 mL/min (by C-G formula based on SCr of 0.83 mg/dL).    Intake & Output (last 3 days)         03/28 0701 03/29 0700 03/29 0701 03/30 0700 03/30 0701 03/31 0700 03/31 0701 04/01 0700    P.O.   0     I.V. (mL/kg)   2191.3 (25.5) 119.2 (1.4)    IV Piggyback   841.8 354    Total Intake(mL/kg)   3033.1 (35.2) 473.2 (5.5)    Urine (mL/kg/hr)   1650 265 (0.4)    Total Output   1650 265    Net   +1383.1 +208.2                    Microbiology and Radiology  Microbiology Results (last 10 days)       Procedure Component Value - Date/Time    Wound Culture - Drainage, Ear, Right [[id number]] Collected: 03/30/23 1949    Lab Status: Preliminary result Specimen: Drainage from Ear, Right Updated: 03/31/23 1049     Wound Culture Growth present, too young to evaluate     Gram Stain No WBCs seen      Few (2+) Gram positive cocci in pairs      Rare (1+) Yeast      Rare (1+) Gram positive bacilli    Blood Culture - Blood, Arm, Left [[id number]]  (Abnormal) Collected: 03/30/23 1339    Lab Status: Preliminary result Specimen: Blood from Arm, Left Updated: 03/31/23 0044     Blood Culture Abnormal Stain     Gram Stain Aerobic Bottle Gram positive cocci in pairs and chains      Anaerobic Bottle Gram positive cocci in pairs and chains    Blood Culture ID, PCR - Blood, Arm, Left [[id number]]  (Abnormal) Collected: 03/30/23 1339    Lab Status: Final result Specimen: Blood from Arm, Left Updated: 03/31/23 0159     BCID, PCR Streptococcus pneumoniae. Identification by BCID2 PCR.    Blood Culture - Blood, Arm, Left [[id number]]  (Abnormal) Collected: 03/30/23 1308    Lab Status: Preliminary result Specimen: Blood from Arm, Left Updated: 03/31/23 0211     Blood Culture Abnormal Stain     Gram Stain Aerobic Bottle " Gram positive cocci in pairs and chains      Anaerobic Bottle Gram positive cocci in pairs and chains            Reported Vancomycin Levels                Results from last 7 days   Lab Units 03/31/23  1342   VANCOMYCIN TR mcg/mL 8.10          [employer] AUC Calculation:    Current AUC:   307 mg/L*hr    Predicted Steady State AUC on Current Dose: -- mg/L*hr  _________________________________    Predicted Steady State AUC on New Dose:   624 mg/L*hr    Assessment/Plan:     1. Pharmacy to dose vancomycin for possible meningitis. Goal -600 mg/L*hr.  2. Patient received a loading dose of vancomycin 1250 mg (~20 mg/kg) IV on 3/30 @ 1418 and a maintenance dose of vancomycin 750 mg (~8.4mg/kg) on 3/31  @ 0219. Vancomycin random levels was 8.10 on 3/31 @1342 (~11h after maintenance dose). Renal function remains stable with regards to SCr and I/O documentation.  3. Current dose is predicted to be subtherapeutic based on [employer] Rx calculations. Plan to increase dose to vancomycin 1250 mg (~14.5 mg/kg) q12h.   4. Assess clearance by vancomycin level on 4/1 @ 1400 before third dose on current regimen.   5. Pharmacy will continue to monitor renal function, cultures and sensitivities, and clinical status to adjust regimen as necessary.    Charla Sofia PharmD Candidate 2023  3/31/2023   14:50 EDT

## 2023-03-31 NOTE — PROGRESS NOTES
Intensive Care Follow-up     Hospital:  LOS: 1 day   Mr. Kai Dodson, 82 y.o. male is followed for:   Encephalopathy, metabolic          History of present illness:   aKi Dodson is a 82 y.o. male with PMH PAF on Flecainide and Eliquis, HTN, CAD, Tachy-Samuel syndrome s/p PPM and MINO on CPAP who presented to the ED with AMS, inability to speak and left sided weakness.  He was LKW at 2300 last night.  Apparently, his wife was just discharged from [facility] this morning.  Their son was driving his mom back to [facility] for inpatient rehab.  He tried to call his dad, but couldn't reach.  Another family member went to check on him and he was found down altered with tremors in all extremities.  EMS was called and was brought to the ED.  CTH, CTA H/N and CTP revealed abnormal intracranial gas present, appearing likely extra-axial in the lateral aspect of the right cerebellar hemisphere, with changes of the right mastoid present, concerning for mastoiditis and possible subdural empyema or less likely parenchymal abscess.  CT temporal bones revealed likely acute otomastoiditis on the right, with middle ear and right mastoid effusion, coalescent changes of the right mastoid and dehiscence of both the tegmen tympani and tegmen mastoideum, no ischemia or hemorrhage.  He was started on broad spectrum antibiotics.  Significant labs:  LA 6.6, PCT 6, K 3.3, PT 17.9, INR 1.5.  UA was unremarkable.  NS and Neurology were consulted.  There was concern for status epilepticus.  He received 2 mg IV Ativan.  EEG and MRI Brain have been ordered.     Subjective   Interval History:  Patient continues on mechanical ventilation this morning, when sedation is weaned patient will sit up in bed and attempt to pull out his ET tube but is not on purposeful.             The patient's past medical, surgical and social history were reviewed and updated in Epic as appropriate.       Objective     Infusions:  dexmedetomidine, 0.2-1.5 mcg/kg/hr,  Last Rate: 0.2 mcg/kg/hr (03/31/23 0953)  fentanyl 10 mcg/mL,  mcg/hr, Last Rate: Stopped (03/31/23 1000)  niCARdipine, 5-15 mg/hr, Last Rate: Stopped (03/30/23 1821)  propofol, 5-50 mcg/kg/min, Last Rate: Stopped (03/30/23 2347)      Medications:  aspirin, 81 mg, Oral, Daily   Or  aspirin, 300 mg, Rectal, Daily  cefTRIAXone, 2 g, Intravenous, Q12H  chlorhexidine, 15 mL, Mouth/Throat, Q12H  ciprofloxacin-dexamethasone, 4 drop, Right Ear, BID  dexamethasone, 10 mg, Intravenous, Q6H  levETIRAcetam, 500 mg, Intravenous, Q12H  pantoprazole, 40 mg, Intravenous, Q AM  sodium chloride, 10 mL, Intravenous, Q12H  sodium chloride, 10 mL, Intravenous, Q12H  vancomycin, 750 mg, Intravenous, Q12H      I reviewed the patient's medications.    Vital Sign Min/Max for last 24 hours  Temp  Min: 97.8 °F (36.6 °C)  Max: 100.9 °F (38.3 °C)   BP  Min: 72/47  Max: 187/146   Pulse  Min: 51  Max: 112   Resp  Min: 14  Max: 29   SpO2  Min: 87 %  Max: 100 %   No data recorded       Input/Output for last 24 hour shift  03/30 0701 - 03/31 0700  In: 3033.1 [I.V.:2191.3]  Out: 1650 [Urine:1650]   FiO2 (%):  [35 %-45 %] 35 %  S RR:  [14-16] 14  PEEP/CPAP (cm H2O):  [5 cm H20] 5 cm H20  MAP (cm H2O):  [7-10] 7.2  GENERAL : Sedated, lying in bed, NAD  RESPIRATORY/THORAX : ET tube in place, Coarse breath sounds bilaterally  CARDIOVASCULAR : Normal S1/S2, RRR. 1+ lower ext edema.  GASTROINTESTINAL : Soft, NT/ND. BS x 4 normoactive. No hepatosplenomegaly.  MUSCULOSKELETAL : No cyanosis, clubbing, or ischemia  NEUROLOGICAL: Sedated, Moving all ext.    Results from last 7 days   Lab Units 03/31/23  0451 03/30/23  1121 03/30/23  1117   WBC 10*3/mm3 14.14* 9.63  --    HEMOGLOBIN g/dL 12.0* 14.4  --    HEMOGLOBIN, POC g/dL  --   --  15.3   PLATELETS 10*3/mm3 109* 129*  --      Results from last 7 days   Lab Units 03/31/23  0451 03/30/23  1912 03/30/23  1121 03/30/23  1117   SODIUM mmol/L 138  --  142  --    POTASSIUM mmol/L 3.9 3.3* 3.3*  --    CO2  mmol/L 22.0  --  24.0  --    BUN mg/dL 19  --  21  --    CREATININE mg/dL 0.83  --  0.99 0.90   MAGNESIUM mg/dL 2.6* 1.5* 1.8  --    PHOSPHORUS mg/dL 2.6  --   --   --    GLUCOSE mg/dL 140*  --  150*  --      Estimated Creatinine Clearance: 83.6 mL/min (by C-G formula based on SCr of 0.83 mg/dL).    Results from last 7 days   Lab Units 03/30/23  1544   PH, ARTERIAL pH units 7.463*   PCO2, ARTERIAL mm Hg 30.1*   PO2 ART mm Hg 73.8*       I reviewed the patient's new clinical results.  I reviewed the patient's new imaging results/reports including actual images and agree with reports.       Imaging Results (Last 24 Hours)     Procedure Component Value Units Date/Time    MRI Brain With & Without Contrast [[id number]] Collected: 03/30/23 1727     Updated: 03/30/23 1752    Narrative:        MRI BRAIN W WO CONTRAST    Date of Exam: 3/30/2023 3:32 PM EDT    Indication: History of a right sided acute otomastoiditis with concern of an empyema versus abscess in the right temporal lobe.  Clinically the patient has a history of falls and confusion     Comparison: CT the head and CT of the temporal bones performed on 3/30/2023.    Technique:  Routine multiplanar/multisequence sequence images of the brain were obtained before and after the uneventful administration of  18 cc of Multihance.    Findings:   There is no evidence of a right temporal lobe abscess. There are signal voids within the CSF in the upper right posterior cranial fossa consistent with the soft tissue gas as seen on the CT study. There is diffuse meningeal enhancement. This includes   enhancement of the  meninges along the brainstem. This is consistent with meningitis. There is abnormal fluid/mucosal thickening in the right mastoid sinus and middle ear cavity with the associated enhancement consistent with acute otomastoiditis. The   patient has chronic volume loss. There is prominence of the sulci, fissures, ventricles, and basal cisterns. There are abnormal  subcortical and periventricular white matter signal changes consistent with the chronic microvascular ischemia. There are   normal signal voids within the intracranial arteries and the major dural sinuses. There is no acute hemorrhage or midline shift. There is no restricted diffusion to indicate acute ischemia. The orbital contents are normal. The paranasal and left mastoid   sinuses are clear.      Impression:      Impression:     1. No evidence of a right temporal lobe abscess.  2. Findings consistent with diffuse meningitis. There is right otomastoiditis as seen on the CT of the temporal bones. There are signal voids corresponding to gas bubbles within the CSF in the upper right posterior cranial fossa.  3. Volume loss secondary to cerebral atrophy.  4. Chronic microvascular ischemia.    Electronically Signed: Juan Torrez    3/30/2023 5:48 PM EDT    Workstation ID: [id number]    XR Chest 1 View [[id number]] Collected: 03/30/23 1508     Updated: 03/30/23 1512    Narrative:      XR CHEST 1 VW    Date of Exam: 3/30/2023 2:45 PM EDT    Indication: post intubation xray.    Comparison: 3/30/2023.    Findings:  Endotracheal tube tip in the midtrachea. There is a left subclavian ACD/pacemaker device. Lung volumes are low. There is indistinctness of the pulmonary vasculature. No significant pleural effusion identified. No pneumothorax. No acute osseous   abnormality identified.      Impression:      Impression:  Endotracheal tube tip in the midtrachea. No pneumothorax. Stable mild vascular congestion.    Electronically Signed: Jazmin Gutierrez    3/30/2023 3:08 PM EDT    Workstation ID: [id number]    XR Chest 1 View [[id number]] Collected: 03/30/23 1335     Updated: 03/30/23 1339    Narrative:      XR CHEST 1 VW    Date of Exam: 3/30/2023 12:55 PM EDT    Indication: Acute Stroke Protocol (onset < 12 hrs).    Comparison: 6/21/2019    Findings:  Central interstitial opacities are present, suggesting vascular congestion, without overt  pulmonary edema. There is no effusion or pneumothorax. There is no focal lobar consolidation. Unremarkable heart and mediastinal contours.      Impression:      Impression:  Mild vascular congestion is apparent, without overt pulmonary edema or other acute disease.    Electronically Signed: Ryder Houser    3/30/2023 1:36 PM EDT    Workstation ID: [id number]    CT Head Without Contrast Stroke Protocol [[id number]] Collected: 03/30/23 1145     Updated: 03/30/23 1214    Narrative:      CT HEAD WO CONTRAST STROKE PROTOCOL, CT TEMPORAL BONES WO CONTRAST, CT CEREBRAL PERFUSION W WO CONTRAST, CT ANGIOGRAM NECK, CT ANGIOGRAM HEAD W AI ANALYSIS OF LVO    Date of Exam: 3/30/2023 11:11 AM EDT    Indication: Neuro deficit, acute, stroke suspected  Neuro deficit, acute stroke suspected.    Comparison: None available.    Technique: Axial CT images were obtained of the head, in addition to dedicated CT of the temporal bones, without contrast administration.  Reconstructed coronal and sagittal images were also obtained. Automated exposure control and iterative construction   methods were used.    Axial CT images of the brain were obtained prior to and after the administration of 115 mL Isovue-370. Core blood volume, core blood flow, mean transit time, and Tmax images were obtained utilizing the Rapid software protocol. A limited CT angiogram of   the head was also performed to measure the blood vessel density.     Axial IV arterial phase contrast-enhanced CT angiogram of the head and neck. Three-dimensional reconstructions were postprocessed.      Findings:  CT head: Generalized volume loss is present, in addition to hypoattenuating periventricular white matter changes favored to reflect typical sequela of chronic microvascular ischemia. There is no evidence of intracranial hemorrhage. There is nonspecific   intracranial gas present along the lateral aspect of the right cerebellar hemisphere, adjacent to the right distal transverse  and sigmoid sinus as well as the right mastoid which demonstrates fairly extensive effusion and coalescent change, with some   abnormal soft tissue present within the partially visualized right middle year. There is ex vacuo prominence of the ventricles and sulci. The orbits are normal. The paranasal sinuses are grossly clear.    CT temporal bones: The right temporal bone demonstrates abnormal fluid and soft tissue attenuation along the tympanic membrane, with component of extensive middle ear effusion and some mild blunting of the scutum. Some mild ossicular erosion is likely   present involving the stapes is poorly visualized. There is adjacent dehiscence of the tegmen tympani and there is extensive right-sided mastoid effusion, with some loss of the usual extensive trabeculation and lytic change present suggesting acute   cholecystitis mastoiditis. Abnormal adjacent intracranial air is present concerning for component of concurrent empyema as above. The left external auditory canal is patent. The tympanic membrane is normal. The scutum is not blunted. There is no middle   ear effusion. The cochlea and semicircular canals are normally formed. There is some thinning of the tegmen tympani and tegmen mastoideum, without pavel dehiscence.    CT PERFUSION: Color maps demonstrate no evidence of core infarct or significant territorial ischemic tissue at risk.    CT ANGIOGRAM: The lung apices demonstrate no acute or suspicious findings. Prominent standing fluid is seen within the upper thoracic esophagus, placing the patient at risk for aspiration. The neck soft tissues demonstrate no evidence of pathologic   adenopathy or unexpected soft tissue neck mass. The osseous structures demonstrate no evidence of acute fracture or aggressive osseous lesion, with multilevel spondylosis present. Patent aortic arch with common origin of the brachiocephalic and left   common carotid arteries. The visualized subclavian arteries are  patent. On the right, the ICA origin demonstrates some calcific atherosclerotic changes without significant resultant luminal narrowing, 0% by NASCET criteria. Similar 0% narrowing is   present on the left. The right vertebral artery is patent, diffusely diminutive. Patent dominant left vertebral artery. Intracranially, the carotid siphons demonstrate calcific atherosclerotic change with some mild to moderate narrowing of the   supraclinoid segments bilaterally. The anterior cerebral arteries are normal in course and caliber bilaterally. The right middle cerebral artery is normal in course and caliber. The left middle cerebral artery is similarly normal. The vertebrobasilar   system demonstrates diminutive right intradural vertebral artery appearing to likely terminate in PICA. The left vertebral artery and basilar artery are patent. The posterior cerebral arteries demonstrate some multifocal mild to moderate narrowing,   without evidence of large vessel occlusion.      Impression:      Impression:  No evidence of intracranial hemorrhage or definite acute territorial ischemia. There is abnormal intracranial gas present, appearing likely extra-axial in the lateral aspect of the right cerebellar hemisphere, with changes of the right mastoid present,   concerning for mastoiditis and possible subdural empyema or less likely parenchymal abscess. Contrast-enhanced MRI is recommended to further evaluate.    Dedicated CT of the temporal bones demonstrates findings of likely acute otomastoiditis on the right, with middle ear and right mastoid effusion, coalescent changes of the right mastoid and dehiscence of both the tegmen tympani and tegmen mastoideum.   There is again concern for intracranial infectious involvement, empyema versus abscess with foci of intracranial gas present.    CT perfusion demonstrates no evidence of core infarct or significant territorial tissue at risk.    CT angiogram demonstrates multifocal  mild/moderate atherosclerotic changes, with minimal narrowing of the ICA origins and no evidence of intracranial flow-limiting stenosis, large vessel occlusion or aneurysmal dilatation.    Noncontrast CT head performed 3/30/2023 at 11:08 AM. Results discussed with the stroke team by Dr. Hanley in person at time of image acquisition.    Electronically Signed: Ryder Houser    3/30/2023 12:11 PM EDT    Workstation ID: [id number]    CT Angiogram Head w AI Analysis of LVO [[id number]] Collected: 03/30/23 1145     Updated: 03/30/23 1214    Narrative:      CT HEAD WO CONTRAST STROKE PROTOCOL, CT TEMPORAL BONES WO CONTRAST, CT CEREBRAL PERFUSION W WO CONTRAST, CT ANGIOGRAM NECK, CT ANGIOGRAM HEAD W AI ANALYSIS OF LVO    Date of Exam: 3/30/2023 11:11 AM EDT    Indication: Neuro deficit, acute, stroke suspected  Neuro deficit, acute stroke suspected.    Comparison: None available.    Technique: Axial CT images were obtained of the head, in addition to dedicated CT of the temporal bones, without contrast administration.  Reconstructed coronal and sagittal images were also obtained. Automated exposure control and iterative construction   methods were used.    Axial CT images of the brain were obtained prior to and after the administration of 115 mL Isovue-370. Core blood volume, core blood flow, mean transit time, and Tmax images were obtained utilizing the Rapid software protocol. A limited CT angiogram of   the head was also performed to measure the blood vessel density.     Axial IV arterial phase contrast-enhanced CT angiogram of the head and neck. Three-dimensional reconstructions were postprocessed.      Findings:  CT head: Generalized volume loss is present, in addition to hypoattenuating periventricular white matter changes favored to reflect typical sequela of chronic microvascular ischemia. There is no evidence of intracranial hemorrhage. There is nonspecific   intracranial gas present along the lateral aspect of the  right cerebellar hemisphere, adjacent to the right distal transverse and sigmoid sinus as well as the right mastoid which demonstrates fairly extensive effusion and coalescent change, with some   abnormal soft tissue present within the partially visualized right middle year. There is ex vacuo prominence of the ventricles and sulci. The orbits are normal. The paranasal sinuses are grossly clear.    CT temporal bones: The right temporal bone demonstrates abnormal fluid and soft tissue attenuation along the tympanic membrane, with component of extensive middle ear effusion and some mild blunting of the scutum. Some mild ossicular erosion is likely   present involving the stapes is poorly visualized. There is adjacent dehiscence of the tegmen tympani and there is extensive right-sided mastoid effusion, with some loss of the usual extensive trabeculation and lytic change present suggesting acute   cholecystitis mastoiditis. Abnormal adjacent intracranial air is present concerning for component of concurrent empyema as above. The left external auditory canal is patent. The tympanic membrane is normal. The scutum is not blunted. There is no middle   ear effusion. The cochlea and semicircular canals are normally formed. There is some thinning of the tegmen tympani and tegmen mastoideum, without pavel dehiscence.    CT PERFUSION: Color maps demonstrate no evidence of core infarct or significant territorial ischemic tissue at risk.    CT ANGIOGRAM: The lung apices demonstrate no acute or suspicious findings. Prominent standing fluid is seen within the upper thoracic esophagus, placing the patient at risk for aspiration. The neck soft tissues demonstrate no evidence of pathologic   adenopathy or unexpected soft tissue neck mass. The osseous structures demonstrate no evidence of acute fracture or aggressive osseous lesion, with multilevel spondylosis present. Patent aortic arch with common origin of the brachiocephalic and left    common carotid arteries. The visualized subclavian arteries are patent. On the right, the ICA origin demonstrates some calcific atherosclerotic changes without significant resultant luminal narrowing, 0% by NASCET criteria. Similar 0% narrowing is   present on the left. The right vertebral artery is patent, diffusely diminutive. Patent dominant left vertebral artery. Intracranially, the carotid siphons demonstrate calcific atherosclerotic change with some mild to moderate narrowing of the   supraclinoid segments bilaterally. The anterior cerebral arteries are normal in course and caliber bilaterally. The right middle cerebral artery is normal in course and caliber. The left middle cerebral artery is similarly normal. The vertebrobasilar   system demonstrates diminutive right intradural vertebral artery appearing to likely terminate in PICA. The left vertebral artery and basilar artery are patent. The posterior cerebral arteries demonstrate some multifocal mild to moderate narrowing,   without evidence of large vessel occlusion.      Impression:      Impression:  No evidence of intracranial hemorrhage or definite acute territorial ischemia. There is abnormal intracranial gas present, appearing likely extra-axial in the lateral aspect of the right cerebellar hemisphere, with changes of the right mastoid present,   concerning for mastoiditis and possible subdural empyema or less likely parenchymal abscess. Contrast-enhanced MRI is recommended to further evaluate.    Dedicated CT of the temporal bones demonstrates findings of likely acute otomastoiditis on the right, with middle ear and right mastoid effusion, coalescent changes of the right mastoid and dehiscence of both the tegmen tympani and tegmen mastoideum.   There is again concern for intracranial infectious involvement, empyema versus abscess with foci of intracranial gas present.    CT perfusion demonstrates no evidence of core infarct or significant  territorial tissue at risk.    CT angiogram demonstrates multifocal mild/moderate atherosclerotic changes, with minimal narrowing of the ICA origins and no evidence of intracranial flow-limiting stenosis, large vessel occlusion or aneurysmal dilatation.    Noncontrast CT head performed 3/30/2023 at 11:08 AM. Results discussed with the stroke team by Dr. Hanley in person at time of image acquisition.    Electronically Signed: Ryder Houser    3/30/2023 12:11 PM EDT    Workstation ID: [id number]    CT Angiogram Neck [[id number]] Collected: 03/30/23 1145     Updated: 03/30/23 1214    Narrative:      CT HEAD WO CONTRAST STROKE PROTOCOL, CT TEMPORAL BONES WO CONTRAST, CT CEREBRAL PERFUSION W WO CONTRAST, CT ANGIOGRAM NECK, CT ANGIOGRAM HEAD W AI ANALYSIS OF LVO    Date of Exam: 3/30/2023 11:11 AM EDT    Indication: Neuro deficit, acute, stroke suspected  Neuro deficit, acute stroke suspected.    Comparison: None available.    Technique: Axial CT images were obtained of the head, in addition to dedicated CT of the temporal bones, without contrast administration.  Reconstructed coronal and sagittal images were also obtained. Automated exposure control and iterative construction   methods were used.    Axial CT images of the brain were obtained prior to and after the administration of 115 mL Isovue-370. Core blood volume, core blood flow, mean transit time, and Tmax images were obtained utilizing the Rapid software protocol. A limited CT angiogram of   the head was also performed to measure the blood vessel density.     Axial IV arterial phase contrast-enhanced CT angiogram of the head and neck. Three-dimensional reconstructions were postprocessed.      Findings:  CT head: Generalized volume loss is present, in addition to hypoattenuating periventricular white matter changes favored to reflect typical sequela of chronic microvascular ischemia. There is no evidence of intracranial hemorrhage. There is nonspecific   intracranial  gas present along the lateral aspect of the right cerebellar hemisphere, adjacent to the right distal transverse and sigmoid sinus as well as the right mastoid which demonstrates fairly extensive effusion and coalescent change, with some   abnormal soft tissue present within the partially visualized right middle year. There is ex vacuo prominence of the ventricles and sulci. The orbits are normal. The paranasal sinuses are grossly clear.    CT temporal bones: The right temporal bone demonstrates abnormal fluid and soft tissue attenuation along the tympanic membrane, with component of extensive middle ear effusion and some mild blunting of the scutum. Some mild ossicular erosion is likely   present involving the stapes is poorly visualized. There is adjacent dehiscence of the tegmen tympani and there is extensive right-sided mastoid effusion, with some loss of the usual extensive trabeculation and lytic change present suggesting acute   cholecystitis mastoiditis. Abnormal adjacent intracranial air is present concerning for component of concurrent empyema as above. The left external auditory canal is patent. The tympanic membrane is normal. The scutum is not blunted. There is no middle   ear effusion. The cochlea and semicircular canals are normally formed. There is some thinning of the tegmen tympani and tegmen mastoideum, without pavel dehiscence.    CT PERFUSION: Color maps demonstrate no evidence of core infarct or significant territorial ischemic tissue at risk.    CT ANGIOGRAM: The lung apices demonstrate no acute or suspicious findings. Prominent standing fluid is seen within the upper thoracic esophagus, placing the patient at risk for aspiration. The neck soft tissues demonstrate no evidence of pathologic   adenopathy or unexpected soft tissue neck mass. The osseous structures demonstrate no evidence of acute fracture or aggressive osseous lesion, with multilevel spondylosis present. Patent aortic arch with  common origin of the brachiocephalic and left   common carotid arteries. The visualized subclavian arteries are patent. On the right, the ICA origin demonstrates some calcific atherosclerotic changes without significant resultant luminal narrowing, 0% by NASCET criteria. Similar 0% narrowing is   present on the left. The right vertebral artery is patent, diffusely diminutive. Patent dominant left vertebral artery. Intracranially, the carotid siphons demonstrate calcific atherosclerotic change with some mild to moderate narrowing of the   supraclinoid segments bilaterally. The anterior cerebral arteries are normal in course and caliber bilaterally. The right middle cerebral artery is normal in course and caliber. The left middle cerebral artery is similarly normal. The vertebrobasilar   system demonstrates diminutive right intradural vertebral artery appearing to likely terminate in PICA. The left vertebral artery and basilar artery are patent. The posterior cerebral arteries demonstrate some multifocal mild to moderate narrowing,   without evidence of large vessel occlusion.      Impression:      Impression:  No evidence of intracranial hemorrhage or definite acute territorial ischemia. There is abnormal intracranial gas present, appearing likely extra-axial in the lateral aspect of the right cerebellar hemisphere, with changes of the right mastoid present,   concerning for mastoiditis and possible subdural empyema or less likely parenchymal abscess. Contrast-enhanced MRI is recommended to further evaluate.    Dedicated CT of the temporal bones demonstrates findings of likely acute otomastoiditis on the right, with middle ear and right mastoid effusion, coalescent changes of the right mastoid and dehiscence of both the tegmen tympani and tegmen mastoideum.   There is again concern for intracranial infectious involvement, empyema versus abscess with foci of intracranial gas present.    CT perfusion demonstrates no  evidence of core infarct or significant territorial tissue at risk.    CT angiogram demonstrates multifocal mild/moderate atherosclerotic changes, with minimal narrowing of the ICA origins and no evidence of intracranial flow-limiting stenosis, large vessel occlusion or aneurysmal dilatation.    Noncontrast CT head performed 3/30/2023 at 11:08 AM. Results discussed with the stroke team by Dr. Hanley in person at time of image acquisition.    Electronically Signed: Ryder oHuser    3/30/2023 12:11 PM EDT    Workstation ID: [id number]    CT CEREBRAL PERFUSION WITH & WITHOUT CONTRAST [[id number]] Collected: 03/30/23 1145     Updated: 03/30/23 1214    Narrative:      CT HEAD WO CONTRAST STROKE PROTOCOL, CT TEMPORAL BONES WO CONTRAST, CT CEREBRAL PERFUSION W WO CONTRAST, CT ANGIOGRAM NECK, CT ANGIOGRAM HEAD W AI ANALYSIS OF LVO    Date of Exam: 3/30/2023 11:11 AM EDT    Indication: Neuro deficit, acute, stroke suspected  Neuro deficit, acute stroke suspected.    Comparison: None available.    Technique: Axial CT images were obtained of the head, in addition to dedicated CT of the temporal bones, without contrast administration.  Reconstructed coronal and sagittal images were also obtained. Automated exposure control and iterative construction   methods were used.    Axial CT images of the brain were obtained prior to and after the administration of 115 mL Isovue-370. Core blood volume, core blood flow, mean transit time, and Tmax images were obtained utilizing the Rapid software protocol. A limited CT angiogram of   the head was also performed to measure the blood vessel density.     Axial IV arterial phase contrast-enhanced CT angiogram of the head and neck. Three-dimensional reconstructions were postprocessed.      Findings:  CT head: Generalized volume loss is present, in addition to hypoattenuating periventricular white matter changes favored to reflect typical sequela of chronic microvascular ischemia. There is no  evidence of intracranial hemorrhage. There is nonspecific   intracranial gas present along the lateral aspect of the right cerebellar hemisphere, adjacent to the right distal transverse and sigmoid sinus as well as the right mastoid which demonstrates fairly extensive effusion and coalescent change, with some   abnormal soft tissue present within the partially visualized right middle year. There is ex vacuo prominence of the ventricles and sulci. The orbits are normal. The paranasal sinuses are grossly clear.    CT temporal bones: The right temporal bone demonstrates abnormal fluid and soft tissue attenuation along the tympanic membrane, with component of extensive middle ear effusion and some mild blunting of the scutum. Some mild ossicular erosion is likely   present involving the stapes is poorly visualized. There is adjacent dehiscence of the tegmen tympani and there is extensive right-sided mastoid effusion, with some loss of the usual extensive trabeculation and lytic change present suggesting acute   cholecystitis mastoiditis. Abnormal adjacent intracranial air is present concerning for component of concurrent empyema as above. The left external auditory canal is patent. The tympanic membrane is normal. The scutum is not blunted. There is no middle   ear effusion. The cochlea and semicircular canals are normally formed. There is some thinning of the tegmen tympani and tegmen mastoideum, without pavel dehiscence.    CT PERFUSION: Color maps demonstrate no evidence of core infarct or significant territorial ischemic tissue at risk.    CT ANGIOGRAM: The lung apices demonstrate no acute or suspicious findings. Prominent standing fluid is seen within the upper thoracic esophagus, placing the patient at risk for aspiration. The neck soft tissues demonstrate no evidence of pathologic   adenopathy or unexpected soft tissue neck mass. The osseous structures demonstrate no evidence of acute fracture or aggressive  osseous lesion, with multilevel spondylosis present. Patent aortic arch with common origin of the brachiocephalic and left   common carotid arteries. The visualized subclavian arteries are patent. On the right, the ICA origin demonstrates some calcific atherosclerotic changes without significant resultant luminal narrowing, 0% by NASCET criteria. Similar 0% narrowing is   present on the left. The right vertebral artery is patent, diffusely diminutive. Patent dominant left vertebral artery. Intracranially, the carotid siphons demonstrate calcific atherosclerotic change with some mild to moderate narrowing of the   supraclinoid segments bilaterally. The anterior cerebral arteries are normal in course and caliber bilaterally. The right middle cerebral artery is normal in course and caliber. The left middle cerebral artery is similarly normal. The vertebrobasilar   system demonstrates diminutive right intradural vertebral artery appearing to likely terminate in PICA. The left vertebral artery and basilar artery are patent. The posterior cerebral arteries demonstrate some multifocal mild to moderate narrowing,   without evidence of large vessel occlusion.      Impression:      Impression:  No evidence of intracranial hemorrhage or definite acute territorial ischemia. There is abnormal intracranial gas present, appearing likely extra-axial in the lateral aspect of the right cerebellar hemisphere, with changes of the right mastoid present,   concerning for mastoiditis and possible subdural empyema or less likely parenchymal abscess. Contrast-enhanced MRI is recommended to further evaluate.    Dedicated CT of the temporal bones demonstrates findings of likely acute otomastoiditis on the right, with middle ear and right mastoid effusion, coalescent changes of the right mastoid and dehiscence of both the tegmen tympani and tegmen mastoideum.   There is again concern for intracranial infectious involvement, empyema versus  abscess with foci of intracranial gas present.    CT perfusion demonstrates no evidence of core infarct or significant territorial tissue at risk.    CT angiogram demonstrates multifocal mild/moderate atherosclerotic changes, with minimal narrowing of the ICA origins and no evidence of intracranial flow-limiting stenosis, large vessel occlusion or aneurysmal dilatation.    Noncontrast CT head performed 3/30/2023 at 11:08 AM. Results discussed with the stroke team by Dr. Hanley in person at time of image acquisition.    Electronically Signed: Ryder Houser    3/30/2023 12:11 PM EDT    Workstation ID: [id number]    CT Temporal Bones Without Contrast [[id number]] Collected: 03/30/23 1145     Updated: 03/30/23 1214    Narrative:      CT HEAD WO CONTRAST STROKE PROTOCOL, CT TEMPORAL BONES WO CONTRAST, CT CEREBRAL PERFUSION W WO CONTRAST, CT ANGIOGRAM NECK, CT ANGIOGRAM HEAD W AI ANALYSIS OF LVO    Date of Exam: 3/30/2023 11:11 AM EDT    Indication: Neuro deficit, acute, stroke suspected  Neuro deficit, acute stroke suspected.    Comparison: None available.    Technique: Axial CT images were obtained of the head, in addition to dedicated CT of the temporal bones, without contrast administration.  Reconstructed coronal and sagittal images were also obtained. Automated exposure control and iterative construction   methods were used.    Axial CT images of the brain were obtained prior to and after the administration of 115 mL Isovue-370. Core blood volume, core blood flow, mean transit time, and Tmax images were obtained utilizing the Rapid software protocol. A limited CT angiogram of   the head was also performed to measure the blood vessel density.     Axial IV arterial phase contrast-enhanced CT angiogram of the head and neck. Three-dimensional reconstructions were postprocessed.      Findings:  CT head: Generalized volume loss is present, in addition to hypoattenuating periventricular white matter changes favored to reflect  typical sequela of chronic microvascular ischemia. There is no evidence of intracranial hemorrhage. There is nonspecific   intracranial gas present along the lateral aspect of the right cerebellar hemisphere, adjacent to the right distal transverse and sigmoid sinus as well as the right mastoid which demonstrates fairly extensive effusion and coalescent change, with some   abnormal soft tissue present within the partially visualized right middle year. There is ex vacuo prominence of the ventricles and sulci. The orbits are normal. The paranasal sinuses are grossly clear.    CT temporal bones: The right temporal bone demonstrates abnormal fluid and soft tissue attenuation along the tympanic membrane, with component of extensive middle ear effusion and some mild blunting of the scutum. Some mild ossicular erosion is likely   present involving the stapes is poorly visualized. There is adjacent dehiscence of the tegmen tympani and there is extensive right-sided mastoid effusion, with some loss of the usual extensive trabeculation and lytic change present suggesting acute   cholecystitis mastoiditis. Abnormal adjacent intracranial air is present concerning for component of concurrent empyema as above. The left external auditory canal is patent. The tympanic membrane is normal. The scutum is not blunted. There is no middle   ear effusion. The cochlea and semicircular canals are normally formed. There is some thinning of the tegmen tympani and tegmen mastoideum, without pavel dehiscence.    CT PERFUSION: Color maps demonstrate no evidence of core infarct or significant territorial ischemic tissue at risk.    CT ANGIOGRAM: The lung apices demonstrate no acute or suspicious findings. Prominent standing fluid is seen within the upper thoracic esophagus, placing the patient at risk for aspiration. The neck soft tissues demonstrate no evidence of pathologic   adenopathy or unexpected soft tissue neck mass. The osseous  structures demonstrate no evidence of acute fracture or aggressive osseous lesion, with multilevel spondylosis present. Patent aortic arch with common origin of the brachiocephalic and left   common carotid arteries. The visualized subclavian arteries are patent. On the right, the ICA origin demonstrates some calcific atherosclerotic changes without significant resultant luminal narrowing, 0% by NASCET criteria. Similar 0% narrowing is   present on the left. The right vertebral artery is patent, diffusely diminutive. Patent dominant left vertebral artery. Intracranially, the carotid siphons demonstrate calcific atherosclerotic change with some mild to moderate narrowing of the   supraclinoid segments bilaterally. The anterior cerebral arteries are normal in course and caliber bilaterally. The right middle cerebral artery is normal in course and caliber. The left middle cerebral artery is similarly normal. The vertebrobasilar   system demonstrates diminutive right intradural vertebral artery appearing to likely terminate in PICA. The left vertebral artery and basilar artery are patent. The posterior cerebral arteries demonstrate some multifocal mild to moderate narrowing,   without evidence of large vessel occlusion.      Impression:      Impression:  No evidence of intracranial hemorrhage or definite acute territorial ischemia. There is abnormal intracranial gas present, appearing likely extra-axial in the lateral aspect of the right cerebellar hemisphere, with changes of the right mastoid present,   concerning for mastoiditis and possible subdural empyema or less likely parenchymal abscess. Contrast-enhanced MRI is recommended to further evaluate.    Dedicated CT of the temporal bones demonstrates findings of likely acute otomastoiditis on the right, with middle ear and right mastoid effusion, coalescent changes of the right mastoid and dehiscence of both the tegmen tympani and tegmen mastoideum.   There is again  concern for intracranial infectious involvement, empyema versus abscess with foci of intracranial gas present.    CT perfusion demonstrates no evidence of core infarct or significant territorial tissue at risk.    CT angiogram demonstrates multifocal mild/moderate atherosclerotic changes, with minimal narrowing of the ICA origins and no evidence of intracranial flow-limiting stenosis, large vessel occlusion or aneurysmal dilatation.    Noncontrast CT head performed 3/30/2023 at 11:08 AM. Results discussed with the stroke team by Dr. Hanley in person at time of image acquisition.    Electronically Signed: Ryder Houser    3/30/2023 12:11 PM EDT    Workstation ID: [id number]          Assessment & Plan   Impression        Encephalopathy, metabolic    MINO on CPAP    Hypertension    Paroxysmal atrial fibrillation on Flecainide and Eliquis    Rt Mastoiditis    Subdural empyema    R/O Meningitis    Questionable Status epilepticus (HCC)    Sepsis, unspecified organism (HCC)       Plan        82 y.o. male with PMH PAF on Flecainide and Eliquis, HTN, CAD, Tachy-Samuel syndrome s/p PPM and MINO on CPAP who presented to the ED with AMS, inability to speak and left sided weakness.  Found to have possible subdural empyema and meningitis related to mastoiditis/otitis media.  Patient underwent ear tube placement bedside with ENT on 3/30/2023.  Patient was eval by neurology with no neuro surgical intervention at this time.  He currently remains on mechanical ventilation.    -Continue mechanical ventilation, wean oxygen to saturation greater than 88%  -We will switch sedation over to Precedex and fentanyl, wean as able to assess mental status  -Continue current antibiotics, by we will go ahead and consult infectious disease for management  -Continue dexamethasone for meningitis concerns  -ENT following appreciate recommendations  -Questionable seizure activity initially, neurology is following will defer antiepileptics to them  -Patient  has not received LP mainly due to the fact that he is on Eliquis at baseline, was held on 3/30/2023, continue holding anticoagulation until there are no more planned procedures  -Tube feeds  -A.m. labs    I conducted multidisciplinary rounds in the plan of care was discussed with the multidisciplinary team at that time. In attendance at multidisciplinary rounds was clinical pharmacist, dietitian, nursing staff, and case management.    I discussed the patient's findings and my recommendations with family and nursing staff     Critical Care time spent in direct patient care: 35 minutes (excluding procedure time, if applicable) including high complexity decision making to assess, manipulate, and support vital organ system failure in this individual who has impairment of one or more vital organ systems such that there is a high probability of imminent or life threatening deterioration in the patient's condition.      Jasmin Hastings, DO  Pulmonary, Critical care and Sleep Medicine

## 2023-04-01 PROBLEM — H70.90 MASTOIDITIS: Status: RESOLVED | Noted: 2023-01-01 | Resolved: 2023-01-01

## 2023-04-01 PROBLEM — B95.5 STREPTOCOCCAL BACTEREMIA: Status: ACTIVE | Noted: 2023-04-01

## 2023-04-01 PROBLEM — R78.81 STREPTOCOCCAL BACTEREMIA: Status: ACTIVE | Noted: 2023-01-01

## 2023-04-01 NOTE — PROGRESS NOTES
"    INFECTIOUS DISEASE PROGRESS NOTE    Kai Dodson  1940  [id number]    Date of consult: 3/31/23  Admit date: 3/30/2023    Requesting Provider: Dr. Jason  Evaluating physician: Que Alonso MD  Reason for Consultation: meningitis  Chief Complaint: altered mental status      Subjective   History of present illness:  Kai Dodson is a  82 y.o.  Yr old male with a past medical history of hypertension, coronary artery disease, paroxysmal A. Fib on flecainide and Eliquis, tachycardia-bradycardia syndrome status post PPM and obstructive sleep apnea on CPAP who presented to the ER yesterday with encephalopathy, inability to speak, and left-sided weakness.  Due to the patient's mental state and intubated status, history is obtained from medical records.  The patient's wife had just been discharged yesterday morning from [facility] and their son was driving the patient's wife back to [facility] for inpatient rehab.  The patient's son tried to call him and could not reach him.  Other family members went to check on him and found him altered with tremors in all the extremities.  EMS was called and he was transported to the ER.    Since arrival, T-max has been 100.9°F.  He was initially tachycardic up to 112 bpm but this is improved. He had some hypotension but this has improved. The patient was intubated for airway protection and remains on the ventilator with an FiO2 of 35%. Initial lactic acid was 6.6, pro-calcitonin was 6.0, hemoglobin A1c was 5.4, [place] has been normal.  LFTs are within normal limits. White blood cell count was normal on arrival at 9.63, has now trended up to 14.14. platelet count has decreased to 109.sensitivity troponin was slightly elevated at 17. Urine drug screen was negative. 2 sets of admission blood cultures are growing GPC's in pairs and chains identified as Streptococcus pneumoniae by the BCID2 PCR. The patient underwent CTA head and neck that showed \"no " "evidence of intracranial hemorrhage or definite acute territorial ischemia.  There is abnormal intracranial gas present, appearing likely extra-axial in the lateral aspect of the right cerebellar hemisphere, with changes of the right mastoid present, concerning for mastoiditis and possibly subdural empyema or less likely parenchymal abscess.\" \"Dedicated CT of the temporal bones demonstrates findings of likely acute otomastoiditis on the right, with middle ear and right mastoid effusion, coalescent changes of the right mastoid and dehiscence of both the tegmen tympani and tegmen mastoideum. There is again concern for intracranial infectious involvement, empyema versus abscess with foci of intracranial gas present.\" chest x-ray was without any acute disease. MRI brain showed \"no evidence of right temporal lobe abscess.  Findings consistent with diffuse meningitis.  There is right otomastoiditis as seen on the CT of the temporal bones.  There are signal voids corresponding to gas bubbles within the CSF in the upper right posterior cranial fossa.\"  ENT has been consulted and Dr. Garcia evaluated the patient with right myringotomy procedure performed at bedside in the ICU. Wide myringotomy was made in the posterior aspect of the tympanic membrane.  A small amount of purulence was expressed and cultured.  After that CSF was draining through the myringotomy.  A tube was therefore not placed.  Neurosurgery has evaluated the patient and recommended that there is no role for neurosurgical intervention at this time. The patient has been started on empiric acyclovir, ampicillin, ceftriaxone 2 g IV every 12 hours, and IV vancomycin by the ICU team.  He has additionally been placed on dexamethasone.  ID has been consultated for antibiotic recommendations.    Subjective:    4/1/23: The patient remains critically ill on the ventilator.  No history is available from him.  He does have some fluid leaking from his right ear, presumably " CSF. No fevers.  Ventilator settings remain stable.  Leukocytosis is worse but likely steroid induced.    Past Medical History:   Diagnosis Date   • Arthritis    • Atrial fibrillation    • Bradycardia    • Chronic hypertension     Probably essential   • Hypertension    • Hypertensive cardiovascular disease 07/2015    Recent progressive CCS class III-IV chest pain syndrome with normal EKG and exertional dyspnea and fatigue with normal codominant coronary arteries and preserved systolic  left ventricular function, July 2015.   • Intermittent palpitations 05/01/2014    Remote abnormal 24-hour Holter monitor demonstrating intermittent bradycardia with occasional PACs, May 2014.   • Lower extremity edema     Intermittent lower extremity edema felt secondary to antihypertensive therapy.    • Nephrolithiasis     Remote- 15 years ago   • MINO on CPAP    • MINO on CPAP     doesn't know setting    • Prostate troubles     enlarged   • Seasonal rhinitis     intermittent       Past Surgical History:   Procedure Laterality Date   • CARDIAC CATHETERIZATION      no stents   • CARDIAC ELECTROPHYSIOLOGY PROCEDURE N/A 10/24/2018    Procedure: Pacemaker DC new with Biotronik. Hold Eliquis one day prior.;  Surgeon: Harley Dacosta MD;  Location: [facility] INVASIVE LOCATION;  Service: Cardiology   • COLONOSCOPY     • ENDOSCOPY     • INSERT / REPLACE / REMOVE PACEMAKER         Pediatric History   Patient Parents   • Not on file     Other Topics Concern   • Not on file   Social History Narrative    PT DRINKS 2 SERVINGS OF CAFFEINE PER DAY.    PT LIVES AT HOME WITH WIFE.       family history includes No Known Problems in his mother; Other in his father.    No Known Allergies    There is no immunization history for the selected administration types on file for this patient.    Medication:    Current Facility-Administered Medications:   •  aspirin chewable tablet 81 mg, 81 mg, Oral, Daily, 81 mg at 04/01/23 1049 **OR** aspirin suppository  300 mg, 300 mg, Rectal, Daily, Tereza Bautista APRN, 300 mg at 03/30/23 1826  •  Calcium Replacement - Follow Nurse / BPA Driven Protocol, , Does not apply, PRN, Natalia Cruz APRN  •  cefTRIAXone (ROCEPHIN) 2 g/100 mL 0.9% NS IVPB (MBP), 2 g, Intravenous, Q12H, Que Alonos MD, 2 g at 04/01/23 0159  •  chlorhexidine (PERIDEX) 0.12 % solution 15 mL, 15 mL, Mouth/Throat, Q12H, Mundo Jason MD, 15 mL at 04/01/23 0945  •  ciprofloxacin-dexamethasone (CIPRODEX) 0.3-0.1 % otic suspension 4 drop, 4 drop, Right Ear, BID, Edwin Garcia MD, 4 drop at 04/01/23 0945  •  dexamethasone (DECADRON) IVPB 10 mg, 10 mg, Intravenous, Q6H, Mundo Jason MD, 10 mg at 04/01/23 1049  •  fentaNYL (SUBLIMAZE) bolus from bag 10 mcg/mL 50 mcg, 50 mcg, Intravenous, Q30 Min PRN, Natalia Cruz APRN, 50 mcg at 03/31/23 1911  •  fentaNYL 2500 mcg/250 mL NS infusion,  mcg/hr, Intravenous, Titrated, Natalia Cruz APRN, Last Rate: 30 mL/hr at 04/01/23 0619, 300 mcg/hr at 04/01/23 0619  •  levETIRAcetam in NaCl 0.82% (KEPPRA) IVPB 500 mg, 500 mg, Intravenous, Q12H, Meena Burks APRN, 500 mg at 04/01/23 0644  •  Magnesium Standard Dose Replacement - Follow Nurse / BPA Driven Protocol, , Does not apply, PRN, Natalia Cruz APRN  •  niCARdipine (CARDENE) 25mg in 250mL NS infusion, 5-15 mg/hr, Intravenous, Titrated, Tereza Bautista APRN, Held at 03/30/23 1821  •  ondansetron (ZOFRAN) injection 4 mg, 4 mg, Intravenous, Q6H PRN, Antonietta Phillips APRN  •  pantoprazole (PROTONIX) injection 40 mg, 40 mg, Intravenous, Q AM, Antonietta Phillips APRN, 40 mg at 04/01/23 0645  •  Pharmacy to dose vancomycin, , Does not apply, Continuous PRN, Mundo Jason MD  •  Phosphorus Replacement - Follow Nurse / BPA Driven Protocol, , Does not apply, Anthony BUSTILLO Sara W APRHECTOR  •  Potassium Replacement - Follow Nurse / BPA Driven Protocol, , Does not apply, Anthony BUSTILLO Sara W, APRN  •  propofol  "(DIPRIVAN) infusion 10 mg/mL 100 mL, 5-50 mcg/kg/min, Intravenous, Titrated, Antonietta Phillips APRN, Last Rate: 5.31 mL/hr at 04/01/23 0950, 10 mcg/kg/min at 04/01/23 0950  •  sodium chloride 0.9 % flush 10 mL, 10 mL, Intravenous, PRN, Emergency, Triage Protocol, MD  •  sodium chloride 0.9 % flush 10 mL, 10 mL, Intravenous, Q12H, Antonietta Phillips APRN, 10 mL at 04/01/23 0951  •  sodium chloride 0.9 % flush 10 mL, 10 mL, Intravenous, Q12H, Tereza Bautista APRN, 10 mL at 04/01/23 0945  •  sodium chloride 0.9 % flush 10 mL, 10 mL, Intravenous, PRN, Tereza Bautista APRN  •  sodium chloride 0.9 % flush 10 mL, 10 mL, Intravenous, Q12H, Mundo Jason MD, 10 mL at 04/01/23 0945  •  sodium chloride 0.9 % flush 10 mL, 10 mL, Intravenous, PRN, Mundo Jason MD  •  sodium chloride 0.9 % infusion 40 mL, 40 mL, Intravenous, PRN, Antonietta Phillips APRN  •  sodium chloride 0.9 % infusion 40 mL, 40 mL, Intravenous, PRN, Tereza Bautista APRN  •  vancomycin 1250 mg/250 mL 0.9% NS IVPB ([facility]), 1,250 mg, Intravenous, Q12H, Antwon Lorenzana, RPH, 1,250 mg at 04/01/23 0321    Please refer to the medical record for a full medication list    Review of Systems:  Unable to obtain a 12 point review of systems from the patient given his clinical state    Physical Exam:   Vital Signs   Temp:  [96.4 °F (35.8 °C)-97.6 °F (36.4 °C)] 97 °F (36.1 °C)  Heart Rate:  [49-86] 70  Resp:  [14-15] 14  BP: (101-187)/() 144/78  FiO2 (%):  [35 %] 35 %    Temp  Min: 96.4 °F (35.8 °C)  Max: 97.6 °F (36.4 °C)  BP  Min: 101/58  Max: 187/91  Pulse  Min: 49  Max: 86  Resp  Min: 14  Max: 15  SpO2  Min: 93 %  Max: 100 %    Blood pressure 144/78, pulse 70, temperature 97 °F (36.1 °C), temperature source Bladder, resp. rate 14, height 176.5 cm (69.49\"), weight 84.5 kg (186 lb 4.6 oz), SpO2 94 %.  GENERAL: critically ill-appearing on the ventilator.  HEENT:  Normocephalic, atraumatic.  ET " tube in place.  No external oral lesions noted. NG tube in place  EYES: pupils equal, round, and react to light.  No conjunctival injection.  Chest: Left chest wall PPM pocket site without any erythema or drainage.  HEART: RRR, no murmur  LUNGS: clear to auscultation anteriorly.  On the ventilator with an FiO2 of 35%  ABDOMEN: Soft, nontender, nondistended. No appreciable HSM.  Bowel sounds normal.  GENITAL: + Doran catheter  SKIN: no generalized rashes.  No peripheral stigmata of infective endocarditis noted.  PSYCHIATRIC: unable to assess  EXT:  No cellulitic change.  NEURO: sedated on the ventilator but does open his eyes occasionally.  Not following any commands and currently in restraints.    Right upper extremity PICC line site is without any erythema or drainage    Results Review:   I reviewed the patient's new clinical results.    Results from last 7 days   Lab Units 04/01/23  0442 03/31/23  0451 03/30/23  1121   WBC 10*3/mm3 15.04* 14.14* 9.63   HEMOGLOBIN g/dL 11.7* 12.0* 14.4   HEMATOCRIT % 35.2* 34.8* 43.1   PLATELETS 10*3/mm3 119* 109* 129*     Results from last 7 days   Lab Units 04/01/23  0442   SODIUM mmol/L 139   POTASSIUM mmol/L 4.1   CHLORIDE mmol/L 106   CO2 mmol/L 23.0   BUN mg/dL 24*   CREATININE mg/dL 0.71*   GLUCOSE mg/dL 112*   CALCIUM mg/dL 8.2*     Results from last 7 days   Lab Units 03/31/23  0451   ALK PHOS U/L 75   BILIRUBIN mg/dL 2.7*   ALT (SGPT) U/L 19   AST (SGOT) U/L 28             Results from last 7 days   Lab Units 03/31/23  1342   VANCOMYCIN TR mcg/mL 8.10     Results from last 7 days   Lab Units 03/31/23  1127   LACTATE mmol/L 3.1*     Estimated Creatinine Clearance: 95.9 mL/min (A) (by C-G formula based on SCr of 0.71 mg/dL (L)).  CPK    Common Labsle 3/30/23   Creatine Kinase 57            Procalitonin Results:      Lab 03/30/23  1121   PROCALCITONIN 6.00*      Brief Urine Lab Results  (Last result in the past 365 days)      Color   Clarity   Blood   Leuk Est   Nitrite    Protein   CREAT   Urine HCG        03/30/23 1200 Yellow   Clear   Negative   Negative   Negative   Negative                Site   Date Value Ref Range Status   03/30/2023 Left Radial  Final     Juan A's Test   Date Value Ref Range Status   03/30/2023 N/A  Final     pH, Arterial   Date Value Ref Range Status   03/30/2023 7.463 (H) 7.350 - 7.450 pH units Final     Comment:     83 Value above reference range     pCO2, Arterial   Date Value Ref Range Status   03/30/2023 30.1 (L) 35.0 - 45.0 mm Hg Final     Comment:     84 Value below reference range     pO2, Arterial   Date Value Ref Range Status   03/30/2023 73.8 (L) 83.0 - 108.0 mm Hg Final     Comment:     84 Value below reference range     HCO3, Arterial   Date Value Ref Range Status   03/30/2023 21.5 20.0 - 26.0 mmol/L Final     Base Excess, Arterial   Date Value Ref Range Status   03/30/2023 -1.3 (L) 0.0 - 2.0 mmol/L Final     Hemoglobin, Blood Gas   Date Value Ref Range Status   03/30/2023 13.2 (L) 13.5 - 17.5 g/dL Final     Hematocrit, Blood Gas   Date Value Ref Range Status   03/30/2023 40.4 38.0 - 51.0 % Final     Oxyhemoglobin   Date Value Ref Range Status   03/30/2023 96.3 94 - 99 % Final     Methemoglobin   Date Value Ref Range Status   03/30/2023   Final     Comment:     94 Value below reportable range < _0.1     Carboxyhemoglobin   Date Value Ref Range Status   03/30/2023 1.2 0 - 2 % Final     CO2 Content   Date Value Ref Range Status   03/30/2023 22.4 22 - 33 mmol/L Final     Barometric Pressure for Blood Gas   Date Value Ref Range Status   03/30/2023   Final     Comment:     N/A     Modality   Date Value Ref Range Status   03/30/2023 Ventilator  Final     FIO2   Date Value Ref Range Status   03/30/2023 45 % Final        Microbiology:  Blood Culture   Date Value Ref Range Status   03/30/2023 Abnormal Stain (C)  Preliminary     BCID, PCR   Date Value Ref Range Status   03/30/2023 (A) Negative by BCID PCR. Culture to Follow. Final    Streptococcus  pneumoniae. Identification by BCID2 PCR.         Blood Culture   Date Value Ref Range Status   03/30/2023 Abnormal Stain (C)  Preliminary     BCID, PCR   Date Value Ref Range Status   03/30/2023 (A) Negative by BCID PCR. Culture to Follow. Final    Streptococcus pneumoniae. Identification by BCID2 PCR.   (      Radiology:  Imaging Results (Last 72 Hours)     Procedure Component Value Units Date/Time    XR Abdomen KUB [[id number]] Collected: 04/01/23 0935     Updated: 04/01/23 0940    Narrative:      XR ABDOMEN KUB    Date of Exam: 4/1/2023 8:36 AM EDT    Indication: ng placement.    Comparison: None available.    Findings:  Nasogastric tube is past the GE junction. Terminates in the distal stomach.      Impression:      Impression:  Nasogastric tube tip terminates in the distal stomach.    Electronically Signed: Natalia Brown    4/1/2023 9:37 AM EDT    Workstation ID: [id number]    XR Chest 1 View [[id number]] Collected: 03/31/23 1538     Updated: 03/31/23 1550    Narrative:      XR CHEST 1 VW    Date of Exam: 3/31/2023 3:11 PM EDT    Indication: picc placement.    Comparison: Chest x-ray 3/30/2023 2:40 PM    Findings:  Interval placement of right upper extremity PICC line terminating in the mid SVC. Unchanged endotracheal tube and dual-lead cardiac pacer. Stable cardiomediastinal silhouette within normal limits. Streaky opacities of the right lung base likely reflects   some atelectasis. Similar biapical pleural thickening. No definite pleural effusion. No pneumothorax.      Impression:      Impression:  Interval placement of right upper extremity PICC line terminating in the mid SVC.    Mild streaky right lung base atelectasis.    Electronically Signed: Reese Hung    3/31/2023 3:47 PM EDT    Workstation ID: [id number]    MRI Brain With & Without Contrast [[id number]] Collected: 03/30/23 1727     Updated: 03/30/23 1752    Narrative:        MRI BRAIN W WO CONTRAST    Date of Exam: 3/30/2023 3:32 PM EDT    Indication:  History of a right sided acute otomastoiditis with concern of an empyema versus abscess in the right temporal lobe.  Clinically the patient has a history of falls and confusion     Comparison: CT the head and CT of the temporal bones performed on 3/30/2023.    Technique:  Routine multiplanar/multisequence sequence images of the brain were obtained before and after the uneventful administration of  18 cc of Multihance.    Findings:   There is no evidence of a right temporal lobe abscess. There are signal voids within the CSF in the upper right posterior cranial fossa consistent with the soft tissue gas as seen on the CT study. There is diffuse meningeal enhancement. This includes   enhancement of the  meninges along the brainstem. This is consistent with meningitis. There is abnormal fluid/mucosal thickening in the right mastoid sinus and middle ear cavity with the associated enhancement consistent with acute otomastoiditis. The   patient has chronic volume loss. There is prominence of the sulci, fissures, ventricles, and basal cisterns. There are abnormal subcortical and periventricular white matter signal changes consistent with the chronic microvascular ischemia. There are   normal signal voids within the intracranial arteries and the major dural sinuses. There is no acute hemorrhage or midline shift. There is no restricted diffusion to indicate acute ischemia. The orbital contents are normal. The paranasal and left mastoid   sinuses are clear.      Impression:      Impression:     1. No evidence of a right temporal lobe abscess.  2. Findings consistent with diffuse meningitis. There is right otomastoiditis as seen on the CT of the temporal bones. There are signal voids corresponding to gas bubbles within the CSF in the upper right posterior cranial fossa.  3. Volume loss secondary to cerebral atrophy.  4. Chronic microvascular ischemia.    Electronically Signed: Juan Torrez    3/30/2023 5:48 PM EDT    Workstation  ID: [id number]    XR Chest 1 View [[id number]] Collected: 03/30/23 1508     Updated: 03/30/23 1512    Narrative:      XR CHEST 1 VW    Date of Exam: 3/30/2023 2:45 PM EDT    Indication: post intubation xray.    Comparison: 3/30/2023.    Findings:  Endotracheal tube tip in the midtrachea. There is a left subclavian ACD/pacemaker device. Lung volumes are low. There is indistinctness of the pulmonary vasculature. No significant pleural effusion identified. No pneumothorax. No acute osseous   abnormality identified.      Impression:      Impression:  Endotracheal tube tip in the midtrachea. No pneumothorax. Stable mild vascular congestion.    Electronically Signed: Jazmin Gutierrez    3/30/2023 3:08 PM EDT    Workstation ID: [id number]    XR Chest 1 View [[id number]] Collected: 03/30/23 1335     Updated: 03/30/23 1339    Narrative:      XR CHEST 1 VW    Date of Exam: 3/30/2023 12:55 PM EDT    Indication: Acute Stroke Protocol (onset < 12 hrs).    Comparison: 6/21/2019    Findings:  Central interstitial opacities are present, suggesting vascular congestion, without overt pulmonary edema. There is no effusion or pneumothorax. There is no focal lobar consolidation. Unremarkable heart and mediastinal contours.      Impression:      Impression:  Mild vascular congestion is apparent, without overt pulmonary edema or other acute disease.    Electronically Signed: Ryder Houser    3/30/2023 1:36 PM EDT    Workstation ID: [id number]    CT Head Without Contrast Stroke Protocol [[id number]] Collected: 03/30/23 1145     Updated: 03/30/23 1214    Narrative:      CT HEAD WO CONTRAST STROKE PROTOCOL, CT TEMPORAL BONES WO CONTRAST, CT CEREBRAL PERFUSION W WO CONTRAST, CT ANGIOGRAM NECK, CT ANGIOGRAM HEAD W AI ANALYSIS OF LVO    Date of Exam: 3/30/2023 11:11 AM EDT    Indication: Neuro deficit, acute, stroke suspected  Neuro deficit, acute stroke suspected.    Comparison: None available.    Technique: Axial CT images were obtained of the head, in  addition to dedicated CT of the temporal bones, without contrast administration.  Reconstructed coronal and sagittal images were also obtained. Automated exposure control and iterative construction   methods were used.    Axial CT images of the brain were obtained prior to and after the administration of 115 mL Isovue-370. Core blood volume, core blood flow, mean transit time, and Tmax images were obtained utilizing the Rapid software protocol. A limited CT angiogram of   the head was also performed to measure the blood vessel density.     Axial IV arterial phase contrast-enhanced CT angiogram of the head and neck. Three-dimensional reconstructions were postprocessed.      Findings:  CT head: Generalized volume loss is present, in addition to hypoattenuating periventricular white matter changes favored to reflect typical sequela of chronic microvascular ischemia. There is no evidence of intracranial hemorrhage. There is nonspecific   intracranial gas present along the lateral aspect of the right cerebellar hemisphere, adjacent to the right distal transverse and sigmoid sinus as well as the right mastoid which demonstrates fairly extensive effusion and coalescent change, with some   abnormal soft tissue present within the partially visualized right middle year. There is ex vacuo prominence of the ventricles and sulci. The orbits are normal. The paranasal sinuses are grossly clear.    CT temporal bones: The right temporal bone demonstrates abnormal fluid and soft tissue attenuation along the tympanic membrane, with component of extensive middle ear effusion and some mild blunting of the scutum. Some mild ossicular erosion is likely   present involving the stapes is poorly visualized. There is adjacent dehiscence of the tegmen tympani and there is extensive right-sided mastoid effusion, with some loss of the usual extensive trabeculation and lytic change present suggesting acute   cholecystitis mastoiditis. Abnormal  adjacent intracranial air is present concerning for component of concurrent empyema as above. The left external auditory canal is patent. The tympanic membrane is normal. The scutum is not blunted. There is no middle   ear effusion. The cochlea and semicircular canals are normally formed. There is some thinning of the tegmen tympani and tegmen mastoideum, without pavel dehiscence.    CT PERFUSION: Color maps demonstrate no evidence of core infarct or significant territorial ischemic tissue at risk.    CT ANGIOGRAM: The lung apices demonstrate no acute or suspicious findings. Prominent standing fluid is seen within the upper thoracic esophagus, placing the patient at risk for aspiration. The neck soft tissues demonstrate no evidence of pathologic   adenopathy or unexpected soft tissue neck mass. The osseous structures demonstrate no evidence of acute fracture or aggressive osseous lesion, with multilevel spondylosis present. Patent aortic arch with common origin of the brachiocephalic and left   common carotid arteries. The visualized subclavian arteries are patent. On the right, the ICA origin demonstrates some calcific atherosclerotic changes without significant resultant luminal narrowing, 0% by NASCET criteria. Similar 0% narrowing is   present on the left. The right vertebral artery is patent, diffusely diminutive. Patent dominant left vertebral artery. Intracranially, the carotid siphons demonstrate calcific atherosclerotic change with some mild to moderate narrowing of the   supraclinoid segments bilaterally. The anterior cerebral arteries are normal in course and caliber bilaterally. The right middle cerebral artery is normal in course and caliber. The left middle cerebral artery is similarly normal. The vertebrobasilar   system demonstrates diminutive right intradural vertebral artery appearing to likely terminate in PICA. The left vertebral artery and basilar artery are patent. The posterior cerebral  arteries demonstrate some multifocal mild to moderate narrowing,   without evidence of large vessel occlusion.      Impression:      Impression:  No evidence of intracranial hemorrhage or definite acute territorial ischemia. There is abnormal intracranial gas present, appearing likely extra-axial in the lateral aspect of the right cerebellar hemisphere, with changes of the right mastoid present,   concerning for mastoiditis and possible subdural empyema or less likely parenchymal abscess. Contrast-enhanced MRI is recommended to further evaluate.    Dedicated CT of the temporal bones demonstrates findings of likely acute otomastoiditis on the right, with middle ear and right mastoid effusion, coalescent changes of the right mastoid and dehiscence of both the tegmen tympani and tegmen mastoideum.   There is again concern for intracranial infectious involvement, empyema versus abscess with foci of intracranial gas present.    CT perfusion demonstrates no evidence of core infarct or significant territorial tissue at risk.    CT angiogram demonstrates multifocal mild/moderate atherosclerotic changes, with minimal narrowing of the ICA origins and no evidence of intracranial flow-limiting stenosis, large vessel occlusion or aneurysmal dilatation.    Noncontrast CT head performed 3/30/2023 at 11:08 AM. Results discussed with the stroke team by Dr. Hanley in person at time of image acquisition.    Electronically Signed: Ryder Houser    3/30/2023 12:11 PM EDT    Workstation ID: [id number]    CT Angiogram Head w AI Analysis of LVO [[id number]] Collected: 03/30/23 1145     Updated: 03/30/23 1214    Narrative:      CT HEAD WO CONTRAST STROKE PROTOCOL, CT TEMPORAL BONES WO CONTRAST, CT CEREBRAL PERFUSION W WO CONTRAST, CT ANGIOGRAM NECK, CT ANGIOGRAM HEAD W AI ANALYSIS OF LVO    Date of Exam: 3/30/2023 11:11 AM EDT    Indication: Neuro deficit, acute, stroke suspected  Neuro deficit, acute stroke suspected.    Comparison: None  available.    Technique: Axial CT images were obtained of the head, in addition to dedicated CT of the temporal bones, without contrast administration.  Reconstructed coronal and sagittal images were also obtained. Automated exposure control and iterative construction   methods were used.    Axial CT images of the brain were obtained prior to and after the administration of 115 mL Isovue-370. Core blood volume, core blood flow, mean transit time, and Tmax images were obtained utilizing the Rapid software protocol. A limited CT angiogram of   the head was also performed to measure the blood vessel density.     Axial IV arterial phase contrast-enhanced CT angiogram of the head and neck. Three-dimensional reconstructions were postprocessed.      Findings:  CT head: Generalized volume loss is present, in addition to hypoattenuating periventricular white matter changes favored to reflect typical sequela of chronic microvascular ischemia. There is no evidence of intracranial hemorrhage. There is nonspecific   intracranial gas present along the lateral aspect of the right cerebellar hemisphere, adjacent to the right distal transverse and sigmoid sinus as well as the right mastoid which demonstrates fairly extensive effusion and coalescent change, with some   abnormal soft tissue present within the partially visualized right middle year. There is ex vacuo prominence of the ventricles and sulci. The orbits are normal. The paranasal sinuses are grossly clear.    CT temporal bones: The right temporal bone demonstrates abnormal fluid and soft tissue attenuation along the tympanic membrane, with component of extensive middle ear effusion and some mild blunting of the scutum. Some mild ossicular erosion is likely   present involving the stapes is poorly visualized. There is adjacent dehiscence of the tegmen tympani and there is extensive right-sided mastoid effusion, with some loss of the usual extensive trabeculation and lytic  change present suggesting acute   cholecystitis mastoiditis. Abnormal adjacent intracranial air is present concerning for component of concurrent empyema as above. The left external auditory canal is patent. The tympanic membrane is normal. The scutum is not blunted. There is no middle   ear effusion. The cochlea and semicircular canals are normally formed. There is some thinning of the tegmen tympani and tegmen mastoideum, without pavel dehiscence.    CT PERFUSION: Color maps demonstrate no evidence of core infarct or significant territorial ischemic tissue at risk.    CT ANGIOGRAM: The lung apices demonstrate no acute or suspicious findings. Prominent standing fluid is seen within the upper thoracic esophagus, placing the patient at risk for aspiration. The neck soft tissues demonstrate no evidence of pathologic   adenopathy or unexpected soft tissue neck mass. The osseous structures demonstrate no evidence of acute fracture or aggressive osseous lesion, with multilevel spondylosis present. Patent aortic arch with common origin of the brachiocephalic and left   common carotid arteries. The visualized subclavian arteries are patent. On the right, the ICA origin demonstrates some calcific atherosclerotic changes without significant resultant luminal narrowing, 0% by NASCET criteria. Similar 0% narrowing is   present on the left. The right vertebral artery is patent, diffusely diminutive. Patent dominant left vertebral artery. Intracranially, the carotid siphons demonstrate calcific atherosclerotic change with some mild to moderate narrowing of the   supraclinoid segments bilaterally. The anterior cerebral arteries are normal in course and caliber bilaterally. The right middle cerebral artery is normal in course and caliber. The left middle cerebral artery is similarly normal. The vertebrobasilar   system demonstrates diminutive right intradural vertebral artery appearing to likely terminate in PICA. The left  vertebral artery and basilar artery are patent. The posterior cerebral arteries demonstrate some multifocal mild to moderate narrowing,   without evidence of large vessel occlusion.      Impression:      Impression:  No evidence of intracranial hemorrhage or definite acute territorial ischemia. There is abnormal intracranial gas present, appearing likely extra-axial in the lateral aspect of the right cerebellar hemisphere, with changes of the right mastoid present,   concerning for mastoiditis and possible subdural empyema or less likely parenchymal abscess. Contrast-enhanced MRI is recommended to further evaluate.    Dedicated CT of the temporal bones demonstrates findings of likely acute otomastoiditis on the right, with middle ear and right mastoid effusion, coalescent changes of the right mastoid and dehiscence of both the tegmen tympani and tegmen mastoideum.   There is again concern for intracranial infectious involvement, empyema versus abscess with foci of intracranial gas present.    CT perfusion demonstrates no evidence of core infarct or significant territorial tissue at risk.    CT angiogram demonstrates multifocal mild/moderate atherosclerotic changes, with minimal narrowing of the ICA origins and no evidence of intracranial flow-limiting stenosis, large vessel occlusion or aneurysmal dilatation.    Noncontrast CT head performed 3/30/2023 at 11:08 AM. Results discussed with the stroke team by Dr. Hanley in person at time of image acquisition.    Electronically Signed: Ryder Houser    3/30/2023 12:11 PM EDT    Workstation ID: [id number]    CT Angiogram Neck [[id number]] Collected: 03/30/23 1145     Updated: 03/30/23 1214    Narrative:      CT HEAD WO CONTRAST STROKE PROTOCOL, CT TEMPORAL BONES WO CONTRAST, CT CEREBRAL PERFUSION W WO CONTRAST, CT ANGIOGRAM NECK, CT ANGIOGRAM HEAD W AI ANALYSIS OF LVO    Date of Exam: 3/30/2023 11:11 AM EDT    Indication: Neuro deficit, acute, stroke suspected  Neuro deficit,  acute stroke suspected.    Comparison: None available.    Technique: Axial CT images were obtained of the head, in addition to dedicated CT of the temporal bones, without contrast administration.  Reconstructed coronal and sagittal images were also obtained. Automated exposure control and iterative construction   methods were used.    Axial CT images of the brain were obtained prior to and after the administration of 115 mL Isovue-370. Core blood volume, core blood flow, mean transit time, and Tmax images were obtained utilizing the Rapid software protocol. A limited CT angiogram of   the head was also performed to measure the blood vessel density.     Axial IV arterial phase contrast-enhanced CT angiogram of the head and neck. Three-dimensional reconstructions were postprocessed.      Findings:  CT head: Generalized volume loss is present, in addition to hypoattenuating periventricular white matter changes favored to reflect typical sequela of chronic microvascular ischemia. There is no evidence of intracranial hemorrhage. There is nonspecific   intracranial gas present along the lateral aspect of the right cerebellar hemisphere, adjacent to the right distal transverse and sigmoid sinus as well as the right mastoid which demonstrates fairly extensive effusion and coalescent change, with some   abnormal soft tissue present within the partially visualized right middle year. There is ex vacuo prominence of the ventricles and sulci. The orbits are normal. The paranasal sinuses are grossly clear.    CT temporal bones: The right temporal bone demonstrates abnormal fluid and soft tissue attenuation along the tympanic membrane, with component of extensive middle ear effusion and some mild blunting of the scutum. Some mild ossicular erosion is likely   present involving the stapes is poorly visualized. There is adjacent dehiscence of the tegmen tympani and there is extensive right-sided mastoid effusion, with some loss of  the usual extensive trabeculation and lytic change present suggesting acute   cholecystitis mastoiditis. Abnormal adjacent intracranial air is present concerning for component of concurrent empyema as above. The left external auditory canal is patent. The tympanic membrane is normal. The scutum is not blunted. There is no middle   ear effusion. The cochlea and semicircular canals are normally formed. There is some thinning of the tegmen tympani and tegmen mastoideum, without pavel dehiscence.    CT PERFUSION: Color maps demonstrate no evidence of core infarct or significant territorial ischemic tissue at risk.    CT ANGIOGRAM: The lung apices demonstrate no acute or suspicious findings. Prominent standing fluid is seen within the upper thoracic esophagus, placing the patient at risk for aspiration. The neck soft tissues demonstrate no evidence of pathologic   adenopathy or unexpected soft tissue neck mass. The osseous structures demonstrate no evidence of acute fracture or aggressive osseous lesion, with multilevel spondylosis present. Patent aortic arch with common origin of the brachiocephalic and left   common carotid arteries. The visualized subclavian arteries are patent. On the right, the ICA origin demonstrates some calcific atherosclerotic changes without significant resultant luminal narrowing, 0% by NASCET criteria. Similar 0% narrowing is   present on the left. The right vertebral artery is patent, diffusely diminutive. Patent dominant left vertebral artery. Intracranially, the carotid siphons demonstrate calcific atherosclerotic change with some mild to moderate narrowing of the   supraclinoid segments bilaterally. The anterior cerebral arteries are normal in course and caliber bilaterally. The right middle cerebral artery is normal in course and caliber. The left middle cerebral artery is similarly normal. The vertebrobasilar   system demonstrates diminutive right intradural vertebral artery appearing  to likely terminate in PICA. The left vertebral artery and basilar artery are patent. The posterior cerebral arteries demonstrate some multifocal mild to moderate narrowing,   without evidence of large vessel occlusion.      Impression:      Impression:  No evidence of intracranial hemorrhage or definite acute territorial ischemia. There is abnormal intracranial gas present, appearing likely extra-axial in the lateral aspect of the right cerebellar hemisphere, with changes of the right mastoid present,   concerning for mastoiditis and possible subdural empyema or less likely parenchymal abscess. Contrast-enhanced MRI is recommended to further evaluate.    Dedicated CT of the temporal bones demonstrates findings of likely acute otomastoiditis on the right, with middle ear and right mastoid effusion, coalescent changes of the right mastoid and dehiscence of both the tegmen tympani and tegmen mastoideum.   There is again concern for intracranial infectious involvement, empyema versus abscess with foci of intracranial gas present.    CT perfusion demonstrates no evidence of core infarct or significant territorial tissue at risk.    CT angiogram demonstrates multifocal mild/moderate atherosclerotic changes, with minimal narrowing of the ICA origins and no evidence of intracranial flow-limiting stenosis, large vessel occlusion or aneurysmal dilatation.    Noncontrast CT head performed 3/30/2023 at 11:08 AM. Results discussed with the stroke team by Dr. Hanley in person at time of image acquisition.    Electronically Signed: Ryder Houser    3/30/2023 12:11 PM EDT    Workstation ID: [id number]    CT CEREBRAL PERFUSION WITH & WITHOUT CONTRAST [[id number]] Collected: 03/30/23 1145     Updated: 03/30/23 1214    Narrative:      CT HEAD WO CONTRAST STROKE PROTOCOL, CT TEMPORAL BONES WO CONTRAST, CT CEREBRAL PERFUSION W WO CONTRAST, CT ANGIOGRAM NECK, CT ANGIOGRAM HEAD W AI ANALYSIS OF LVO    Date of Exam: 3/30/2023 11:11 AM  EDT    Indication: Neuro deficit, acute, stroke suspected  Neuro deficit, acute stroke suspected.    Comparison: None available.    Technique: Axial CT images were obtained of the head, in addition to dedicated CT of the temporal bones, without contrast administration.  Reconstructed coronal and sagittal images were also obtained. Automated exposure control and iterative construction   methods were used.    Axial CT images of the brain were obtained prior to and after the administration of 115 mL Isovue-370. Core blood volume, core blood flow, mean transit time, and Tmax images were obtained utilizing the Rapid software protocol. A limited CT angiogram of   the head was also performed to measure the blood vessel density.     Axial IV arterial phase contrast-enhanced CT angiogram of the head and neck. Three-dimensional reconstructions were postprocessed.      Findings:  CT head: Generalized volume loss is present, in addition to hypoattenuating periventricular white matter changes favored to reflect typical sequela of chronic microvascular ischemia. There is no evidence of intracranial hemorrhage. There is nonspecific   intracranial gas present along the lateral aspect of the right cerebellar hemisphere, adjacent to the right distal transverse and sigmoid sinus as well as the right mastoid which demonstrates fairly extensive effusion and coalescent change, with some   abnormal soft tissue present within the partially visualized right middle year. There is ex vacuo prominence of the ventricles and sulci. The orbits are normal. The paranasal sinuses are grossly clear.    CT temporal bones: The right temporal bone demonstrates abnormal fluid and soft tissue attenuation along the tympanic membrane, with component of extensive middle ear effusion and some mild blunting of the scutum. Some mild ossicular erosion is likely   present involving the stapes is poorly visualized. There is adjacent dehiscence of the tegmen  tympani and there is extensive right-sided mastoid effusion, with some loss of the usual extensive trabeculation and lytic change present suggesting acute   cholecystitis mastoiditis. Abnormal adjacent intracranial air is present concerning for component of concurrent empyema as above. The left external auditory canal is patent. The tympanic membrane is normal. The scutum is not blunted. There is no middle   ear effusion. The cochlea and semicircular canals are normally formed. There is some thinning of the tegmen tympani and tegmen mastoideum, without pavel dehiscence.    CT PERFUSION: Color maps demonstrate no evidence of core infarct or significant territorial ischemic tissue at risk.    CT ANGIOGRAM: The lung apices demonstrate no acute or suspicious findings. Prominent standing fluid is seen within the upper thoracic esophagus, placing the patient at risk for aspiration. The neck soft tissues demonstrate no evidence of pathologic   adenopathy or unexpected soft tissue neck mass. The osseous structures demonstrate no evidence of acute fracture or aggressive osseous lesion, with multilevel spondylosis present. Patent aortic arch with common origin of the brachiocephalic and left   common carotid arteries. The visualized subclavian arteries are patent. On the right, the ICA origin demonstrates some calcific atherosclerotic changes without significant resultant luminal narrowing, 0% by NASCET criteria. Similar 0% narrowing is   present on the left. The right vertebral artery is patent, diffusely diminutive. Patent dominant left vertebral artery. Intracranially, the carotid siphons demonstrate calcific atherosclerotic change with some mild to moderate narrowing of the   supraclinoid segments bilaterally. The anterior cerebral arteries are normal in course and caliber bilaterally. The right middle cerebral artery is normal in course and caliber. The left middle cerebral artery is similarly normal. The vertebrobasilar    system demonstrates diminutive right intradural vertebral artery appearing to likely terminate in PICA. The left vertebral artery and basilar artery are patent. The posterior cerebral arteries demonstrate some multifocal mild to moderate narrowing,   without evidence of large vessel occlusion.      Impression:      Impression:  No evidence of intracranial hemorrhage or definite acute territorial ischemia. There is abnormal intracranial gas present, appearing likely extra-axial in the lateral aspect of the right cerebellar hemisphere, with changes of the right mastoid present,   concerning for mastoiditis and possible subdural empyema or less likely parenchymal abscess. Contrast-enhanced MRI is recommended to further evaluate.    Dedicated CT of the temporal bones demonstrates findings of likely acute otomastoiditis on the right, with middle ear and right mastoid effusion, coalescent changes of the right mastoid and dehiscence of both the tegmen tympani and tegmen mastoideum.   There is again concern for intracranial infectious involvement, empyema versus abscess with foci of intracranial gas present.    CT perfusion demonstrates no evidence of core infarct or significant territorial tissue at risk.    CT angiogram demonstrates multifocal mild/moderate atherosclerotic changes, with minimal narrowing of the ICA origins and no evidence of intracranial flow-limiting stenosis, large vessel occlusion or aneurysmal dilatation.    Noncontrast CT head performed 3/30/2023 at 11:08 AM. Results discussed with the stroke team by Dr. Hanley in person at time of image acquisition.    Electronically Signed: Ryder Houser    3/30/2023 12:11 PM EDT    Workstation ID: [id number]    CT Temporal Bones Without Contrast [[id number]] Collected: 03/30/23 1145     Updated: 03/30/23 1214    Narrative:      CT HEAD WO CONTRAST STROKE PROTOCOL, CT TEMPORAL BONES WO CONTRAST, CT CEREBRAL PERFUSION W WO CONTRAST, CT ANGIOGRAM NECK, CT ANGIOGRAM HEAD  W AI ANALYSIS OF LVO    Date of Exam: 3/30/2023 11:11 AM EDT    Indication: Neuro deficit, acute, stroke suspected  Neuro deficit, acute stroke suspected.    Comparison: None available.    Technique: Axial CT images were obtained of the head, in addition to dedicated CT of the temporal bones, without contrast administration.  Reconstructed coronal and sagittal images were also obtained. Automated exposure control and iterative construction   methods were used.    Axial CT images of the brain were obtained prior to and after the administration of 115 mL Isovue-370. Core blood volume, core blood flow, mean transit time, and Tmax images were obtained utilizing the Rapid software protocol. A limited CT angiogram of   the head was also performed to measure the blood vessel density.     Axial IV arterial phase contrast-enhanced CT angiogram of the head and neck. Three-dimensional reconstructions were postprocessed.      Findings:  CT head: Generalized volume loss is present, in addition to hypoattenuating periventricular white matter changes favored to reflect typical sequela of chronic microvascular ischemia. There is no evidence of intracranial hemorrhage. There is nonspecific   intracranial gas present along the lateral aspect of the right cerebellar hemisphere, adjacent to the right distal transverse and sigmoid sinus as well as the right mastoid which demonstrates fairly extensive effusion and coalescent change, with some   abnormal soft tissue present within the partially visualized right middle year. There is ex vacuo prominence of the ventricles and sulci. The orbits are normal. The paranasal sinuses are grossly clear.    CT temporal bones: The right temporal bone demonstrates abnormal fluid and soft tissue attenuation along the tympanic membrane, with component of extensive middle ear effusion and some mild blunting of the scutum. Some mild ossicular erosion is likely   present involving the stapes is poorly  visualized. There is adjacent dehiscence of the tegmen tympani and there is extensive right-sided mastoid effusion, with some loss of the usual extensive trabeculation and lytic change present suggesting acute   cholecystitis mastoiditis. Abnormal adjacent intracranial air is present concerning for component of concurrent empyema as above. The left external auditory canal is patent. The tympanic membrane is normal. The scutum is not blunted. There is no middle   ear effusion. The cochlea and semicircular canals are normally formed. There is some thinning of the tegmen tympani and tegmen mastoideum, without pavel dehiscence.    CT PERFUSION: Color maps demonstrate no evidence of core infarct or significant territorial ischemic tissue at risk.    CT ANGIOGRAM: The lung apices demonstrate no acute or suspicious findings. Prominent standing fluid is seen within the upper thoracic esophagus, placing the patient at risk for aspiration. The neck soft tissues demonstrate no evidence of pathologic   adenopathy or unexpected soft tissue neck mass. The osseous structures demonstrate no evidence of acute fracture or aggressive osseous lesion, with multilevel spondylosis present. Patent aortic arch with common origin of the brachiocephalic and left   common carotid arteries. The visualized subclavian arteries are patent. On the right, the ICA origin demonstrates some calcific atherosclerotic changes without significant resultant luminal narrowing, 0% by NASCET criteria. Similar 0% narrowing is   present on the left. The right vertebral artery is patent, diffusely diminutive. Patent dominant left vertebral artery. Intracranially, the carotid siphons demonstrate calcific atherosclerotic change with some mild to moderate narrowing of the   supraclinoid segments bilaterally. The anterior cerebral arteries are normal in course and caliber bilaterally. The right middle cerebral artery is normal in course and caliber. The left middle  cerebral artery is similarly normal. The vertebrobasilar   system demonstrates diminutive right intradural vertebral artery appearing to likely terminate in PICA. The left vertebral artery and basilar artery are patent. The posterior cerebral arteries demonstrate some multifocal mild to moderate narrowing,   without evidence of large vessel occlusion.      Impression:      Impression:  No evidence of intracranial hemorrhage or definite acute territorial ischemia. There is abnormal intracranial gas present, appearing likely extra-axial in the lateral aspect of the right cerebellar hemisphere, with changes of the right mastoid present,   concerning for mastoiditis and possible subdural empyema or less likely parenchymal abscess. Contrast-enhanced MRI is recommended to further evaluate.    Dedicated CT of the temporal bones demonstrates findings of likely acute otomastoiditis on the right, with middle ear and right mastoid effusion, coalescent changes of the right mastoid and dehiscence of both the tegmen tympani and tegmen mastoideum.   There is again concern for intracranial infectious involvement, empyema versus abscess with foci of intracranial gas present.    CT perfusion demonstrates no evidence of core infarct or significant territorial tissue at risk.    CT angiogram demonstrates multifocal mild/moderate atherosclerotic changes, with minimal narrowing of the ICA origins and no evidence of intracranial flow-limiting stenosis, large vessel occlusion or aneurysmal dilatation.    Noncontrast CT head performed 3/30/2023 at 11:08 AM. Results discussed with the stroke team by Dr. Hanley in person at time of image acquisition.    Electronically Signed: Ryder Houser    3/30/2023 12:11 PM EDT    Workstation ID: [id number]      I independently reviewed the patient's KUB done today-NG tube in appropriate position. No consolidations or effusions noted in visible lung fields    IMPRESSION:     Problems:  1. Streptococcus  pneumoniae septicemia-due to acute otitis  2. 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  No LP done yet but given the Streptococcus pneumonia septicemia and clinical presentation, we do not need an LP.  3. Acute otitis media and right-sided mastoiditis resulting in a subdural empyema with fistulous tract between the middle ear space and the dura  4. Acute toxic/metabolic encephalopathy-due to the above infection  5. Acute hypoxic respiratory failure, intubated for airway protection  6. leukocytosis with neutrophilia-was not present on arrival.  Could be due to infection but steroids likely contributing.  7. Hypokalemia  8. Hypomagnesemia  9. tachycardia-bradycardia syndrome status post PPM- complicating factor in the setting of bacteremia as at some risk for seeding PPM leads with bacteria but streptococcus pneumoniae is an uncommon bacteria to do this. TTE was without any signs of vegetations or pacemaker lead infection  10. paroxysmal A. Fib on flecainide and Eliquis  11. coronary artery disease  12. obstructive sleep apnea on CPAP nightly at home  13. History of hypertension    RECOMMENDATIONS:    -continue to follow blood cultures  -Follow culture from the right ear drainage  -TTE was negative for any signs of infective endocarditis or pacemaker lead infection  -patient is on dexamethasone 10 mg IV every 6 hours ×4 days in the setting of his Streptococcus pneumoniae meningitis  -ENT service is following and patient is status post right myringotomy procedure.  Appreciate their assistance  -continue ceftriaxone 2 g IV every 12 hours  -Continue IV vancomycin for now pending susceptibilities for Streptococcus pneumoniae. Hopefully we will have more information regarding this tomorrow.  -status post PICC line placement  -Okay to hold off on doing an LP from my standpoint as diagnosis seems clear at this point    We will likely need to treat his infection more like  brain abscess given the subdural empyema and the severity of his infection.  He will likely need a 6 week course of IV antibiotic therapy.    I discussed with Dr. Hastings today    Thank you for asking me to see Kai Dodson.  Our group would be pleased to follow this patient over the course of their hospitalization and assist with outpatient antimicrobial therapy, as indicated.  Further recommendations depend on the results of the cultures and clinical course.    Complex MDM. 35 minutes of critical care time spent in the patient's case today    Que Alonso MD  4/1/2023

## 2023-04-01 NOTE — PROGRESS NOTES
Clinical Nutrition     Nutrition Support Assessment  Reason for Visit: Assessment      Patient Name: Kai Dodson  YOB: 1940  MRN: [id number]  Date of Encounter: 03/31/23 22:53 EDT  Admission date: 3/30/2023    Comments: if remains on vent and comes to require EN consider:  Peptamen AF begin 25 ml/hr w tolerance advance by 15 ml/hr ev 6 hr to goal   70 ml/hr  Water at 30 ml ev 2 hr.    Nutrition Assessmen t   Admission Diagnosis:  AMS (altered mental status) [R41.82]      Problem List:    Encephalopathy, metabolic    MINO on CPAP    Hypertension    Paroxysmal atrial fibrillation on Flecainide and Eliquis    Rt Mastoiditis    Subdural empyema    R/O Meningitis    Questionable Status epilepticus (HCC)    Sepsis, unspecified organism (HCC)    Resp F      tx for Meningitis       PMH:   He  has a past medical history of Arthritis, Atrial fibrillation (HCC), Bradycardia, Chronic hypertension, Hypertension, Hypertensive cardiovascular disease (07/2015), Intermittent palpitations (05/01/2014), Lower extremity edema, Nephrolithiasis, MINO on CPAP, MINO on CPAP, Prostate troubles, and Seasonal rhinitis.    PSH:  He  has a past surgical history that includes Colonoscopy; Cardiac catheterization; Esophagogastroduodenoscopy; Cardiac electrophysiology procedure (N/A, 10/24/2018); and Insert / replace / remove pacemaker.      Applicable Nutrition Concerns:   Skin:  Oral:  GI:      Applicable Interval History:   3/30 incision/drainage from middle ear  3/30 intubated      Reported/Observed/Food/Nutrition Related History:     MD note refers to possible TF.     Labs    Labs Reviewed: Yes     Results from last 7 days   Lab Units 03/31/23  0451 03/30/23  1912 03/30/23  1121 03/30/23  1117   GLUCOSE mg/dL 140*  --  150*  --    BUN mg/dL 19  --  21  --    CREATININE mg/dL 0.83  --  0.99 0.90   SODIUM mmol/L 138  --  142  --    CHLORIDE mmol/L 106  --  102  --    POTASSIUM mmol/L 3.9 3.3* 3.3*  --   "  PHOSPHORUS mg/dL 2.6  --   --   --    MAGNESIUM mg/dL 2.6* 1.5* 1.8  --    ALT (SGPT) U/L 19  --  26  --        Results from last 7 days   Lab Units 03/31/23  0451 03/30/23  1121   ALBUMIN g/dL 3.2* 4.2   CHOLESTEROL mg/dL 100  --    TRIGLYCERIDES mg/dL 77  --        Results from last 7 days   Lab Units 03/31/23  1801 03/31/23  1159 03/31/23  0530 03/30/23  2354 03/30/23  1938 03/30/23  1117   GLUCOSE mg/dL 106 127 136* 135* 129 140*     Lab Results   Lab Value Date/Time    HGBA1C 5.40 03/30/2023 1121    HGBA1C 5.2 07/01/2015 0436                 Medications    Medications Reviewed: Yes  Pertinent: abx, steroid, keppra, protonix  Infusion:fentanyl, cardene  PRN:       Intake/Ouptut 24 hrs (0701 - 0700)   I&O's Reviewed: Yes     Intake & Output (last day)       03/31 0701  04/01 0700    P.O. 0    I.V. (mL/kg) 248.6 (2.9)    IV Piggyback 878.5    Total Intake(mL/kg) 1127.1 (13.1)    Urine (mL/kg/hr) 555 (0.4)    Stool 0    Total Output 555    Net +572.1         Stool Unmeasured Occurrence 1 x            Anthropometrics     Admission Height 176.5 cm (69.5\") Documented at 03/30/2023 1116   Admission Weight 88.5 kg (195 lb) Documented at 03/30/2023 1116       Height: Height: 176.5 cm (69.49\")  Last Filed Weight: Weight: 86.1 kg (189 lb 13.1 oz) (03/31/23 1519)  Method: Weight Method: Bed scale  BMI: BMI (Calculated): 27.6  BMI classification: Overweight: 25.0-29.9kg/m2       UBW: per EMR rpt of 195 lbs on 10/11/23 further data req.  Weight change:     Nutrition Focused Physical Exam     Date:     Unable to perform exam due to: Defer pending indication      Needs Assessment   Date:3/31    Height used:Height: 176.5 cm (69.49\")  Weights used: 86.1 Kg bed wt      Estimated Calorie needs: ~ 1700 Kcal/day  Method:  Kcals/KG 20 = 1722  Method:  PSU on wt 86.1 temp 36, VE 5.51 = 1461 x 1.2 = 1754    Estimated Protein needs: ~ 112 g PRO/day  Method: g/Kg1.3    Estimated Fluid needs: ~ ml/day   Per clinical status    Current " Nutrition Prescription     PO: NPO Diet NPO Type: Strict NPO  Oral Nutrition Supplement:   Intake: N/A      Nutrition Diagnosis   Date: 3/31 Updated:   Problem Inadequate oral intake   Etiology Mechanical ventilation   Signs/Symptoms NPO   Status:       Goal:   General: Nutrition support treatment  PO: Advace diet as medically feasible/appropriate  EN/PN: Initiate EN if/as appropriate    Nutrition Intervention      Follow treatment progress, Care plan reviewed, EN rec prepared    If need EN consider:  EN: Peptamen AF  Goal Rate: 70 ml/hr    Water Flushes: 30 ml ev 2 hr  Modular: None  Route:  per order  Tube: Unknown    At goal over: 20Hrs/day    Rx will supply:   Goal Volume 1400 mL/day     Flush Volume 300 mL/day     Energy 1680 Kcal/day  % Est Need   Protein 106 g/day  % Est Need   Fiber 8.4 g/day     Water in  EN 1134 mL     Total Water 1434 mL     Meet DRI Yes        -  Monitoring/Evaluation:   Per protocol, I&O, Pertinent labs, Weight, Symptoms, POC/GOC      Dorie Lewis RD  Time Spent: 30 min

## 2023-04-01 NOTE — PROGRESS NOTES
[facility]     Progress Note    Patient Name: Kai Dodson  : 1940  MRN: [id number]  Primary Care Physician:  Derick Phillip MD  Date of admission: 3/30/2023    Subjective   Subjective     Chief Complaint: meningitis    History of Present Illness  a    82 y.o. male seen in follow up.  Pt intubated and sedated on fentanyl.      ID managing antibx, Dexamethasone.          Review of Systems    Objective   Objective     Vitals:   Temp:  [96.4 °F (35.8 °C)-97.2 °F (36.2 °C)] 97 °F (36.1 °C)  Heart Rate:  [49-86] 70  Resp:  [14-15] 14  BP: (101-187)/() 125/72  FiO2 (%):  [35 %] 35 %    Physical Exam     Neurologic Exam     Mental Status   Level of consciousness: unresponsive to painful stimuli    Cranial Nerves   No response to painful stimuli, no gag or corneals      Motor Exam No response to pain       Result Review    Result Review:  I have personally reviewed the results from the time of this admission to 2023 09:24 EDT and agree with these findings:  [x]  Laboratory list / accordion  []  Microbiology  [x]  Radiology  []  EKG/Telemetry   []  Cardiology/Vascular   []  Pathology  []  Old records  []  Other:  Most notable findings include:       Assessment & Plan   Assessment / Plan     Brief Patient Summary:  Kai Dodson is a 82 y.o. male with acute right otomastoiditis, subdural empyema, meningits, s/p ear tube placement, streptococcus PNA bld cx,     Active Hospital Problems:  Active Hospital Problems    Diagnosis    • **Encephalopathy, metabolic    • Rt Mastoiditis    • Subdural empyema    • R/O Meningitis    • Questionable Status epilepticus (HCC)    • Sepsis, unspecified organism    • Paroxysmal atrial fibrillation on Flecainide and Eliquis    • MINO on CPAP    • Hypertension      Plan:     1.  ID managing IV antibx and dexamethasone        DVT prophylaxis:  Mechanical DVT prophylaxis orders are present.    CODE STATUS:    Code Status (Patient has no pulse and is not  breathing): CPR (Attempt to Resuscitate)  Medical Interventions (Patient has pulse or is breathing): Full Support    Disposition:  I expect patient to be discharged      Derick Fraga MD

## 2023-04-01 NOTE — PROGRESS NOTES
[facility]     Progress Note    Patient Name: Kai Dodson  : 1940  MRN: [id number]  Primary Care Physician:  Derick Phillip MD  Date of admission: 3/30/2023    Subjective   Subjective       HPI:  Patient Reports sedated and intubated        Objective   Objective     Vitals:   Temp:  [96.4 °F (35.8 °C)-97.6 °F (36.4 °C)] 97 °F (36.1 °C)  Heart Rate:  [49-86] 70  Resp:  [14-15] 14  BP: (101-187)/() 144/78  FiO2 (%):  [35 %] 35 %  Physical Exam   Sedated, intubated  Xanthochromic otorrhea right ear. No auricle edema or erythema. No mastod erythema          Assessment & Plan   Assessment / Plan     Brief Patient Summary:  Kai Dodson is a 82 y.o. male who has meningitis due to AOM. Otorrhea due to CSF from tegmen defect. Continue IV abx. Expect otorrhea to resolve but may need surgical intervention in the future          DVT prophylaxis:  Mechanical DVT prophylaxis orders are present.    CODE STATUS:   Code Status (Patient has no pulse and is not breathing): CPR (Attempt to Resuscitate)  Medical Interventions (Patient has pulse or is breathing): Full Support        Edwin Garcia MD

## 2023-04-01 NOTE — PROGRESS NOTES
Pharmacy Consult-Vancomycin Dosing  Kai Dodson is a  82 y.o. male receiving vancomycin therapy.     Indication: Meningitis  Consulting Provider: Eren SMALLS Consult: Yes    Goal AUC: 400 - 600 mg/L*hr    Current Antimicrobial Therapy  Anti-Infectives (From admission, onward)      Ordered     Dose/Rate Route Frequency Start Stop    03/31/23 1434  vancomycin 1250 mg/250 mL 0.9% NS IVPB ([facility])        Ordering Provider: Antwon Lorenzana RPH    1,250 mg Intravenous Every 12 Hours 03/31/23 1500 04/06/23 1759    03/31/23 1344  Pharmacy to dose vancomycin        Ordering Provider: Mundo Jason MD     Does not apply Continuous PRN 03/31/23 1343 04/07/23 1342    03/30/23 1510  cefTRIAXone (ROCEPHIN) 2 g/100 mL 0.9% NS IVPB ([facility])        Ordering Provider: Que Alonso MD    2 g  over 30 Minutes Intravenous Every 12 Hours 03/31/23 0100 05/12/23 0059    03/30/23 1240  cefTRIAXone (ROCEPHIN) 2 g/100 mL 0.9% NS IVPB ([facility])        Ordering Provider: Mundo Jason MD    2 g  over 30 Minutes Intravenous Once 03/30/23 1242 03/30/23 1417    03/30/23 1231  vancomycin 1750 mg/500 mL 0.9% NS IVPB ([facility])        Ordering Provider: Que Key MD    20 mg/kg × 88.5 kg Intravenous Once 03/30/23 1233 03/30/23 1602    03/30/23 1231  ampicillin 2 g/100 mL 0.9% NS ([facility])        Ordering Provider: Que Key MD    2 g  over 30 Minutes Intravenous Once 03/30/23 1233 03/30/23 1417    03/30/23 1231  acyclovir (ZOVIRAX) 720 mg in sodium chloride 0.9 % 250 mL IVPB        Ordering Provider: Que Key MD    10 mg/kg × 71.9 kg (Ideal)  over 60 Minutes Intravenous Once 03/30/23 1233 03/30/23 1452            Allergies  Allergies as of 03/30/2023    (No Known Allergies)       Labs    Results from last 7 days   Lab Units 04/01/23  0442 03/31/23  0451 03/30/23  1121   BUN mg/dL 24* 19 21   CREATININE mg/dL 0.71* 0.83 0.99       Results from last 7 days   Lab Units 04/01/23  0442 03/31/23  0451 03/30/23  1121   WBC  "10*3/mm3 15.04* 14.14* 9.63       Evaluation of Dosing     Last Dose Received in the ED/Outside Facility: 3/30 @ 1418  Is Patient on Dialysis or Renal Replacement: no    Ht - 176.5 cm (69.49\")  Wt - 84.5 kg (186 lb 4.6 oz)    Estimated Creatinine Clearance: 95.9 mL/min (A) (by C-G formula based on SCr of 0.71 mg/dL (L)).    Intake & Output (last 3 days)         03/29 0701 03/30 0700 03/30 0701  03/31 0700 03/31 0701 04/01 0700 04/01 0701 04/02 0700    P.O.  0 0     I.V. (mL/kg)  2191.3 (25.5) 683.9 (8.1)     IV Piggyback  841.8 1269.5     Total Intake(mL/kg)  3033.1 (35.2) 1953.4 (23.1)     Urine (mL/kg/hr)  1650 860 (0.4)     Stool   0     Total Output  1650 860     Net  +1383.1 +1093.4             Stool Unmeasured Occurrence   1 x             Microbiology and Radiology  Microbiology Results (last 10 days)       Procedure Component Value - Date/Time    Wound Culture - Drainage, Ear, Right [[id number]]  (Abnormal) Collected: 03/30/23 1949    Lab Status: Preliminary result Specimen: Drainage from Ear, Right Updated: 04/01/23 1012     Wound Culture Moderate growth (3+) Streptococcus pneumoniae      Moderate growth (3+) Normal Skin Yuki     Gram Stain No WBCs seen      Few (2+) Gram positive cocci in pairs      Rare (1+) Yeast      Rare (1+) Gram positive bacilli    Blood Culture - Blood, Arm, Left [[id number]]  (Abnormal) Collected: 03/30/23 1339    Lab Status: Preliminary result Specimen: Blood from Arm, Left Updated: 04/01/23 0739     Blood Culture Streptococcus, Alpha Hemolytic     Isolated from Aerobic and Anaerobic Bottles     Gram Stain Aerobic Bottle Gram positive cocci in pairs and chains      Anaerobic Bottle Gram positive cocci in pairs and chains    Blood Culture ID, PCR - Blood, Arm, Left [[id number]]  (Abnormal) Collected: 03/30/23 1339    Lab Status: Final result Specimen: Blood from Arm, Left Updated: 03/31/23 0159     BCID, PCR Streptococcus pneumoniae. Identification by BCID2 PCR.    Blood Culture - " Blood, Arm, Left [[id number]]  (Abnormal) Collected: 03/30/23 1308    Lab Status: Preliminary result Specimen: Blood from Arm, Left Updated: 04/01/23 0739     Blood Culture Streptococcus, Alpha Hemolytic     Isolated from Aerobic and Anaerobic Bottles     Gram Stain Aerobic Bottle Gram positive cocci in pairs and chains      Anaerobic Bottle Gram positive cocci in pairs and chains            Reported Vancomycin Levels                Results from last 7 days   Lab Units 04/01/23  1351 03/31/23  1342   VANCOMYCIN TR mcg/mL 15.40 8.10          [employer] AUC Calculation:    Current AUC:   465 mg/L*hr    Predicted Steady State AUC on Current Dose: 539 mg/L*hr  _________________________________    Predicted Steady State AUC on New Dose:   539 mg/L*hr    Assessment/Plan:     1. Pharmacy to dose vancomycin for possible meningitis. Goal -600 mg/L*hr.  2. Patient is currently receiving vancomycin 1250mg IV Q12H.  3. Vancomycin level was obtained ~10.5 hrs post-dose and is 15.4mcg/mL.  4. Will continued with current dose and recheck level Monday with AM labs.  5. Pharmacy will continue to monitor renal function, cultures and sensitivities, and clinical status to adjust regimen as necessary.    Sondra Trejo, PharmD, BCPS  4/1/2023  14:38 EDT

## 2023-04-01 NOTE — PROGRESS NOTES
Intensive Care Follow-up     Hospital:  LOS: 2 days   Mr. Kai Dodson, 82 y.o. male is followed for:   Encephalopathy, metabolic            History of present illness:   Kai Dodson is a 82 y.o. male with PMH PAF on Flecainide and Eliquis, HTN, CAD, Tachy-Samuel syndrome s/p PPM and MINO on CPAP who presented to the ED with AMS, inability to speak and left sided weakness.  He was LKW at 2300 last night.  Apparently, his wife was just discharged from [facility] this morning.  Their son was driving his mom back to [place] for inpatient rehab.  He tried to call his dad, but couldn't reach.  Another family member went to check on him and he was found down altered with tremors in all extremities.  EMS was called and was brought to the ED.  CTH, CTA H/N and CTP revealed abnormal intracranial gas present, appearing likely extra-axial in the lateral aspect of the right cerebellar hemisphere, with changes of the right mastoid present, concerning for mastoiditis and possible subdural empyema or less likely parenchymal abscess.  CT temporal bones revealed likely acute otomastoiditis on the right, with middle ear and right mastoid effusion, coalescent changes of the right mastoid and dehiscence of both the tegmen tympani and tegmen mastoideum, no ischemia or hemorrhage.  He was started on broad spectrum antibiotics.  Significant labs:  LA 6.6, PCT 6, K 3.3, PT 17.9, INR 1.5.  UA was unremarkable.  NS and Neurology were consulted.  There was concern for status epilepticus.  He received 2 mg IV Ativan.  EEG and MRI Brain have been ordered.       Subjective   Interval History:  Patient continues on mechanical ventilation this morning.  When sedation is weaned.  Patient will wake up but is not purposeful for any movement.  Blood cultures and cultures from ear fluid is all growing strep, which we suspect that the fluid coming from the ear is actually CSF drainage.             The patient's past medical, surgical and social  history were reviewed and updated in Epic as appropriate.       Objective     Infusions:  fentanyl 10 mcg/mL,  mcg/hr, Last Rate: 300 mcg/hr (04/01/23 0619)  niCARdipine, 5-15 mg/hr, Last Rate: Stopped (03/30/23 1821)  Pharmacy to dose vancomycin,   propofol, 5-50 mcg/kg/min, Last Rate: 5 mcg/kg/min (04/01/23 0944)      Medications:  aspirin, 81 mg, Oral, Daily   Or  aspirin, 300 mg, Rectal, Daily  cefTRIAXone, 2 g, Intravenous, Q12H  chlorhexidine, 15 mL, Mouth/Throat, Q12H  ciprofloxacin-dexamethasone, 4 drop, Right Ear, BID  dexamethasone, 10 mg, Intravenous, Q6H  levETIRAcetam, 500 mg, Intravenous, Q12H  pantoprazole, 40 mg, Intravenous, Q AM  sodium chloride, 10 mL, Intravenous, Q12H  sodium chloride, 10 mL, Intravenous, Q12H  sodium chloride, 10 mL, Intravenous, Q12H  vancomycin, 1,250 mg, Intravenous, Q12H      I reviewed the patient's medications.    Vital Sign Min/Max for last 24 hours  Temp  Min: 96.4 °F (35.8 °C)  Max: 97.6 °F (36.4 °C)   BP  Min: 101/58  Max: 187/91   Pulse  Min: 49  Max: 86   Resp  Min: 14  Max: 15   SpO2  Min: 93 %  Max: 100 %   No data recorded       Input/Output for last 24 hour shift  03/31 0701 - 04/01 0700  In: 1953.4 [I.V.:683.9]  Out: 860 [Urine:860]   FiO2 (%):  [35 %] 35 %  S RR:  [14] 14  PEEP/CPAP (cm H2O):  [5 cm H20] 5 cm H20  MAP (cm H2O):  [7.2-8.1] 7.2  GENERAL : lying in bed, NAD  RESPIRATORY/THORAX : ET tube in place, CTAB  CARDIOVASCULAR : Normal S1/S2, RRR.  No lower ext edema.  GASTROINTESTINAL : Soft, NT/ND. BS x 4 normoactive. No hepatosplenomegaly.  MUSCULOSKELETAL : No cyanosis, clubbing, or ischemia  NEUROLOGICAL: Awake and agitated, no purposeful movements, moving all extremity    Results from last 7 days   Lab Units 04/01/23  0442 03/31/23  0451 03/30/23  1121   WBC 10*3/mm3 15.04* 14.14* 9.63   HEMOGLOBIN g/dL 11.7* 12.0* 14.4   PLATELETS 10*3/mm3 119* 109* 129*     Results from last 7 days   Lab Units 04/01/23 0442 03/31/23  0451 03/30/23  1912  03/30/23  1121   SODIUM mmol/L 139 138  --  142   POTASSIUM mmol/L 4.1 3.9 3.3* 3.3*   CO2 mmol/L 23.0 22.0  --  24.0   BUN mg/dL 24* 19  --  21   CREATININE mg/dL 0.71* 0.83  --  0.99   MAGNESIUM mg/dL 2.7* 2.6* 1.5* 1.8   PHOSPHORUS mg/dL 2.2* 2.6  --   --    GLUCOSE mg/dL 112* 140*  --  150*     Estimated Creatinine Clearance: 95.9 mL/min (A) (by C-G formula based on SCr of 0.71 mg/dL (L)).    Results from last 7 days   Lab Units 03/30/23  1544   PH, ARTERIAL pH units 7.463*   PCO2, ARTERIAL mm Hg 30.1*   PO2 ART mm Hg 73.8*       I reviewed the patient's new clinical results.  I reviewed the patient's new imaging results/reports including actual images and agree with reports.       Imaging Results (Last 24 Hours)     Procedure Component Value Units Date/Time    XR Abdomen KUB [[id number]] Collected: 04/01/23 0935     Updated: 04/01/23 0940    Narrative:      XR ABDOMEN KUB    Date of Exam: 4/1/2023 8:36 AM EDT    Indication: ng placement.    Comparison: None available.    Findings:  Nasogastric tube is past the GE junction. Terminates in the distal stomach.      Impression:      Impression:  Nasogastric tube tip terminates in the distal stomach.    Electronically Signed: Natalia Brown    4/1/2023 9:37 AM EDT    Workstation ID: [id number]    XR Chest 1 View [[id number]] Collected: 03/31/23 1538     Updated: 03/31/23 1550    Narrative:      XR CHEST 1 VW    Date of Exam: 3/31/2023 3:11 PM EDT    Indication: picc placement.    Comparison: Chest x-ray 3/30/2023 2:40 PM    Findings:  Interval placement of right upper extremity PICC line terminating in the mid SVC. Unchanged endotracheal tube and dual-lead cardiac pacer. Stable cardiomediastinal silhouette within normal limits. Streaky opacities of the right lung base likely reflects   some atelectasis. Similar biapical pleural thickening. No definite pleural effusion. No pneumothorax.      Impression:      Impression:  Interval placement of right upper extremity PICC line  terminating in the mid SVC.    Mild streaky right lung base atelectasis.    Electronically Signed: Reese Hung    3/31/2023 3:47 PM EDT    Workstation ID: [id number]          Assessment & Plan   Impression        Encephalopathy, metabolic    MINO on CPAP    Hypertension    Paroxysmal atrial fibrillation on Flecainide and Eliquis    Subdural empyema    Meningitis    Questionable Status epilepticus (HCC)    Sepsis secondary to strep bacteremia and meningitis with subdural empyema    Streptococcal bacteremia       Plan        82 y.o. male with PMH PAF on Flecainide and Eliquis, HTN, CAD, Tachy-Samuel syndrome s/p PPM and MINO on CPAP who presented to the ED with AMS, inability to speak and left sided weakness.  Found to have possible subdural empyema and meningitis related to mastoiditis/otitis media.  Patient underwent Myringotomy on the right by ENT on 3/30/2023.  Patient was eval by neurology with no neuro surgical intervention at this time.  He currently remains on mechanical ventilation.     -Continue mechanical ventilation, wean oxygen to saturation greater than 88%  -Continue fentanyl, and will go back to either propofol or Versed depending on the patient's heart rate response for sedation.  He is not ready for extubation at this point.  -Continue current antibiotics, per infectious disease, I did discuss the case with Dr. Campoverde today  -Continue dexamethasone for meningitis concerns  -ENT following appreciate recommendations  -Questionable seizure activity initially, neurology is following will defer antiepileptics to them  -Patient has not received LP mainly due to the fact that he is on Eliquis at baseline, was held on 3/30/2023, continue holding anticoagulation until there are no more planned procedures  -Tube feeds  -A.m. labs    I conducted multidisciplinary rounds in the plan of care was discussed with the multidisciplinary team at that time. In attendance at multidisciplinary rounds was clinical  pharmacist, dietitian, nursing staff, and case management.    I discussed the patient's findings and my recommendations with nursing staff and consulting provider     Critical Care time spent in direct patient care: 35 minutes (excluding procedure time, if applicable) including high complexity decision making to assess, manipulate, and support vital organ system failure in this individual who has impairment of one or more vital organ systems such that there is a high probability of imminent or life threatening deterioration in the patient's condition.      Jasmin Hastings, DO  Pulmonary, Critical care and Sleep Medicine

## 2023-04-02 NOTE — PLAN OF CARE
Goal Outcome Evaluation:    Heparin drip initiated instead of restarting Eliquis in case of any future procedures.    Tube feed at goal. Tolerating. Sliding scale insulin added to regimen.     Okay to draw labs from PICC line per Dr. Hastings.

## 2023-04-02 NOTE — PROGRESS NOTES
Brief EN Review Note    Patient Name: Kai Dodson  Date of Encounter: 04/02/23 09:20 EDT  MRN: [id number]  Admission date: 3/30/2023    Reason for visit: EN review . RD to continue to follow per protocol.     EMR reviewed   Labs reviewed: phos 1.8, mag 3.0, K+ 3.8  Medication reviewed: RPN: phos replacement     Additional Information Obtained: EN started yesterday per RD recommendations from 3/31.  No active EN orders. EN running @ 55mlhr this morning.      Current diet: NPO Diet NPO Type: Strict NPO, Tube Feeding    (no active orders)   EN: Peptamen AF  Goal Rate: no goal rate   Water Flushes:   Modular: None  Route: NG  Tube: Large bore    ------------------------------------------------------------------------  Product/Rate verified at bedside: Yes   Infusing Rate at time of visit: 55ml/hr; free water 30ml Q 2hrs     Intervention:  · RD will enter EN orders:  · Peptamen AF begin 25 ml/hr w tolerance advance by 15 ml/hr ev 6 hr to goal   · 70 ml/hr  Water at 30 ml ev 2 hr.  · Replace phos per protocol.  · Ordered nutrition panel, CRP and Pre-albumin Q Monday     Follow up:   Per protocol      Nola Davila RD  09:20 EDT  Time: 15min

## 2023-04-02 NOTE — PROGRESS NOTES
Intensive Care Follow-up     Hospital:  LOS: 3 days   Mr. Kai Dodson, 82 y.o. male is followed for:   Encephalopathy, metabolic            History of present illness:   Kai Dodson is a 82 y.o. male with PMH PAF on Flecainide and Eliquis, HTN, CAD, Tachy-Samuel syndrome s/p PPM and MINO on CPAP who presented to the ED with AMS, inability to speak and left sided weakness.  He was LKW at 2300 last night.  Apparently, his wife was just discharged from [facility] this morning.  Their son was driving his mom back to [place] for inpatient rehab.  He tried to call his dad, but couldn't reach.  Another family member went to check on him and he was found down altered with tremors in all extremities.  EMS was called and was brought to the ED.  CTH, CTA H/N and CTP revealed abnormal intracranial gas present, appearing likely extra-axial in the lateral aspect of the right cerebellar hemisphere, with changes of the right mastoid present, concerning for mastoiditis and possible subdural empyema or less likely parenchymal abscess.  CT temporal bones revealed likely acute otomastoiditis on the right, with middle ear and right mastoid effusion, coalescent changes of the right mastoid and dehiscence of both the tegmen tympani and tegmen mastoideum, no ischemia or hemorrhage.  He was started on broad spectrum antibiotics.  Significant labs:  LA 6.6, PCT 6, K 3.3, PT 17.9, INR 1.5.  UA was unremarkable.  NS and Neurology were consulted.  There was concern for status epilepticus.  He received 2 mg IV Ativan.  EEG and MRI Brain have been ordered.       Subjective   Interval History:  Patient continues on mechanical ventilation, when sedation is weaned he will wake up this can nonpurposeful with movements and still severely agitated.             The patient's past medical, surgical and social history were reviewed and updated in Epic as appropriate.       Objective     Infusions:  fentanyl 10 mcg/mL,  mcg/hr, Last Rate: 50  mcg/hr (04/02/23 0634)  heparin, 1,000 Units/hr  niCARdipine, 5-15 mg/hr, Last Rate: Stopped (03/30/23 1821)  Pharmacy to Dose Heparin,   Pharmacy to dose vancomycin,   propofol, 5-50 mcg/kg/min, Last Rate: 25 mcg/kg/min (04/02/23 1024)      Medications:  [START ON 4/3/2023] aspirin, 81 mg, Nasogastric, Daily   Or  [START ON 4/3/2023] aspirin, 300 mg, Rectal, Daily  cefTRIAXone, 2 g, Intravenous, Q12H  chlorhexidine, 15 mL, Mouth/Throat, Q12H  ciprofloxacin-dexamethasone, 4 drop, Right Ear, BID  dexamethasone, 10 mg, Intravenous, Q6H  flecainide, 50 mg, Oral, Q12H  heparin (porcine), 4,000 Units, Intravenous, Once  levETIRAcetam, 500 mg, Intravenous, Q12H  losartan, 50 mg, Oral, Q24H  pantoprazole, 40 mg, Intravenous, Q AM  sodium chloride, 10 mL, Intravenous, Q12H  sodium chloride, 10 mL, Intravenous, Q12H  sodium chloride, 10 mL, Intravenous, Q12H  vancomycin, 1,250 mg, Intravenous, Q12H      I reviewed the patient's medications.    Vital Sign Min/Max for last 24 hours  Temp  Min: 97 °F (36.1 °C)  Max: 97.9 °F (36.6 °C)   BP  Min: 110/59  Max: 196/95   Pulse  Min: 49  Max: 70   Resp  Min: 14  Max: 15   SpO2  Min: 94 %  Max: 100 %   No data recorded       Input/Output for last 24 hour shift  04/01 0701 - 04/02 0700  In: 1855.5 [I.V.:738.9]  Out: 1427 [Urine:1427]   FiO2 (%):  [35 %] 35 %  S RR:  [14] 14  PEEP/CPAP (cm H2O):  [5 cm H20] 5 cm H20  MAP (cm H2O):  [7.3-7.6] 7.3  GENERAL : lying in bed, NAD  RESPIRATORY/THORAX : ET tube in place, CTAB  CARDIOVASCULAR : Normal S1/S2, RRR.  No lower ext edema.  GASTROINTESTINAL : Soft, NT/ND. BS x 4 normoactive. No hepatosplenomegaly.  MUSCULOSKELETAL : No cyanosis, clubbing, or ischemia  NEUROLOGICAL: Awake and agitated, no purposeful movements, moving all extremity    Results from last 7 days   Lab Units 04/01/23  0442 03/31/23  0451 03/30/23  1121   WBC 10*3/mm3 15.04* 14.14* 9.63   HEMOGLOBIN g/dL 11.7* 12.0* 14.4   PLATELETS 10*3/mm3 119* 109* 129*     Results from  last 7 days   Lab Units 04/02/23  0838 04/02/23  0651 04/01/23  1351 04/01/23  0442 03/31/23  0451   SODIUM mmol/L 143  --   --  139 138   POTASSIUM mmol/L 3.8  --   --  4.1 3.9   CO2 mmol/L 23.0  --   --  23.0 22.0   BUN mg/dL 25*  --   --  24* 19   CREATININE mg/dL 0.57*  --   --  0.71* 0.83   MAGNESIUM mg/dL  --  3.0*  --  2.7* 2.6*   PHOSPHORUS mg/dL 1.8* 2.3* 2.0* 2.2* 2.6   GLUCOSE mg/dL 174*  --   --  112* 140*     Estimated Creatinine Clearance: 109.8 mL/min (A) (by C-G formula based on SCr of 0.57 mg/dL (L)).    Results from last 7 days   Lab Units 03/30/23  1544   PH, ARTERIAL pH units 7.463*   PCO2, ARTERIAL mm Hg 30.1*   PO2 ART mm Hg 73.8*       I reviewed the patient's new clinical results.  I reviewed the patient's new imaging results/reports including actual images and agree with reports.       Assessment & Plan   Impression        Encephalopathy, metabolic    MINO on CPAP    Hypertension    Paroxysmal atrial fibrillation on Flecainide and Eliquis    Subdural empyema    Meningitis    Questionable Status epilepticus (HCC)    Sepsis secondary to strep bacteremia and meningitis with subdural empyema    Streptococcal bacteremia       Plan        82 y.o. male with PMH PAF on Flecainide and Eliquis, HTN, CAD, Tachy-Samuel syndrome s/p PPM and MINO on CPAP who presented to the ED with AMS, inability to speak and left sided weakness.  Found to have possible subdural empyema and meningitis related to mastoiditis/otitis media.  Patient underwent Myringotomy on the right by ENT on 3/30/2023.  Cultures from CSF fluid from right ear 3/30/2023 growing Streptococcus, blood cultures from 3/30/2023 growing strep pneumonia.  Patient was eval by neurosurgery with no neuro surgical intervention at this time.  He currently remains on mechanical ventilation.     -Continue mechanical ventilation, wean oxygen to saturation greater than 88%  -Continue fentanyl, and propofol for sedation  -Continue current antibiotics, per  infectious disease  -Continue dexamethasone for meningitis concerns  -ENT following appreciate recommendations  -Antiepileptics per neurology  -For hypertension we will start Cozaar in place of telmisartan at a reduced dose, Cardene still available for systolic blood pressure greater than or equal to 220  -I will go ahead and restart the patient's flecainide, he has had some bradycardia therefore we will hold his metoprolol for atrial fibrillation  -Questionable seizure activity initially, neurology is following will defer antiepileptics to them  -Case discussed with neurology and infectious disease and current girlfriend and is no need for lumbar puncture at this time.  I will go ahead and restart anticoagulation, and utilize a heparin drip just in case there is a need for any procedure from a neurosurgical intervention standpoint in the near future instead of starting the patient's NOAC back.  -Tube feeds  -A.m. labs    I conducted multidisciplinary rounds in the plan of care was discussed with the multidisciplinary team at that time. In attendance at multidisciplinary rounds was clinical pharmacist, dietitian, nursing staff, and case management.    I discussed the patient's findings and my recommendations with nursing staff     Critical Care time spent in direct patient care: 35 minutes (excluding procedure time, if applicable) including high complexity decision making to assess, manipulate, and support vital organ system failure in this individual who has impairment of one or more vital organ systems such that there is a high probability of imminent or life threatening deterioration in the patient's condition.      Jasmin Hastings, DO  Pulmonary, Critical care and Sleep Medicine

## 2023-04-02 NOTE — PROGRESS NOTES
HEPARIN INFUSION  Kai Dodson is a  82 y.o. male receiving heparin infusion.     Therapy for (VTE/Cardiac):  Cardiac  Patient Weight: 86.6kg  Initial Bolus (Y/N):   Yes  Any Bolus (Y/N):   Yes    Signs or Symptoms of Bleeding: none noted    Cardiac or Other (Not VTE)   Initial Bolus: 60 units/kg (Max 4,000 units)  Initial rate: 12 units/kg/hr (Max 1,000 units/hr)   Anti Xa Rebolus Infusion Hold time Change infusion Dose (Units/kg/hr) Next Anti Xa or aPTT Level Due   < 0.11 50 Units/kg  (4000 Units Max) None Increase by  3 Units/kg/hr 6 hours   0.11- 0.19 25 Units/kg  (2000 Units Max) None Increase by  2 Units/kg/hr 6 hours   0.2 - 0.29 0 None Increase by  1 Units/kg/hr 6 hours   0.3 - 0.5 0 None No Change 6 hours (after 2 consecutive levels in range check qAM)   0.51 - 0.6 0 None Decrease by  1 Units/kg/hr 6 hours   0.61 - 0.8 0 30 Minutes Decrease by  2 Units/kg/hr 6 hours   0.81 - 1 0 60 Minutes Decrease by  3 Units/kg/hr 6 hours   >1 0 Hold  After Anti Xa less than 0.5 decrease previous rate by  4 Units/kg/hr  Every 2 hours until Anti Xa  less than 0.5 then when infusion restarts in 6 hours       Results from last 7 days   Lab Units 04/02/23  1200 04/02/23  1145 04/01/23  0442 03/31/23  0451 03/30/23  1117 03/30/23  1116   INR  1.14*  --   --   --   --  1.5*   HEMOGLOBIN g/dL  --  11.9* 11.7* 12.0*   < >  --    HEMOGLOBIN, POC   --   --   --   --    < >  --    HEMATOCRIT %  --  36.9* 35.2* 34.8*   < >  --    HEMATOCRIT POC   --   --   --   --    < >  --    PLATELETS 10*3/mm3  --  136* 119* 109*   < >  --     < > = values in this interval not displayed.          Date   Time   Anti-Xa Current Rate (Unit/kg/hr) Bolus   (Units) Rate Change   (Unit/kg/hr) New Rate (Unit/kg/hr) Next   Anti-Xa Comments  Pump Check Daily   4/2 1200 0.35 -- -- +11.5 11.5 1800 No initial bolus given baseline Xa. Magen RN                                                                                                                                                                                                                                  Sondra Trejo, PharmD  4/2/2023  15:22 EDT

## 2023-04-02 NOTE — PROGRESS NOTES
[facility]     Progress Note    Patient Name: Kai Dodson  : 1940  MRN: [id number]  Primary Care Physician:  Derick Phillip MD  Date of admission: 3/30/2023    Subjective   Subjective     Chief Complaint: AMS      History of Present Illness    82 y.o. male seen in follow up.      Sedated on vent with propofol, fentanyl     R ear cx 3+ streptococcus pneumoniae susceptible Rocephin     Results for orders placed during the hospital encounter of 23    Adult Transthoracic Echo Complete W/ Cont if Necessary Per Protocol    Interpretation Summary  •  Estimated left ventricular EF = 60%  •  The cardiac valves are anatomically and functionally normal.  •  There is a small (<1cm) pericardial effusion. There is no evidence of cardiac tamponade.     Review of Systems    Objective   Objective     Vitals:   Temp:  [97 °F (36.1 °C)-97.9 °F (36.6 °C)] 97.3 °F (36.3 °C)  Heart Rate:  [49-84] 70  Resp:  [14-15] 14  BP: (110-196)/() 179/96  FiO2 (%):  [35 %] 35 %    Physical Exam     Neurologic Exam     Mental Status   Level of consciousness: unresponsive to painful stimuli    Cranial Nerves     CN III, IV, VI   Pupils: pinpoint  Vestibulo-ocular reflex: present    Motor Exam No response to pain       Result Review    Result Review:  I have personally reviewed the results from the time of this admission to 2023 08:33 EDT and agree with these findings:  [x]  Laboratory list / accordion  []  Microbiology  []  Radiology  []  EKG/Telemetry   []  Cardiology/Vascular   []  Pathology  []  Old records  []  Other:    Most notable findings include:         Assessment & Plan   Assessment / Plan     Brief Patient Summary:  Kai Dodson is a 82 y.o. male with acute right otomastoiditis, subdural empyema, meningits, s/p ear tube placement, streptococcus PNA bld cx,       Active Hospital Problems:  Active Hospital Problems    Diagnosis    • **Encephalopathy, metabolic    • Streptococcal bacteremia     • Subdural empyema    • Meningitis    • Questionable Status epilepticus (HCC)    • Sepsis secondary to strep bacteremia and meningitis with subdural empyema    • Paroxysmal atrial fibrillation on Flecainide and Eliquis    • MINO on CPAP    • Hypertension      Plan:     1.  Rocephin, vancomycin per ID   2.  Dexamethasone    DVT prophylaxis:  Mechanical DVT prophylaxis orders are present.    CODE STATUS:    Code Status (Patient has no pulse and is not breathing): CPR (Attempt to Resuscitate)  Medical Interventions (Patient has pulse or is breathing): Full Support    Disposition:  I expect patient to be discharged TBD    Derick Fraga MD

## 2023-04-02 NOTE — PROGRESS NOTES
"    INFECTIOUS DISEASE PROGRESS NOTE    Kai Dodson  1940  [id number]    Date of consult: 3/31/23  Admit date: 3/30/2023    Requesting Provider: Dr. Jason  Evaluating physician: FORREST Reyna  Reason for Consultation: meningitis  Chief Complaint: altered mental status      Subjective   History of present illness:  Kai Dodson is a  82 y.o.  Yr old male with a past medical history of hypertension, coronary artery disease, paroxysmal A. Fib on flecainide and Eliquis, tachycardia-bradycardia syndrome status post PPM and obstructive sleep apnea on CPAP who presented to the ER yesterday with encephalopathy, inability to speak, and left-sided weakness.  Due to the patient's mental state and intubated status, history is obtained from medical records.  The patient's wife had just been discharged yesterday morning from [facility] and their son was driving the patient's wife back to [facility] for inpatient rehab.  The patient's son tried to call him and could not reach him.  Other family members went to check on him and found him altered with tremors in all the extremities.  EMS was called and he was transported to the ER.    Since arrival, T-max has been 100.9°F.  He was initially tachycardic up to 112 bpm but this is improved. He had some hypotension but this has improved. The patient was intubated for airway protection and remains on the ventilator with an FiO2 of 35%. Initial lactic acid was 6.6, pro-calcitonin was 6.0, hemoglobin A1c was 5.4, [place] has been normal.  LFTs are within normal limits. White blood cell count was normal on arrival at 9.63, has now trended up to 14.14. platelet count has decreased to 109.sensitivity troponin was slightly elevated at 17. Urine drug screen was negative. 2 sets of admission blood cultures are growing GPC's in pairs and chains identified as Streptococcus pneumoniae by the BCID2 PCR. The patient underwent CTA head and neck that showed \"no " "evidence of intracranial hemorrhage or definite acute territorial ischemia.  There is abnormal intracranial gas present, appearing likely extra-axial in the lateral aspect of the right cerebellar hemisphere, with changes of the right mastoid present, concerning for mastoiditis and possibly subdural empyema or less likely parenchymal abscess.\" \"Dedicated CT of the temporal bones demonstrates findings of likely acute otomastoiditis on the right, with middle ear and right mastoid effusion, coalescent changes of the right mastoid and dehiscence of both the tegmen tympani and tegmen mastoideum. There is again concern for intracranial infectious involvement, empyema versus abscess with foci of intracranial gas present.\" chest x-ray was without any acute disease. MRI brain showed \"no evidence of right temporal lobe abscess.  Findings consistent with diffuse meningitis.  There is right otomastoiditis as seen on the CT of the temporal bones.  There are signal voids corresponding to gas bubbles within the CSF in the upper right posterior cranial fossa.\"  ENT has been consulted and Dr. Garcia evaluated the patient with right myringotomy procedure performed at bedside in the ICU. Wide myringotomy was made in the posterior aspect of the tympanic membrane.  A small amount of purulence was expressed and cultured.  After that CSF was draining through the myringotomy.  A tube was therefore not placed.  Neurosurgery has evaluated the patient and recommended that there is no role for neurosurgical intervention at this time. The patient has been started on empiric acyclovir, ampicillin, ceftriaxone 2 g IV every 12 hours, and IV vancomycin by the ICU team.  He has additionally been placed on dexamethasone.  ID has been consultated for antibiotic recommendations.    Subjective:    4/1/23: The patient remains critically ill on the ventilator.  No history is available from him.  He does have some fluid leaking from his right ear, presumably " CSF. No fevers.  Ventilator settings remain stable.  Leukocytosis is worse but likely steroid induced.    4/2/23:  He remains afebrile and on 35% FIO2.  WBC 12.29 this am.  Remains on Dexamethasone.     Past Medical History:   Diagnosis Date   • Arthritis    • Atrial fibrillation    • Bradycardia    • Chronic hypertension     Probably essential   • Hypertension    • Hypertensive cardiovascular disease 07/2015    Recent progressive CCS class III-IV chest pain syndrome with normal EKG and exertional dyspnea and fatigue with normal codominant coronary arteries and preserved systolic  left ventricular function, July 2015.   • Intermittent palpitations 05/01/2014    Remote abnormal 24-hour Holter monitor demonstrating intermittent bradycardia with occasional PACs, May 2014.   • Lower extremity edema     Intermittent lower extremity edema felt secondary to antihypertensive therapy.    • Nephrolithiasis     Remote- 15 years ago   • MINO on CPAP    • MINO on CPAP     doesn't know setting    • Prostate troubles     enlarged   • Seasonal rhinitis     intermittent       Past Surgical History:   Procedure Laterality Date   • CARDIAC CATHETERIZATION      no stents   • CARDIAC ELECTROPHYSIOLOGY PROCEDURE N/A 10/24/2018    Procedure: Pacemaker DC new with Biotronik. Hold Eliquis one day prior.;  Surgeon: Harley Dacosta MD;  Location: [facility] INVASIVE LOCATION;  Service: Cardiology   • COLONOSCOPY     • ENDOSCOPY     • INSERT / REPLACE / REMOVE PACEMAKER         Pediatric History   Patient Parents   • Not on file     Other Topics Concern   • Not on file   Social History Narrative    PT DRINKS 2 SERVINGS OF CAFFEINE PER DAY.    PT LIVES AT HOME WITH WIFE.       family history includes No Known Problems in his mother; Other in his father.    No Known Allergies    There is no immunization history for the selected administration types on file for this patient.    Medication:    Current Facility-Administered Medications:   •  [START  ON 4/3/2023] aspirin chewable tablet 81 mg, 81 mg, Nasogastric, Daily **OR** [START ON 4/3/2023] aspirin suppository 300 mg, 300 mg, Rectal, Daily, Tereza Bautista APRN  •  Calcium Replacement - Follow Nurse / BPA Driven Protocol, , Does not apply, PRN, Natalia Cruz APRN  •  cefTRIAXone (ROCEPHIN) 2 g/100 mL 0.9% NS IVPB (MBP), 2 g, Intravenous, Q12H, Que Alonso MD, 2 g at 04/02/23 1201  •  chlorhexidine (PERIDEX) 0.12 % solution 15 mL, 15 mL, Mouth/Throat, Q12H, Mundo Jason MD, 15 mL at 04/02/23 0817  •  ciprofloxacin-dexamethasone (CIPRODEX) 0.3-0.1 % otic suspension 4 drop, 4 drop, Right Ear, BID, Edwin Garcia MD, 4 drop at 04/02/23 0817  •  dexamethasone (DECADRON) IVPB 10 mg, 10 mg, Intravenous, Q6H, Mundo Jason MD, 10 mg at 04/02/23 0818  •  dextrose (D50W) (25 g/50 mL) IV injection 25 g, 25 g, Intravenous, Q15 Min PRN, Amadou Mercedes APRN  •  dextrose (GLUTOSE) oral gel 15 g, 15 g, Oral, Q15 Min PRN, Amadou Mercedes APRN  •  fentaNYL (SUBLIMAZE) bolus from bag 10 mcg/mL 50 mcg, 50 mcg, Intravenous, Q30 Min PRN, Natalia Cruz APRN, 50 mcg at 03/31/23 1911  •  fentaNYL 2500 mcg/250 mL NS infusion,  mcg/hr, Intravenous, Titrated, Natalia Cruz APRN, Last Rate: 5 mL/hr at 04/02/23 0634, 50 mcg/hr at 04/02/23 0634  •  flecainide (TAMBOCOR) tablet 50 mg, 50 mg, Nasogastric, Q12H, Marcelino Hastings DO, 50 mg at 04/02/23 1150  •  glucagon (GLUCAGEN) injection 1 mg, 1 mg, Intramuscular, Q15 Min PRN, Amadou Mercedes, FORREST  •  heparin 65719 units/250 mL (100 units/mL) in 0.45 % NaCl infusion, 11.5 Units/kg/hr, Intravenous, Titrated, Marcelino Hastings DO, Last Rate: 9.95 mL/hr at 04/02/23 1158, 11.5 Units/kg/hr at 04/02/23 1158  •  insulin regular (humuLIN R,novoLIN R) injection 0-7 Units, 0-7 Units, Subcutaneous, Q6H, Amadou Mercedes, FORREST  •  levETIRAcetam in NaCl 0.82% (KEPPRA) IVPB 500 mg, 500 mg, Intravenous, Q12H, Meena Burks,  APRN, 500 mg at 04/02/23 0557  •  losartan (COZAAR) tablet 50 mg, 50 mg, Nasogastric, Q24H, Marcelino Hastings, , 50 mg at 04/02/23 1150  •  Magnesium Standard Dose Replacement - Follow Nurse / BPA Driven Protocol, , Does not apply, PRN, Natalia Cruz APRN  •  niCARdipine (CARDENE) 25mg in 250mL NS infusion, 5-15 mg/hr, Intravenous, Titrated, Tereza Bautista APRN, Held at 03/30/23 1821  •  ondansetron (ZOFRAN) injection 4 mg, 4 mg, Intravenous, Q6H PRN, Antonietta Phillips APRN  •  pantoprazole (PROTONIX) injection 40 mg, 40 mg, Intravenous, Q AM, Antonietta Phillips APRN, 40 mg at 04/02/23 0557  •  Pharmacy to Dose Heparin, , Does not apply, Continuous PRN, Marcelino Hastings,   •  Pharmacy to dose vancomycin, , Does not apply, Continuous PRN, Mundo Jason MD  •  Phosphorus Replacement - Follow Nurse / BPA Driven Protocol, , Does not apply, IWONA, Natalia Cruz APRN  •  Potassium Replacement - Follow Nurse / BPA Driven Protocol, , Does not apply, PRHECTOR, Natalia Cruz APRN  •  propofol (DIPRIVAN) infusion 10 mg/mL 100 mL, 5-50 mcg/kg/min, Intravenous, Titrated, Antonietta Phillips APRN, Last Rate: 13.28 mL/hr at 04/02/23 1024, 25 mcg/kg/min at 04/02/23 1024  •  sodium chloride 0.9 % flush 10 mL, 10 mL, Intravenous, PRN, Emergency, Triage Protocol, MD  •  sodium chloride 0.9 % flush 10 mL, 10 mL, Intravenous, Q12H, Antonietta Phillips APRN, 10 mL at 04/01/23 2056  •  sodium chloride 0.9 % flush 10 mL, 10 mL, Intravenous, Q12H, Tereza Bautista APRN, 10 mL at 04/01/23 2056  •  sodium chloride 0.9 % flush 10 mL, 10 mL, Intravenous, PRN, Tereza Bautista, FORREST  •  sodium chloride 0.9 % flush 10 mL, 10 mL, Intravenous, Q12H, Mundo Jason MD, 10 mL at 04/01/23 2056  •  sodium chloride 0.9 % flush 10 mL, 10 mL, Intravenous, PRN, Mundo Jason MD  •  sodium chloride 0.9 % infusion 40 mL, 40 mL, Intravenous, PRN, [place]  "Antonietta Bhakta, APRN  •  sodium chloride 0.9 % infusion 40 mL, 40 mL, Intravenous, PRN, Tereza Bautista, APRHECTOR  •  vancomycin 1250 mg/250 mL 0.9% NS IVPB ([facility]), 1,250 mg, Intravenous, Q12H, Antwon Lorenzana RPH, 1,250 mg at 04/02/23 1014    Please refer to the medical record for a full medication list    Review of Systems:  Unable to obtain a 12 point review of systems from the patient given his clinical state    Physical Exam:   Vital Signs   Temp:  [97 °F (36.1 °C)-97.9 °F (36.6 °C)] 97.5 °F (36.4 °C)  Heart Rate:  [49-70] 70  Resp:  [14-15] 15  BP: (110-196)/() 149/76  FiO2 (%):  [35 %] 35 %    Temp  Min: 97 °F (36.1 °C)  Max: 97.9 °F (36.6 °C)  BP  Min: 110/59  Max: 196/95  Pulse  Min: 49  Max: 70  Resp  Min: 14  Max: 15  SpO2  Min: 94 %  Max: 100 %    Blood pressure 149/76, pulse 70, temperature 97.5 °F (36.4 °C), temperature source Bladder, resp. rate 15, height 176.5 cm (69.49\"), weight 86.6 kg (190 lb 14.7 oz), SpO2 95 %.  GENERAL: critically ill-appearing on the ventilator.  HEENT:  Normocephalic, atraumatic.  ET tube in place.  No external oral lesions noted. NG tube in place  EYES: pupils equal, round, and react to light.  No conjunctival injection.  Chest: Left chest wall PPM pocket site without any erythema or drainage.  HEART: RRR, no murmur  LUNGS: clear to auscultation anteriorly.  On the ventilator with an FiO2 of 35%  ABDOMEN: Soft, nontender, nondistended. No appreciable HSM.  Bowel sounds normal.  GENITAL: + Doran catheter  SKIN: no generalized rashes.  No peripheral stigmata of infective endocarditis noted.  PSYCHIATRIC: unable to assess  EXT:  No cellulitic change.  NEURO: sedated on the ventilator but does open his eyes occasionally.  Not following any commands and currently in restraints.    Right upper extremity PICC line- no redness tenderness     Results Review:   I reviewed the patient's new clinical results.    Results from last 7 days   Lab Units 04/02/23  1145 " 04/01/23  0442 03/31/23  0451   WBC 10*3/mm3 12.29* 15.04* 14.14*   HEMOGLOBIN g/dL 11.9* 11.7* 12.0*   HEMATOCRIT % 36.9* 35.2* 34.8*   PLATELETS 10*3/mm3 136* 119* 109*     Results from last 7 days   Lab Units 04/02/23  0838   SODIUM mmol/L 143   POTASSIUM mmol/L 3.8   CHLORIDE mmol/L 110*   CO2 mmol/L 23.0   BUN mg/dL 25*   CREATININE mg/dL 0.57*   GLUCOSE mg/dL 174*   CALCIUM mg/dL 8.3*     Results from last 7 days   Lab Units 04/02/23  0838   ALK PHOS U/L 69   BILIRUBIN mg/dL 0.6   ALT (SGPT) U/L 13   AST (SGOT) U/L 18             Results from last 7 days   Lab Units 04/01/23  1351 03/31/23  1342   VANCOMYCIN TR mcg/mL 15.40 8.10     Results from last 7 days   Lab Units 03/31/23  1127   LACTATE mmol/L 3.1*     Estimated Creatinine Clearance: 109.8 mL/min (A) (by C-G formula based on SCr of 0.57 mg/dL (L)).  CPK    Common Labsle 3/30/23   Creatine Kinase 57            Procalitonin Results:      Lab 03/30/23  1121   PROCALCITONIN 6.00*      Brief Urine Lab Results  (Last result in the past 365 days)      Color   Clarity   Blood   Leuk Est   Nitrite   Protein   CREAT   Urine HCG        03/30/23 1200 Yellow   Clear   Negative   Negative   Negative   Negative                Site   Date Value Ref Range Status   03/30/2023 Left Radial  Final     Juan A's Test   Date Value Ref Range Status   03/30/2023 N/A  Final     pH, Arterial   Date Value Ref Range Status   03/30/2023 7.463 (H) 7.350 - 7.450 pH units Final     Comment:     83 Value above reference range     pCO2, Arterial   Date Value Ref Range Status   03/30/2023 30.1 (L) 35.0 - 45.0 mm Hg Final     Comment:     84 Value below reference range     pO2, Arterial   Date Value Ref Range Status   03/30/2023 73.8 (L) 83.0 - 108.0 mm Hg Final     Comment:     84 Value below reference range     HCO3, Arterial   Date Value Ref Range Status   03/30/2023 21.5 20.0 - 26.0 mmol/L Final     Base Excess, Arterial   Date Value Ref Range Status   03/30/2023 -1.3 (L) 0.0 - 2.0  mmol/L Final     Hemoglobin, Blood Gas   Date Value Ref Range Status   03/30/2023 13.2 (L) 13.5 - 17.5 g/dL Final     Hematocrit, Blood Gas   Date Value Ref Range Status   03/30/2023 40.4 38.0 - 51.0 % Final     Oxyhemoglobin   Date Value Ref Range Status   03/30/2023 96.3 94 - 99 % Final     Methemoglobin   Date Value Ref Range Status   03/30/2023   Final     Comment:     94 Value below reportable range < _0.1     Carboxyhemoglobin   Date Value Ref Range Status   03/30/2023 1.2 0 - 2 % Final     CO2 Content   Date Value Ref Range Status   03/30/2023 22.4 22 - 33 mmol/L Final     Barometric Pressure for Blood Gas   Date Value Ref Range Status   03/30/2023   Final     Comment:     N/A     Modality   Date Value Ref Range Status   03/30/2023 Ventilator  Final     FIO2   Date Value Ref Range Status   03/30/2023 45 % Final        Microbiology:  Blood Culture   Date Value Ref Range Status   03/30/2023 Abnormal Stain (C)  Preliminary     BCID, PCR   Date Value Ref Range Status   03/30/2023 (A) Negative by BCID PCR. Culture to Follow. Final    Streptococcus pneumoniae. Identification by BCID2 PCR.         Blood Culture   Date Value Ref Range Status   03/30/2023 Abnormal Stain (C)  Preliminary     BCID, PCR   Date Value Ref Range Status   03/30/2023 (A) Negative by BCID PCR. Culture to Follow. Final    Streptococcus pneumoniae. Identification by BCID2 PCR.   (      Radiology:      IMPRESSION:     Problems:  1. Streptococcus pneumoniae septicemia-due to acute otitis  2. 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  No LP done yet but given the Streptococcus pneumonia septicemia and clinical presentation, we do not need an LP.  3. Acute otitis media and right-sided mastoiditis resulting in a subdural empyema with fistulous tract between the middle ear space and the dura- Strep pneumoniae on culture  4. Acute toxic/metabolic encephalopathy-due to the above infection  5. Acute  hypoxic respiratory failure, intubated for airway protection  6. leukocytosis with neutrophilia-was not present on arrival.  Could be due to infection but steroids likely contributing.  7. Hypokalemia improved   8. Hypomagnesemia, ongoing   9. tachycardia-bradycardia syndrome status post PPM- complicating factor in the setting of bacteremia as at some risk for seeding PPM leads with bacteria but streptococcus pneumoniae is an uncommon bacteria to do this. TTE was without any signs of vegetations or pacemaker lead infection  10. paroxysmal A. Fib on flecainide and Eliquis  11. coronary artery disease  12. obstructive sleep apnea on CPAP nightly at home  13. History of hypertension  14. Thrombocytopenia, 136    RECOMMENDATIONS:    -continue to follow blood cultures  -Follow culture from the right ear drainage- Strep pneumoniae   -TTE was negative for any signs of infective endocarditis or pacemaker lead infection  -patient is on dexamethasone 10 mg IV every 6 hours ×4 days in the setting of his Streptococcus pneumoniae meningitis  -ENT service is following and patient is status post right myringotomy procedure.  Appreciate their assistance  -continue ceftriaxone 2 g IV every 12 hours  -discontinue IV vancomycin       He will likely need a 6 week course of IV antibiotic therapy.  Copied text in this note has been reviewed and is accurate as of 04/02/23            Maddy Waters, APRN  4/2/2023

## 2023-04-03 NOTE — PROGRESS NOTES
Clinical Nutrition     Nutrition Support Assessment  Reason for Visit: Assessment, EN      Patient Name: Kai Dodson  YOB: 1940  MRN: [id number]  Date of Encounter: 04/03/23 12:33 EDT  Admission date: 3/30/2023    Comments: EN running @ goal rate at bedside. Being tolerated so far. Electrolytes being replaced.     Will adjust EN to better meet needs and prevent overfeeding while on propofol.    Monitor need for a bowel regiment.     Nutrition Assessmen t   Admission Diagnosis:  AMS (altered mental status) [R41.82]      Problem List:    Encephalopathy, metabolic    MINO on CPAP    Hypertension    Paroxysmal atrial fibrillation on Flecainide and Eliquis    Subdural empyema    Meningitis    Questionable Status epilepticus (HCC)    Sepsis secondary to strep bacteremia and meningitis with subdural empyema    Streptococcal bacteremia    Resp F      tx for Meningitis     PMH:   He  has a past medical history of Arthritis, Atrial fibrillation, Bradycardia, Chronic hypertension, Hypertension, Hypertensive cardiovascular disease (07/2015), Intermittent palpitations (05/01/2014), Lower extremity edema, Nephrolithiasis, MINO on CPAP, MINO on CPAP, Prostate troubles, and Seasonal rhinitis.    PSH:  He  has a past surgical history that includes Colonoscopy; Cardiac catheterization; Esophagogastroduodenoscopy; Cardiac electrophysiology procedure (N/A, 10/24/2018); and Insert / replace / remove pacemaker.    Applicable Nutrition Concerns:   Skin:  Oral:  GI:    Applicable Interval History:   3/30 incision/drainage from middle ear  3/30 intubated    Reported/Observed/Food/Nutrition Related History:   4/3) Remains intubated and sedated.  EN stated on 4/1, at goal rate this morning.  No issues with intolerance with EN.  Running @ goal rate at bedside at time of visit. No family at bed side  Bed weight: 97.7kg   Remains on propofol for sedation.     Labs    Labs Reviewed: Yes     Results from last 7  days   Lab Units 04/03/23  0648 04/02/23  1757 04/02/23  1145 04/02/23  0838 04/02/23  0651 04/01/23  1351 04/01/23  0442 03/31/23  0451 03/30/23  1912 03/30/23  1121   GLUCOSE mg/dL 168*  --   --  179*  174*  --   --  112* 140*  --  150*   BUN mg/dL 31*  --   --  27*  25*  --   --  24* 19  --  21   CREATININE mg/dL 0.69*  --   --  0.60*  0.57*  --   --  0.71* 0.83  --  0.99   SODIUM mmol/L 143  --   --  143  143  --   --  139 138  --  142   CHLORIDE mmol/L 111*  --   --  110*  110*  --   --  106 106  --  102   POTASSIUM mmol/L 3.7  --   --  3.8  3.8  --   --  4.1 3.9   < > 3.3*   PHOSPHORUS mg/dL 1.8* 1.6* 1.7* 2.0*  1.8* 2.3*   < > 2.2* 2.6   < >  --    MAGNESIUM mg/dL 2.7*  --   --  2.9* 3.0*  --  2.7* 2.6*   < > 1.8   ALT (SGPT) U/L  --   --   --  15  13  --   --   --  19  --  26    < > = values in this interval not displayed.     Results from last 7 days   Lab Units 04/03/23  0648 04/02/23  0838 03/31/23  0451 03/30/23  1121   ALBUMIN g/dL  --  3.0*  2.8* 3.2* 4.2   PREALBUMIN mg/dL 13.6*  --   --   --    CRP mg/dL  --  18.05*  --   --    CHOLESTEROL mg/dL  --  149 100  --    TRIGLYCERIDES mg/dL  --  185* 77  --      Results from last 7 days   Lab Units 04/03/23  1152 04/03/23  0607 04/02/23  2322 04/02/23  1744 04/02/23  1147 04/02/23  0528   GLUCOSE mg/dL 189* 182* 152* 159* 172* 131*     Lab Results   Lab Value Date/Time    HGBA1C 5.40 03/30/2023 1121    HGBA1C 5.2 07/01/2015 0436             Medications    Medications Reviewed: Yes  Pertinent: Decadron, Insulin, Protonix,   Infusion: Fentanyl 50mcg/hr, heparin, Propofol @ 10.6ml (280kcals)   PRN: zofran    Intake/Ouptut 24 hrs (0701 - 0700)   I&O's Reviewed: Yes     Intake & Output (last day)       04/02 0701 04/03 0700 04/03 0701 04/04 0700    P.O. 0     I.V. (mL/kg) 743.1 (8.6)     Other 330     NG/GT 1784     IV Piggyback 650     Total Intake(mL/kg) 3507.1 (40.5)     Urine (mL/kg/hr) 1055 (0.5) 215 (0.4)    Total Output 1055 215    Net +2452.1  "-215              Last documented BM (3/31)   Anthropometrics     Admission Height 176.5 cm (69.5\") Documented at 03/30/2023 1116   Admission Weight 88.5 kg (195 lb) Documented at 03/30/2023 1116     Height: Height: 176.5 cm (69.49\")  Last Filed Weight: Weight: 86.6 kg (190 lb 14.7 oz) (04/02/23 0600)  Method: Weight Method: Bed scale  BMI: BMI (Calculated): 27.8  BMI classification: Overweight: 25.0-29.9kg/m2     UBW: per EMR rpt of 195 lbs on 10/11/23 further data req.  Weight change:     Nutrition Focused Physical Exam     Date:     Unable to perform exam due to: Defer pending indication    Needs Assessment   Date: 4/3    Height used:Height: 176.5 cm (69.49\")  Weights used: 86.1 Kg bed wt     Estimated Calorie needs: ~ 1700 Kcal/day  Method:  Kcals/KG 20 = 1722  Method:  PSU on wt 86.1 temp 36.8, VE 6.33 =1626     Estimated Protein needs: ~ 112 g PRO/day  Method: g/Kg1.3    Estimated Fluid needs: ~ ml/day   Per clinical status    Current Nutrition Prescription     PO: NPO Diet NPO Type: Strict NPO, Tube Feeding  Oral Nutrition Supplement:   Intake: N/A     EN: Peptamen AF  Goal Rate:  70ml/hr Water Flushes: 30ml Q2hrs   Modular: None  Route: NG  Tube: Large bore    At goal over: 20Hrs/day    Rx will supply:   Goal Volume 1400 mL/day     Flush Volume 300 mL/day     Energy 1680 Kcal/day 99 % Est Need   Protein 106 g/day 95 % Est Need   Fiber 8.4 g/day     Water in  EN 1135 mL     Total Water 1435 mL     Meet DRI Yes        EN + propofol to provide: 1960kcals (115% est needs)   --------------------------------------------------------------------------  Product/Rate verified at bedside: Yes  Infusing Rate at time of visit: @ 70ml/hr     Average Delivery from Charting: Insufficient data  Volume  mL/day   % Goal Vol.   Flush Volume  mL/day     Energy  Kcal/day  % Est Need   Protein  g/day  % Est Need   Fiber  g/day     Water in  EN  mL     Total Water  mL     Meet DRI No        Nutrition Diagnosis   Date: 3/31 Updated: "  4/3  Problem Inadequate oral intake   Etiology Mechanical ventilation   Signs/Symptoms NPO   Status: EN started 4/1; at goal rate this morning.       Goal:   General: Nutrition support treatment  PO: N/A  EN/PN: Adjust EN to prevent overfeeding with propofol.     Nutrition Intervention      Follow treatment progress, Care plan reviewed, EN rec prepared    To better meet needs while on Propfol, will change EN to the following:   EN: Peptamen AF  Goal Rate: 55 ml/hr    Water Flushes: 30 ml ev 2 hr  Modular: Prosource-no carb 2/day  Route: NG  Tube: Large bore    At goal over: 20Hrs/day    Rx will supply:   Goal Volume 1100 mL/day     Flush Volume 300 mL/day     Energy 1720 Kcal/day 101 % Est Need   Protein 114 g/day 102 % Est Need   Fiber 6.6 g/day     Water in   mL     Total Water 1192 mL     Meet DRI Yes        -  Monitoring/Evaluation:   Per protocol, I&O, Pertinent labs, Weight, Symptoms, POC/GOC      Nola Davila RD  Time Spent: 30 min

## 2023-04-03 NOTE — PROGRESS NOTES
"Neurology       Patient Care Team:  Derick Phillip MD as PCP - General (Family Medicine)  Nino Castillo MD as Consulting Physician (Cardiology)    Chief complaint: Streptococcal meningitis    History: Sedated on the vent  Past Medical History:   Diagnosis Date   • Arthritis    • Atrial fibrillation    • Bradycardia    • Chronic hypertension     Probably essential   • Hypertension    • Hypertensive cardiovascular disease 07/2015    Recent progressive CCS class III-IV chest pain syndrome with normal EKG and exertional dyspnea and fatigue with normal codominant coronary arteries and preserved systolic  left ventricular function, July 2015.   • Intermittent palpitations 05/01/2014    Remote abnormal 24-hour Holter monitor demonstrating intermittent bradycardia with occasional PACs, May 2014.   • Lower extremity edema     Intermittent lower extremity edema felt secondary to antihypertensive therapy.    • Nephrolithiasis     Remote- 15 years ago   • MINO on CPAP    • MINO on CPAP     doesn't know setting    • Prostate troubles     enlarged   • Seasonal rhinitis     intermittent       Vital Signs   Vitals:    04/03/23 0747 04/03/23 0800 04/03/23 0900 04/03/23 1000   BP: 154/73 169/77 160/75 148/72   BP Location:  Left arm     Patient Position:       Pulse: 70 70 70 70   Resp:       Temp:  98.2 °F (36.8 °C)     TempSrc:  Bladder     SpO2: 94% 92% 93% 94%   Weight:       Height: 176.5 cm (69.49\")          Physical Exam:   General: Unresponsive    Blood pressure 148/72.    Temperature 98.2.              Neuro: Sedated    Results Review:  White count 13,120 with 89.3% neutrophils  Results from last 7 days   Lab Units 04/03/23  0648   WBC 10*3/mm3 13.12*   HEMOGLOBIN g/dL 11.3*   HEMATOCRIT % 35.8*   PLATELETS 10*3/mm3 122*     Results from last 7 days   Lab Units 04/03/23  0648 04/02/23  0838 04/01/23  0442 03/31/23  0451 03/30/23  1912 03/30/23  1121   SODIUM mmol/L 143 143  143 139 138  --  142   POTASSIUM mmol/L " 3.7 3.8  3.8 4.1 3.9   < > 3.3*   CHLORIDE mmol/L 111* 110*  110* 106 106  --  102   CO2 mmol/L 23.0 20.0*  23.0 23.0 22.0  --  24.0   BUN mg/dL 31* 27*  25* 24* 19  --  21   CREATININE mg/dL 0.69* 0.60*  0.57* 0.71* 0.83  --  0.99   CALCIUM mg/dL 8.0* 8.5*  8.3* 8.2* 8.2*  --  9.4   BILIRUBIN mg/dL  --  0.5  0.6  --  2.7*  --  3.7*   ALK PHOS U/L  --  65  69  --  75  --  108   ALT (SGPT) U/L  --  15  13  --  19  --  26   AST (SGOT) U/L  --  21  18  --  28  --  34   GLUCOSE mg/dL 168* 179*  174* 112* 140*  --  150*    < > = values in this interval not displayed.       Imaging Results (Last 24 Hours)     ** No results found for the last 24 hours. **          Assessment:  Septicemia with streptococcal meningitis    Plan:  Continue full support    Comment:  Treatment guarded prognosis         I discussed the patients findings and my recommendations with primary care team    Chaz Kahn MD  04/03/23  11:41 EDT

## 2023-04-03 NOTE — PROGRESS NOTES
"    INFECTIOUS DISEASE PROGRESS NOTE    Kai Dodson  1940  [id number]    Date of consult: 3/31/23  Admit date: 3/30/2023    Requesting Provider: Dr. Jason  Evaluating physician: Que Alonso MD  Reason for Consultation: meningitis  Chief Complaint: altered mental status      Subjective   History of present illness:  Kai Dodson is a  82 y.o.  Yr old male with a past medical history of hypertension, coronary artery disease, paroxysmal A. Fib on flecainide and Eliquis, tachycardia-bradycardia syndrome status post PPM and obstructive sleep apnea on CPAP who presented to the ER yesterday with encephalopathy, inability to speak, and left-sided weakness.  Due to the patient's mental state and intubated status, history is obtained from medical records.  The patient's wife had just been discharged yesterday morning from [facility] and their son was driving the patient's wife back to [facility] for inpatient rehab.  The patient's son tried to call him and could not reach him.  Other family members went to check on him and found him altered with tremors in all the extremities.  EMS was called and he was transported to the ER.    Since arrival, T-max has been 100.9°F.  He was initially tachycardic up to 112 bpm but this is improved. He had some hypotension but this has improved. The patient was intubated for airway protection and remains on the ventilator with an FiO2 of 35%. Initial lactic acid was 6.6, pro-calcitonin was 6.0, hemoglobin A1c was 5.4, Shannon has been normal.  LFTs are within normal limits. White blood cell count was normal on arrival at 9.63, has now trended up to 14.14. platelet count has decreased to 109.sensitivity troponin was slightly elevated at 17. Urine drug screen was negative. 2 sets of admission blood cultures are growing GPC's in pairs and chains identified as Streptococcus pneumoniae by the BCID2 PCR. The patient underwent CTA head and neck that showed \"no " "evidence of intracranial hemorrhage or definite acute territorial ischemia.  There is abnormal intracranial gas present, appearing likely extra-axial in the lateral aspect of the right cerebellar hemisphere, with changes of the right mastoid present, concerning for mastoiditis and possibly subdural empyema or less likely parenchymal abscess.\" \"Dedicated CT of the temporal bones demonstrates findings of likely acute otomastoiditis on the right, with middle ear and right mastoid effusion, coalescent changes of the right mastoid and dehiscence of both the tegmen tympani and tegmen mastoideum. There is again concern for intracranial infectious involvement, empyema versus abscess with foci of intracranial gas present.\" chest x-ray was without any acute disease. MRI brain showed \"no evidence of right temporal lobe abscess.  Findings consistent with diffuse meningitis.  There is right otomastoiditis as seen on the CT of the temporal bones.  There are signal voids corresponding to gas bubbles within the CSF in the upper right posterior cranial fossa.\"  ENT has been consulted and Dr. Garcia evaluated the patient with right myringotomy procedure performed at bedside in the ICU. Wide myringotomy was made in the posterior aspect of the tympanic membrane.  A small amount of purulence was expressed and cultured.  After that CSF was draining through the myringotomy.  A tube was therefore not placed.  Neurosurgery has evaluated the patient and recommended that there is no role for neurosurgical intervention at this time. The patient has been started on empiric acyclovir, ampicillin, ceftriaxone 2 g IV every 12 hours, and IV vancomycin by the ICU team.  He has additionally been placed on dexamethasone.  ID has been consultated for antibiotic recommendations.    Subjective:    4/1/23: The patient remains critically ill on the ventilator.  No history is available from him.  He does have some fluid leaking from his right ear, presumably " CSF. No fevers.  Ventilator settings remain stable.  Leukocytosis is worse but likely steroid induced.    4/2/23:  He remains afebrile and on 35% FIO2.  WBC 12.29 this am.  Remains on Dexamethasone.     4/3/23: no history available from the patient.  He remains on the ventilator with an FiO2 of 35%.  No fevers.  Continued CSF leak from the right ear.    Past Medical History:   Diagnosis Date   • Arthritis    • Atrial fibrillation    • Bradycardia    • Chronic hypertension     Probably essential   • Hypertension    • Hypertensive cardiovascular disease 07/2015    Recent progressive CCS class III-IV chest pain syndrome with normal EKG and exertional dyspnea and fatigue with normal codominant coronary arteries and preserved systolic  left ventricular function, July 2015.   • Intermittent palpitations 05/01/2014    Remote abnormal 24-hour Holter monitor demonstrating intermittent bradycardia with occasional PACs, May 2014.   • Lower extremity edema     Intermittent lower extremity edema felt secondary to antihypertensive therapy.    • Nephrolithiasis     Remote- 15 years ago   • MINO on CPAP    • MINO on CPAP     doesn't know setting    • Prostate troubles     enlarged   • Seasonal rhinitis     intermittent       Past Surgical History:   Procedure Laterality Date   • CARDIAC CATHETERIZATION      no stents   • CARDIAC ELECTROPHYSIOLOGY PROCEDURE N/A 10/24/2018    Procedure: Pacemaker DC new with Biotronik. Hold Eliquis one day prior.;  Surgeon: Harley Dacosta MD;  Location: [facility] INVASIVE LOCATION;  Service: Cardiology   • COLONOSCOPY     • ENDOSCOPY     • INSERT / REPLACE / REMOVE PACEMAKER         Pediatric History   Patient Parents   • Not on file     Other Topics Concern   • Not on file   Social History Narrative    PT DRINKS 2 SERVINGS OF CAFFEINE PER DAY.    PT LIVES AT HOME WITH WIFE.       family history includes No Known Problems in his mother; Other in his father.    No Known Allergies    There is no  immunization history for the selected administration types on file for this patient.    Medication:    Current Facility-Administered Medications:   •  aspirin chewable tablet 81 mg, 81 mg, Nasogastric, Daily, 81 mg at 04/03/23 0836 **OR** aspirin suppository 300 mg, 300 mg, Rectal, Daily, Tereza Bautista APRN  •  Calcium Replacement - Follow Nurse / BPA Driven Protocol, , Does not apply, PRN, Natalia Cruz APRN  •  cefTRIAXone (ROCEPHIN) 2 g/100 mL 0.9% NS IVPB (MBP), 2 g, Intravenous, Q12H, Que Alonso MD, 2 g at 04/03/23 1311  •  chlorhexidine (PERIDEX) 0.12 % solution 15 mL, 15 mL, Mouth/Throat, Q12H, Mundo Jason MD, 15 mL at 04/03/23 0836  •  ciprofloxacin-dexamethasone (CIPRODEX) 0.3-0.1 % otic suspension 4 drop, 4 drop, Right Ear, BID, Edwin Garcia MD, 4 drop at 04/03/23 0838  •  dextrose (D50W) (25 g/50 mL) IV injection 25 g, 25 g, Intravenous, Q15 Min PRN, Amadou eMrcedes APRN  •  fentaNYL (SUBLIMAZE) bolus from bag 10 mcg/mL 50 mcg, 50 mcg, Intravenous, Q30 Min PRN, Natalia Cruz APRN, 50 mcg at 03/31/23 1911  •  fentaNYL 2500 mcg/250 mL NS infusion,  mcg/hr, Intravenous, Titrated, Natalia Cruz APRN, Last Rate: 5 mL/hr at 04/03/23 0638, 50 mcg/hr at 04/03/23 0638  •  flecainide (TAMBOCOR) tablet 50 mg, 50 mg, Nasogastric, Q12H, Marcelino Hastings DO, 50 mg at 04/03/23 0836  •  glucagon (GLUCAGEN) injection 1 mg, 1 mg, Intramuscular, Q15 Min PRN, Amadou Mercedes APRN  •  heparin 21473 units/250 mL (100 units/mL) in 0.45 % NaCl infusion, 9 Units/kg/hr, Intravenous, Titrated, De Leon, Antwon A, PharmD, Last Rate: 7.79 mL/hr at 04/03/23 1534, 9 Units/kg/hr at 04/03/23 1534  •  insulin regular (humuLIN R,novoLIN R) injection 0-7 Units, 0-7 Units, Subcutaneous, Q6H, Amadou Mercedes, FORREST, 2 Units at 04/03/23 1805  •  levETIRAcetam in NaCl 0.82% (KEPPRA) IVPB 500 mg, 500 mg, Intravenous, Q12H, Meena Burks, APRN, 500 mg at 04/03/23 1805  •  losartan  (COZAAR) tablet 50 mg, 50 mg, Nasogastric, Q24H, Marcelino Hastings, DO, 50 mg at 04/03/23 0836  •  Magnesium Standard Dose Replacement - Follow Nurse / BPA Driven Protocol, , Does not apply, PRN, Natalia Cruz, APRN  •  niCARdipine (CARDENE) 25mg in 250mL NS infusion, 5-15 mg/hr, Intravenous, Titrated, Tereza Bautista APRN, Held at 03/30/23 1821  •  ondansetron (ZOFRAN) injection 4 mg, 4 mg, Intravenous, Q6H PRN, Antonietta Phillips APRHECTOR  •  pantoprazole (PROTONIX) injection 40 mg, 40 mg, Intravenous, Q AM, Antonietta Phillips APRN, 40 mg at 04/03/23 0615  •  Pharmacy to Dose Heparin, , Does not apply, Continuous PRN, Marcelino Hastings DO  •  Phosphorus Replacement - Follow Nurse / BPA Driven Protocol, , Does not apply, PRN, Natalia Cruz, APRN  •  potassium phosphates 15 mmol in sodium chloride 0.9 % 100 mL infusion, 15 mmol, Intravenous, Once, Mundo Jason MD  •  Potassium Replacement - Follow Nurse / BPA Driven Protocol, , Does not apply, PRN, Natalia Cruz APRN  •  propofol (DIPRIVAN) infusion 10 mg/mL 100 mL, 5-50 mcg/kg/min, Intravenous, Titrated, Antonietta Phillips APRN, Last Rate: 10.62 mL/hr at 04/03/23 1532, 20 mcg/kg/min at 04/03/23 1532  •  ProSource No Carb oral solution 30 mL, 30 mL, Nasogastric, BID, Marcelino Hastings, DO  •  sodium chloride 0.9 % flush 10 mL, 10 mL, Intravenous, Q12H, Tereza Bautista APRN, 10 mL at 04/03/23 0840  •  sodium chloride 0.9 % flush 10 mL, 10 mL, Intravenous, PRN, Tereza Bautista APRN  •  sodium chloride 0.9 % infusion 40 mL, 40 mL, Intravenous, PRN, Michele, Tereza Diego, APRN    Please refer to the medical record for a full medication list    Review of Systems:  Unable to obtain a 12 point review of systems from the patient given his clinical state    Physical Exam:   Vital Signs   Temp:  [97.7 °F (36.5 °C)-98.8 °F (37.1 °C)] 98.6 °F (37 °C)  Heart Rate:  [49-71]  "70  Resp:  [14-15] 15  BP: (115-181)/(59-87) 170/82  FiO2 (%):  [35 %] 35 %    Temp  Min: 97.7 °F (36.5 °C)  Max: 98.8 °F (37.1 °C)  BP  Min: 115/84  Max: 181/84  Pulse  Min: 49  Max: 71  Resp  Min: 14  Max: 15  SpO2  Min: 92 %  Max: 100 %    Blood pressure 170/82, pulse 70, temperature 98.6 °F (37 °C), temperature source Bladder, resp. rate 15, height 176.5 cm (69.49\"), weight 86.6 kg (190 lb 14.7 oz), SpO2 93 %.  GENERAL: critically ill-appearing on the ventilator.  HEENT:  Normocephalic, atraumatic.  ET tube in place.  No external oral lesions noted. NG tube in place. Ongoing CSF fluid leaking from his right ear  EYES: pupils equal, round, and react to light.  No conjunctival injection.  Chest: Left chest wall PPM pocket site without any erythema or drainage.  HEART: RRR, no murmur  LUNGS: clear to auscultation anteriorly.  On the ventilator with an FiO2 of 35%  ABDOMEN: Soft, nontender, nondistended. No appreciable HSM.    GENITAL: + Doran catheter  SKIN: no new generalized rashes noted  PSYCHIATRIC: unable to assess  EXT:  No cellulitic change.  NEURO: sedated on the ventilator.     Right upper extremity PICC line- no redness tenderness     Results Review:   I reviewed the patient's new clinical results.    Results from last 7 days   Lab Units 04/03/23  0648 04/02/23  1145 04/01/23  0442   WBC 10*3/mm3 13.12* 12.29* 15.04*   HEMOGLOBIN g/dL 11.3* 11.9* 11.7*   HEMATOCRIT % 35.8* 36.9* 35.2*   PLATELETS 10*3/mm3 122* 136* 119*     Results from last 7 days   Lab Units 04/03/23  0648   SODIUM mmol/L 143   POTASSIUM mmol/L 3.7   CHLORIDE mmol/L 111*   CO2 mmol/L 23.0   BUN mg/dL 31*   CREATININE mg/dL 0.69*   GLUCOSE mg/dL 168*   CALCIUM mg/dL 8.0*     Results from last 7 days   Lab Units 04/02/23  0838   ALK PHOS U/L 65  69   BILIRUBIN mg/dL 0.5  0.6   ALT (SGPT) U/L 15  13   AST (SGOT) U/L 21  18         Results from last 7 days   Lab Units 04/02/23  0838   CRP mg/dL 18.05*     Results from last 7 days   Lab " Units 04/01/23  1351 03/31/23  1342   VANCOMYCIN TR mcg/mL 15.40 8.10     Results from last 7 days   Lab Units 03/31/23  1127   LACTATE mmol/L 3.1*     Estimated Creatinine Clearance: 90.7 mL/min (A) (by C-G formula based on SCr of 0.69 mg/dL (L)).  CPK    Common Labsle 3/30/23   Creatine Kinase 57            Procalitonin Results:      Lab 03/30/23  1121   PROCALCITONIN 6.00*      Brief Urine Lab Results  (Last result in the past 365 days)      Color   Clarity   Blood   Leuk Est   Nitrite   Protein   CREAT   Urine HCG        03/30/23 1200 Yellow   Clear   Negative   Negative   Negative   Negative                No results found for: SITE, ALLENTEST, PHART, KPX2HFG, PO2ART, QQE3RZM, BASEEXCESS, I4DJEGBP, HGBBG, HCTABG, OXYHEMOGLOBI, METHHGBN, CARBOXYHGB, CO2CT, BAROMETRIC, MODALITY, FIO2     Microbiology:  Blood Culture   Date Value Ref Range Status   03/30/2023 Abnormal Stain (C)  Preliminary     BCID, PCR   Date Value Ref Range Status   03/30/2023 (A) Negative by BCID PCR. Culture to Follow. Final    Streptococcus pneumoniae. Identification by BCID2 PCR.         Blood Culture   Date Value Ref Range Status   03/30/2023 Abnormal Stain (C)  Preliminary     BCID, PCR   Date Value Ref Range Status   03/30/2023 (A) Negative by BCID PCR. Culture to Follow. Final    Streptococcus pneumoniae. Identification by BCID2 PCR.   (      Radiology:      IMPRESSION:     Problems:  1. Streptococcus pneumoniae septicemia-due to acute otitis  2. 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  No LP done yet but given the Streptococcus pneumonia septicemia and clinical presentation, we do not need an LP.  3. Acute otitis media and right-sided mastoiditis resulting in a subdural empyema with fistulous tract between the middle ear space and the dura- Strep pneumoniae on culture  4. Acute toxic/metabolic encephalopathy-due to the above infection  5. Acute hypoxic respiratory failure, intubated  for airway protection  6. leukocytosis with neutrophilia-was not present on arrival.  Could be due to infection but steroids likely contributing.  7. Hypokalemia improved   8. Hypomagnesemia, ongoing   9. tachycardia-bradycardia syndrome status post PPM- complicating factor in the setting of bacteremia as at some risk for seeding PPM leads with bacteria but streptococcus pneumoniae is an uncommon bacteria to do this. TTE was without any signs of vegetations or pacemaker lead infection  10. paroxysmal A. Fib on flecainide and Eliquis  11. coronary artery disease  12. obstructive sleep apnea on CPAP nightly at home  13. History of hypertension  14. Thrombocytopenia, 136    RECOMMENDATIONS:    -continue to follow blood cultures-repeat blood cultures from 4/1 are no growth  -Follow culture from the right ear drainage- Strep pneumoniae   -TTE was negative for any signs of infective endocarditis or pacemaker lead infection  -ongoing CSF leak from right ear that has not significantly improved.  ENT suggested having neurosurgery evaluate again due to ongoing CSF leak.  -continue ceftriaxone 2 g IV every 12 hours     He will likely need a 6 week course of IV antibiotic therapy.    Complex MDM    Copied text in this note has been reviewed and is accurate as of 04/03/23            Que Alonso MD  4/3/2023

## 2023-04-03 NOTE — CASE MANAGEMENT/SOCIAL WORK
Continued Stay Note  [facility]     Patient Name: Kai Dodson  MRN: [id number]  Today's Date: 4/3/2023    Admit Date: 3/30/2023    Plan: Ongoing   Discharge Plan     Row Name 04/03/23 1542       Plan    Plan Ongoing      Plan Comments Mr. Dodson remains intubated and sedated on the ventilator. A nasogastric tube is in place for tube feeding. Therapy orders will be placed when appropriate. Case management will continue to follow Mr. Dodson's progress and provide for him a safe discharge plan.               Discharge Codes    No documentation.               Expected Discharge Date and Time     Expected Discharge Date Expected Discharge Time    Apr 7, 2023             Keily Rodriguez RN

## 2023-04-03 NOTE — PROGRESS NOTES
[facility]     Progress Note    Patient Name: Kai Dodson  : 1940  MRN: [id number]  Primary Care Physician:  Derick Phillip MD  Date of admission: 3/30/2023    Subjective   Subjective       HPI:  Patient Reports intubated and sedated        Objective   Objective     Vitals:   Temp:  [97.3 °F (36.3 °C)-98.4 °F (36.9 °C)] 98.4 °F (36.9 °C)  Heart Rate:  [49-70] 70  Resp:  [14-15] 15  BP: (115-196)/(59-96) 144/69  FiO2 (%):  [35 %] 35 %  Physical Exam     Sedated on the ventilator.  Auricles normal.  Xanthochromic otorrhea on the right.  No mastoid erythema.          Assessment & Plan   Assessment / Plan     Brief Patient Summary:  Kai Dodson is a 82 y.o. male who has meningitis secondary to acute otitis media.  Recurrent otorrhea does not appear infected but appears to be persistent CSF.  Given the amount of persistent otorrhea recommend consulting neurosurgery to see if they think a lumbar drain is appropriate to divert CSF from the tegmen defect to potentially slow down or stop the CSF leak.  At this point no need for surgical intervention from an ENT standpoint    Active Hospital Problems:  Active Hospital Problems    Diagnosis    • **Encephalopathy, metabolic    • Streptococcal bacteremia    • Subdural empyema    • Meningitis    • Questionable Status epilepticus (HCC)    • Sepsis secondary to strep bacteremia and meningitis with subdural empyema    • Paroxysmal atrial fibrillation on Flecainide and Eliquis    • MINO on CPAP    • Hypertension             DVT prophylaxis:  Medical and mechanical DVT prophylaxis orders are present.    CODE STATUS:   Code Status (Patient has no pulse and is not breathing): CPR (Attempt to Resuscitate)  Medical Interventions (Patient has pulse or is breathing): Full Support        Edwin Garcia MD

## 2023-04-03 NOTE — PROGRESS NOTES
"INTENSIVIST   PROGRESS NOTE     Hospital:  LOS: 4 days     Chief Complaint: Altered mentation    Subjective   S     Interval History: No acute issues overnight.  He is afebrile and hemodynamically stable.  On small amount of sedation but unresponsive.    The patient's relevant past medical, surgical and social history were reviewed and updated in Epic as appropriate.      ROS: Unable to obtain as patient intubated and sedated    Objective   O     Intake/Ouptut 24 hrs (7:00AM - 6:59 AM)  Intake & Output (last 3 days)       03/31 0701 04/01 0700 04/01 0701 04/02 0700 04/02 0701 04/03 0700 04/03 0701 04/04 0700    P.O. 0 0 0     I.V. (mL/kg) 683.9 (8.1) 738.9 (8.5) 743.1 (8.6)     Other  150 330     NG/GT  434 1784     IV Piggyback 1269.5 532.6 650     Total Intake(mL/kg) 1953.4 (23.1) 1855.5 (21.4) 3507.1 (40.5)     Urine (mL/kg/hr) 860 (0.4) 1427 (0.7) 1055 (0.5) 90 (0.4)    Stool 0       Total Output 860 1427 1055 90    Net +1093.4 +428.5 +2452.1 -90            Stool Unmeasured Occurrence 1 x           Medications (drips):  fentanyl 10 mcg/mL, Last Rate: 50 mcg/hr (04/03/23 0638)  heparin, Last Rate: 9 Units/kg/hr (04/03/23 0832)  niCARdipine, Last Rate: Stopped (03/30/23 1821)  Pharmacy to Dose Heparin  propofol, Last Rate: 20 mcg/kg/min (04/03/23 0644)    Respiratory Support: Mechanical ventilation     Physical Examination:  Vital Signs: Blood pressure 169/77, pulse 70, temperature 98.2 °F (36.8 °C), temperature source Bladder, resp. rate 15, height 176.5 cm (69.49\"), weight 86.6 kg (190 lb 14.7 oz), SpO2 92 %.    General: Critically ill-appearing.  On mechanical ventilation.  ENT/Neck: Trachea midline, No masses.  ETT in place.  Lymphadenopathy: No palpable anterior cervical, submandibular, submental, or posterior cervical lymphadenopathy.   Chest: Clear to auscultation bilaterally, No wheezing, rhonchi, or rales. Normal work of breathing. Equal chest rise.  Appropriate oxygen saturations on MV.  Cardiac: " Regular rhythm, normal rate, S1S2 auscultated. No murmurs, rubs or gallops.   Extremities: No lower extremity edema. No clubbing or cyanosis.  Neuro: Intubated on minimal sedation but does not respond to noxious stimuli.  Does not following commands.      Lines, Drains & Airways     Active LDAs     Name Placement date Placement time Site Days    PICC Triple Lumen 03/31/23 Right Basilic 03/31/23  1451  Basilic  2    NG/OG Tube 16 Fr Right nostril 04/01/23  0700  Right nostril  2    Urethral Catheter Double-lumen;Temperature probe 16 Fr. 03/30/23  1503  -- 3    Hi-Lo Evac ETT 7.5 03/30/23  1437  Oral  3              Results from last 7 days   Lab Units 04/03/23  0648 04/02/23  1145 04/01/23  0442   WBC 10*3/mm3 13.12* 12.29* 15.04*   HEMOGLOBIN g/dL 11.3* 11.9* 11.7*   MCV fL 98.4* 97.4* 96.2   PLATELETS 10*3/mm3 122* 136* 119*     Results from last 7 days   Lab Units 04/03/23  0648 04/02/23  1757 04/02/23  1145 04/02/23  0838 04/02/23  0651 04/01/23  1351 04/01/23  0442   SODIUM mmol/L 143  --   --  143  143  --   --  139   POTASSIUM mmol/L 3.7  --   --  3.8  3.8  --   --  4.1   CO2 mmol/L 23.0  --   --  20.0*  23.0  --   --  23.0   CREATININE mg/dL 0.69*  --   --  0.60*  0.57*  --   --  0.71*   GLUCOSE mg/dL 168*  --   --  179*  174*  --   --  112*   MAGNESIUM mg/dL 2.7*  --   --  2.9* 3.0*  --  2.7*   PHOSPHORUS mg/dL 1.8* 1.6* 1.7* 2.0*  1.8* 2.3*   < > 2.2*    < > = values in this interval not displayed.     Estimated Creatinine Clearance: 90.7 mL/min (A) (by C-G formula based on SCr of 0.69 mg/dL (L)).  Results from last 7 days   Lab Units 04/02/23  0838 03/31/23  0451 03/30/23  1121   ALK PHOS U/L 65  69 75 108   BILIRUBIN mg/dL 0.5  0.6 2.7* 3.7*   ALT (SGPT) U/L 15  13 19 26   AST (SGOT) U/L 21  18 28 34       Results from last 7 days   Lab Units 03/30/23  1544   PH, ARTERIAL pH units 7.463*   PCO2, ARTERIAL mm Hg 30.1*   PO2 ART mm Hg 73.8*   FIO2 % 45     Results: Reviewed.    I reviewed the  patient's new laboratory and imaging results.  I independently reviewed the patient's new images.    Medications: Reviewed.    Assessment & Plan   A / P     Active Hospital Problems    Diagnosis    • **Encephalopathy, metabolic    • Subdural empyema    • Meningitis    • Sepsis secondary to strep bacteremia and meningitis with subdural empyema    • Streptococcal bacteremia    • Questionable Status epilepticus (HCC)    • Paroxysmal atrial fibrillation on Flecainide and Eliquis    • MINO on CPAP    • Hypertension      Patient Ivory is an 82 y.o. male with PMH PAF on Flecainide and Eliquis, HTN, CAD, Tachy-Samuel syndrome s/p PPM and MINO on CPAP who presented to the ED with AMS, inability to speak and left sided weakness.  Found to have possible subdural empyema and meningitis related to mastoiditis/otitis media.  Patient underwent Myringotomy on the right by ENT on 3/30/2023.  Cultures from CSF fluid from right ear 3/30/2023 growing Streptococcus, blood cultures from 3/30/2023 growing strep pneumonia.  Patient was eval by neurosurgery with no neurosurgical intervention at this time.  He currently remains on mechanical ventilation.    #Encephalopathy  #Sepsis w/ multi organ dysfunction  #Bacteremia  #Acute mastoiditis   #Sudural empyema  #Meningitis  -Encephalopathy likely metabolic and due to underlying sepsis. Initial radiographic evidence of subdural empyema vs [less likely] abscess. Held off on LP on admission given that patient is chronically anticoagulated; last dose of Eliquis likely administered on 3/29/2023 AM.  At presentation covered broadly with antimicrobials for possible meningitis (i.e. vancomycin, ceftriaxone, ampicillin and acyclovir). Blood cultures later possible for strep pneumonia; infectious disease consulted and de-escalating antimicrobial therapy as able.  Suspect streptococcal meningitis given clinical picture and aforementioned blood cultures.  He is also receiving dexamethasone x4 days.  Both  neurology and neurosurgery following.  We will touch base with neurosurgery again in the next 12 hours as patient has continuous CSF leak from right ear that is not improving with treatment.  All other cultures including those from myringotomy have been negative thus far  -Radiographic imaging consistent with acute mastoiditis. ENT following s/p right myringotomy   -EEG performed in the ED; While there is diffuse cerebral dysfunction there is no evidence of epilepsy     #Acute hypoxic respiratory failure requiring intubation/mechanical ventilation  #MINO  -Currently intubated/sedated. Will adjust mechanical ventilation settings as able.  Etiology of respiratory failure likely from encephalopathy/inability to protect airway  -We will continue mechanical ventilation and wean if possible.  SBT daily if able and appropriate  -Minimize sedation is much as possible for comfort on mechanical ventilation and appropriate neurological exams    #Paroxysmal atrial fibrillation  -Patient has been started on home flecainide but beta-blocker held for bradycardia  -Given questionable need for LP and surgical intervention(s) earlier in admission transitioned Eliquis to heparin drip.  We will continue heparin drip at this time.     F-Tube feeds per dietary recs  A- Fentanyl  S-Propofol  T-Heparin gtt   H-Head of bed greater than 30 degrees  U-PPI  G-FSBS per unit protocol, correction dose insulin  S-SBT  B-Will monitor daily and provide regimen if indicated  I-PIV  D-Ceftriaxone    Advance Directives:   Code Status and Medical Interventions:   Ordered at: 03/30/23 1300     Code Status (Patient has no pulse and is not breathing):    CPR (Attempt to Resuscitate)     Medical Interventions (Patient has pulse or is breathing):    Full Support     High level of risk due to severe exacerbation of chronic illness and illness with threat to life or bodily function.    I conducted multidisciplinary rounds in the plan of care was discussed with  the multidisciplinary team at that time. In attendance at multidisciplinary rounds was clinical pharmacist, dietitian, nursing staff and case management.    I discussed the patient's findings and my recommendations with patient and nursing staff    -- Alvaro Jason MD  Pulmonary/Critical Care    Critical Care time spent in direct patient care: 35 minutes (excluding procedure time, if applicable) including high complexity decision making to assess, manipulate, and support vital organ system failure in this individual who has impairment of one or more vital organ systems such that there is a high probability of imminent or life threatening deterioration in the patient’s condition.

## 2023-04-04 NOTE — PROGRESS NOTES
"    INFECTIOUS DISEASE PROGRESS NOTE    Kai Dodson  1940  [id number]    Date of consult: 3/31/23  Admit date: 3/30/2023    Requesting Provider: Dr. Jason  Evaluating physician: Que Alonso MD  Reason for Consultation: meningitis  Chief Complaint: altered mental status      Subjective   History of present illness:  Kai Dodson is a  82 y.o.  Yr old male with a past medical history of hypertension, coronary artery disease, paroxysmal A. Fib on flecainide and Eliquis, tachycardia-bradycardia syndrome status post PPM and obstructive sleep apnea on CPAP who presented to the ER yesterday with encephalopathy, inability to speak, and left-sided weakness.  Due to the patient's mental state and intubated status, history is obtained from medical records.  The patient's wife had just been discharged yesterday morning from [facility] and their son was driving the patient's wife back to [facility] for inpatient rehab.  The patient's son tried to call him and could not reach him.  Other family members went to check on him and found him altered with tremors in all the extremities.  EMS was called and he was transported to the ER.    Since arrival, T-max has been 100.9°F.  He was initially tachycardic up to 112 bpm but this is improved. He had some hypotension but this has improved. The patient was intubated for airway protection and remains on the ventilator with an FiO2 of 35%. Initial lactic acid was 6.6, pro-calcitonin was 6.0, hemoglobin A1c was 5.4, Orion has been normal.  LFTs are within normal limits. White blood cell count was normal on arrival at 9.63, has now trended up to 14.14. platelet count has decreased to 109.sensitivity troponin was slightly elevated at 17. Urine drug screen was negative. 2 sets of admission blood cultures are growing GPC's in pairs and chains identified as Streptococcus pneumoniae by the BCID2 PCR. The patient underwent CTA head and neck that showed \"no " "evidence of intracranial hemorrhage or definite acute territorial ischemia.  There is abnormal intracranial gas present, appearing likely extra-axial in the lateral aspect of the right cerebellar hemisphere, with changes of the right mastoid present, concerning for mastoiditis and possibly subdural empyema or less likely parenchymal abscess.\" \"Dedicated CT of the temporal bones demonstrates findings of likely acute otomastoiditis on the right, with middle ear and right mastoid effusion, coalescent changes of the right mastoid and dehiscence of both the tegmen tympani and tegmen mastoideum. There is again concern for intracranial infectious involvement, empyema versus abscess with foci of intracranial gas present.\" chest x-ray was without any acute disease. MRI brain showed \"no evidence of right temporal lobe abscess.  Findings consistent with diffuse meningitis.  There is right otomastoiditis as seen on the CT of the temporal bones.  There are signal voids corresponding to gas bubbles within the CSF in the upper right posterior cranial fossa.\"  ENT has been consulted and Dr. Garcia evaluated the patient with right myringotomy procedure performed at bedside in the ICU. Wide myringotomy was made in the posterior aspect of the tympanic membrane.  A small amount of purulence was expressed and cultured.  After that CSF was draining through the myringotomy.  A tube was therefore not placed.  Neurosurgery has evaluated the patient and recommended that there is no role for neurosurgical intervention at this time. The patient has been started on empiric acyclovir, ampicillin, ceftriaxone 2 g IV every 12 hours, and IV vancomycin by the ICU team.  He has additionally been placed on dexamethasone.  ID has been consultated for antibiotic recommendations.    Subjective:    4/1/23: The patient remains critically ill on the ventilator.  No history is available from him.  He does have some fluid leaking from his right ear, presumably " CSF. No fevers.  Ventilator settings remain stable.  Leukocytosis is worse but likely steroid induced.    4/2/23:  He remains afebrile and on 35% FIO2.  WBC 12.29 this am.  Remains on Dexamethasone.     4/3/23: no history available from the patient.  He remains on the ventilator with an FiO2 of 35%.  No fevers.  Continued CSF leak from the right ear.    4/4/23: Continues to have some drainage from his right ear.  Remains critically ill on the ventilator with an FiO2 of 35%.  No fevers. Neurosurgery has reevaluated the patient and does not think that there is a role for CSF diversion or need for any neurosurgical intervention at this time.  May need mastoidectomy and packing of the mastoid air cells per neurosurgery.    Past Medical History:   Diagnosis Date   • Arthritis    • Atrial fibrillation    • Bradycardia    • Chronic hypertension     Probably essential   • Hypertension    • Hypertensive cardiovascular disease 07/2015    Recent progressive CCS class III-IV chest pain syndrome with normal EKG and exertional dyspnea and fatigue with normal codominant coronary arteries and preserved systolic  left ventricular function, July 2015.   • Intermittent palpitations 05/01/2014    Remote abnormal 24-hour Holter monitor demonstrating intermittent bradycardia with occasional PACs, May 2014.   • Lower extremity edema     Intermittent lower extremity edema felt secondary to antihypertensive therapy.    • Nephrolithiasis     Remote- 15 years ago   • MINO on CPAP    • MINO on CPAP     doesn't know setting    • Prostate troubles     enlarged   • Seasonal rhinitis     intermittent       Past Surgical History:   Procedure Laterality Date   • CARDIAC CATHETERIZATION      no stents   • CARDIAC ELECTROPHYSIOLOGY PROCEDURE N/A 10/24/2018    Procedure: Pacemaker DC new with Biotronik. Hold Eliquis one day prior.;  Surgeon: Harley Dacosta MD;  Location: [facility] INVASIVE LOCATION;  Service: Cardiology   • COLONOSCOPY     •  ENDOSCOPY     • INSERT / REPLACE / REMOVE PACEMAKER         Pediatric History   Patient Parents   • Not on file     Other Topics Concern   • Not on file   Social History Narrative    PT DRINKS 2 SERVINGS OF CAFFEINE PER DAY.    PT LIVES AT HOME WITH WIFE.       family history includes No Known Problems in his mother; Other in his father.    No Known Allergies    There is no immunization history for the selected administration types on file for this patient.    Medication:    Current Facility-Administered Medications:   •  aspirin chewable tablet 81 mg, 81 mg, Nasogastric, Daily, 81 mg at 04/04/23 0944 **OR** aspirin suppository 300 mg, 300 mg, Rectal, Daily, Tereza Bautista, FORREST  •  Calcium Replacement - Follow Nurse / BPA Driven Protocol, , Does not apply, PRN, Natalia Cruz APRN  •  cefTRIAXone (ROCEPHIN) 2 g/100 mL 0.9% NS IVPB (MBP), 2 g, Intravenous, Q12H, Que Alonso MD, 2 g at 04/04/23 1234  •  chlorhexidine (PERIDEX) 0.12 % solution 15 mL, 15 mL, Mouth/Throat, Q12H, Mundo Jason MD, 15 mL at 04/04/23 0944  •  ciprofloxacin-dexamethasone (CIPRODEX) 0.3-0.1 % otic suspension 4 drop, 4 drop, Right Ear, BID, Edwin Garcia MD, 4 drop at 04/04/23 0944  •  dextrose (D50W) (25 g/50 mL) IV injection 25 g, 25 g, Intravenous, Q15 Min PRN, Amadou Mercedes, FORREST  •  fentaNYL (SUBLIMAZE) bolus from bag 10 mcg/mL 50 mcg, 50 mcg, Intravenous, Q30 Min PRN, Natalia Cruz APRN, 50 mcg at 03/31/23 1911  •  fentaNYL 2500 mcg/250 mL NS infusion,  mcg/hr, Intravenous, Titrated, Natalia Cruz APRN, Last Rate: 5 mL/hr at 04/04/23 0643, 50 mcg/hr at 04/04/23 0643  •  flecainide (TAMBOCOR) tablet 50 mg, 50 mg, Nasogastric, Q12H, Marcelino Hastings DO, 50 mg at 04/04/23 0944  •  glucagon (GLUCAGEN) injection 1 mg, 1 mg, Intramuscular, Q15 Min PRN, Amadou Mercedes, APRN  •  heparin 06714 units/250 mL (100 units/mL) in 0.45 % NaCl infusion, 9 Units/kg/hr, Intravenous, Titrated,  Antwon De Leon, PharmD, Last Rate: 7.79 mL/hr at 04/03/23 1534, 9 Units/kg/hr at 04/03/23 1534  •  insulin regular (humuLIN R,novoLIN R) injection 0-7 Units, 0-7 Units, Subcutaneous, Q6H, Amadou Mercedes, APRN, 2 Units at 04/04/23 1744  •  levETIRAcetam in NaCl 0.82% (KEPPRA) IVPB 500 mg, 500 mg, Intravenous, Q12H, Meena Burks, APRN, 500 mg at 04/04/23 1745  •  losartan (COZAAR) tablet 50 mg, 50 mg, Nasogastric, Q24H, Marcelino Hastings DO, 50 mg at 04/04/23 0944  •  Magnesium Standard Dose Replacement - Follow Nurse / BPA Driven Protocol, , Does not apply, PRN, Natalia Cruz, APRN  •  ondansetron (ZOFRAN) injection 4 mg, 4 mg, Intravenous, Q6H PRN, Antonietta Phillips APRN  •  pantoprazole (PROTONIX) injection 40 mg, 40 mg, Intravenous, Q AM, Antonietta Phillips APRN, 40 mg at 04/04/23 0614  •  Pharmacy to Dose Heparin, , Does not apply, Continuous PRN, Marcelion Hastings, DO  •  Phosphorus Replacement - Follow Nurse / BPA Driven Protocol, , Does not apply, PRN, Natalia Cruz, APRN  •  Potassium Replacement - Follow Nurse / BPA Driven Protocol, , Does not apply, PRAnthony YOUNG Sara W, APRN  •  propofol (DIPRIVAN) infusion 10 mg/mL 100 mL, 5-50 mcg/kg/min, Intravenous, Titrated, Antonietta Phillips APRN, Last Rate: 10.62 mL/hr at 04/04/23 1746, 20 mcg/kg/min at 04/04/23 1746  •  ProSource No Carb oral solution 30 mL, 30 mL, Nasogastric, BID, Marcelino Hastings, , 30 mL at 04/04/23 0944  •  sodium chloride 0.9 % flush 10 mL, 10 mL, Intravenous, Q12H, Tereza Bautista APRN, 10 mL at 04/04/23 0945  •  sodium chloride 0.9 % flush 10 mL, 10 mL, Intravenous, PRN, Tereza Bautista APRN  •  sodium chloride 0.9 % infusion 40 mL, 40 mL, Intravenous, PRN, Tereza Bautista APRN    Please refer to the medical record for a full medication list    Review of Systems:  Unable to obtain a 12 point review of systems from the patient given  "his clinical state    Physical Exam:   Vital Signs   Temp:  [98.2 °F (36.8 °C)-99.5 °F (37.5 °C)] 99.5 °F (37.5 °C)  Heart Rate:  [49-82] 70  Resp:  [14-19] 17  BP: (134-191)/(60-89) 170/72  FiO2 (%):  [35 %-40 %] 40 %    Temp  Min: 98.2 °F (36.8 °C)  Max: 99.5 °F (37.5 °C)  BP  Min: 134/72  Max: 191/89  Pulse  Min: 49  Max: 82  Resp  Min: 14  Max: 19  SpO2  Min: 88 %  Max: 94 %    Blood pressure 170/72, pulse 70, temperature 99.5 °F (37.5 °C), temperature source Bladder, resp. rate 17, height 176.5 cm (69.49\"), weight 86.6 kg (190 lb 14.7 oz), SpO2 94 %.  GENERAL: critically ill-appearing on the ventilator.  HEENT:  Normocephalic, atraumatic.  ET tube in place.  No external oral lesions noted. NG tube in place. Ongoing drainage from his right ear  EYES: pupils equal, round, and react to light.  No conjunctival injection. anicteric  Chest: Left chest wall PPM pocket site without any erythema or drainage.  HEART: RRR, no murmur  LUNGS: clear to auscultation anteriorly.  On the ventilator with an FiO2 of 35%  ABDOMEN: Soft, nontender, nondistended. No appreciable HSM.    GENITAL: + Doran catheter  SKIN: no new generalized rashes noted  PSYCHIATRIC: unable to assess  EXT:  No cellulitic change. No peripheral edema noted  NEURO: sedated on the ventilator.     Right upper extremity PICC line- no erythema noted at the exit site    Results Review:   I reviewed the patient's new clinical results.    Results from last 7 days   Lab Units 04/04/23  0857 04/03/23  0648 04/02/23  1145   WBC 10*3/mm3 13.90* 13.12* 12.29*   HEMOGLOBIN g/dL 12.5* 11.3* 11.9*   HEMATOCRIT % 38.5 35.8* 36.9*   PLATELETS 10*3/mm3 152 122* 136*     Results from last 7 days   Lab Units 04/04/23  0459   SODIUM mmol/L 147*   POTASSIUM mmol/L 4.6   CHLORIDE mmol/L 113*   CO2 mmol/L 21.0*   BUN mg/dL 31*   CREATININE mg/dL 0.66*   GLUCOSE mg/dL 166*   CALCIUM mg/dL 7.9*     Results from last 7 days   Lab Units 04/04/23  0459   ALK PHOS U/L 81   BILIRUBIN " mg/dL 0.6   ALT (SGPT) U/L 30   AST (SGOT) U/L 21         Results from last 7 days   Lab Units 04/02/23  0838   CRP mg/dL 18.05*     Results from last 7 days   Lab Units 04/01/23  1351 03/31/23  1342   VANCOMYCIN TR mcg/mL 15.40 8.10     Results from last 7 days   Lab Units 04/04/23  0857   LACTATE mmol/L 2.0     Estimated Creatinine Clearance: 94.8 mL/min (A) (by C-G formula based on SCr of 0.66 mg/dL (L)).  CPK    Common Labsle 3/30/23   Creatine Kinase 57            Procalitonin Results:      Lab 04/04/23  0459 03/30/23  1121   PROCALCITONIN 3.13* 6.00*      Brief Urine Lab Results  (Last result in the past 365 days)      Color   Clarity   Blood   Leuk Est   Nitrite   Protein   CREAT   Urine HCG        03/30/23 1200 Yellow   Clear   Negative   Negative   Negative   Negative                No results found for: SITE, ALLENTEST, PHART, YYO0SRJ, PO2ART, DDN4GET, BASEEXCESS, X3OVLPMF, HGBBG, HCTABG, OXYHEMOGLOBI, METHHGBN, CARBOXYHGB, CO2CT, BAROMETRIC, MODALITY, FIO2     Microbiology:  Blood Culture   Date Value Ref Range Status   03/30/2023 Abnormal Stain (C)  Preliminary     BCID, PCR   Date Value Ref Range Status   03/30/2023 (A) Negative by BCID PCR. Culture to Follow. Final    Streptococcus pneumoniae. Identification by BCID2 PCR.         Blood Culture   Date Value Ref Range Status   03/30/2023 Abnormal Stain (C)  Preliminary     BCID, PCR   Date Value Ref Range Status   03/30/2023 (A) Negative by BCID PCR. Culture to Follow. Final    Streptococcus pneumoniae. Identification by BCID2 PCR.   (      Radiology:      IMPRESSION:     Problems:  1. Streptococcus pneumoniae septicemia-due to acute otitis  2. 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  No LP done yet but given the Streptococcus pneumonia septicemia and clinical presentation, we do not need an LP.  3. Acute otitis media and right-sided mastoiditis resulting in a subdural empyema with fistulous tract  between the middle ear space and the dura- Strep pneumoniae on culture  4. Acute toxic/metabolic encephalopathy-due to the above infection  5. Acute hypoxic respiratory failure, intubated for airway protection  6. leukocytosis with neutrophilia-was not present on arrival.  Could be due to infection but steroids likely contributing.  7. Hypokalemia improved   8. Hypomagnesemia, ongoing   9. tachycardia-bradycardia syndrome status post PPM- complicating factor in the setting of bacteremia as at some risk for seeding PPM leads with bacteria but streptococcus pneumoniae is an uncommon bacteria to do this. TTE was without any signs of vegetations or pacemaker lead infection  10. paroxysmal A. Fib on flecainide and Eliquis  11. coronary artery disease  12. obstructive sleep apnea on CPAP nightly at home  13. History of hypertension  14. Thrombocytopenia, 136    RECOMMENDATIONS:    -continue to follow blood cultures-repeat blood cultures from 4/1 are no growth  -May need mastoidectomy per neurosurgery  -continue ceftriaxone 2 g IV every 12 hours     He will likely need a 6 week course of IV antibiotic therapy.    I discussed in length with his power of  and his niece at bedside today    Discussed with nursing today    Complex MDM    Copied text in this note has been reviewed and is accurate as of 04/04/23            Que Alonso MD  4/4/2023

## 2023-04-04 NOTE — CONSULTS
Consults    Referring Provider: Eren GARCIA    Patient Care Team:  Derick Phillip MD as PCP - General (Family Medicine)  Nino Castillo MD as Consulting Physician (Cardiology)    Chief Complaint: Spinal fluid leaking from right ear    Subjective .     History of present illness:       Patient is a 82-year-old gentleman who is critically ill and intubated and sedated in the neurologic ICU.  Patient was noted to have a small empyema or question thereof based off the CT scan.  There was also some pneumocephalus noted.    Neurosurgery was consulted after the MRI was done this decreases our suspicion that there is a focal abscess in the brain that would need to be operated on.  Patient has a diffuse meningitis secondary to a mastoiditis.    Neurosurgery signed off after that patient had a myringotomy which produced pus and spinal fluid.  Since that time spinal fluid has been draining over the course of 3 or 4 days.    Patient has noticed new scans for us to review.    We are asked to come see the patient to see if CSF diversion would be an option.    Review of Systems  Unable to obtain secondary to sedation  History  Past Medical History:   Diagnosis Date    Arthritis     Atrial fibrillation     Bradycardia     Chronic hypertension     Probably essential    Hypertension     Hypertensive cardiovascular disease 07/2015    Recent progressive CCS class III-IV chest pain syndrome with normal EKG and exertional dyspnea and fatigue with normal codominant coronary arteries and preserved systolic  left ventricular function, July 2015.    Intermittent palpitations 05/01/2014    Remote abnormal 24-hour Holter monitor demonstrating intermittent bradycardia with occasional PACs, May 2014.    Lower extremity edema     Intermittent lower extremity edema felt secondary to antihypertensive therapy.     Nephrolithiasis     Remote- 15 years ago    MINO on CPAP     MINO on CPAP     doesn't know setting     Prostate troubles      enlarged    Seasonal rhinitis     intermittent   ,   Past Surgical History:   Procedure Laterality Date    CARDIAC CATHETERIZATION      no stents    CARDIAC ELECTROPHYSIOLOGY PROCEDURE N/A 10/24/2018    Procedure: Pacemaker DC new with Biotronik. Hold Eliquis one day prior.;  Surgeon: Harley Dacosta MD;  Location: [facility] INVASIVE LOCATION;  Service: Cardiology    COLONOSCOPY      ENDOSCOPY      INSERT / REPLACE / REMOVE PACEMAKER     ,   Family History   Problem Relation Age of Onset    No Known Problems Mother     Other Father         cardiac arrhythmia   ,   Social History     Socioeconomic History    Marital status:    Tobacco Use    Smoking status: Former     Packs/day: 1.00     Types: Cigarettes     Quit date:      Years since quittin.2    Smokeless tobacco: Never   Vaping Use    Vaping Use: Never used   Substance and Sexual Activity    Alcohol use: No    Drug use: No    Sexual activity: Defer     E-cigarette/Vaping    E-cigarette/Vaping Use Never User     Passive Exposure No     Counseling Given No      E-cigarette/Vaping Substances     E-cigarette/Vaping Devices       ,   Medications Prior to Admission   Medication Sig Dispense Refill Last Dose    apixaban (ELIQUIS) 5 MG tablet tablet Take 1 tablet by mouth Every 12 (Twelve) Hours. 180 tablet 3     Diclofenac Sodium (VOLTAREN) 1 % gel gel APPLY TOPICALLY TO THE AFFECTED AREA FOUR TIMES DAILY       flecainide (TAMBOCOR) 50 MG tablet Take 1 tablet by mouth 2 (Two) Times a Day. 180 tablet 3     isosorbide mononitrate (Imdur) 30 MG 24 hr tablet Take 1 tablet by mouth Daily. 90 tablet 3     magnesium oxide (MAG-OX) 400 MG tablet Take 1 tablet by mouth Daily. 90 tablet 3     metoprolol tartrate (LOPRESSOR) 25 MG tablet Take 1 tablet by mouth Every 12 (Twelve) Hours. 180 tablet 3     nitroglycerin (NITROSTAT) 0.4 MG SL tablet 1 under the tongue as needed for angina, may repeat q5mins for up three doses 50 tablet 5     potassium chloride 10 MEQ  CR tablet Take 1 tablet by mouth Daily. NEED APPOINTMENT WITH DR. CAMPBELL FOR FURTHER REFILLS 90 tablet 3     tadalafil (CIALIS) 5 MG tablet Take 5 mg by mouth As Needed for Erectile Dysfunction.  1     telmisartan-hydrochlorothiazide (MICARDIS HCT) 80-25 MG per tablet Take 1 tablet by mouth Daily. Schedule appointment for further refills 90 tablet 3     traMADol (ULTRAM) 50 MG tablet Take 1 tablet by mouth Every 6 (Six) Hours As Needed for Moderate Pain . 12 tablet 0    , Scheduled Meds:  aspirin, 81 mg, Nasogastric, Daily   Or  aspirin, 300 mg, Rectal, Daily  cefTRIAXone, 2 g, Intravenous, Q12H  chlorhexidine, 15 mL, Mouth/Throat, Q12H  ciprofloxacin-dexamethasone, 4 drop, Right Ear, BID  flecainide, 50 mg, Nasogastric, Q12H  insulin regular, 0-7 Units, Subcutaneous, Q6H  levETIRAcetam, 500 mg, Intravenous, Q12H  losartan, 50 mg, Nasogastric, Q24H  pantoprazole, 40 mg, Intravenous, Q AM  ProSource No Carb, 30 mL, Nasogastric, BID  sodium chloride, 10 mL, Intravenous, Q12H    , Continuous Infusions:  fentanyl 10 mcg/mL,  mcg/hr, Last Rate: 50 mcg/hr (04/04/23 0643)  heparin, 9 Units/kg/hr, Last Rate: 9 Units/kg/hr (04/03/23 1534)  Pharmacy to Dose Heparin,   propofol, 5-50 mcg/kg/min, Last Rate: 20 mcg/kg/min (04/04/23 0641)    , PRN Meds:    Calcium Replacement - Follow Nurse / BPA Driven Protocol    dextrose    fentaNYL    glucagon (human recombinant)    Magnesium Standard Dose Replacement - Follow Nurse / BPA Driven Protocol    ondansetron    Pharmacy to Dose Heparin    Phosphorus Replacement - Follow Nurse / BPA Driven Protocol    Potassium Replacement - Follow Nurse / BPA Driven Protocol    sodium chloride    sodium chloride and Allergies:  Patient has no known allergies.   SMOKING STATUS: Non-smoker  Objective     Vital Signs   Temp:  [98.2 °F (36.8 °C)-99 °F (37.2 °C)] 98.6 °F (37 °C)  Heart Rate:  [49-71] 70  Resp:  [14-16] 15  BP: (134-181)/(60-87) 135/62  FiO2 (%):  [35 %] 35 %  Body mass index is  27.8 kg/m².    Physical Exam:     Body mass index is 27.8 kg/m².    Exam limited secondary to patient's sedation.  Patient has positive gag reflex    Patient will withdraw from pain in all 4 extremities  Pupils equal and reactive but somewhat sluggish.    Results Review:   I reviewed the patient's new imaging results and agree with the interpretation.  Discussed with Dr. Yen.  Again no new CT scans reviewed.  Old images reviewed and confirm cyst position of mastoiditis and some pneumocephalus with encephalitis.     Assessment & Plan       Encephalopathy, metabolic    MINO on CPAP    Hypertension    Paroxysmal atrial fibrillation on Flecainide and Eliquis    Subdural empyema    Meningitis    Questionable Status epilepticus (HCC)    Sepsis secondary to strep bacteremia and meningitis with subdural empyema    Streptococcal bacteremia    There is presently no role for CSF diversion.  There is no intracranial abscess noted that should require a craniotomy.  That being said Dr. Yen and I discussed the primary treatment should most likely be a mastoidectomy and packing off of the mastoid air cells.    I will discuss my recommendations with Dr. Jason directly.    Please do not hesitate to call with any other questions or concerns.     I discussed the patients findings and my recommendations with patient and consulting provider    Jean Claude Briggs PA-C  04/04/23  09:45 EDT    Time:  60 minutes

## 2023-04-04 NOTE — PROGRESS NOTES
Neurology       Patient Care Team:  Derick Phillip MD as PCP - General (Family Medicine)  Nino Castillo MD as Consulting Physician (Cardiology)    Chief complaint:      History: Unresponsive on the vent.    Reviewed Dr. Yen's note and my complete agreement.      Past Medical History:   Diagnosis Date   • Arthritis    • Atrial fibrillation    • Bradycardia    • Chronic hypertension     Probably essential   • Hypertension    • Hypertensive cardiovascular disease 07/2015    Recent progressive CCS class III-IV chest pain syndrome with normal EKG and exertional dyspnea and fatigue with normal codominant coronary arteries and preserved systolic  left ventricular function, July 2015.   • Intermittent palpitations 05/01/2014    Remote abnormal 24-hour Holter monitor demonstrating intermittent bradycardia with occasional PACs, May 2014.   • Lower extremity edema     Intermittent lower extremity edema felt secondary to antihypertensive therapy.    • Nephrolithiasis     Remote- 15 years ago   • MINO on CPAP    • MINO on CPAP     doesn't know setting    • Prostate troubles     enlarged   • Seasonal rhinitis     intermittent       Vital Signs   Vitals:    04/04/23 0944 04/04/23 1000 04/04/23 1050 04/04/23 1100   BP:    152/75   BP Location:    Left arm   Patient Position:    Lying   Pulse: 70 70 70 70   Resp: 15  19    Temp:       TempSrc:       SpO2: 93% 93% 92% 93%   Weight:       Height:           Physical Exam:   General: Critically ill white male              Neuro: Unresponsive on sedation    Results Review:  Strep pneumonia and blood culture  Results from last 7 days   Lab Units 04/04/23  0857   WBC 10*3/mm3 13.90*   HEMOGLOBIN g/dL 12.5*   HEMATOCRIT % 38.5   PLATELETS 10*3/mm3 152     Results from last 7 days   Lab Units 04/04/23  0459 04/03/23  0648 04/02/23  0838 04/01/23  0442 03/31/23  0451   SODIUM mmol/L 147* 143 143  143   < > 138   POTASSIUM mmol/L 4.6 3.7 3.8  3.8   < > 3.9   CHLORIDE mmol/L  113* 111* 110*  110*   < > 106   CO2 mmol/L 21.0* 23.0 20.0*  23.0   < > 22.0   BUN mg/dL 31* 31* 27*  25*   < > 19   CREATININE mg/dL 0.66* 0.69* 0.60*  0.57*   < > 0.83   CALCIUM mg/dL 7.9* 8.0* 8.5*  8.3*   < > 8.2*   BILIRUBIN mg/dL 0.6  --  0.5  0.6  --  2.7*   ALK PHOS U/L 81  --  65  69  --  75   ALT (SGPT) U/L 30  --  15  13  --  19   AST (SGOT) U/L 21  --  21  18  --  28   GLUCOSE mg/dL 166* 168* 179*  174*   < > 140*    < > = values in this interval not displayed.       Imaging Results (Last 24 Hours)     ** No results found for the last 24 hours. **          Assessment:  Streptococcal sepsis with streptococcal meningitis.    Mastoiditis.    Ear drainage is more likely from his mastoid and from a CSF leak.  In any case the treatment of choice would be mastoidectomy and packing.    Plan:  The ideal treatment    Comment:  Extremely guarded prognosis         I discussed the patients findings and my recommendations with primary care team    Chaz Kanh MD  04/04/23  11:52 EDT

## 2023-04-04 NOTE — PROGRESS NOTES
"INTENSIVIST   PROGRESS NOTE     Hospital:  LOS: 5 days     Chief Complaint: Altered mentation    Subjective   S     Interval History: No acute issue overnight.  Patient afebrile and overall hemodynamically stable.  Intubated/sedated.    The patient's relevant past medical, surgical and social history were reviewed and updated in Epic as appropriate.      ROS: Unable to obtain as patient intubated and sedated    Objective   O     Intake/Ouptut 24 hrs (7:00AM - 6:59 AM)  Intake & Output (last 3 days)       04/01 0701 04/02 0700 04/02 0701 04/03 0700 04/03 0701 04/04 0700 04/04 0701 04/05 0700    P.O. 0 0      I.V. (mL/kg) 738.9 (8.5) 743.1 (8.6)      Other 150 330      NG/ 1784      IV Piggyback 532.6 650      Total Intake(mL/kg) 1855.5 (21.4) 3507.1 (40.5)      Urine (mL/kg/hr) 1427 (0.7) 1055 (0.5) 1365 (0.7)     Stool        Total Output 1427 1055 1365     Net +428.5 +2452.1 -1365                 Medications (drips):  fentanyl 10 mcg/mL, Last Rate: 50 mcg/hr (04/04/23 0643)  heparin, Last Rate: 9 Units/kg/hr (04/03/23 1534)  niCARdipine, Last Rate: Stopped (03/30/23 1821)  Pharmacy to Dose Heparin  propofol, Last Rate: 20 mcg/kg/min (04/04/23 0641)    Respiratory Support: Mechanical ventilation     Physical Examination:  Vital Signs: Blood pressure 135/62, pulse 69, temperature 98.6 °F (37 °C), temperature source Bladder, resp. rate 16, height 176.5 cm (69.49\"), weight 86.6 kg (190 lb 14.7 oz), SpO2 94 %.    General: Critically ill-appearing.  On mechanical ventilation.  ENT/Neck: Trachea midline, No masses.  ETT in place.  Lymphadenopathy: No palpable anterior cervical, submandibular, submental, or posterior cervical lymphadenopathy.   Chest: Clear to auscultation bilaterally, No wheezing, rhonchi, or rales. Normal work of breathing. Equal chest rise.  Appropriate oxygen saturations on MV.  Cardiac: Regular rhythm, normal rate, S1S2 auscultated. No murmurs, rubs or gallops.   Extremities: No lower " extremity edema. No clubbing or cyanosis.  Neuro: Intubated on minimal sedation.  Withdraws to noxious stimuli but does not follow commands.     Lines, Drains & Airways     Active LDAs     Name Placement date Placement time Site Days    PICC Triple Lumen 03/31/23 Right Basilic 03/31/23  1451  Basilic  2    NG/OG Tube 16 Fr Right nostril 04/01/23  0700  Right nostril  2    Urethral Catheter Double-lumen;Temperature probe 16 Fr. 03/30/23  1503  -- 3    Hi-Lo Evac ETT 7.5 03/30/23  1437  Oral  3              Results from last 7 days   Lab Units 04/03/23  0648 04/02/23  1145 04/01/23  0442   WBC 10*3/mm3 13.12* 12.29* 15.04*   HEMOGLOBIN g/dL 11.3* 11.9* 11.7*   MCV fL 98.4* 97.4* 96.2   PLATELETS 10*3/mm3 122* 136* 119*     Results from last 7 days   Lab Units 04/04/23  0459 04/03/23  1759 04/03/23  0648 04/02/23  1145 04/02/23  0838 04/02/23  0651 04/01/23  1351 04/01/23  0442   SODIUM mmol/L  --   --  143  --  143  143  --   --  139   POTASSIUM mmol/L  --   --  3.7  --  3.8  3.8  --   --  4.1   CO2 mmol/L  --   --  23.0  --  20.0*  23.0  --   --  23.0   CREATININE mg/dL  --   --  0.69*  --  0.60*  0.57*  --   --  0.71*   GLUCOSE mg/dL  --   --  168*  --  179*  174*  --   --  112*   MAGNESIUM mg/dL  --   --  2.7*  --  2.9* 3.0*  --  2.7*   PHOSPHORUS mg/dL 2.6 1.9* 1.8*   < > 2.0*  1.8* 2.3*   < > 2.2*    < > = values in this interval not displayed.     Estimated Creatinine Clearance: 90.7 mL/min (A) (by C-G formula based on SCr of 0.69 mg/dL (L)).  Results from last 7 days   Lab Units 04/02/23  0838 03/31/23  0451 03/30/23  1121   ALK PHOS U/L 65  69 75 108   BILIRUBIN mg/dL 0.5  0.6 2.7* 3.7*   ALT (SGPT) U/L 15  13 19 26   AST (SGOT) U/L 21  18 28 34     Results from last 7 days   Lab Units 03/30/23  1544   PH, ARTERIAL pH units 7.463*   PCO2, ARTERIAL mm Hg 30.1*   PO2 ART mm Hg 73.8*   FIO2 % 45     Results: Reviewed.    I reviewed the patient's new laboratory and imaging results.  I independently  reviewed the patient's new images.    Medications: Reviewed.    Assessment & Plan   A / P     Active Hospital Problems    Diagnosis    • **Encephalopathy, metabolic    • Subdural empyema    • Meningitis    • Sepsis secondary to strep bacteremia and meningitis with subdural empyema    • Streptococcal bacteremia    • Questionable Status epilepticus (HCC)    • Paroxysmal atrial fibrillation on Flecainide and Eliquis    • MINO on CPAP    • Hypertension      Patient Ivory is an 82 y.o. male with PMH PAF on Flecainide and Eliquis, HTN, CAD, Tachy-Samuel syndrome s/p PPM and MINO on CPAP who presented to the ED with AMS, inability to speak and left sided weakness.  Found to have possible subdural empyema and meningitis related to mastoiditis/otitis media.  Patient underwent Myringotomy on the right by ENT on 3/30/2023.  Cultures from CSF fluid from right ear 3/30/2023 growing Streptococcus, blood cultures from 3/30/2023 growing strep pneumonia.  Patient was eval by neurosurgery with no neurosurgical intervention at this time.  He currently remains on mechanical ventilation.    #Encephalopathy  #Sepsis w/ multi organ dysfunction  #Bacteremia  #Acute mastoiditis   #Sudural empyema  #Meningitis  -Encephalopathy likely metabolic and due to underlying sepsis. Initial radiographic evidence of subdural empyema vs [less likely] abscess. Held off on LP on admission given that patient is chronically anticoagulated; last dose of Eliquis likely administered on 3/29/2023 AM.  At presentation covered broadly with antimicrobials for possible meningitis (i.e. vancomycin, ceftriaxone, ampicillin and acyclovir). Blood cultures later possible for strep pneumonia; infectious disease consulted and de-escalating antimicrobial therapy as able.  Suspect streptococcal meningitis as well given clinical picture and aforementioned blood cultures.  He is also receiving dexamethasone x4 days.  Both neurology and neurosurgery following.  Neurosurgery  evaluated again (4/04) given continued leak (presumably CSF) from right ear.  No neurosurgical procedure needed at this time, including lumbar drain for CSF diversion  -Radiographic imaging consistent with acute mastoiditis. ENT closely following s/p right myringotomy; fluid cultured but no tube placed.  Spoke with ENT (4/04) who continues to follow patient and notes that mastoidectomy may need to be performed pending clinical improvement-- now would not be the appropriate time to do so given associated inflammation  -EEG performed in the ED on initial presentation; While there is diffuse cerebral dysfunction there was no evidence of epilepsy      #Acute hypoxic respiratory failure requiring intubation/mechanical ventilation  #MINO  -Currently intubated/sedated. Will adjust mechanical ventilation settings as able.  Etiology of respiratory failure likely from encephalopathy/inability to protect airway  -We will continue mechanical ventilation and wean if possible.  SBT daily if able and appropriate  -Minimize sedation is much as possible for comfort on mechanical ventilation and appropriate neurological exams    #Paroxysmal atrial fibrillation  -Patient has been started on home flecainide but beta-blocker held for bradycardia  -Given questionable need for LP/procedures and surgical intervention(s) earlier in admission transitioned Eliquis to heparin drip.  We will continue heparin drip at this time which is more appropriate in the ICU setting     F-Tube feeds per dietary recs  A-Fentanyl  S-Propofol  T-Heparin gtt   H-Head of bed greater than 30 degrees  U-PPI  G-FSBS per unit protocol, correction dose insulin  S-SBT  B-Will monitor daily and provide regimen if indicated  I-PIV  D-Ceftriaxone    Advance Directives:   Code Status and Medical Interventions:   Ordered at: 03/30/23 1300     Code Status (Patient has no pulse and is not breathing):    CPR (Attempt to Resuscitate)     Medical Interventions (Patient has pulse  or is breathing):    Full Support     High level of risk due to severe exacerbation of chronic illness and illness with threat to life or bodily function.    I conducted multidisciplinary rounds in the plan of care was discussed with the multidisciplinary team at that time. In attendance at multidisciplinary rounds was clinical pharmacist, dietitian, nursing staff and case management.    I discussed the patient's findings and my recommendations with patient and nursing staff    -- Alvaro Jason MD  Pulmonary/Critical Care    Critical Care time spent in direct patient care: 35 minutes (excluding procedure time, if applicable) including high complexity decision making to assess, manipulate, and support vital organ system failure in this individual who has impairment of one or more vital organ systems such that there is a high probability of imminent or life threatening deterioration in the patient’s condition.

## 2023-04-05 NOTE — CASE MANAGEMENT/SOCIAL WORK
Continued Stay Note  [facility]     Patient Name: Kai Dodson  MRN: [id number]  Today's Date: 4/5/2023    Admit Date: 3/30/2023    Plan: Ongoing   Discharge Plan     Row Name 04/05/23 1455       Plan    Plan Ongoing      Plan Comments Mr. Dodson remains intubated and sedated on the ventilator. Case management will continue to follow his progress.               Discharge Codes    No documentation.               Expected Discharge Date and Time     Expected Discharge Date Expected Discharge Time    Apr 12, 2023             Keily Rodriguez RN

## 2023-04-05 NOTE — PROGRESS NOTES
[facility]     Progress Note    Patient Name: Kai Dodson  : 1940  MRN: [id number]  Primary Care Physician:  Derick Phillip MD  Date of admission: 3/30/2023    Subjective   Subjective       HPI:  Patient Reports intubated      Objective   Objective     Vitals:   Temp:  [99.1 °F (37.3 °C)-100.2 °F (37.9 °C)] 99.9 °F (37.7 °C)  Heart Rate:  [52-70] 70  Resp:  [14-20] 16  BP: (141-191)/(62-89) 144/62  FiO2 (%):  [35 %-50 %] 50 %  Physical Exam     Right otorrhea     Result Review        Assessment & Plan   Assessment / Plan     Brief Patient Summary:  Kai Dodson is a 82 y.o. male who has meningitis due to aom. Has right csf otorrhea due to tegmen defect. Recommend observing the otorrhea for resolution from potential scarring as the meningitis resolves. Surgical intervention to attempt to repair the leak on the middle ear and mastoid in an acutely inflammed state would likely not be successful. Would defer the procedure unless it does not heal spontaneously. If surgical intervention is needed would want neurosurgery to place a lumbar drain at that time to divert csf from a repair site.           DVT prophylaxis:  Medical and mechanical DVT prophylaxis orders are present.    CODE STATUS:   Code Status (Patient has no pulse and is not breathing): CPR (Attempt to Resuscitate)  Medical Interventions (Patient has pulse or is breathing): Full Support        Edwin Garcia MD

## 2023-04-05 NOTE — PROGRESS NOTES
"INTENSIVIST   PROGRESS NOTE     Hospital:  LOS: 6 days     Chief Complaint: Altered mentation    Subjective   S     Interval History: No major issues overnight.  Continue to wean sedation this morning.  Afebrile and hemodynamically stable.  Drainage from right ear decreasing per bedside nursing.    The patient's relevant past medical, surgical and social history were reviewed and updated in Epic as appropriate.      ROS: Unable to obtain as patient intubated and sedated    Objective   O     Intake/Ouptut 24 hrs (7:00AM - 6:59 AM)  Intake & Output (last 3 days)       04/02 0701 04/03 0700 04/03 0701 04/04 0700 04/04 0701 04/05 0700 04/05 0701 04/06 0700    P.O. 0       I.V. (mL/kg) 743.1 (8.6)  1122 (13) 78.2 (0.9)    Other 330  370 60    NG/GT 1784  1288 273    IV Piggyback 650  200     Total Intake(mL/kg) 3507.1 (40.5)  2980 (34.4) 411.2 (4.7)    Urine (mL/kg/hr) 1055 (0.5) 1365 (0.7) 1280 (0.6) 250 (0.7)    Total Output 1055 1365 1280 250    Net +2452.1 -1365 +1700 +161.2                Medications (drips):  fentanyl 10 mcg/mL, Last Rate: 25 mcg/hr (04/05/23 1045)  heparin, Last Rate: 10 Units/kg/hr (04/05/23 0737)  Pharmacy to Dose Heparin  propofol, Last Rate: 10 mcg/kg/min (04/05/23 1045)    Respiratory Support: Mechanical ventilation     Physical Examination:  Vital Signs: Blood pressure 176/76, pulse 70, temperature 100 °F (37.8 °C), temperature source Bladder, resp. rate 20, height 176.5 cm (69.49\"), weight 86.6 kg (190 lb 14.7 oz), SpO2 96 %.    General: Critically ill-appearing.  On mechanical ventilation.  ENT/Neck: Trachea midline, No masses.  ETT in place.  Chest: Clear to auscultation bilaterally, No wheezing, rhonchi, or rales. Normal work of breathing. Equal chest rise.  Appropriate oxygen saturations on MV.  Cardiac: Regular rhythm, normal rate, S1S2 auscultated. No murmurs, rubs or gallops.   Extremities: No lower extremity edema. No clubbing or cyanosis.  Neuro: Intubated on minimal sedation.  " Withdraws to noxious stimuli. Not following commands.     Lines, Drains & Airways     Active LDAs     Name Placement date Placement time Site Days    PICC Triple Lumen 03/31/23 Right Basilic 03/31/23  1451  Basilic  2    NG/OG Tube 16 Fr Right nostril 04/01/23  0700  Right nostril  2    Urethral Catheter Double-lumen;Temperature probe 16 Fr. 03/30/23  1503  -- 3    Hi-Lo Evac ETT 7.5 03/30/23  1437  Oral  3              Results from last 7 days   Lab Units 04/04/23  0857 04/03/23  0648 04/02/23  1145   WBC 10*3/mm3 13.90* 13.12* 12.29*   HEMOGLOBIN g/dL 12.5* 11.3* 11.9*   MCV fL 95.8 98.4* 97.4*   PLATELETS 10*3/mm3 152 122* 136*     Results from last 7 days   Lab Units 04/04/23  0459 04/03/23  1759 04/03/23  0648 04/02/23  1145 04/02/23  0838 04/02/23  0651   SODIUM mmol/L 147*  --  143  --  143  143  --    POTASSIUM mmol/L 4.6  --  3.7  --  3.8  3.8  --    CO2 mmol/L 21.0*  --  23.0  --  20.0*  23.0  --    CREATININE mg/dL 0.66*  --  0.69*  --  0.60*  0.57*  --    GLUCOSE mg/dL 166*  --  168*  --  179*  174*  --    MAGNESIUM mg/dL  --   --  2.7*  --  2.9* 3.0*   PHOSPHORUS mg/dL 2.6 1.9* 1.8*   < > 2.0*  1.8* 2.3*    < > = values in this interval not displayed.     Estimated Creatinine Clearance: 94.8 mL/min (A) (by C-G formula based on SCr of 0.66 mg/dL (L)).  Results from last 7 days   Lab Units 04/04/23  0459 04/02/23  0838 03/31/23  0451   ALK PHOS U/L 81 65  69 75   BILIRUBIN mg/dL 0.6 0.5  0.6 2.7*   ALT (SGPT) U/L 30 15  13 19   AST (SGOT) U/L 21 21  18 28     Results from last 7 days   Lab Units 03/30/23  1544   PH, ARTERIAL pH units 7.463*   PCO2, ARTERIAL mm Hg 30.1*   PO2 ART mm Hg 73.8*   FIO2 % 45     Results: Reviewed.    I reviewed the patient's new laboratory and imaging results.  I independently reviewed the patient's new images.    Medications: Reviewed.    Assessment & Plan   A / P     Active Hospital Problems    Diagnosis    • **Encephalopathy, metabolic    • Subdural empyema    •  Meningitis    • Sepsis secondary to strep bacteremia and meningitis with subdural empyema    • Streptococcal bacteremia    • Questionable Status epilepticus (HCC)    • Paroxysmal atrial fibrillation on Flecainide and Eliquis    • MINO on CPAP    • Hypertension      Patient Ivory is an 82 y.o. male with PMH PAF on Flecainide and Eliquis, HTN, CAD, Tachy-Samuel syndrome s/p PPM and MINO on CPAP who presented to the ED with AMS, inability to speak and left sided weakness.  Found to have possible subdural empyema and meningitis related to mastoiditis/otitis media.  Patient underwent Myringotomy on the right by ENT on 3/30/2023.  Cultures from CSF fluid from right ear 3/30/2023 growing Streptococcus, blood cultures from 3/30/2023 growing strep pneumonia.  Patient was eval by neurosurgery with no neurosurgical intervention at this time.  He currently remains on mechanical ventilation.    #Encephalopathy  #Sepsis w/ multi organ dysfunction  #Bacteremia  #Acute mastoiditis   #Sudural empyema  #Meningitis  -Encephalopathy likely metabolic and due to underlying sepsis. Initial radiographic evidence of subdural empyema vs [less likely] abscess. Held off on LP on admission given that patient is chronically anticoagulated; last dose of Eliquis likely administered on 3/29/2023 AM.  At presentation covered broadly with antimicrobials for possible meningitis (i.e. vancomycin, ceftriaxone, ampicillin and acyclovir). Blood cultures later possible for strep pneumonia; infectious disease consulted and de-escalating antimicrobial therapy as able.  Suspect streptococcal meningitis as well given clinical picture and aforementioned blood cultures.  He is also receiving dexamethasone x4 days.  Both neurology and neurosurgery following.  Neurosurgery evaluated again (4/04) given continued leak (presumably CSF) from right ear (which is improving).  Sent fluid for beta-2 transferrin today.  Also reevaluated imaging and noted that there was no  CNS abscess visualized. No neurosurgical procedure needed at this time, including lumbar drain for CSF diversion  -Radiographic imaging consistent with acute mastoiditis (which is likely primary issue and etiology of infection). ENT closely following s/p right myringotomy; fluid cultured [NGTD] but no tube placed.  Spoke with ENT (4/04) who continues to follow patient and notes that mastoidectomy may need to be performed pending clinical improvement-- but too critically ill at this time  -EEG performed in the ED on initial presentation; While there is diffuse cerebral dysfunction there was no evidence of epilepsy      #Acute hypoxic respiratory failure requiring intubation/mechanical ventilation  #MINO  -Currently intubated/sedated. Will adjust mechanical ventilation settings as able.  Etiology of respiratory failure likely from encephalopathy/inability to protect airway  -We will continue mechanical ventilation and wean if possible.  SBT daily if able and appropriate  -Minimize sedation is much as possible for comfort on mechanical ventilation and appropriate neurological exams    #Paroxysmal atrial fibrillation  -Patient has been started on home flecainide but beta-blocker held for bradycardia  -Given questionable need for LP/procedures and surgical intervention(s) earlier in admission transitioned Eliquis to heparin drip.  We will continue heparin drip at this time which is more appropriate in the ICU setting     F-Tube feeds per dietary recs  A-Fentanyl  S-Propofol  T-Heparin gtt   H-Head of bed greater than 30 degrees  U-PPI  G-FSBS per unit protocol, correction dose insulin  S-SBT  B-Will monitor daily and provide regimen if indicated  I-PIV  D-Ceftriaxone    Advance Directives:   Code Status and Medical Interventions:   Ordered at: 03/30/23 1300     Code Status (Patient has no pulse and is not breathing):    CPR (Attempt to Resuscitate)     Medical Interventions (Patient has pulse or is breathing):    Full  Support     High level of risk due to severe exacerbation of chronic illness and illness with threat to life or bodily function.    I conducted multidisciplinary rounds in the plan of care was discussed with the multidisciplinary team at that time. In attendance at multidisciplinary rounds was clinical pharmacist, dietitian, nursing staff and case management.    I discussed the patient's findings and my recommendations with patient and nursing staff    -- Alvaro Jason MD  Pulmonary/Critical Care    Critical Care time spent in direct patient care: 35 minutes (excluding procedure time, if applicable) including high complexity decision making to assess, manipulate, and support vital organ system failure in this individual who has impairment of one or more vital organ systems such that there is a high probability of imminent or life threatening deterioration in the patient’s condition.

## 2023-04-05 NOTE — PLAN OF CARE
Problem: Fall Injury Risk  Goal: Absence of Fall and Fall-Related Injury  Intervention: Identify and Manage Contributors  Intervention: Promote Injury-Free Environment     Problem: Skin Injury Risk Increased  Goal: Skin Health and Integrity  Intervention: Promote and Optimize Oral Intake  Intervention: Optimize Skin Protection     Problem: Restraint, Nonviolent  Goal: Absence of Harm or Injury  Intervention: Implement Least Restrictive Safety Strategies  Intervention: Protect Dignity, Rights, and Personal Wellbeing  Intervention: Protect Skin and Joint Integrity     Problem: Fall Injury Risk  Goal: Absence of Fall and Fall-Related Injury  Intervention: Promote Injury-Free Environment     Problem: Skin Injury Risk Increased  Goal: Skin Health and Integrity  Intervention: Promote and Optimize Oral Intake  Intervention: Optimize Skin Protection     Problem: Skin Injury Risk Increased  Goal: Skin Health and Integrity  Intervention: Promote and Optimize Oral Intake     Problem: Skin Injury Risk Increased  Goal: Skin Health and Integrity  Intervention: Optimize Skin Protection     Problem: Restraint, Nonviolent  Goal: Absence of Harm or Injury  Intervention: Implement Least Restrictive Safety Strategies  Intervention: Protect Dignity, Rights, and Personal Wellbeing  Intervention: Protect Skin and Joint Integrity     Problem: Restraint, Nonviolent  Goal: Absence of Harm or Injury  Intervention: Implement Least Restrictive Safety Strategies     Problem: Restraint, Nonviolent  Goal: Absence of Harm or Injury  Intervention: Protect Dignity, Rights, and Personal Wellbeing     Problem: Restraint, Nonviolent  Goal: Absence of Harm or Injury  Intervention: Protect Skin and Joint Integrity   Goal Outcome Evaluation:

## 2023-04-05 NOTE — PROGRESS NOTES
Neurology       Patient Care Team:  Derick Phillip MD as PCP - General (Family Medicine)  Nino Castillo MD as Consulting Physician (Cardiology)    Chief complaint: Meningitis/sepsis    History: Nurse is wean down his sedation to the point that he would his would   with both hands and withdrawal both legs to stimulation.  Currently on high sedation and unresponsive  Past Medical History:   Diagnosis Date   • Arthritis    • Atrial fibrillation    • Bradycardia    • Chronic hypertension     Probably essential   • Hypertension    • Hypertensive cardiovascular disease 07/2015    Recent progressive CCS class III-IV chest pain syndrome with normal EKG and exertional dyspnea and fatigue with normal codominant coronary arteries and preserved systolic  left ventricular function, July 2015.   • Intermittent palpitations 05/01/2014    Remote abnormal 24-hour Holter monitor demonstrating intermittent bradycardia with occasional PACs, May 2014.   • Lower extremity edema     Intermittent lower extremity edema felt secondary to antihypertensive therapy.    • Nephrolithiasis     Remote- 15 years ago   • MINO on CPAP    • MINO on CPAP     doesn't know setting    • Prostate troubles     enlarged   • Seasonal rhinitis     intermittent       Vital Signs   Vitals:    04/05/23 0945 04/05/23 1000 04/05/23 1015 04/05/23 1030   BP:  176/76     BP Location:  Left arm     Patient Position:  Lying     Pulse: 70 70 70 70   Resp:  20     Temp:       TempSrc:       SpO2: 96% 97% 97% 96%   Weight:       Height:           Physical Exam:   General: Sedated on vent              Neuro: Unresponsive  Results Review:  Reviewed  Results from last 7 days   Lab Units 04/04/23  0857   WBC 10*3/mm3 13.90*   HEMOGLOBIN g/dL 12.5*   HEMATOCRIT % 38.5   PLATELETS 10*3/mm3 152     Results from last 7 days   Lab Units 04/04/23  0459 04/03/23  0648 04/02/23  0838 04/01/23  0442 03/31/23  0451   SODIUM mmol/L 147* 143 143  143   < > 138   POTASSIUM  mmol/L 4.6 3.7 3.8  3.8   < > 3.9   CHLORIDE mmol/L 113* 111* 110*  110*   < > 106   CO2 mmol/L 21.0* 23.0 20.0*  23.0   < > 22.0   BUN mg/dL 31* 31* 27*  25*   < > 19   CREATININE mg/dL 0.66* 0.69* 0.60*  0.57*   < > 0.83   CALCIUM mg/dL 7.9* 8.0* 8.5*  8.3*   < > 8.2*   BILIRUBIN mg/dL 0.6  --  0.5  0.6  --  2.7*   ALK PHOS U/L 81  --  65  69  --  75   ALT (SGPT) U/L 30  --  15  13  --  19   AST (SGOT) U/L 21  --  21  18  --  28   GLUCOSE mg/dL 166* 168* 179*  174*   < > 140*    < > = values in this interval not displayed.       Imaging Results (Last 24 Hours)     ** No results found for the last 24 hours. **          Assessment:  Meningitis secondary to mastoiditis with secondary sepsis    Plan:  Full support    Comment:  Guarded prognosis in view of the patient's age         I discussed the patients findings and my recommendations with nursing staff and primary care team    Chaz Kahn MD  04/05/23  11:19 EDT

## 2023-04-06 NOTE — PROGRESS NOTES
Neurology       Patient Care Team:  Derick Phillip MD as PCP - General (Family Medicine)  Nino Castillo MD as Consulting Physician (Cardiology)    Chief complaint: Meningitis/sepsis    History: Patient sedated on the vent at the moment.    Dr. Armstrong has seen the patient and is recommending conservative approach.    Surgical options in the patient's current state seem to fall on the negative side of the risk-benefit panel balance.      Past Medical History:   Diagnosis Date   • Arthritis    • Atrial fibrillation    • Bradycardia    • Chronic hypertension     Probably essential   • Hypertension    • Hypertensive cardiovascular disease 07/2015    Recent progressive CCS class III-IV chest pain syndrome with normal EKG and exertional dyspnea and fatigue with normal codominant coronary arteries and preserved systolic  left ventricular function, July 2015.   • Intermittent palpitations 05/01/2014    Remote abnormal 24-hour Holter monitor demonstrating intermittent bradycardia with occasional PACs, May 2014.   • Lower extremity edema     Intermittent lower extremity edema felt secondary to antihypertensive therapy.    • Nephrolithiasis     Remote- 15 years ago   • MINO on CPAP    • MINO on CPAP     doesn't know setting    • Prostate troubles     enlarged   • Seasonal rhinitis     intermittent       Vital Signs   Vitals:    04/06/23 0500 04/06/23 0505 04/06/23 0600 04/06/23 0730   BP: 145/64  163/61 164/71   BP Location:   Left arm    Patient Position:   Lying    Pulse: 57 58 62 70   Resp:   16 20   Temp:   99.9 °F (37.7 °C)    TempSrc:   Bladder    SpO2: 92% 93% 96% 93%   Weight:       Height:           Physical Exam:   General: Sedated on the vent              Neuro: Not responsive at the moment    Results Review:  Reviewed  Results from last 7 days   Lab Units 04/05/23  1345   WBC 10*3/mm3 11.19*   HEMOGLOBIN g/dL 12.6*   HEMATOCRIT % 38.4   PLATELETS 10*3/mm3 166     Results from last 7 days   Lab Units  04/06/23  1013 04/05/23  1345 04/04/23  0459 04/03/23  0648 04/02/23  0838   SODIUM mmol/L 142 146* 147*   < > 143  143   POTASSIUM mmol/L 4.6 4.4 4.6   < > 3.8  3.8   CHLORIDE mmol/L 112* 112* 113*   < > 110*  110*   CO2 mmol/L 23.0 25.0 21.0*   < > 20.0*  23.0   BUN mg/dL 29* 33* 31*   < > 27*  25*   CREATININE mg/dL 0.53* 0.62* 0.66*   < > 0.60*  0.57*   CALCIUM mg/dL 7.8* 7.9* 7.9*   < > 8.5*  8.3*   BILIRUBIN mg/dL  --  1.3* 0.6  --  0.5  0.6   ALK PHOS U/L  --  80 81  --  65  69   ALT (SGPT) U/L  --  51* 30  --  15  13   AST (SGOT) U/L  --  27 21  --  21  18   GLUCOSE mg/dL 134* 132* 166*   < > 179*  174*    < > = values in this interval not displayed.       Imaging Results (Last 24 Hours)     Procedure Component Value Units Date/Time    XR Chest 1 View [[id number]] Collected: 04/06/23 0717     Updated: 04/06/23 0722    Narrative:      XR CHEST 1 VW    Date of Exam: 4/6/2023 3:51 AM EDT    Indication: fever, hypoxia.    Comparison: 3/31/2023    Findings:  ET tube NG tube and PICC line appear to be in satisfactory position. Patient is rotated to the right on today's exam. The heart and vasculature appear normal in size. No pneumothorax is seen. Minimal right lung interstitial changes are noted, but there   is increasing patchy airspace disease in the left base. This may be more apparent today due to rotation of patient to the right, but actual worsening of disease is suspected. No new disease is seen elsewhere.      Impression:      Impression:  New or at least increasing left basilar interstitial and airspace disease, with history of fever, concerning for pneumonia.    Electronically Signed: Avni Hanley    4/6/2023 7:19 AM EDT    Workstation ID: [id number]          Assessment:  Streptococcal meningitis.    Streptococcal sepsis.    Mastoiditis with probable CSF leak    Plan:  Continue antibiotics and conservative treatment until the patient improved significantly    Comment:  Guarded prognosis.         I  discussed the patients findings and my recommendations with primary care team    Chaz Kahn MD  04/06/23  11:14 EDT

## 2023-04-06 NOTE — PROGRESS NOTES
"INTENSIVIST   PROGRESS NOTE     Hospital:  LOS: 7 days     Chief Complaint: Altered mentation    Subjective   S     Interval History: No acute issues overnight.  Patient intermittently febrile but otherwise hemodynamically stable.    The patient's relevant past medical, surgical and social history were reviewed and updated in Epic as appropriate.      ROS: Unable to obtain as patient intubated and sedated    Objective   O     Intake/Ouptut 24 hrs (7:00AM - 6:59 AM)  Intake & Output (last 3 days)       04/03 0701 04/04 0700 04/04 0701 04/05 0700 04/05 0701 04/06 0700 04/06 0701 04/07 0700    P.O.        I.V. (mL/kg)  1122 (13) 465.6 (5.4) 1080.4 (12.5)    Other  370 565 145    NG/GT  1288 1373 667    IV Piggyback  200 300     Total Intake(mL/kg)  2980 (34.4) 2703.6 (31.2) 1892.4 (21.9)    Urine (mL/kg/hr) 1365 (0.7) 1280 (0.6) 2150 (1) 1000 (1.2)    Total Output 1365 1280 2150 1000    Net -1365 +1700 +553.6 +892.4                Medications (drips):  fentanyl 10 mcg/mL, Last Rate: 50 mcg/hr (04/05/23 1530)  heparin, Last Rate: 13 Units/kg/hr (04/06/23 1554)  Pharmacy to Dose Heparin  Pharmacy to dose vancomycin  propofol, Last Rate: 10 mcg/kg/min (04/06/23 0516)    Respiratory Support: Mechanical ventilation     Physical Examination:  Vital Signs: Blood pressure 169/70, pulse 70, temperature 100.4 °F (38 °C), resp. rate 17, height 176.5 cm (69.49\"), weight 86.6 kg (190 lb 14.7 oz), SpO2 97 %.    General: Critically ill-appearing.  On mechanical ventilation.  ENT/Neck: Trachea midline, No masses.  ETT in place.  Chest: Clear to auscultation bilaterally, No wheezing, rhonchi, or rales. Normal work of breathing. Equal chest rise.  Appropriate oxygen saturations on MV.  Cardiac: Regular rhythm, normal rate, S1S2 auscultated. No murmurs, rubs or gallops.   Extremities: No lower extremity edema. No clubbing or cyanosis.  Neuro: Intubated on minimal sedation.  Withdraws to noxious stimuli. Not following commands. "     Lines, Drains & Airways     Active LDAs     Name Placement date Placement time Site Days    PICC Triple Lumen 03/31/23 Right Basilic 03/31/23  1451  Basilic  2    NG/OG Tube 16 Fr Right nostril 04/01/23  0700  Right nostril  2    Urethral Catheter Double-lumen;Temperature probe 16 Fr. 03/30/23  1503  -- 3    Hi-Lo Evac ETT 7.5 03/30/23  1437  Oral  3              Results from last 7 days   Lab Units 04/05/23  1345 04/04/23  0857 04/03/23  0648   WBC 10*3/mm3 11.19* 13.90* 13.12*   HEMOGLOBIN g/dL 12.6* 12.5* 11.3*   MCV fL 96.2 95.8 98.4*   PLATELETS 10*3/mm3 166 152 122*     Results from last 7 days   Lab Units 04/06/23  1013 04/05/23  1345 04/04/23  0459 04/03/23  1759 04/03/23  0648 04/02/23  1145 04/02/23  0838   SODIUM mmol/L 142 146* 147*  --  143  --  143  143   POTASSIUM mmol/L 4.6 4.4 4.6  --  3.7  --  3.8  3.8   CO2 mmol/L 23.0 25.0 21.0*  --  23.0  --  20.0*  23.0   CREATININE mg/dL 0.53* 0.62* 0.66*  --  0.69*  --  0.60*  0.57*   GLUCOSE mg/dL 134* 132* 166*  --  168*  --  179*  174*   MAGNESIUM mg/dL 2.0  --   --   --  2.7*  --  2.9*   PHOSPHORUS mg/dL  --   --  2.6 1.9* 1.8*   < > 2.0*  1.8*    < > = values in this interval not displayed.     Estimated Creatinine Clearance: 118.1 mL/min (A) (by C-G formula based on SCr of 0.53 mg/dL (L)).  Results from last 7 days   Lab Units 04/05/23  1345 04/04/23  0459 04/02/23  0838   ALK PHOS U/L 80 81 65  69   BILIRUBIN mg/dL 1.3* 0.6 0.5  0.6   ALT (SGPT) U/L 51* 30 15  13   AST (SGOT) U/L 27 21 21  18     Results from last 7 days   Lab Units 04/06/23  1113 04/05/23  1350   PH, ARTERIAL pH units 7.471*  --    PCO2, ARTERIAL mm Hg 34.4*  --    PO2 ART mm Hg 81.5*  --    FIO2 % 40 50     Results: Reviewed.    I reviewed the patient's new laboratory and imaging results.  I independently reviewed the patient's new images.    Medications: Reviewed.    Assessment & Plan   A / P     Active Hospital Problems    Diagnosis    • **Encephalopathy, metabolic     • Subdural empyema    • Meningitis    • Sepsis secondary to strep bacteremia and meningitis with subdural empyema    • Streptococcal bacteremia    • Questionable Status epilepticus (HCC)    • Paroxysmal atrial fibrillation on Flecainide and Eliquis    • MINO on CPAP    • Hypertension      Patient Ivory is an 82 y.o. male with PMH PAF on Flecainide and Eliquis, HTN, CAD, Tachy-Samuel syndrome s/p PPM and MINO on CPAP who presented to the ED with AMS, inability to speak and left sided weakness.  Found to have possible subdural empyema and meningitis related to mastoiditis/otitis media.  Patient underwent Myringotomy on the right by ENT on 3/30/2023.  Cultures from CSF fluid from right ear 3/30/2023 growing Streptococcus, blood cultures from 3/30/2023 growing strep pneumonia.  Patient was eval by neurosurgery with no neurosurgical intervention at this time.  He currently remains on mechanical ventilation.    #Encephalopathy  #Sepsis w/ multi organ dysfunction  #Bacteremia  #Acute mastoiditis   #Sudural empyema  #Meningitis  -Encephalopathy likely metabolic and due to underlying sepsis. Initial radiographic evidence of subdural empyema vs [less likely] abscess. Held off on LP on admission given that patient is chronically anticoagulated; last dose of Eliquis likely administered on 3/29/2023 AM.  At presentation covered broadly with antimicrobials for possible meningitis (i.e. vancomycin, ceftriaxone, ampicillin and acyclovir). Blood cultures later possible for strep pneumonia; infectious disease consulted and de-escalating antimicrobial therapy as able.  Suspect streptococcal meningitis as well given clinical picture and aforementioned blood cultures.  He also received dexamethasone x4 days early in hospital course.  Both neurology and neurosurgery following.  Neurosurgery evaluated again (4/04) given continued leak (presumably CSF) from right ear (which is improving).  Sent fluid for beta-2 transferrin today.  Also  reevaluated imaging and noted that there was no CNS abscess visualized. No neurosurgical procedure needed at this time, including lumbar drain for CSF diversion  -Radiographic imaging consistent with acute mastoiditis (which is likely primary issue and etiology of infection). ENT closely following s/p right myringotomy; fluid cultured [NGTD] but no tube placed.  Spoke with ENT (4/04) who continues to follow patient and notes that mastoidectomy may need to be performed pending clinical improvement-- but too critically ill at this time  -EEG performed in the ED on initial presentation; While there is diffuse cerebral dysfunction there was no evidence of epilepsy      #Acute hypoxic respiratory failure requiring intubation/mechanical ventilation  #MINO  -Currently intubated/sedated. Will adjust mechanical ventilation settings as able.  Etiology of respiratory failure likely from encephalopathy/inability to protect airway  -We will continue mechanical ventilation and wean if possible.  SBT daily if able and appropriate  -Minimize sedation is much as possible for comfort on mechanical ventilation and appropriate neurological exams  -Antibiotics broadened on 4/06 due to intermittent fevers; Repeat culture results pending    #Paroxysmal atrial fibrillation  -Patient has been started on home flecainide but beta-blocker held for bradycardia  -Given questionable need for LP/procedures and surgical intervention(s) earlier in admission transitioned Eliquis to heparin drip.  We will continue heparin drip at this time which is more appropriate in the ICU setting     F-Tube feeds per dietary recs  A-Fentanyl  S-Propofol  T-Heparin gtt   H-Head of bed greater than 30 degrees  U-PPI  G-FSBS per unit protocol, correction dose insulin  S-SBT assessment daily   B-Will monitor daily and provide regimen if indicated  I-PIV  D-Vancomycin, cefepime    Advance Directives:   Code Status and Medical Interventions:   Ordered at: 03/30/23 1300      Code Status (Patient has no pulse and is not breathing):    CPR (Attempt to Resuscitate)     Medical Interventions (Patient has pulse or is breathing):    Full Support     High level of risk due to severe exacerbation of chronic illness and illness with threat to life or bodily function.    I conducted multidisciplinary rounds in the plan of care was discussed with the multidisciplinary team at that time. In attendance at multidisciplinary rounds was clinical pharmacist, dietitian, nursing staff and case management.    I discussed the patient's findings and my recommendations with patient and nursing staff    -- Alvaro Jason MD  Pulmonary/Critical Care    Critical Care time spent in direct patient care: 30 minutes (excluding procedure time, if applicable) including high complexity decision making to assess, manipulate, and support vital organ system failure in this individual who has impairment of one or more vital organ systems such that there is a high probability of imminent or life threatening deterioration in the patient’s condition.

## 2023-04-06 NOTE — PLAN OF CARE
Problem: Fall Injury Risk  Goal: Absence of Fall and Fall-Related Injury  4/6/2023 1843 by Kathy Lima RN  Outcome: Ongoing, Progressing  4/6/2023 1841 by Kathy Lima RN  Outcome: Ongoing, Progressing  Intervention: Identify and Manage Contributors  Intervention: Promote Injury-Free Environment     Problem: Skin Injury Risk Increased  Goal: Skin Health and Integrity  4/6/2023 1843 by Kathy Lima RN  Outcome: Ongoing, Progressing  4/6/2023 1841 by Kathy Lima RN  Outcome: Ongoing, Progressing  Intervention: Promote and Optimize Oral Intake  Intervention: Optimize Skin Protection     Problem: Restraint, Nonviolent  Goal: Absence of Harm or Injury  4/6/2023 1843 by Kathy Lima RN  Outcome: Ongoing, Progressing  4/6/2023 1841 by Kathy Lima RN  Outcome: Ongoing, Progressing  Intervention: Implement Least Restrictive Safety Strategies  Intervention: Protect Dignity, Rights, and Personal Wellbeing  Intervention: Protect Skin and Joint Integrity   Goal Outcome Evaluation:

## 2023-04-06 NOTE — PROGRESS NOTES
Otolaryngology progress note-[facility]: 4/6/2023    Patient seen today for the first time by me and history reviewed.   Patient is intubated and sedated.  Family not available for history.  Chart has been reviewed.  Mr. Kai Dodson is an 82-year-old male who developed altered mental status on the day of admission but prior to this it had some imbalance.  There is apparently not a longstanding history of otitis.  He was seen by Dr. Garcia on the day of admission due to a subdural empyema and fluid in the right mastoid.  He underwent right myringotomy incision on 3/30/2023 .  This was followed by otorrhea which has since abated.    CT imaging from 3/30/2023 revealed defects in both the right tegmen tympani and tegmen mastoideum with evidence of extradural empyema.  Left ear was unremarkable.    Physical exam: Patient is sedated and intubated.  Bilateral otoscopy performed.  Clear tympanic membrane on the left.  Right ear has clear effusion without purulent material.    Recommendation:  Continued intravenous antibiotics and ventilatory support.  Options include continued observation with current modality which I recommend.  Other options include right  tympanostomy tube placement but with a clear effusion today, this would likely prevent any CSF leak from sealing.  Other options include simple mastoidectomy to decompress mastoid but and current medical condition, this is best deferred.  Definitive therapy may eventually include closure of the 2 CSF leaks posteriorly and superiorly via transmastoid approach or from an intracranial approach.  He is certainly not yet stable for this.  We will continue to follow.

## 2023-04-06 NOTE — PROGRESS NOTES
"    INFECTIOUS DISEASE PROGRESS NOTE    Kai Dodson  1940  [id number]    Date of consult: 3/31/23  Admit date: 3/30/2023    Requesting Provider: Dr. Jason  Evaluating physician: Que Alonso MD  Reason for Consultation: meningitis  Chief Complaint: altered mental status      Subjective   History of present illness:  Kai Dodson is a  82 y.o.  Yr old male with a past medical history of hypertension, coronary artery disease, paroxysmal A. Fib on flecainide and Eliquis, tachycardia-bradycardia syndrome status post PPM and obstructive sleep apnea on CPAP who presented to the ER yesterday with encephalopathy, inability to speak, and left-sided weakness.  Due to the patient's mental state and intubated status, history is obtained from medical records.  The patient's wife had just been discharged yesterday morning from [facility] and their son was driving the patient's wife back to [facility] for inpatient rehab.  The patient's son tried to call him and could not reach him.  Other family members went to check on him and found him altered with tremors in all the extremities.  EMS was called and he was transported to the ER.    Since arrival, T-max has been 100.9°F.  He was initially tachycardic up to 112 bpm but this is improved. He had some hypotension but this has improved. The patient was intubated for airway protection and remains on the ventilator with an FiO2 of 35%. Initial lactic acid was 6.6, pro-calcitonin was 6.0, hemoglobin A1c was 5.4, [place] has been normal.  LFTs are within normal limits. White blood cell count was normal on arrival at 9.63, has now trended up to 14.14. platelet count has decreased to 109.sensitivity troponin was slightly elevated at 17. Urine drug screen was negative. 2 sets of admission blood cultures are growing GPC's in pairs and chains identified as Streptococcus pneumoniae by the BCID2 PCR. The patient underwent CTA head and neck that showed \"no " "evidence of intracranial hemorrhage or definite acute territorial ischemia.  There is abnormal intracranial gas present, appearing likely extra-axial in the lateral aspect of the right cerebellar hemisphere, with changes of the right mastoid present, concerning for mastoiditis and possibly subdural empyema or less likely parenchymal abscess.\" \"Dedicated CT of the temporal bones demonstrates findings of likely acute otomastoiditis on the right, with middle ear and right mastoid effusion, coalescent changes of the right mastoid and dehiscence of both the tegmen tympani and tegmen mastoideum. There is again concern for intracranial infectious involvement, empyema versus abscess with foci of intracranial gas present.\" chest x-ray was without any acute disease. MRI brain showed \"no evidence of right temporal lobe abscess.  Findings consistent with diffuse meningitis.  There is right otomastoiditis as seen on the CT of the temporal bones.  There are signal voids corresponding to gas bubbles within the CSF in the upper right posterior cranial fossa.\"  ENT has been consulted and Dr. Garcia evaluated the patient with right myringotomy procedure performed at bedside in the ICU. Wide myringotomy was made in the posterior aspect of the tympanic membrane.  A small amount of purulence was expressed and cultured.  After that CSF was draining through the myringotomy.  A tube was therefore not placed.  Neurosurgery has evaluated the patient and recommended that there is no role for neurosurgical intervention at this time. The patient has been started on empiric acyclovir, ampicillin, ceftriaxone 2 g IV every 12 hours, and IV vancomycin by the ICU team.  He has additionally been placed on dexamethasone.  ID has been consultated for antibiotic recommendations.    Subjective:    4/1/23: The patient remains critically ill on the ventilator.  No history is available from him.  He does have some fluid leaking from his right ear, presumably " CSF. No fevers.  Ventilator settings remain stable.  Leukocytosis is worse but likely steroid induced.    4/2/23:  He remains afebrile and on 35% FIO2.  WBC 12.29 this am.  Remains on Dexamethasone.     4/3/23: no history available from the patient.  He remains on the ventilator with an FiO2 of 35%.  No fevers.  Continued CSF leak from the right ear.    4/4/23: Continues to have some drainage from his right ear.  Remains critically ill on the ventilator with an FiO2 of 35%.  No fevers. Neurosurgery has reevaluated the patient and does not think that there is a role for CSF diversion or need for any neurosurgical intervention at this time.  May need mastoidectomy and packing of the mastoid air cells per neurosurgery.    4/5/23: the patient is still critically ill on the ventilator.  He did have some fevers 100.9°F after I saw him today. Vent settings are also slightly worse with an FiO2 of 50%.  Still having some drainage from his right ear.  No current surgical plans per ENT or neurosurgery.  No history is available from the patient.  No family at bedside.    4/6/23: The patient had ongoing fevers up to 101.3°F overnight.  Vent settings were slightly worse overnight with an FiO2 of 50% but slightly better today at 40% when I went by his room.  He is having increased respiratory secretions per nursing staff.  Ongoing drainage from his right ear.  No history available from the patient is a remains on the ventilator and sedated.    Past Medical History:   Diagnosis Date   • Arthritis    • Atrial fibrillation    • Bradycardia    • Chronic hypertension     Probably essential   • Hypertension    • Hypertensive cardiovascular disease 07/2015    Recent progressive CCS class III-IV chest pain syndrome with normal EKG and exertional dyspnea and fatigue with normal codominant coronary arteries and preserved systolic  left ventricular function, July 2015.   • Intermittent palpitations 05/01/2014    Remote abnormal 24-hour  Holter monitor demonstrating intermittent bradycardia with occasional PACs, May 2014.   • Lower extremity edema     Intermittent lower extremity edema felt secondary to antihypertensive therapy.    • Nephrolithiasis     Remote- 15 years ago   • MINO on CPAP    • MINO on CPAP     doesn't know setting    • Prostate troubles     enlarged   • Seasonal rhinitis     intermittent       Past Surgical History:   Procedure Laterality Date   • CARDIAC CATHETERIZATION      no stents   • CARDIAC ELECTROPHYSIOLOGY PROCEDURE N/A 10/24/2018    Procedure: Pacemaker DC new with Biotronik. Hold Eliquis one day prior.;  Surgeon: Harley Dacosta MD;  Location: [facility] INVASIVE LOCATION;  Service: Cardiology   • COLONOSCOPY     • ENDOSCOPY     • INSERT / REPLACE / REMOVE PACEMAKER         Pediatric History   Patient Parents   • Not on file     Other Topics Concern   • Not on file   Social History Narrative    PT DRINKS 2 SERVINGS OF CAFFEINE PER DAY.    PT LIVES AT HOME WITH WIFE.       family history includes No Known Problems in his mother; Other in his father.    No Known Allergies    There is no immunization history for the selected administration types on file for this patient.    Medication:    Current Facility-Administered Medications:   •  [START ON 4/8/2023] !Vancomycin Trough 4/8 at 0600.  Hold 0900 Dose Until Level Evaluated by Pharmacy, , Does not apply, Once, Antwon De Leon, PharmD  •  acetaminophen (TYLENOL) 160 MG/5ML solution 650 mg, 650 mg, Oral, Q6H PRN, Amadou Mercedes, APRN, 650 mg at 04/06/23 1429  •  aspirin chewable tablet 81 mg, 81 mg, Nasogastric, Daily, 81 mg at 04/06/23 0918 **OR** aspirin suppository 300 mg, 300 mg, Rectal, Daily, Tereza Bautista APRN  •  Calcium Replacement - Follow Nurse / BPA Driven Protocol, , Does not apply, PRN, Natalia Cruz APRHECTOR  •  cefepime (MAXIPIME) 2 g/100 mL 0.9% NS (mbp), 2 g, Intravenous, Q8H, Que Alonso MD, 2 g at 04/06/23 1705  •  chlorhexidine  (PERIDEX) 0.12 % solution 15 mL, 15 mL, Mouth/Throat, Q12H, Mundo Jason MD, 15 mL at 04/06/23 0918  •  dextrose (D50W) (25 g/50 mL) IV injection 25 g, 25 g, Intravenous, Q15 Min PRN, Amadou Mercedes APRHECTOR  •  sennosides (SENOKOT) 8.8 MG/5ML syrup 10 mL, 10 mL, Nasogastric, BID, 10 mL at 04/06/23 0922 **AND** docusate sodium (COLACE) liquid 100 mg, 100 mg, Nasogastric, BID, Mundo Jason MD, 100 mg at 04/06/23 0923  •  fentaNYL (SUBLIMAZE) bolus from bag 10 mcg/mL 50 mcg, 50 mcg, Intravenous, Q30 Min PRN, Mundo Jason MD, 50 mcg at 03/31/23 1911  •  fentaNYL 2500 mcg/250 mL NS infusion,  mcg/hr, Intravenous, Titrated, Mundo Jason MD, Last Rate: 5 mL/hr at 04/05/23 1530, 50 mcg/hr at 04/05/23 1530  •  flecainide (TAMBOCOR) tablet 50 mg, 50 mg, Nasogastric, Q12H, Marcelino Hastings DO, 50 mg at 04/06/23 0918  •  glucagon (GLUCAGEN) injection 1 mg, 1 mg, Intramuscular, Q15 Min PRN, Amadou Mercedes APRN  •  heparin 72530 units/250 mL (100 units/mL) in 0.45 % NaCl infusion, 13 Units/kg/hr, Intravenous, Titrated, Antwon De Leon PharmD, Last Rate: 11.25 mL/hr at 04/06/23 1554, 13 Units/kg/hr at 04/06/23 1554  •  insulin regular (humuLIN R,novoLIN R) injection 0-7 Units, 0-7 Units, Subcutaneous, Q6H, Amadou Mercedes APRN, 2 Units at 04/04/23 1744  •  levETIRAcetam in NaCl 0.82% (KEPPRA) IVPB 500 mg, 500 mg, Intravenous, Q12H, Meena Burks, APRHECTOR, 500 mg at 04/06/23 1705  •  losartan (COZAAR) tablet 50 mg, 50 mg, Nasogastric, Q24H, Marcelino Hastings DO, 50 mg at 04/06/23 0918  •  Magnesium Standard Dose Replacement - Follow Nurse / BPA Driven Protocol, , Does not apply, PRN, Natalia Cruz, APRN  •  ondansetron (ZOFRAN) injection 4 mg, 4 mg, Intravenous, Q6H PRN, Antonietta Phillips APRN  •  pantoprazole (PROTONIX) injection 40 mg, 40 mg, Intravenous, Q AM, Antonietta Phillips APRN, 40 mg at 04/06/23 0518  •  Pharmacy to Dose Heparin, , Does not  "apply, Continuous PRN, Marcelino Hastings DO  •  Pharmacy to dose vancomycin, , Does not apply, Continuous PRN, Que Alonso MD  •  Phosphorus Replacement - Follow Nurse / BPA Driven Protocol, , Does not apply, PRHECTOR, Natalia Cruz, APRN  •  polyethylene glycol (MIRALAX) packet 17 g, 17 g, Nasogastric, Daily, Mnudo Jason MD, 17 g at 04/06/23 0923  •  Potassium Replacement - Follow Nurse / BPA Driven Protocol, , Does not apply, PRHECTOR, Natalia Cruz, APRN  •  propofol (DIPRIVAN) infusion 10 mg/mL 100 mL, 5-50 mcg/kg/min, Intravenous, Titrated, Antonietta Phillips APRN, Last Rate: 2.66 mL/hr at 04/06/23 1819, 5 mcg/kg/min at 04/06/23 1819  •  ProSource No Carb oral solution 30 mL, 30 mL, Nasogastric, BID, Marcelino Hastings, , 30 mL at 04/06/23 0917  •  sodium chloride 0.9 % flush 10 mL, 10 mL, Intravenous, Q12H, Tereza Bautista APRN, 10 mL at 04/06/23 0918  •  sodium chloride 0.9 % flush 10 mL, 10 mL, Intravenous, PRN, Tereza Bautista APRN  •  sodium chloride 0.9 % infusion 40 mL, 40 mL, Intravenous, PRN, Tereza Bautista APRN  •  [START ON 4/7/2023] vancomycin 1500 mg/500 mL 0.9% NS IVPB ([facility]), 1,500 mg, Intravenous, Q24H, Antwon De Leon PharmD    Please refer to the medical record for a full medication list    Review of Systems:  Unable to obtain a 12 point review of systems from the patient given his clinical state    Physical Exam:   Vital Signs   Temp:  [99.9 °F (37.7 °C)-101.3 °F (38.5 °C)] 100.4 °F (38 °C)  Heart Rate:  [57-76] 70  Resp:  [14-20] 20  BP: (131-187)/() 154/64  FiO2 (%):  [35 %-50 %] 35 %    Temp  Min: 99.9 °F (37.7 °C)  Max: 101.3 °F (38.5 °C)  BP  Min: 131/66  Max: 187/75  Pulse  Min: 57  Max: 76  Resp  Min: 14  Max: 20  SpO2  Min: 92 %  Max: 99 %    Blood pressure 154/64, pulse 70, temperature 100.4 °F (38 °C), resp. rate 20, height 176.5 cm (69.49\"), weight 86.6 kg (190 lb 14.7 oz), SpO2 97 %.  GENERAL: critically " ill-appearing on the ventilator.  HEENT:  Normocephalic, atraumatic.  ET tube in place.  No external oral lesions noted. NG tube in place. Ongoing drainage from his right ear  Chest: Left chest wall PPM pocket site without any erythema or drainage.  HEART: RRR, no murmur  LUNGS: ventilator breath sounds bilaterally.  On the ventilator with an FiO2 of 40%  ABDOMEN: Soft, nontender, nondistended. No appreciable HSM.   GENITAL: + Doran catheter  SKIN: No new rashes.  No jaundice.  PSYCHIATRIC: unable to assess  EXT:  No cellulitic change. No peripheral edema noted. has some ecchymosis over his right upper extremity near the PICC line site and over his right hand.  NEURO: sedated on the ventilator.     Right upper extremity PICC line- no erythema noted at the exit site    Results Review:   I reviewed the patient's new clinical results.    Results from last 7 days   Lab Units 04/05/23  1345 04/04/23  0857 04/03/23  0648   WBC 10*3/mm3 11.19* 13.90* 13.12*   HEMOGLOBIN g/dL 12.6* 12.5* 11.3*   HEMATOCRIT % 38.4 38.5 35.8*   PLATELETS 10*3/mm3 166 152 122*     Results from last 7 days   Lab Units 04/06/23  1013   SODIUM mmol/L 142   POTASSIUM mmol/L 4.6   CHLORIDE mmol/L 112*   CO2 mmol/L 23.0   BUN mg/dL 29*   CREATININE mg/dL 0.53*   GLUCOSE mg/dL 134*   CALCIUM mg/dL 7.8*     Results from last 7 days   Lab Units 04/05/23  1345   ALK PHOS U/L 80   BILIRUBIN mg/dL 1.3*   ALT (SGPT) U/L 51*   AST (SGOT) U/L 27         Results from last 7 days   Lab Units 04/02/23  0838   CRP mg/dL 18.05*     Results from last 7 days   Lab Units 04/01/23  1351 03/31/23  1342   VANCOMYCIN TR mcg/mL 15.40 8.10     Results from last 7 days   Lab Units 04/06/23  1013   LACTATE mmol/L 1.6     Estimated Creatinine Clearance: 118.1 mL/min (A) (by C-G formula based on SCr of 0.53 mg/dL (L)).  CPK    Common Labsle 3/30/23   Creatine Kinase 57            Procalitonin Results:      Lab 04/05/23  1345 04/04/23  0459   PROCALCITONIN 1.48* 3.13*       Brief Urine Lab Results  (Last result in the past 365 days)      Color   Clarity   Blood   Leuk Est   Nitrite   Protein   CREAT   Urine HCG        03/30/23 1200 Yellow   Clear   Negative   Negative   Negative   Negative                Site   Date Value Ref Range Status   04/06/2023 Right Radial  Final     Juan A's Test   Date Value Ref Range Status   04/06/2023 N/A  Final     pH, Arterial   Date Value Ref Range Status   04/06/2023 7.471 (H) 7.350 - 7.450 pH units Final     Comment:     83 Value above reference range     pCO2, Arterial   Date Value Ref Range Status   04/06/2023 34.4 (L) 35.0 - 45.0 mm Hg Final     Comment:     84 Value below reference range     pO2, Arterial   Date Value Ref Range Status   04/06/2023 81.5 (L) 83.0 - 108.0 mm Hg Final     Comment:     84 Value below reference range     HCO3, Arterial   Date Value Ref Range Status   04/06/2023 25.1 20.0 - 26.0 mmol/L Final     Base Excess, Arterial   Date Value Ref Range Status   04/06/2023 1.7 0.0 - 2.0 mmol/L Final     Hemoglobin, Blood Gas   Date Value Ref Range Status   04/06/2023 11.4 (L) 13.5 - 17.5 g/dL Final     Comment:     84 Value below reference range     Hematocrit, Blood Gas   Date Value Ref Range Status   04/06/2023 35.1 (L) 38.0 - 51.0 % Final     Oxyhemoglobin   Date Value Ref Range Status   04/06/2023 95.9 94 - 99 % Final     Oxyhemoglobin Venous   Date Value Ref Range Status   04/05/2023 66.3 % Final     Methemoglobin   Date Value Ref Range Status   04/06/2023 0.30 0.00 - 1.50 % Final     Carboxyhemoglobin   Date Value Ref Range Status   04/06/2023 1.0 0 - 2 % Final     CO2 Content   Date Value Ref Range Status   04/06/2023 26.1 22 - 33 mmol/L Final     Barometric Pressure for Blood Gas   Date Value Ref Range Status   04/06/2023   Final     Comment:     N/A     Modality   Date Value Ref Range Status   04/06/2023 Ventilator  Final     FIO2   Date Value Ref Range Status   04/06/2023 40 % Final        Microbiology:  Blood Culture    Date Value Ref Range Status   03/30/2023 Abnormal Stain (C)  Preliminary     BCID, PCR   Date Value Ref Range Status   03/30/2023 (A) Negative by BCID PCR. Culture to Follow. Final    Streptococcus pneumoniae. Identification by BCID2 PCR.         Blood Culture   Date Value Ref Range Status   03/30/2023 Abnormal Stain (C)  Preliminary     BCID, PCR   Date Value Ref Range Status   03/30/2023 (A) Negative by BCID PCR. Culture to Follow. Final    Streptococcus pneumoniae. Identification by BCID2 PCR.   (      Radiology:    I independently read his chest x-ray from 4/6/23-he has a new left basilar opacity concerning for pneumonia    IMPRESSION:     Problems:  1. Streptococcus pneumoniae septicemia-due to acute otitis  2. 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  No LP done yet but given the Streptococcus pneumonia septicemia and clinical presentation, we do not need an LP.  3. Acute otitis media and right-sided mastoiditis resulting in a subdural empyema with fistulous tract between the middle ear space and the dura- Strep pneumoniae on culture  4. Acute toxic/metabolic encephalopathy-due to the above infection  5. Acute hypoxic respiratory failure, intubated for airway protection  6. leukocytosis with neutrophilia-was not present on arrival.  Could be due to infection but steroids likely contributing.  7. Hypokalemia improved   8. Hypomagnesemia, ongoing   9. tachycardia-bradycardia syndrome status post PPM- complicating factor in the setting of bacteremia as at some risk for seeding PPM leads with bacteria but streptococcus pneumoniae is an uncommon bacteria to do this. TTE was without any signs of vegetations or pacemaker lead infection  10. paroxysmal A. Fib on flecainide and Eliquis  11. coronary artery disease  12. obstructive sleep apnea on CPAP nightly at home  13. History of hypertension  14. Thrombocytopenia-improved  15. New fevers starting on 4/5-suspect  due to pneumonia-ongoing  16. Left-sided pneumonia-new.  Suspect MRSA but a gram-negative lexis that is resistant to ceftriaxone as possible.  He did have GPC's in pairs, chains, and clusters on Gram stain and his MRSA PCR of the naris was positive.    RECOMMENDATIONS:    -follow repeat blood cultures and respiratory culture.  Gram stain from respiratory culture with GPC's in pairs, chains, and clusters.  -ENT is monitoring otorrhea for resolution for potential scarring as meningitis resolves.  I will defer to ENT and neurosurgery with regards to whether or not the patient requires surgical intervention.  -stop ceftriaxone  -broaden the gram-negative coverage to cefepime 2 g IV every 8 hours for broader gram-negative lexis pneumonia coverage.  This will also cover his strep pneumo  -Started IV vancomycin with the PK dosing consultation.  His MRSA PCR nares was positive today.       He will likely need a 6 week course of IV antibiotic therapy.    I discussed with nursing at bedside today    I discussed his case with Cherie Bhagat Pharm.D. today    Complex MDM. 35 minutes of critical care time spent on the patient's case today    Copied text in this note has been reviewed and is accurate as of 04/06/23            Que Alonso MD  4/6/2023

## 2023-04-06 NOTE — PROGRESS NOTES
"    INFECTIOUS DISEASE PROGRESS NOTE    Kai Dodson  1940  [id number]    Date of consult: 3/31/23  Admit date: 3/30/2023    Requesting Provider: Dr. Jason  Evaluating physician: Que Alonso MD  Reason for Consultation: meningitis  Chief Complaint: altered mental status      Subjective   History of present illness:  Kai Dodson is a  82 y.o.  Yr old male with a past medical history of hypertension, coronary artery disease, paroxysmal A. Fib on flecainide and Eliquis, tachycardia-bradycardia syndrome status post PPM and obstructive sleep apnea on CPAP who presented to the ER yesterday with encephalopathy, inability to speak, and left-sided weakness.  Due to the patient's mental state and intubated status, history is obtained from medical records.  The patient's wife had just been discharged yesterday morning from [facility] and their son was driving the patient's wife back to [facility] for inpatient rehab.  The patient's son tried to call him and could not reach him.  Other family members went to check on him and found him altered with tremors in all the extremities.  EMS was called and he was transported to the ER.    Since arrival, T-max has been 100.9°F.  He was initially tachycardic up to 112 bpm but this is improved. He had some hypotension but this has improved. The patient was intubated for airway protection and remains on the ventilator with an FiO2 of 35%. Initial lactic acid was 6.6, pro-calcitonin was 6.0, hemoglobin A1c was 5.4, Bertrand has been normal.  LFTs are within normal limits. White blood cell count was normal on arrival at 9.63, has now trended up to 14.14. platelet count has decreased to 109.sensitivity troponin was slightly elevated at 17. Urine drug screen was negative. 2 sets of admission blood cultures are growing GPC's in pairs and chains identified as Streptococcus pneumoniae by the BCID2 PCR. The patient underwent CTA head and neck that showed \"no " "evidence of intracranial hemorrhage or definite acute territorial ischemia.  There is abnormal intracranial gas present, appearing likely extra-axial in the lateral aspect of the right cerebellar hemisphere, with changes of the right mastoid present, concerning for mastoiditis and possibly subdural empyema or less likely parenchymal abscess.\" \"Dedicated CT of the temporal bones demonstrates findings of likely acute otomastoiditis on the right, with middle ear and right mastoid effusion, coalescent changes of the right mastoid and dehiscence of both the tegmen tympani and tegmen mastoideum. There is again concern for intracranial infectious involvement, empyema versus abscess with foci of intracranial gas present.\" chest x-ray was without any acute disease. MRI brain showed \"no evidence of right temporal lobe abscess.  Findings consistent with diffuse meningitis.  There is right otomastoiditis as seen on the CT of the temporal bones.  There are signal voids corresponding to gas bubbles within the CSF in the upper right posterior cranial fossa.\"  ENT has been consulted and Dr. Garcia evaluated the patient with right myringotomy procedure performed at bedside in the ICU. Wide myringotomy was made in the posterior aspect of the tympanic membrane.  A small amount of purulence was expressed and cultured.  After that CSF was draining through the myringotomy.  A tube was therefore not placed.  Neurosurgery has evaluated the patient and recommended that there is no role for neurosurgical intervention at this time. The patient has been started on empiric acyclovir, ampicillin, ceftriaxone 2 g IV every 12 hours, and IV vancomycin by the ICU team.  He has additionally been placed on dexamethasone.  ID has been consultated for antibiotic recommendations.    Subjective:    4/1/23: The patient remains critically ill on the ventilator.  No history is available from him.  He does have some fluid leaking from his right ear, presumably " CSF. No fevers.  Ventilator settings remain stable.  Leukocytosis is worse but likely steroid induced.    4/2/23:  He remains afebrile and on 35% FIO2.  WBC 12.29 this am.  Remains on Dexamethasone.     4/3/23: no history available from the patient.  He remains on the ventilator with an FiO2 of 35%.  No fevers.  Continued CSF leak from the right ear.    4/4/23: Continues to have some drainage from his right ear.  Remains critically ill on the ventilator with an FiO2 of 35%.  No fevers. Neurosurgery has reevaluated the patient and does not think that there is a role for CSF diversion or need for any neurosurgical intervention at this time.  May need mastoidectomy and packing of the mastoid air cells per neurosurgery.    4/5/23: the patient is still critically ill on the ventilator.  He did have some fevers 100.9°F after I saw him today. Vent settings are also slightly worse with an FiO2 of 50%.  Still having some drainage from his right ear.  No current surgical plans per ENT or neurosurgery.  No history is available from the patient.  No family at bedside.    Past Medical History:   Diagnosis Date   • Arthritis    • Atrial fibrillation    • Bradycardia    • Chronic hypertension     Probably essential   • Hypertension    • Hypertensive cardiovascular disease 07/2015    Recent progressive CCS class III-IV chest pain syndrome with normal EKG and exertional dyspnea and fatigue with normal codominant coronary arteries and preserved systolic  left ventricular function, July 2015.   • Intermittent palpitations 05/01/2014    Remote abnormal 24-hour Holter monitor demonstrating intermittent bradycardia with occasional PACs, May 2014.   • Lower extremity edema     Intermittent lower extremity edema felt secondary to antihypertensive therapy.    • Nephrolithiasis     Remote- 15 years ago   • MINO on CPAP    • MINO on CPAP     doesn't know setting    • Prostate troubles     enlarged   • Seasonal rhinitis     intermittent        Past Surgical History:   Procedure Laterality Date   • CARDIAC CATHETERIZATION      no stents   • CARDIAC ELECTROPHYSIOLOGY PROCEDURE N/A 10/24/2018    Procedure: Pacemaker DC new with Biotronik. Hold Eliquis one day prior.;  Surgeon: Harley Dacosta MD;  Location: [facility] INVASIVE LOCATION;  Service: Cardiology   • COLONOSCOPY     • ENDOSCOPY     • INSERT / REPLACE / REMOVE PACEMAKER         Pediatric History   Patient Parents   • Not on file     Other Topics Concern   • Not on file   Social History Narrative    PT DRINKS 2 SERVINGS OF CAFFEINE PER DAY.    PT LIVES AT HOME WITH WIFE.       family history includes No Known Problems in his mother; Other in his father.    No Known Allergies    There is no immunization history for the selected administration types on file for this patient.    Medication:    Current Facility-Administered Medications:   •  aspirin chewable tablet 81 mg, 81 mg, Nasogastric, Daily, 81 mg at 04/05/23 0944 **OR** aspirin suppository 300 mg, 300 mg, Rectal, Daily, Tereza Bautista, FORREST  •  Calcium Replacement - Follow Nurse / BPA Driven Protocol, , Does not apply, PRN, Natalia Cruz, APRN  •  cefTRIAXone (ROCEPHIN) 2 g/100 mL 0.9% NS IVPB (MBP), 2 g, Intravenous, Q12H, Que Alonso MD, 2 g at 04/05/23 1316  •  chlorhexidine (PERIDEX) 0.12 % solution 15 mL, 15 mL, Mouth/Throat, Q12H, Mundo Jason MD, 15 mL at 04/05/23 0944  •  dextrose (D50W) (25 g/50 mL) IV injection 25 g, 25 g, Intravenous, Q15 Min PRN, Amadou Mercedes, APRN  •  fentaNYL (SUBLIMAZE) bolus from bag 10 mcg/mL 50 mcg, 50 mcg, Intravenous, Q30 Min PRN, Mundo Jason MD, 50 mcg at 03/31/23 1911  •  fentaNYL 2500 mcg/250 mL NS infusion,  mcg/hr, Intravenous, Titrated, Mundo Jason MD, Last Rate: 5 mL/hr at 04/05/23 1530, 50 mcg/hr at 04/05/23 1530  •  flecainide (TAMBOCOR) tablet 50 mg, 50 mg, Nasogastric, Q12H, Marcelino Hastings DO, 50 mg at 04/05/23 0944  •  glucagon  (GLUCAGEN) injection 1 mg, 1 mg, Intramuscular, Q15 Min PRN, Amadou Mercedes, APRN  •  heparin 87589 units/250 mL (100 units/mL) in 0.45 % NaCl infusion, 11 Units/kg/hr, Intravenous, Titrated, Mundo Jason MD, Last Rate: 9.52 mL/hr at 04/05/23 1537, 11 Units/kg/hr at 04/05/23 1537  •  insulin regular (humuLIN R,novoLIN R) injection 0-7 Units, 0-7 Units, Subcutaneous, Q6H, Amadou Mercedes, APRN, 2 Units at 04/04/23 1744  •  levETIRAcetam in NaCl 0.82% (KEPPRA) IVPB 500 mg, 500 mg, Intravenous, Q12H, Meena Burks, APRN, 500 mg at 04/05/23 1843  •  losartan (COZAAR) tablet 50 mg, 50 mg, Nasogastric, Q24H, Marcelino Hastings, , 50 mg at 04/05/23 0944  •  Magnesium Standard Dose Replacement - Follow Nurse / BPA Driven Protocol, , Does not apply, PRN, Natalia Cruz, APRN  •  ondansetron (ZOFRAN) injection 4 mg, 4 mg, Intravenous, Q6H PRN, Antonietta Phillips APRN  •  pantoprazole (PROTONIX) injection 40 mg, 40 mg, Intravenous, Q AM, Antonietta Phillips APRHECTOR, 40 mg at 04/05/23 0507  •  Pharmacy to Dose Heparin, , Does not apply, Continuous PRN, Marcelino Hastings, DO  •  Phosphorus Replacement - Follow Nurse / BPA Driven Protocol, , Does not apply, PRN, Natalia Cruz, APRN  •  Potassium Replacement - Follow Nurse / BPA Driven Protocol, , Does not apply, PRAnthony YOUNG Sara W, APRN  •  propofol (DIPRIVAN) infusion 10 mg/mL 100 mL, 5-50 mcg/kg/min, Intravenous, Titrated, Antonietta Phillips APRN, Last Rate: 5.31 mL/hr at 04/05/23 1537, 10 mcg/kg/min at 04/05/23 1537  •  ProSource No Carb oral solution 30 mL, 30 mL, Nasogastric, BID, Marcelino Hastings, DO, 30 mL at 04/05/23 0937  •  sodium chloride 0.9 % flush 10 mL, 10 mL, Intravenous, Q12H, Tereza Bautista, FORREST, 10 mL at 04/05/23 0937  •  sodium chloride 0.9 % flush 10 mL, 10 mL, Intravenous, PRN, Tereza Bautista, FORREST  •  sodium chloride 0.9 % infusion 40 mL, 40 mL, Intravenous,  "IWONA, Tereza Bautista, APRN    Please refer to the medical record for a full medication list    Review of Systems:  Unable to obtain a 12 point review of systems from the patient given his clinical state    Physical Exam:   Vital Signs   Temp:  [99.9 °F (37.7 °C)-100.9 °F (38.3 °C)] 100.6 °F (38.1 °C)  Heart Rate:  [52-81] 70  Resp:  [14-20] 19  BP: (141-193)/() 184/81  FiO2 (%):  [50 %] 50 %    Temp  Min: 99.9 °F (37.7 °C)  Max: 100.9 °F (38.3 °C)  BP  Min: 141/63  Max: 193/106  Pulse  Min: 52  Max: 81  Resp  Min: 14  Max: 20  SpO2  Min: 90 %  Max: 100 %    Blood pressure (!) 184/81, pulse 70, temperature (!) 100.6 °F (38.1 °C), temperature source Bladder, resp. rate 19, height 176.5 cm (69.49\"), weight 86.6 kg (190 lb 14.7 oz), SpO2 97 %.  GENERAL: critically ill-appearing on the ventilator.  HEENT:  Normocephalic, atraumatic.  ET tube in place.  No external oral lesions noted. NG tube in place. Ongoing drainage from his right ear  EYES: pupils equal, round, and react to light.  No conjunctival injection. anicteric  Chest: Left chest wall PPM pocket site without any erythema or drainage.  HEART: RRR, no murmur  LUNGS: clear to auscultation anteriorly.  On the ventilator with an FiO2 of 40%  ABDOMEN: Soft, nontender, nondistended. No appreciable HSM. Normal Bowel sounds  GENITAL: + Doran catheter  SKIN: No new rashes.  No jaundice.  PSYCHIATRIC: unable to assess  EXT:  No cellulitic change. No peripheral edema noted  NEURO: sedated on the ventilator.     Right upper extremity PICC line- no erythema noted at the exit site    Results Review:   I reviewed the patient's new clinical results.    Results from last 7 days   Lab Units 04/05/23  1345 04/04/23  0857 04/03/23  0648   WBC 10*3/mm3 11.19* 13.90* 13.12*   HEMOGLOBIN g/dL 12.6* 12.5* 11.3*   HEMATOCRIT % 38.4 38.5 35.8*   PLATELETS 10*3/mm3 166 152 122*     Results from last 7 days   Lab Units 04/05/23  1345   SODIUM mmol/L 146*   POTASSIUM mmol/L " 4.4   CHLORIDE mmol/L 112*   CO2 mmol/L 25.0   BUN mg/dL 33*   CREATININE mg/dL 0.62*   GLUCOSE mg/dL 132*   CALCIUM mg/dL 7.9*     Results from last 7 days   Lab Units 04/05/23  1345   ALK PHOS U/L 80   BILIRUBIN mg/dL 1.3*   ALT (SGPT) U/L 51*   AST (SGOT) U/L 27         Results from last 7 days   Lab Units 04/02/23  0838   CRP mg/dL 18.05*     Results from last 7 days   Lab Units 04/01/23  1351 03/31/23  1342   VANCOMYCIN TR mcg/mL 15.40 8.10     Results from last 7 days   Lab Units 04/05/23  1345   LACTATE mmol/L 1.9     Estimated Creatinine Clearance: 101 mL/min (A) (by C-G formula based on SCr of 0.62 mg/dL (L)).  CPK    Common Labsle 3/30/23   Creatine Kinase 57            Procalitonin Results:      Lab 04/05/23  1345 04/04/23  0459 03/30/23  1121   PROCALCITONIN 1.48* 3.13* 6.00*      Brief Urine Lab Results  (Last result in the past 365 days)      Color   Clarity   Blood   Leuk Est   Nitrite   Protein   CREAT   Urine HCG        03/30/23 1200 Yellow   Clear   Negative   Negative   Negative   Negative                Site   Date Value Ref Range Status   04/05/2023 Nurse/Dr Draw  Final     Hemoglobin, Blood Gas   Date Value Ref Range Status   04/05/2023 13.1 (L) 13.5 - 17.5 g/dL Final     Oxyhemoglobin Venous   Date Value Ref Range Status   04/05/2023 66.3 % Final     Methemoglobin Venous   Date Value Ref Range Status   04/05/2023 0.5 % Final     Carboxyhemoglobin Venous   Date Value Ref Range Status   04/05/2023 1.2 % Final     CO2 Content   Date Value Ref Range Status   04/05/2023 29.2 22 - 33 mmol/L Final     Barometric Pressure for Blood Gas   Date Value Ref Range Status   04/05/2023   Final     Comment:     N/A     Modality   Date Value Ref Range Status   04/05/2023 Ventilator  Final     FIO2   Date Value Ref Range Status   04/05/2023 50 % Final        Microbiology:  Blood Culture   Date Value Ref Range Status   03/30/2023 Abnormal Stain (C)  Preliminary     BCID, PCR   Date Value Ref Range Status    03/30/2023 (A) Negative by BCID PCR. Culture to Follow. Final    Streptococcus pneumoniae. Identification by BCID2 PCR.         Blood Culture   Date Value Ref Range Status   03/30/2023 Abnormal Stain (C)  Preliminary     BCID, PCR   Date Value Ref Range Status   03/30/2023 (A) Negative by BCID PCR. Culture to Follow. Final    Streptococcus pneumoniae. Identification by BCID2 PCR.   (      Radiology:      IMPRESSION:     Problems:  1. Streptococcus pneumoniae septicemia-due to acute otitis  2. 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  No LP done yet but given the Streptococcus pneumonia septicemia and clinical presentation, we do not need an LP.  3. Acute otitis media and right-sided mastoiditis resulting in a subdural empyema with fistulous tract between the middle ear space and the dura- Strep pneumoniae on culture  4. Acute toxic/metabolic encephalopathy-due to the above infection  5. Acute hypoxic respiratory failure, intubated for airway protection  6. leukocytosis with neutrophilia-was not present on arrival.  Could be due to infection but steroids likely contributing.  7. Hypokalemia improved   8. Hypomagnesemia, ongoing   9. tachycardia-bradycardia syndrome status post PPM- complicating factor in the setting of bacteremia as at some risk for seeding PPM leads with bacteria but streptococcus pneumoniae is an uncommon bacteria to do this. TTE was without any signs of vegetations or pacemaker lead infection  10. paroxysmal A. Fib on flecainide and Eliquis  11. coronary artery disease  12. obstructive sleep apnea on CPAP nightly at home  13. History of hypertension  14. Thrombocytopenia-improved  15. New fevers on 4/5-unclear etiology.  Could be due to ongoing strep pneumoniae infection as noted above.  vent settings are slightly worse. I will get a.m. chest x-ray to help rule out new ventilator associated pneumonia.    RECOMMENDATIONS:    -continue to follow  blood cultures-repeat blood cultures from 4/1 are no growth  -ENT is monitoring otorrhea for resolution for potential scarring as meningitis resolves.  I will defer to ENT and neurosurgery with regards to whether or not the patient requires surgical intervention.  -continue ceftriaxone 2 g IV every 12 hours  -vent settings are slightly higher this afternoon and he is having some new fevers. We will get a.m. chest x-ray     He will likely need a 6 week course of IV antibiotic therapy.    I discussed his case with Dr. Kahn today    Complex MDM    Copied text in this note has been reviewed and is accurate as of 04/05/23            Que Alonso MD  4/5/2023

## 2023-04-06 NOTE — PROGRESS NOTES
Pharmacy Consult-Vancomycin Dosing  Kai Dodson is a  82 y.o. male receiving vancomycin therapy.     Indication:  Pneumonia  Consulting Provider:  Infectious Disease  ID Consult: See above    Goal AUC: 400 - 600 mg/L*hr    Current Antimicrobial Therapy  Anti-Infectives (From admission, onward)      Ordered     Dose/Rate Route Frequency Start Stop    04/06/23 1448  vancomycin 1500 mg/500 mL 0.9% NS IVPB ([facility])        Ordering Provider: Antwon De Leon PharmD    1,500 mg  over 120 Minutes Intravenous Every 24 Hours 04/07/23 0900 04/13/23 0859    04/06/23 0818  cefepime (MAXIPIME) 2 g/100 mL 0.9% NS ([facility]p)        Ordering Provider: Que Alonso MD    2 g  over 4 Hours Intravenous Every 8 Hours 04/06/23 1800 04/13/23 1759    04/06/23 1425  vancomycin in dextrose 5% 150 mL (VANCOCIN) IVPB 750 mg        Ordering Provider: Antwon De Leon PharmD    750 mg Intravenous Once 04/06/23 1515      04/06/23 1425  Pharmacy to dose vancomycin        Ordering Provider: Antwon De Leon PharmD     Does not apply Continuous PRN 04/06/23 1424 04/13/23 1423    04/06/23 0818  cefepime (MAXIPIME) 2 g/100 mL 0.9% NS (mbp)        Ordering Provider: Que Alonso MD    2 g  200 mL/hr over 30 Minutes Intravenous Once 04/06/23 0915 04/06/23 0946    04/06/23 0819  vancomycin 1750 mg/500 mL 0.9% NS IVPB ([facility])        Ordering Provider: Que Alonso MD    1,750 mg Intravenous Once 04/06/23 0915 04/06/23 0916    03/30/23 1240  cefTRIAXone (ROCEPHIN) 2 g/100 mL 0.9% NS IVPB ([facility])        Ordering Provider: Mundo Jason MD    2 g  over 30 Minutes Intravenous Once 03/30/23 1242 03/30/23 1417    03/30/23 1231  vancomycin 1750 mg/500 mL 0.9% NS IVPB ([facility])        Ordering Provider: Que Key MD    20 mg/kg × 88.5 kg Intravenous Once 03/30/23 1233 03/30/23 1602    03/30/23 1231  ampicillin 2 g/100 mL 0.9% NS ([facility])        Ordering Provider: Que Key MD    2 g  over 30 Minutes Intravenous Once 03/30/23  "1233 03/30/23 1417    03/30/23 1231  acyclovir (ZOVIRAX) 720 mg in sodium chloride 0.9 % 250 mL IVPB        Ordering Provider: Que Key MD    10 mg/kg × 71.9 kg (Ideal)  over 60 Minutes Intravenous Once 03/30/23 1233 03/30/23 1452            Allergies  Allergies as of 03/30/2023    (No Known Allergies)       Labs    Results from last 7 days   Lab Units 04/06/23  1013 04/05/23  1345 04/04/23  0459   BUN mg/dL 29* 33* 31*   CREATININE mg/dL 0.53* 0.62* 0.66*       Results from last 7 days   Lab Units 04/05/23  1345 04/04/23  0857 04/03/23  0648   WBC 10*3/mm3 11.19* 13.90* 13.12*       Evaluation of Dosing     Last Dose Received in the ED/Outside Facility: 1750mg IV x 1 given this morning per ID recs  Is Patient on Dialysis or Renal Replacement: No    Ht - 176.5 cm (69.49\")  Wt - 86.6 kg (190 lb 14.7 oz)    Estimated Creatinine Clearance: 118.1 mL/min (A) (by C-G formula based on SCr of 0.53 mg/dL (L)).    Intake & Output (last 3 days)         04/03 0701  04/04 0700 04/04 0701  04/05 0700 04/05 0701  04/06 0700 04/06 0701  04/07 0700    P.O.        I.V. (mL/kg)  1122 (13) 465.6 (5.4) 814.2 (9.4)    Other  370 565 145    NG/GT  1288 1373 667    IV Piggyback  200 300     Total Intake(mL/kg)  2980 (34.4) 2703.6 (31.2) 1626.2 (18.8)    Urine (mL/kg/hr) 1365 (0.7) 1280 (0.6) 2150 (1) 250 (0.4)    Total Output 1365 1280 2150 250    Net -1365 +1700 +553.6 +1376.2                    Microbiology and Radiology  Microbiology Results (last 10 days)       Procedure Component Value - Date/Time    MRSA Screen, PCR (Inpatient) - Swab, Nares [[id number]]  (Abnormal) Collected: 04/06/23 0980    Lab Status: Final result Specimen: Swab from Nares Updated: 04/06/23 1109     MRSA PCR Positive    Narrative:      The negative predictive value of this diagnostic test is high and should only be used to consider de-escalating anti-MRSA therapy. A positive result may indicate colonization with MRSA and must be correlated clinically. "    Respiratory Culture - Sputum, ET Suction [[id number]] Collected: 04/06/23 0952    Lab Status: Preliminary result Specimen: Sputum from ET Suction Updated: 04/06/23 1253     Gram Stain Many (4+) WBCs per low power field      Rare (1+) Epithelial cells per low power field      Few (2+) Gram positive cocci in pairs, chains and clusters    Blood Culture - Blood, Arm, Left [[id number]]  (Normal) Collected: 04/01/23 1144    Lab Status: Final result Specimen: Blood from Arm, Left Updated: 04/06/23 1216     Blood Culture No growth at 5 days    Blood Culture - Blood, Wrist, Left [[id number]]  (Normal) Collected: 04/01/23 1129    Lab Status: Final result Specimen: Blood from Wrist, Left Updated: 04/06/23 1216     Blood Culture No growth at 5 days    Wound Culture - Drainage, Ear, Right [[id number]]  (Abnormal)  (Susceptibility) Collected: 03/30/23 1949    Lab Status: Final result Specimen: Drainage from Ear, Right Updated: 04/02/23 0801     Wound Culture Moderate growth (3+) Streptococcus pneumoniae      Moderate growth (3+) Normal Skin Yuki     Gram Stain No WBCs seen      Few (2+) Gram positive cocci in pairs      Rare (1+) Yeast      Rare (1+) Gram positive bacilli    Susceptibility        Streptococcus pneumoniae      JOAQUÍN      Ceftriaxone (Meningitis) Susceptible      Ceftriaxone (Non-meningitis) Susceptible      Levofloxacin Susceptible      Penicillin (Meningitis) Susceptible      Trimethoprim + Sulfamethoxazole Susceptible      Vancomycin Susceptible                           Anaerobic Culture - Drainage, Ear, Right [[id number]]  (Normal) Collected: 03/30/23 1949    Lab Status: Preliminary result Specimen: Drainage from Ear, Right Updated: 04/02/23 0711     Anaerobic Culture No anaerobes isolated at 3 days    Blood Culture - Blood, Arm, Left [[id number]]  (Abnormal)  (Susceptibility) Collected: 03/30/23 1339    Lab Status: Final result Specimen: Blood from Arm, Left Updated: 04/02/23 0610     Blood Culture Streptococcus  pneumoniae     Isolated from Aerobic and Anaerobic Bottles     Gram Stain Aerobic Bottle Gram positive cocci in pairs and chains      Anaerobic Bottle Gram positive cocci in pairs and chains    Susceptibility        Streptococcus pneumoniae      JOAQUÍN      Ceftriaxone (Meningitis) Susceptible      Ceftriaxone (Non-meningitis) Susceptible      Levofloxacin Susceptible      Penicillin (Meningitis) Susceptible      Vancomycin Susceptible                           Blood Culture ID, PCR - Blood, Arm, Left [[id number]]  (Abnormal) Collected: 03/30/23 1339    Lab Status: Final result Specimen: Blood from Arm, Left Updated: 03/31/23 0159     BCID, PCR Streptococcus pneumoniae. Identification by BCID2 PCR.    Blood Culture - Blood, Arm, Left [[id number]]  (Abnormal) Collected: 03/30/23 1308    Lab Status: Final result Specimen: Blood from Arm, Left Updated: 04/02/23 0610     Blood Culture Streptococcus pneumoniae     Isolated from Aerobic and Anaerobic Bottles     Gram Stain Aerobic Bottle Gram positive cocci in pairs and chains      Anaerobic Bottle Gram positive cocci in pairs and chains    Narrative:      Refer to previous blood culture collected on 03/30/2023 1339 for MICs.             Reported Vancomycin Levels                Results from last 7 days   Lab Units 04/01/23  1351 03/31/23  1342   VANCOMYCIN TR mcg/mL 15.40 8.10          [employer] AUC Calculation:    Current AUC:   TBD mg/L*hr    Predicted Steady State AUC on Current Dose: TBD mg/L*hr  _________________________________    Predicted Steady State AUC on New Dose:   <400 mg/L*hr, but I do not current projections are a good fit    Assessment/Plan:  Supplemental dose of 750mg IV x 1 now to fully load patient.  Then schedule vancomycin 1500mg IV q24h starting 4/7 at 0900.  Despite stable and low SCr, age and current urine output does not reflect appropriateness for q12h regimen.  Level check 4/8 at 0600.  PK evaluation to follow.  Pharmacy will continue to follow and  adjust dose based on renal function, drug levels, and clinical status.    Thanks,  Antwon De Leon, PharmD, BCPS, BCCCP  04/06/23  14:53 EDT

## 2023-04-07 NOTE — PROGRESS NOTES
"INTENSIVIST   PROGRESS NOTE     Hospital:  LOS: 8 days     Chief Complaint: Altered mentation    Subjective   S     Interval History: Continues to be intermittently febrile but overall hemodynamically stable.    The patient's relevant past medical, surgical and social history were reviewed and updated in Epic as appropriate.      ROS: Unable to obtain as patient intubated and sedated    Objective   O     Intake/Ouptut 24 hrs (7:00AM - 6:59 AM)  Intake & Output (last 3 days)       04/04 0701 04/05 0700 04/05 0701 04/06 0700 04/06 0701 04/07 0700 04/07 0701 04/08 0700    I.V. (mL/kg) 1122 (13) 465.6 (5.4) 1417.4 (16.4) 210.2 (2.4)    Other 370 565 445 300    NG/GT 1288 1373 1508 842    IV Piggyback 200 300 400 999.8    Total Intake(mL/kg) 2980 (34.4) 2703.6 (31.2) 3770.4 (43.5) 2352 (27.2)    Urine (mL/kg/hr) 1280 (0.6) 2150 (1) 2100 (1) 3100 (3.2)    Total Output 1280 2150 2100 3100    Net +1700 +553.6 +1670.4 -748                Medications (drips):  fentanyl 10 mcg/mL, Last Rate: 50 mcg/hr (04/07/23 0435)  heparin, Last Rate: 15 Units/kg/hr (04/07/23 1604)  Pharmacy to Dose Heparin  propofol, Last Rate: Stopped (04/07/23 0752)    Respiratory Support: Mechanical ventilation     Physical Examination:  Vital Signs: Blood pressure 175/77, pulse 70, temperature 99.9 °F (37.7 °C), temperature source Bladder, resp. rate 17, height 176.5 cm (69.49\"), weight 86.6 kg (190 lb 14.7 oz), SpO2 96 %.    General: Critically ill-appearing.  On mechanical ventilation.  ENT/Neck: Trachea midline, No masses.  ETT in place.  Chest: Clear to auscultation bilaterally, No wheezing, rhonchi, or rales. Normal work of breathing. Equal chest rise.  Appropriate oxygen saturations on MV.  Cardiac: Regular rhythm, normal rate, S1S2 auscultated. No murmurs, rubs or gallops.   Extremities: No lower extremity edema. No clubbing or cyanosis.  Neuro: Intubated on minimal sedation.  Withdraws to noxious stimuli. Not following commands. Unchanged. "     Lines, Drains & Airways     Active LDAs     Name Placement date Placement time Site Days    PICC Triple Lumen 03/31/23 Right Basilic 03/31/23  1451  Basilic  2    NG/OG Tube 16 Fr Right nostril 04/01/23  0700  Right nostril  2    Urethral Catheter Double-lumen;Temperature probe 16 Fr. 03/30/23  1503  -- 3    Hi-Lo Evac ETT 7.5 03/30/23  1437  Oral  3              Results from last 7 days   Lab Units 04/07/23  0744 04/05/23  1345 04/04/23  0857   WBC 10*3/mm3 14.21* 11.19* 13.90*   HEMOGLOBIN g/dL 11.0* 12.6* 12.5*   MCV fL 97.4* 96.2 95.8   PLATELETS 10*3/mm3 150 166 152     Results from last 7 days   Lab Units 04/07/23  0744 04/06/23  1013 04/05/23  1345 04/04/23  0459 04/03/23  1759 04/03/23  0648   SODIUM mmol/L 137 142 146* 147*  --  143   POTASSIUM mmol/L 4.6 4.6 4.4 4.6  --  3.7   CO2 mmol/L 24.0 23.0 25.0 21.0*  --  23.0   CREATININE mg/dL 0.41* 0.53* 0.62* 0.66*  --  0.69*   GLUCOSE mg/dL 132* 134* 132* 166*  --  168*   MAGNESIUM mg/dL 2.1 2.0  --   --   --  2.7*   PHOSPHORUS mg/dL  --   --   --  2.6 1.9* 1.8*     Estimated Creatinine Clearance: 152.7 mL/min (A) (by C-G formula based on SCr of 0.41 mg/dL (L)).  Results from last 7 days   Lab Units 04/05/23  1345 04/04/23  0459 04/02/23  0838   ALK PHOS U/L 80 81 65  69   BILIRUBIN mg/dL 1.3* 0.6 0.5  0.6   ALT (SGPT) U/L 51* 30 15  13   AST (SGOT) U/L 27 21 21  18     Results from last 7 days   Lab Units 04/06/23  1113 04/05/23  1350   PH, ARTERIAL pH units 7.471*  --    PCO2, ARTERIAL mm Hg 34.4*  --    PO2 ART mm Hg 81.5*  --    FIO2 % 40 50     Results: Reviewed.    I reviewed the patient's new laboratory and imaging results.  I independently reviewed the patient's new images.    Medications: Reviewed.    Assessment & Plan   A / P     Active Hospital Problems    Diagnosis    • **Encephalopathy, metabolic    • Subdural empyema    • Meningitis    • Sepsis secondary to strep bacteremia and meningitis with subdural empyema    • Streptococcal  bacteremia    • Questionable Status epilepticus (HCC)    • Paroxysmal atrial fibrillation on Flecainide and Eliquis    • MINO on CPAP    • Hypertension      Patient Ivory is an 82 y.o. male with PMH PAF on Flecainide and Eliquis, HTN, CAD, Tachy-Samuel syndrome s/p PPM and MINO on CPAP who presented to the ED with AMS, inability to speak and left sided weakness.  Found to have possible subdural empyema and meningitis related to mastoiditis/otitis media.  Patient underwent Myringotomy on the right by ENT on 3/30/2023.  Cultures from CSF fluid from right ear 3/30/2023 growing Streptococcus, blood cultures from 3/30/2023 growing strep pneumonia.  Patient was eval by neurosurgery with no neurosurgical intervention at this time.  He currently remains on mechanical ventilation.    #Encephalopathy  #Sepsis w/ multi organ dysfunction  #Bacteremia  #Acute mastoiditis   #Sudural empyema  #Meningitis  -Encephalopathy likely metabolic and due to underlying sepsis. Initial radiographic evidence of subdural empyema vs [less likely] abscess. Held off on LP on admission given that patient is chronically anticoagulated; last dose of Eliquis likely administered on 3/29/2023 AM.  At presentation covered broadly with antimicrobials for possible meningitis (i.e. vancomycin, ceftriaxone, ampicillin and acyclovir). Blood cultures later possible for strep pneumonia; infectious disease consulted and de-escalating antimicrobial therapy as able.  Suspect streptococcal meningitis as well given clinical picture and aforementioned blood cultures.  He also received dexamethasone x4 days early in hospital course.  Both neurology and neurosurgery following.  Neurosurgery evaluated again (4/04) given continued leak (presumably CSF) from right ear (which is improving).  Sent fluid for beta-2 transferrin today.  Also reevaluated imaging and noted that there was no CNS abscess visualized. No neurosurgical procedure needed at this time, including lumbar  drain for CSF diversion  -Radiographic imaging consistent with acute mastoiditis (which is likely primary issue and etiology of infection). ENT closely following s/p right myringotomy; fluid cultured [NGTD] but no tube placed.  Spoke with ENT (4/04) who continues to follow patient and notes that mastoidectomy may need to be performed pending clinical improvement-- but too critically ill at this time  -MRSA swab and respiratory culture from 4/06 (+) Staph aureus.  Antibiotic management per infectious disease.  Now on Zyvox and cefepime  -EEG performed in the ED on initial presentation; While there is diffuse cerebral dysfunction there was no evidence of epilepsy      #Acute hypoxic respiratory failure requiring intubation/mechanical ventilation  #MINO  -Currently intubated/sedated. Will adjust mechanical ventilation settings as able.  Etiology of respiratory failure likely from encephalopathy/inability to protect airway  -We will continue mechanical ventilation and wean if possible.  SBT daily if able and appropriate  -Minimize sedation is much as possible for comfort on mechanical ventilation and appropriate neurological exams  -Antibiotics broadened on 4/06 due to intermittent fevers    #Paroxysmal atrial fibrillation  -Patient has been started on home flecainide but beta-blocker held for bradycardia  -Given questionable need for LP/procedures and surgical intervention(s) earlier in admission transitioned Eliquis to heparin drip.  We will continue heparin drip at this time which is more appropriate in the ICU setting     F-Tube feeds per dietary recs  A-Fentanyl  S-Propofol  T-Heparin gtt   H-Head of bed greater than 30 degrees  U-PPI  G-FSBS per unit protocol, correction dose insulin  S-SBT assessment daily   B-Will monitor daily and provide regimen if indicated  I-PIV  D- Zyvox, cefepime    Advance Directives:   Code Status and Medical Interventions:   Ordered at: 03/30/23 1300     Code Status (Patient has no  pulse and is not breathing):    CPR (Attempt to Resuscitate)     Medical Interventions (Patient has pulse or is breathing):    Full Support     High level of risk due to severe exacerbation of chronic illness and illness with threat to life or bodily function.    I conducted multidisciplinary rounds in the plan of care was discussed with the multidisciplinary team at that time. In attendance at multidisciplinary rounds was clinical pharmacist, dietitian, nursing staff and case management.    I discussed the patient's findings and my recommendations with patient and nursing staff    -- Alvaro Jason MD  Pulmonary/Critical Care    Critical Care time spent in direct patient care: 35 minutes (excluding procedure time, if applicable) including high complexity decision making to assess, manipulate, and support vital organ system failure in this individual who has impairment of one or more vital organ systems such that there is a high probability of imminent or life threatening deterioration in the patient’s condition.

## 2023-04-07 NOTE — PROGRESS NOTES
Clinical Nutrition     Nutrition Support Assessment  Reason for Visit: Assessment, EN      Patient Name: Kai Dodson  YOB: 1940  MRN: [id number]  Date of Encounter: 04/07/23 09:07 EDT  Admission date: 3/30/2023    Comments:  Sodium trend noted. In volume overload per I/Os.  Will change free water flushes to 15ml Q2hrs.  Monitor sodium levels and just pending clinical condition.     Nutrition Assessmen t   Admission Diagnosis:  AMS (altered mental status) [R41.82]      Problem List:    Encephalopathy, metabolic    MINO on CPAP    Hypertension    Paroxysmal atrial fibrillation on Flecainide and Eliquis    Subdural empyema    Meningitis    Questionable Status epilepticus (HCC)    Sepsis secondary to strep bacteremia and meningitis with subdural empyema    Streptococcal bacteremia    Resp F      tx for Meningitis     PMH:   He  has a past medical history of Arthritis, Atrial fibrillation, Bradycardia, Chronic hypertension, Hypertension, Hypertensive cardiovascular disease (07/2015), Intermittent palpitations (05/01/2014), Lower extremity edema, Nephrolithiasis, MINO on CPAP, MINO on CPAP, Prostate troubles, and Seasonal rhinitis.    PSH:  He  has a past surgical history that includes Colonoscopy; Cardiac catheterization; Esophagogastroduodenoscopy; Cardiac electrophysiology procedure (N/A, 10/24/2018); and Insert / replace / remove pacemaker.    Applicable Nutrition Concerns:   Skin:  Oral:  GI:    Applicable Interval History:   3/30 incision/drainage from middle ear  3/30 intubated    Reported/Observed/Food/Nutrition Related History:   4/7) in volume overload, plan for diuretics.  No BM since EN started, bowel regiment started yesterday, no BM yet.  EN @ goal rate. Sedated on the vent.        4/3) Remains intubated and sedated.  EN stated on 4/1, at goal rate this morning.  No issues with intolerance with EN.  Running @ goal rate at bedside at time of visit. No family at bed  side  Bed weight: 97.7kg   Remains on propofol for sedation.     Labs    Labs Reviewed: Yes     Results from last 7 days   Lab Units 04/07/23  0744 04/06/23  1013 04/05/23  1345 04/04/23  0459 04/03/23  1759 04/03/23  0648 04/02/23  1145 04/02/23  0838   GLUCOSE mg/dL 132* 134* 132* 166*  --  168*  --  179*  174*   BUN mg/dL 28* 29* 33* 31*  --  31*  --  27*  25*   CREATININE mg/dL 0.41* 0.53* 0.62* 0.66*  --  0.69*  --  0.60*  0.57*   SODIUM mmol/L 137 142 146* 147*  --  143  --  143  143   CHLORIDE mmol/L 108* 112* 112* 113*  --  111*  --  110*  110*   POTASSIUM mmol/L 4.6 4.6 4.4 4.6  --  3.7  --  3.8  3.8   PHOSPHORUS mg/dL  --   --   --  2.6 1.9* 1.8*   < > 2.0*  1.8*   MAGNESIUM mg/dL 2.1 2.0  --   --   --  2.7*  --  2.9*   ALT (SGPT) U/L  --   --  51* 30  --   --   --  15  13    < > = values in this interval not displayed.     Results from last 7 days   Lab Units 04/05/23  1345 04/04/23  0459 04/03/23  0648 04/02/23  0838   ALBUMIN g/dL 2.6* 2.7*  --  3.0*  2.8*   PREALBUMIN mg/dL  --   --  13.6*  --    CRP mg/dL  --   --   --  18.05*   CHOLESTEROL mg/dL  --   --   --  149   TRIGLYCERIDES mg/dL  --   --   --  185*     Results from last 7 days   Lab Units 04/07/23  0502 04/06/23  2327 04/06/23  1720 04/06/23  1116 04/06/23  0603 04/05/23  2306   GLUCOSE mg/dL 114 122 140* 140* 133* 120     Lab Results   Lab Value Date/Time    HGBA1C 5.40 03/30/2023 1121    HGBA1C 5.2 07/01/2015 0436             Medications    Medications Reviewed: Yes  Pertinent: colace, insulin, keppra, protonix, miralax, senokot   Infusion: Fentanyl 50mcg/hr, heparin 14units/kg/hr.   PRN: zofran    Intake/Ouptut 24 hrs (0701 - 0700)   I&O's Reviewed: Yes     Intake & Output (last day)       04/06 0701 04/07 0700 04/07 0701 04/08 0700    I.V. (mL/kg) 1417.4 (16.4) 65.9 (0.8)    Other 445 30    NG/GT 1508 447    IV Piggyback 400 100    Total Intake(mL/kg) 3770.4 (43.5) 642.9 (7.4)    Urine (mL/kg/hr) 2100 (1) 175 (1)    Total Output  "2100 175    Net +1670.4 +467.9              Last documented BM (3/31)   Anthropometrics     Admission Height 176.5 cm (69.5\") Documented at 03/30/2023 1116   Admission Weight 88.5 kg (195 lb) Documented at 03/30/2023 1116     Height: Height: 176.5 cm (69.49\")  Last Filed Weight: Weight: 86.6 kg (190 lb 14.7 oz) (04/02/23 0600)  Method: Weight Method: Bed scale  BMI: BMI (Calculated): 27.8  BMI classification: Overweight: 25.0-29.9kg/m2     UBW: per EMR rpt of 195 lbs on 10/11/23 further data req.  Weight change:     Nutrition Focused Physical Exam     Date:     Unable to perform exam due to: Defer pending indication    Needs Assessment   Date: 4/3    Height used:Height: 176.5 cm (69.49\")  Weights used: 86.1 Kg bed wt     Estimated Calorie needs: ~ 1700 Kcal/day  Method:  Kcals/KG 20 = 1722  Method:  PSU on wt 86.1 temp 36.8, VE 6.33 =1626     Estimated Protein needs: ~ 112 g PRO/day  Method: g/Kg1.3    Estimated Fluid needs: ~ ml/day   Per clinical status    Current Nutrition Prescription     PO: NPO Diet NPO Type: Strict NPO, Tube Feeding  Oral Nutrition Supplement:   Intake: N/A     EN: Peptamen AF  Goal Rate:  55ml/hr Water Flushes: 30ml Qhrs   Modular: Prosource-no carb 2/day  Route: NG  Tube: Large bore    At goal over: 20Hrs/day    Rx will supply:   Goal Volume 1100 mL/day     Flush Volume 600 mL/day     Energy 1320 Kcal/day 77 % Est Need   Protein 84 g/day 75 % Est Need   Fiber 6.6 g/day     Water in   mL     Total Water 1492 mL     Meet DRI No        --------------------------------------------------------------------------  Product/Rate verified at bedside: Yes  Infusing Rate at time of visit: @ 55ml/hr, free water @ 30ml/hr     Average Delivery from Charting: 3 Days:  Volume 1270 mL/day 115  % Goal Vol.   Flush Volume 460 mL/day     Energy 1803 Kcal/day 106 % Est Need   Protein 97 g/day 87 % Est Need   Fiber 7.6 g/day     Water in  EN 1030 mL     Total Water 1490 mL     Meet DRI No        + average " of 253ml Propofol past 3 days.   Nutrition Diagnosis   Date: 3/31 Updated:  4/3  Problem Inadequate oral intake   Etiology Mechanical ventilation   Signs/Symptoms NPO   Status: EN started 4/1; at goal rate this morning.       Goal:   General: Nutrition support treatment  PO: N/A  EN/PN: Adjust EN no longer on propofol     Nutrition Intervention      Follow treatment progress, Care plan reviewed, EN rec prepared    To better meet needs while on Propfol, will change EN to the following:   EN: Peptamen AF  Goal Rate: 70 ml/hr    Water Flushes: 30 ml ev 2 hr  Modular: None  Route: NG   Tube: Large bore    At goal over: 20Hrs/day    Rx will supply:   Goal Volume 1400 mL/day     Flush Volume 300 mL/day     Energy 1680 Kcal/day 98 % Est Need   Protein 106 g/day 95 % Est Need   Fiber 8.4 g/day     Water in  EN 1135 mL     Total Water 1435 mL     Meet DRI Yes        -  Monitoring/Evaluation:   Per protocol, I&O, Pertinent labs, Weight, Symptoms, POC/GOC      Nola Davila RD  Time Spent: 45 min     stable for discharge

## 2023-04-07 NOTE — PROGRESS NOTES
"    INFECTIOUS DISEASE PROGRESS NOTE    Kai Dodson  1940  [id number]    Date of consult: 3/31/23  Admit date: 3/30/2023    Requesting Provider: Dr. Jason  Evaluating physician: Que Alonso MD  Reason for Consultation: meningitis  Chief Complaint: altered mental status      Subjective   History of present illness:  aKi Dodson is a  82 y.o.  Yr old male with a past medical history of hypertension, coronary artery disease, paroxysmal A. Fib on flecainide and Eliquis, tachycardia-bradycardia syndrome status post PPM and obstructive sleep apnea on CPAP who presented to the ER yesterday with encephalopathy, inability to speak, and left-sided weakness.  Due to the patient's mental state and intubated status, history is obtained from medical records.  The patient's wife had just been discharged yesterday morning from [facility] and their son was driving the patient's wife back to [facility] for inpatient rehab.  The patient's son tried to call him and could not reach him.  Other family members went to check on him and found him altered with tremors in all the extremities.  EMS was called and he was transported to the ER.    Since arrival, T-max has been 100.9°F.  He was initially tachycardic up to 112 bpm but this is improved. He had some hypotension but this has improved. The patient was intubated for airway protection and remains on the ventilator with an FiO2 of 35%. Initial lactic acid was 6.6, pro-calcitonin was 6.0, hemoglobin A1c was 5.4, [place] has been normal.  LFTs are within normal limits. White blood cell count was normal on arrival at 9.63, has now trended up to 14.14. platelet count has decreased to 109.sensitivity troponin was slightly elevated at 17. Urine drug screen was negative. 2 sets of admission blood cultures are growing GPC's in pairs and chains identified as Streptococcus pneumoniae by the BCID2 PCR. The patient underwent CTA head and neck that showed \"no " "evidence of intracranial hemorrhage or definite acute territorial ischemia.  There is abnormal intracranial gas present, appearing likely extra-axial in the lateral aspect of the right cerebellar hemisphere, with changes of the right mastoid present, concerning for mastoiditis and possibly subdural empyema or less likely parenchymal abscess.\" \"Dedicated CT of the temporal bones demonstrates findings of likely acute otomastoiditis on the right, with middle ear and right mastoid effusion, coalescent changes of the right mastoid and dehiscence of both the tegmen tympani and tegmen mastoideum. There is again concern for intracranial infectious involvement, empyema versus abscess with foci of intracranial gas present.\" chest x-ray was without any acute disease. MRI brain showed \"no evidence of right temporal lobe abscess.  Findings consistent with diffuse meningitis.  There is right otomastoiditis as seen on the CT of the temporal bones.  There are signal voids corresponding to gas bubbles within the CSF in the upper right posterior cranial fossa.\"  ENT has been consulted and Dr. Garcia evaluated the patient with right myringotomy procedure performed at bedside in the ICU. Wide myringotomy was made in the posterior aspect of the tympanic membrane.  A small amount of purulence was expressed and cultured.  After that CSF was draining through the myringotomy.  A tube was therefore not placed.  Neurosurgery has evaluated the patient and recommended that there is no role for neurosurgical intervention at this time. The patient has been started on empiric acyclovir, ampicillin, ceftriaxone 2 g IV every 12 hours, and IV vancomycin by the ICU team.  He has additionally been placed on dexamethasone.  ID has been consultated for antibiotic recommendations.    Subjective:    4/1/23: The patient remains critically ill on the ventilator.  No history is available from him.  He does have some fluid leaking from his right ear, presumably " CSF. No fevers.  Ventilator settings remain stable.  Leukocytosis is worse but likely steroid induced.    4/2/23:  He remains afebrile and on 35% FIO2.  WBC 12.29 this am.  Remains on Dexamethasone.     4/3/23: no history available from the patient.  He remains on the ventilator with an FiO2 of 35%.  No fevers.  Continued CSF leak from the right ear.    4/4/23: Continues to have some drainage from his right ear.  Remains critically ill on the ventilator with an FiO2 of 35%.  No fevers. Neurosurgery has reevaluated the patient and does not think that there is a role for CSF diversion or need for any neurosurgical intervention at this time.  May need mastoidectomy and packing of the mastoid air cells per neurosurgery.    4/5/23: the patient is still critically ill on the ventilator.  He did have some fevers 100.9°F after I saw him today. Vent settings are also slightly worse with an FiO2 of 50%.  Still having some drainage from his right ear.  No current surgical plans per ENT or neurosurgery.  No history is available from the patient.  No family at bedside.    4/6/23: The patient had ongoing fevers up to 101.3°F overnight.  Vent settings were slightly worse overnight with an FiO2 of 50% but slightly better today at 40% when I went by his room.  He is having increased respiratory secretions per nursing staff.  Ongoing drainage from his right ear.  No history available from the patient is a remains on the ventilator and sedated.    4/7/23: The patient remains intubated on the ventilator.  No history available from the patient.  T-max was 100.6°F overnight.  FiO2 has improved to 35%.  Still having significant respiratory secretions per nursing.  Not having as much drainage from his right ear per nursing. He has experienced some weeping from his left hand which is very edematous.    Past Medical History:   Diagnosis Date   • Arthritis    • Atrial fibrillation    • Bradycardia    • Chronic hypertension     Probably  essential   • Hypertension    • Hypertensive cardiovascular disease 07/2015    Recent progressive CCS class III-IV chest pain syndrome with normal EKG and exertional dyspnea and fatigue with normal codominant coronary arteries and preserved systolic  left ventricular function, July 2015.   • Intermittent palpitations 05/01/2014    Remote abnormal 24-hour Holter monitor demonstrating intermittent bradycardia with occasional PACs, May 2014.   • Lower extremity edema     Intermittent lower extremity edema felt secondary to antihypertensive therapy.    • Nephrolithiasis     Remote- 15 years ago   • MINO on CPAP    • MINO on CPAP     doesn't know setting    • Prostate troubles     enlarged   • Seasonal rhinitis     intermittent       Past Surgical History:   Procedure Laterality Date   • CARDIAC CATHETERIZATION      no stents   • CARDIAC ELECTROPHYSIOLOGY PROCEDURE N/A 10/24/2018    Procedure: Pacemaker DC new with Biotronik. Hold Eliquis one day prior.;  Surgeon: Harley Dacosta MD;  Location: [facility] INVASIVE LOCATION;  Service: Cardiology   • COLONOSCOPY     • ENDOSCOPY     • INSERT / REPLACE / REMOVE PACEMAKER         Pediatric History   Patient Parents   • Not on file     Other Topics Concern   • Not on file   Social History Narrative    PT DRINKS 2 SERVINGS OF CAFFEINE PER DAY.    PT LIVES AT HOME WITH WIFE.       family history includes No Known Problems in his mother; Other in his father.    No Known Allergies    There is no immunization history for the selected administration types on file for this patient.    Medication:    Current Facility-Administered Medications:   •  acetaminophen (TYLENOL) 160 MG/5ML solution 650 mg, 650 mg, Oral, Q6H PRN, Amadou Mercedes, FORREST, 650 mg at 04/07/23 0823  •  aspirin chewable tablet 81 mg, 81 mg, Nasogastric, Daily, 81 mg at 04/07/23 0823 **OR** aspirin suppository 300 mg, 300 mg, Rectal, Daily, Tereza Bautista APRN  •  Calcium Replacement - Follow Nurse /  BPA Driven Protocol, , Does not apply, PRN, Natalia Cruz APRN  •  cefepime (MAXIPIME) 2 g/100 mL 0.9% NS (mbp), 2 g, Intravenous, Q8H, Que Alonso MD, 2 g at 04/07/23 1115  •  chlorhexidine (PERIDEX) 0.12 % solution 15 mL, 15 mL, Mouth/Throat, Q12H, Mundo Jason MD, 15 mL at 04/07/23 0823  •  dextrose (D50W) (25 g/50 mL) IV injection 25 g, 25 g, Intravenous, Q15 Min PRN, Amadou Mercedes APRN  •  sennosides (SENOKOT) 8.8 MG/5ML syrup 10 mL, 10 mL, Nasogastric, BID, 10 mL at 04/07/23 0823 **AND** docusate sodium (COLACE) liquid 100 mg, 100 mg, Nasogastric, BID, Mundo Jason MD, 100 mg at 04/07/23 0823  •  fentaNYL (SUBLIMAZE) bolus from bag 10 mcg/mL 50 mcg, 50 mcg, Intravenous, Q30 Min PRN, Mundo Jason MD, 50 mcg at 03/31/23 1911  •  fentaNYL 2500 mcg/250 mL NS infusion,  mcg/hr, Intravenous, Titrated, Mundo Jason MD, Last Rate: 5 mL/hr at 04/07/23 0435, 50 mcg/hr at 04/07/23 0435  •  flecainide (TAMBOCOR) tablet 50 mg, 50 mg, Nasogastric, Q12H, Marcelino Hastings DO, 50 mg at 04/07/23 0823  •  glucagon (GLUCAGEN) injection 1 mg, 1 mg, Intramuscular, Q15 Min PRN, Amadou Mercedes APRN  •  heparin 91153 units/250 mL (100 units/mL) in 0.45 % NaCl infusion, 15 Units/kg/hr, Intravenous, Titrated, Antwon De Leon PharmD, Last Rate: 12.99 mL/hr at 04/07/23 1604, 15 Units/kg/hr at 04/07/23 1604  •  insulin regular (humuLIN R,novoLIN R) injection 0-7 Units, 0-7 Units, Subcutaneous, Q6H, Amadou Mercedes, APRN, 2 Units at 04/07/23 1204  •  levETIRAcetam in NaCl 0.82% (KEPPRA) IVPB 500 mg, 500 mg, Intravenous, Q12H, Meena Burks, APRN, 500 mg at 04/07/23 0543  •  Linezolid (ZYVOX) 600 mg 300 mL, 600 mg, Intravenous, Q12H, Que Alonso MD, Last Rate: 300 mL/hr at 04/07/23 1003, 600 mg at 04/07/23 1003  •  losartan (COZAAR) tablet 50 mg, 50 mg, Nasogastric, Q24H, Marcelino Hastings DO, 50 mg at 04/07/23 0823  •  Magnesium Standard Dose Replacement - Follow  "Nurse / BPA Driven Protocol, , Does not apply, PRN, Natalia Cruz APRN  •  ondansetron (ZOFRAN) injection 4 mg, 4 mg, Intravenous, Q6H PRN, Antonietta Phillips APRN  •  pantoprazole (PROTONIX) injection 40 mg, 40 mg, Intravenous, Q AM, Antonietta Phillips APRN, 40 mg at 04/07/23 0543  •  Pharmacy to Dose Heparin, , Does not apply, Continuous PRN, Marcelino Hastings,   •  Phosphorus Replacement - Follow Nurse / BPA Driven Protocol, , Does not apply, PRN, Natalia Cruz APRN  •  polyethylene glycol (MIRALAX) packet 17 g, 17 g, Nasogastric, Daily, Mundo Jason MD, 17 g at 04/07/23 0823  •  Potassium Replacement - Follow Nurse / BPA Driven Protocol, , Does not apply, PRN, Natalia Cruz APRN  •  propofol (DIPRIVAN) infusion 10 mg/mL 100 mL, 5-50 mcg/kg/min, Intravenous, Titrated, Antonietta Phillips APRN, Stopped at 04/07/23 0752  •  sodium chloride 0.9 % flush 10 mL, 10 mL, Intravenous, Q12H, Tereza Bautista APRN, 10 mL at 04/07/23 0823  •  sodium chloride 0.9 % flush 10 mL, 10 mL, Intravenous, PRN, Tereza Bautista APRN  •  sodium chloride 0.9 % infusion 40 mL, 40 mL, Intravenous, PRN, Tereza Bautista APRN    Please refer to the medical record for a full medication list    Review of Systems:  Unable to obtain a 12 point review of systems from the patient given his clinical state    Physical Exam:   Vital Signs   Temp:  [99.9 °F (37.7 °C)-100.6 °F (38.1 °C)] 99.9 °F (37.7 °C)  Heart Rate:  [58-79] 77  Resp:  [17-20] 17  BP: (130-189)/() 170/86  FiO2 (%):  [35 %-40 %] 35 %    Temp  Min: 99.9 °F (37.7 °C)  Max: 100.6 °F (38.1 °C)  BP  Min: 130/60  Max: 189/80  Pulse  Min: 58  Max: 79  Resp  Min: 17  Max: 20  SpO2  Min: 95 %  Max: 99 %    Blood pressure 170/86, pulse 77, temperature 99.9 °F (37.7 °C), temperature source Bladder, resp. rate 17, height 176.5 cm (69.49\"), weight 86.6 kg (190 lb 14.7 oz), SpO2 97 %.  GENERAL: critically " ill-appearing on the ventilator.  HEENT:  Normocephalic, atraumatic.  ET tube in place.  No external oral lesions noted. NG tube in place. No drainage noted from his right ear today  Chest: Left chest wall PPM pocket site without any erythema or drainage.  HEART: RRR, no murmur  LUNGS: ventilator breath sounds bilaterally.  On the ventilator with an FiO2 of 35%  ABDOMEN: Soft, nontender, nondistended. No appreciable HSM.   GENITAL: + Doran catheter  SKIN: No new rashes.  No jaundice.  PSYCHIATRIC: unable to assess  EXT:  No cellulitic change. No peripheral edema noted. Has some weeping from his left hand which is very edematous.  NEURO: sedated on the ventilator.     Right upper extremity PICC line- no erythema noted at the exit site    Results Review:   I reviewed the patient's new clinical results.    Results from last 7 days   Lab Units 04/07/23  0744 04/05/23  1345 04/04/23  0857   WBC 10*3/mm3 14.21* 11.19* 13.90*   HEMOGLOBIN g/dL 11.0* 12.6* 12.5*   HEMATOCRIT % 34.0* 38.4 38.5   PLATELETS 10*3/mm3 150 166 152     Results from last 7 days   Lab Units 04/07/23  0744   SODIUM mmol/L 137   POTASSIUM mmol/L 4.6   CHLORIDE mmol/L 108*   CO2 mmol/L 24.0   BUN mg/dL 28*   CREATININE mg/dL 0.41*   GLUCOSE mg/dL 132*   CALCIUM mg/dL 8.1*     Results from last 7 days   Lab Units 04/05/23  1345   ALK PHOS U/L 80   BILIRUBIN mg/dL 1.3*   ALT (SGPT) U/L 51*   AST (SGOT) U/L 27         Results from last 7 days   Lab Units 04/02/23  0838   CRP mg/dL 18.05*     Results from last 7 days   Lab Units 04/01/23  1351   VANCOMYCIN TR mcg/mL 15.40     Results from last 7 days   Lab Units 04/06/23  1013   LACTATE mmol/L 1.6     Estimated Creatinine Clearance: 152.7 mL/min (A) (by C-G formula based on SCr of 0.41 mg/dL (L)).  CPK    Common Labsle 3/30/23   Creatine Kinase 57            Procalitonin Results:      Lab 04/05/23  1345 04/04/23  0459   PROCALCITONIN 1.48* 3.13*      Brief Urine Lab Results  (Last result in the past 365  days)      Color   Clarity   Blood   Leuk Est   Nitrite   Protein   CREAT   Urine HCG        03/30/23 1200 Yellow   Clear   Negative   Negative   Negative   Negative                Site   Date Value Ref Range Status   04/06/2023 Right Radial  Final     Juan A's Test   Date Value Ref Range Status   04/06/2023 N/A  Final     pH, Arterial   Date Value Ref Range Status   04/06/2023 7.471 (H) 7.350 - 7.450 pH units Final     Comment:     83 Value above reference range     pCO2, Arterial   Date Value Ref Range Status   04/06/2023 34.4 (L) 35.0 - 45.0 mm Hg Final     Comment:     84 Value below reference range     pO2, Arterial   Date Value Ref Range Status   04/06/2023 81.5 (L) 83.0 - 108.0 mm Hg Final     Comment:     84 Value below reference range     HCO3, Arterial   Date Value Ref Range Status   04/06/2023 25.1 20.0 - 26.0 mmol/L Final     Base Excess, Arterial   Date Value Ref Range Status   04/06/2023 1.7 0.0 - 2.0 mmol/L Final     Hemoglobin, Blood Gas   Date Value Ref Range Status   04/06/2023 11.4 (L) 13.5 - 17.5 g/dL Final     Comment:     84 Value below reference range     Hematocrit, Blood Gas   Date Value Ref Range Status   04/06/2023 35.1 (L) 38.0 - 51.0 % Final     Oxyhemoglobin   Date Value Ref Range Status   04/06/2023 95.9 94 - 99 % Final     Oxyhemoglobin Venous   Date Value Ref Range Status   04/05/2023 66.3 % Final     Methemoglobin   Date Value Ref Range Status   04/06/2023 0.30 0.00 - 1.50 % Final     Carboxyhemoglobin   Date Value Ref Range Status   04/06/2023 1.0 0 - 2 % Final     CO2 Content   Date Value Ref Range Status   04/06/2023 26.1 22 - 33 mmol/L Final     Barometric Pressure for Blood Gas   Date Value Ref Range Status   04/06/2023   Final     Comment:     N/A     Modality   Date Value Ref Range Status   04/06/2023 Ventilator  Final     FIO2   Date Value Ref Range Status   04/06/2023 40 % Final        Microbiology:  Blood Culture   Date Value Ref Range Status   03/30/2023 Abnormal Stain (C)   Preliminary     BCID, PCR   Date Value Ref Range Status   03/30/2023 (A) Negative by BCID PCR. Culture to Follow. Final    Streptococcus pneumoniae. Identification by BCID2 PCR.         Blood Culture   Date Value Ref Range Status   03/30/2023 Abnormal Stain (C)  Preliminary     BCID, PCR   Date Value Ref Range Status   03/30/2023 (A) Negative by BCID PCR. Culture to Follow. Final    Streptococcus pneumoniae. Identification by BCID2 PCR.   (      Radiology:    I independently read his chest x-ray from 4/7/23-left lower lobe pneumonia appears to be improving    IMPRESSION:     Problems:  1. Streptococcus pneumoniae septicemia-due to acute otitis  2. 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  No LP done yet but given the Streptococcus pneumonia septicemia and clinical presentation, we do not need an LP.  3. Acute otitis media and right-sided mastoiditis resulting in a subdural empyema with fistulous tract between the middle ear space and the dura- Strep pneumoniae on culture  4. Acute toxic/metabolic encephalopathy-due to the above infection  5. Acute hypoxic respiratory failure, intubated for airway protection  6. leukocytosis with neutrophilia-was not present on arrival.  Could be due to infection but steroids likely contributing.  7. Hypokalemia improved   8. Hypomagnesemia, ongoing   9. tachycardia-bradycardia syndrome status post PPM- complicating factor in the setting of bacteremia as at some risk for seeding PPM leads with bacteria but streptococcus pneumoniae is an uncommon bacteria to do this. TTE was without any signs of vegetations or pacemaker lead infection  10. paroxysmal A. Fib on flecainide and Eliquis  11. coronary artery disease  12. obstructive sleep apnea on CPAP nightly at home  13. History of hypertension  14. Thrombocytopenia-improved  15. New fevers starting on 4/5-suspect due to pneumonia-ongoing  16. Left-sided pneumonia-new.  Suspect MRSA but  a gram-negative lexis that is resistant to ceftriaxone as possible.  He did have GPC's in pairs, chains, and clusters on Gram stain and his MRSA PCR of the naris was positive.    RECOMMENDATIONS:    -follow repeat blood cultures and respiratory culture.  Gram stain from respiratory culture with GPC's in pairs, chains, and clusters-culture still in progress but growing staph aureus  -ENT and neurology are following.  -continue cefepime 2 g IV every 8 hours for broader gram-negative lexis pneumonia coverage.  This will also cover his strep pneumo  -stopped IV vancomycin and switched to linezolid     He will likely need a 6 week course of IV antibiotic therapy.    I discussed with nursing at bedside today    Complex MDM.     Copied text in this note has been reviewed and is accurate as of 04/07/23            Que Alonso MD  4/7/2023

## 2023-04-07 NOTE — PROGRESS NOTES
[facility]    Progress Note    Patient Name: Kai Dodson  : 1940  MRN: [id number]  Primary Care Physician:  Derick Phillip MD  Date of admission: 3/30/2023    Subjective     Chief Complaint: Meningitis/sepsis    History of Present Illness .  Patient experienced no acute events overnight.  Patient still requiring mechanical ventilation.    Review of Systems   Unable to assess due to mechanical ventilation    Objective     Physical Exam  Vitals and nursing note reviewed.   Constitutional:       General: He is not in acute distress.     Appearance: He is ill-appearing.      Interventions: He is intubated.   Eyes:      Pupils: Pupils are equal, round, and reactive to light.   Pulmonary:      Effort: He is intubated.   Neurological:      Mental Status: He is lethargic.      Deep Tendon Reflexes: Reflexes are normal and symmetric.      Comments: Patient sedated minimally responsive to pain.           Vitals:   Temp:  [99.9 °F (37.7 °C)-100.6 °F (38.1 °C)] 99.9 °F (37.7 °C)  Heart Rate:  [58-75] 69  Resp:  [17-20] 17  BP: (130-189)/() 158/72  FiO2 (%):  [35 %-40 %] 35 %    Current Medications    Current Facility-Administered Medications:   •  acetaminophen (TYLENOL) 160 MG/5ML solution 650 mg, 650 mg, Oral, Q6H PRN, Amadou Mercedes, FORREST, 650 mg at 23 0823  •  aspirin chewable tablet 81 mg, 81 mg, Nasogastric, Daily, 81 mg at 23 0823 **OR** aspirin suppository 300 mg, 300 mg, Rectal, Daily, Tereza Bautista, FORREST  •  Calcium Replacement - Follow Nurse / BPA Driven Protocol, , Does not apply, PRN, Natalia Cruz, APRN  •  cefepime (MAXIPIME) 2 g/100 mL 0.9% NS (mbp), 2 g, Intravenous, Q8H, Que Alonso MD, 2 g at 23 1115  •  chlorhexidine (PERIDEX) 0.12 % solution 15 mL, 15 mL, Mouth/Throat, Q12H, Mundo Jason MD, 15 mL at 23 0823  •  dextrose (D50W) (25 g/50 mL) IV injection 25 g, 25 g, Intravenous, Q15 Min PRN, Amadou Mercedes  THIERNO, APRN  •  sennosides (SENOKOT) 8.8 MG/5ML syrup 10 mL, 10 mL, Nasogastric, BID, 10 mL at 04/07/23 0823 **AND** docusate sodium (COLACE) liquid 100 mg, 100 mg, Nasogastric, BID, Mundo Jason MD, 100 mg at 04/07/23 0823  •  fentaNYL (SUBLIMAZE) bolus from bag 10 mcg/mL 50 mcg, 50 mcg, Intravenous, Q30 Min PRN, Mundo Jason MD, 50 mcg at 03/31/23 1911  •  fentaNYL 2500 mcg/250 mL NS infusion,  mcg/hr, Intravenous, Titrated, Mundo Jason MD, Last Rate: 5 mL/hr at 04/07/23 0435, 50 mcg/hr at 04/07/23 0435  •  flecainide (TAMBOCOR) tablet 50 mg, 50 mg, Nasogastric, Q12H, Marcelino Hastings DO, 50 mg at 04/07/23 0823  •  glucagon (GLUCAGEN) injection 1 mg, 1 mg, Intramuscular, Q15 Min PRN, Amadou Mercedes, APRN  •  heparin 40087 units/250 mL (100 units/mL) in 0.45 % NaCl infusion, 14 Units/kg/hr, Intravenous, Titrated, Dalton Hurt, PharmD, Last Rate: 12.12 mL/hr at 04/07/23 0436, 14 Units/kg/hr at 04/07/23 0436  •  insulin regular (humuLIN R,novoLIN R) injection 0-7 Units, 0-7 Units, Subcutaneous, Q6H, Amadou Mercedes, APRN, 2 Units at 04/07/23 1204  •  levETIRAcetam in NaCl 0.82% (KEPPRA) IVPB 500 mg, 500 mg, Intravenous, Q12H, Meena Burks, APRN, 500 mg at 04/07/23 0543  •  Linezolid (ZYVOX) 600 mg 300 mL, 600 mg, Intravenous, Q12H, Que Alonso MD, Last Rate: 300 mL/hr at 04/07/23 1003, 600 mg at 04/07/23 1003  •  losartan (COZAAR) tablet 50 mg, 50 mg, Nasogastric, Q24H, Marcelino Hastings, DO, 50 mg at 04/07/23 0823  •  Magnesium Standard Dose Replacement - Follow Nurse / BPA Driven Protocol, , Does not apply, PRN, Natalia Cruz, APRN  •  ondansetron (ZOFRAN) injection 4 mg, 4 mg, Intravenous, Q6H PRN, Antonietta Phillips APRN  •  pantoprazole (PROTONIX) injection 40 mg, 40 mg, Intravenous, Q AM, Antonietta Phillips APRN, 40 mg at 04/07/23 0543  •  Pharmacy to Dose Heparin, , Does not apply, Continuous PRN, Marcelino Hastings,  DO  •  Phosphorus Replacement - Follow Nurse / BPA Driven Protocol, , Does not apply, PRN, Natalia Cruz, APRN  •  polyethylene glycol (MIRALAX) packet 17 g, 17 g, Nasogastric, Daily, Mundo Jason MD, 17 g at 04/07/23 0823  •  Potassium Replacement - Follow Nurse / BPA Driven Protocol, , Does not apply, PRN, Natalia Cruz, APRN  •  propofol (DIPRIVAN) infusion 10 mg/mL 100 mL, 5-50 mcg/kg/min, Intravenous, Titrated, Antonietta Phillips APRN, Stopped at 04/07/23 0752  •  ProSource No Carb oral solution 30 mL, 30 mL, Nasogastric, BID, Marcelino Hastings, DO, 30 mL at 04/07/23 0828  •  sodium chloride 0.9 % flush 10 mL, 10 mL, Intravenous, Q12H, Tereza Bautista APRN, 10 mL at 04/07/23 0823  •  sodium chloride 0.9 % flush 10 mL, 10 mL, Intravenous, PRN, Tereza Bautista, APRN  •  sodium chloride 0.9 % infusion 40 mL, 40 mL, Intravenous, PRN, Tereza Bautista APRN    Laboratory Results:   Lab Results   Component Value Date    GLUCOSE 132 (H) 04/07/2023    CALCIUM 8.1 (L) 04/07/2023     04/07/2023    K 4.6 04/07/2023    CO2 24.0 04/07/2023     (H) 04/07/2023    BUN 28 (H) 04/07/2023    CREATININE 0.41 (L) 04/07/2023    EGFRIFNONA 67 10/16/2018    BCR 68.3 (H) 04/07/2023    ANIONGAP 5.0 04/07/2023     Lab Results   Component Value Date    WBC 14.21 (H) 04/07/2023    HGB 11.0 (L) 04/07/2023    HCT 34.0 (L) 04/07/2023    MCV 97.4 (H) 04/07/2023     04/07/2023     Lab Results   Component Value Date    CHOL 149 04/02/2023    CHOL 100 03/31/2023     Lab Results   Component Value Date    HDL 54 03/31/2023    HDL 48 07/01/2015     Lab Results   Component Value Date    LDL 30 03/31/2023    LDL 83 07/01/2015     Lab Results   Component Value Date    TRIG 185 (H) 04/02/2023    TRIG 77 03/31/2023    TRIG 108 07/01/2015     Lab Results   Component Value Date    HGBA1C 5.40 03/30/2023     Lab Results   Component Value Date    INR 1.14 (H) 04/02/2023    INR 1.5 (H)  03/30/2023    INR 1.02 06/30/2015    PROTIME 14.5 (H) 04/02/2023    PROTIME 17.9 (H) 03/30/2023    PROTIME 10.7 06/30/2015     No results found for: FOLATE  No results found for: [id number]    MRI Brain With & Without Contrast    Result Date: 3/30/2023  MRI BRAIN W WO CONTRAST Date of Exam: 3/30/2023 3:32 PM EDT Indication: History of a right sided acute otomastoiditis with concern of an empyema versus abscess in the right temporal lobe.  Clinically the patient has a history of falls and confusion  Comparison: CT the head and CT of the temporal bones performed on 3/30/2023. Technique:  Routine multiplanar/multisequence sequence images of the brain were obtained before and after the uneventful administration of  18 cc of Multihance. Findings: There is no evidence of a right temporal lobe abscess. There are signal voids within the CSF in the upper right posterior cranial fossa consistent with the soft tissue gas as seen on the CT study. There is diffuse meningeal enhancement. This includes enhancement of the  meninges along the brainstem. This is consistent with meningitis. There is abnormal fluid/mucosal thickening in the right mastoid sinus and middle ear cavity with the associated enhancement consistent with acute otomastoiditis. The patient has chronic volume loss. There is prominence of the sulci, fissures, ventricles, and basal cisterns. There are abnormal subcortical and periventricular white matter signal changes consistent with the chronic microvascular ischemia. There are normal signal voids within the intracranial arteries and the major dural sinuses. There is no acute hemorrhage or midline shift. There is no restricted diffusion to indicate acute ischemia. The orbital contents are normal. The paranasal and left mastoid sinuses are clear.     Impression: Impression: 1. No evidence of a right temporal lobe abscess. 2. Findings consistent with diffuse meningitis. There is right otomastoiditis as seen on the  CT of the temporal bones. There are signal voids corresponding to gas bubbles within the CSF in the upper right posterior cranial fossa. 3. Volume loss secondary to cerebral atrophy. 4. Chronic microvascular ischemia. Electronically Signed: Juan Shan  3/30/2023 5:48 PM EDT  Workstation ID: [id number]       Assessment / Plan     Active Hospital Problems:    Encephalopathy, metabolic    MINO on CPAP    Hypertension    Paroxysmal atrial fibrillation on Flecainide and Eliquis    Subdural empyema    Meningitis    Questionable Status epilepticus (HCC)    Sepsis secondary to strep bacteremia and meningitis with subdural empyema    Streptococcal bacteremia       Brief Patient Summary:  Kai Dodson is a 82 y.o. male who has a past medical history of PAF on flecainide and Eliquis, HTN, CAD, tachybradycardia syndrome s/p PPM for and MINO who presented to [facility] ED for altered mental status and left-sided weakness.  MRI revealed no right temporal lobe abscess but did find right otomastoiditis with signs of gas bubbles in CSF in the right upper posterior cranial fossa which was consistent with diffuse meningitis.  EEG showed no ongoing seizure-like activity was positive for diffuse cerebral dysfunction of moderate degree.  ENT performed a bedside right myringotomy procedure in which a small amount of purulent fluid was removed and cultured.  CSF was thought to be draining from the myringotomy site therefore tube was not placed.  Neurosurgery was consulted and recommended no surgical intervention at this time.  ID recommended cefepime 2 g every 8 hours and IV vancomycin.    Plan:   Streptococcal meningitis   Mastoiditis with possible CSF leak  Sepsis  1.  Continue antibiotics per IDs recommendations.  2.  Continue to wean sedation as tolerated by patient.  3.  General neurology will continue to follow      I have discussed the above with the patient, bedside RN, Dr. Jason  Time spent with patient: 35 minutes in face-to-face  evaluation and management of the patient.      Tarsha Rubin, APRN

## 2023-04-07 NOTE — CASE MANAGEMENT/SOCIAL WORK
Continued Stay Note  [facility]     Patient Name: Kai Dodson  MRN: [id number]  Today's Date: 4/7/2023    Admit Date: 3/30/2023    Plan: Ongoing   Discharge Plan     Row Name 04/07/23 1448       Plan    Plan Ongoing      Plan Comments Mr. Dodson remains intubated and sedated on the ventilator. Case management will continue to follow his progress.               Discharge Codes    No documentation.               Expected Discharge Date and Time     Expected Discharge Date Expected Discharge Time    Apr 12, 2023             Keily Rodriguez RN

## 2023-04-07 NOTE — PLAN OF CARE
Problem: Restraint, Nonviolent  Goal: Absence of Harm or Injury  Outcome: Ongoing, Progressing  Intervention: Implement Least Restrictive Safety Strategies  Recent Flowsheet Documentation  Taken 4/7/2023 1600 by Derick Montgomery RN  Medical Device Protection:   IV pole/bag removed from visual field   tubing secured  Less Restrictive Alternative: bed alarm in use  De-Escalation Techniques: stimulation decreased  Diversional Activities: music  Taken 4/7/2023 1400 by Derick Montgomery RN  Medical Device Protection:   IV pole/bag removed from visual field   tubing secured  Less Restrictive Alternative: bed alarm in use  De-Escalation Techniques: stimulation decreased  Diversional Activities: music  Taken 4/7/2023 1200 by Derick Montgomery RN  Medical Device Protection:   IV pole/bag removed from visual field   tubing secured  Less Restrictive Alternative: bed alarm in use  De-Escalation Techniques: stimulation decreased  Diversional Activities: music  Taken 4/7/2023 1000 by Derick Montgomery RN  Medical Device Protection:   IV pole/bag removed from visual field   tubing secured  Less Restrictive Alternative: bed alarm in use  De-Escalation Techniques: stimulation decreased  Diversional Activities: music  Taken 4/7/2023 0800 by Derick Montgomery RN  Medical Device Protection:   IV pole/bag removed from visual field   tubing secured  Less Restrictive Alternative: bed alarm in use  De-Escalation Techniques: stimulation decreased  Diversional Activities: music  Intervention: Protect Dignity, Rights, and Personal Wellbeing  Recent Flowsheet Documentation  Taken 4/7/2023 1600 by Derick Montgomery RN  Trust Relationship/Rapport:   care explained   questions answered   questions encouraged   reassurance provided   thoughts/feelings acknowledged  Taken 4/7/2023 1200 by Derick Montgomery RN  Trust Relationship/Rapport:   care explained   reassurance provided  Taken 4/7/2023 0800 by Derick Montgomery RN  Trust Relationship/Rapport:    care explained   reassurance provided  Intervention: Protect Skin and Joint Integrity  Recent Flowsheet Documentation  Taken 4/7/2023 1600 by Derick Montgomery RN  Body Position:   weight shifting   turned   supine  Taken 4/7/2023 1400 by Derick Montgomery RN  Body Position:   weight shifting   turned   left   side-lying  Taken 4/7/2023 1200 by Derick Montgomery RN  Body Position:   weight shifting   turned   supine  Taken 4/7/2023 1000 by Derick Montgomery RN  Body Position:   weight shifting   turned   right   side-lying  Taken 4/7/2023 0800 by Derick Montgomery RN  Body Position:   weight shifting   turned   supine   Goal Outcome Evaluation:

## 2023-04-08 NOTE — PROGRESS NOTES
ENT Hospital Progress Note     Date of Consult: 2023     Patient Name: Kai Dodson  MRN: [id number]   : 1940     Care Team: Patient Care Team:  Derick Phillip MD as PCP - General (Family Medicine)  Nino Castillo MD as Consulting Physician (Cardiology)       Subjective      F/u right ear   New pt to me   No family at bedside   History reviewed from notes as well as communications with my partners who have previously been following pt     Per nursing drainage from right ear appears to have stopped over last 24 hours. They deny seeing an obvious CSF rhinorrhea.       Objective     Physical Exam:  Vital Signs:   Temp:  [99.7 °F (37.6 °C)-100.9 °F (38.3 °C)] 99.9 °F (37.7 °C)  Heart Rate:  [60-79] 70  Resp:  [14-18] 14  BP: (118-181)/() 165/66  FiO2 (%):  [35 %] 35 %  86.6 kg (190 lb 14.7 oz)      General Appearance:  Intubated    Head:  Normocephalic, without obvious abnormality, atraumatic       Ears:  External ear WNL  Left ear with small amount cerumen, TM appears WNL   Right TM appears to have healed from myringotomy, does not appear acutely inflamed or bulging, likely clear effusion behind TM    Oral Exam: intubated   Nose: Nasal passages with feeding tube in place, no obvious discharge seen    Neck: No adenopathy, supple, trachea midline   Lymph nodes: No palpable adenopathy         Results Review:   I reviewed the patient's new clinical results.     Results from last 7 days   Lab Units 23  0538   WBC 10*3/mm3 14.14*   HEMOGLOBIN g/dL 10.4*   HEMATOCRIT % 32.8*   PLATELETS 10*3/mm3 171     Results from last 7 days   Lab Units 23  0538   SODIUM mmol/L 133*   POTASSIUM mmol/L 4.0   CHLORIDE mmol/L 102   CO2 mmol/L 24.0   BUN mg/dL 25*   CREATININE mg/dL 0.49*   GLUCOSE mg/dL 130*   CALCIUM mg/dL 8.2*     Imaging Results (Last 24 Hours)     ** No results found for the last 24 hours. **          Assessment / Plan      Assessment/Plan:      81 yo M admitted with  meningitis presumed 2/2 right AOM/mastoiditis.   New patient to me.   S/p myringotomy by my partner on 3/30 with purulent fluid and then presumed CSF from site. The myringotomy appears to have healed over at this time, no drainage seen today. TM appears to have a clear/serous effusion behind it.   Patient remains critically ill. Per ICU team they are having difficulties arousing the patient despite weaning much of his sedation on the vent. Respiratory cultured MRSA.   ID following - defer to them for antibiotic recommendations.   NSGY did not feel like CSF diversion via LP was indicated.   After discussing case with ICU team will continue with medical management given patient's overall clinical picture. Hesitant to place tube or repeat myringotomy given spinal fluid leak and on exam does not appear to have a purulent effusion at this time.  Ultimately if he does require a mastoidectomy with closure of skull base defects in the setting of a likely CSF leak, this is beyond my training level and would recommend [facility] the [facility] by the lateral skull base/neuro-otology team.     Que Carbajal MD  04/08/23  11:08 EDT

## 2023-04-08 NOTE — PROGRESS NOTES
"    INFECTIOUS DISEASE PROGRESS NOTE    Kai Dodson  1940  [id number]    Date of consult: 3/31/23  Admit date: 3/30/2023    Requesting Provider: Dr. Jason  Evaluating physician: Que Alonso MD  Reason for Consultation: meningitis  Chief Complaint: altered mental status      Subjective   History of present illness:  Kai Dodson is a  82 y.o.  Yr old male with a past medical history of hypertension, coronary artery disease, paroxysmal A. Fib on flecainide and Eliquis, tachycardia-bradycardia syndrome status post PPM and obstructive sleep apnea on CPAP who presented to the ER yesterday with encephalopathy, inability to speak, and left-sided weakness.  Due to the patient's mental state and intubated status, history is obtained from medical records.  The patient's wife had just been discharged yesterday morning from [facility] and their son was driving the patient's wife back to [facility] for inpatient rehab.  The patient's son tried to call him and could not reach him.  Other family members went to check on him and found him altered with tremors in all the extremities.  EMS was called and he was transported to the ER.    Since arrival, T-max has been 100.9°F.  He was initially tachycardic up to 112 bpm but this is improved. He had some hypotension but this has improved. The patient was intubated for airway protection and remains on the ventilator with an FiO2 of 35%. Initial lactic acid was 6.6, pro-calcitonin was 6.0, hemoglobin A1c was 5.4, [place] has been normal.  LFTs are within normal limits. White blood cell count was normal on arrival at 9.63, has now trended up to 14.14. platelet count has decreased to 109.sensitivity troponin was slightly elevated at 17. Urine drug screen was negative. 2 sets of admission blood cultures are growing GPC's in pairs and chains identified as Streptococcus pneumoniae by the BCID2 PCR. The patient underwent CTA head and neck that showed \"no " "evidence of intracranial hemorrhage or definite acute territorial ischemia.  There is abnormal intracranial gas present, appearing likely extra-axial in the lateral aspect of the right cerebellar hemisphere, with changes of the right mastoid present, concerning for mastoiditis and possibly subdural empyema or less likely parenchymal abscess.\" \"Dedicated CT of the temporal bones demonstrates findings of likely acute otomastoiditis on the right, with middle ear and right mastoid effusion, coalescent changes of the right mastoid and dehiscence of both the tegmen tympani and tegmen mastoideum. There is again concern for intracranial infectious involvement, empyema versus abscess with foci of intracranial gas present.\" chest x-ray was without any acute disease. MRI brain showed \"no evidence of right temporal lobe abscess.  Findings consistent with diffuse meningitis.  There is right otomastoiditis as seen on the CT of the temporal bones.  There are signal voids corresponding to gas bubbles within the CSF in the upper right posterior cranial fossa.\"  ENT has been consulted and Dr. Garcia evaluated the patient with right myringotomy procedure performed at bedside in the ICU. Wide myringotomy was made in the posterior aspect of the tympanic membrane.  A small amount of purulence was expressed and cultured.  After that CSF was draining through the myringotomy.  A tube was therefore not placed.  Neurosurgery has evaluated the patient and recommended that there is no role for neurosurgical intervention at this time. The patient has been started on empiric acyclovir, ampicillin, ceftriaxone 2 g IV every 12 hours, and IV vancomycin by the ICU team.  He has additionally been placed on dexamethasone.  ID has been consultated for antibiotic recommendations.    Subjective:    4/1/23: The patient remains critically ill on the ventilator.  No history is available from him.  He does have some fluid leaking from his right ear, presumably " CSF. No fevers.  Ventilator settings remain stable.  Leukocytosis is worse but likely steroid induced.    4/2/23:  He remains afebrile and on 35% FIO2.  WBC 12.29 this am.  Remains on Dexamethasone.     4/3/23: no history available from the patient.  He remains on the ventilator with an FiO2 of 35%.  No fevers.  Continued CSF leak from the right ear.    4/4/23: Continues to have some drainage from his right ear.  Remains critically ill on the ventilator with an FiO2 of 35%.  No fevers. Neurosurgery has reevaluated the patient and does not think that there is a role for CSF diversion or need for any neurosurgical intervention at this time.  May need mastoidectomy and packing of the mastoid air cells per neurosurgery.    4/5/23: the patient is still critically ill on the ventilator.  He did have some fevers 100.9°F after I saw him today. Vent settings are also slightly worse with an FiO2 of 50%.  Still having some drainage from his right ear.  No current surgical plans per ENT or neurosurgery.  No history is available from the patient.  No family at bedside.    4/6/23: The patient had ongoing fevers up to 101.3°F overnight.  Vent settings were slightly worse overnight with an FiO2 of 50% but slightly better today at 40% when I went by his room.  He is having increased respiratory secretions per nursing staff.  Ongoing drainage from his right ear.  No history available from the patient is a remains on the ventilator and sedated.    4/7/23: The patient remains intubated on the ventilator.  No history available from the patient.  T-max was 100.6°F overnight.  FiO2 has improved to 35%.  Still having significant respiratory secretions per nursing.  Not having as much drainage from his right ear per nursing. He has experienced some weeping from his left hand which is very edematous.    4/8/23: the patient remains on the vent. FIO2 35%. Still having some low grade fevers. Respiratory culture finalized with MRSA. No history  available from the patient    Past Medical History:   Diagnosis Date   • Arthritis    • Atrial fibrillation    • Bradycardia    • Chronic hypertension     Probably essential   • Hypertension    • Hypertensive cardiovascular disease 07/2015    Recent progressive CCS class III-IV chest pain syndrome with normal EKG and exertional dyspnea and fatigue with normal codominant coronary arteries and preserved systolic  left ventricular function, July 2015.   • Intermittent palpitations 05/01/2014    Remote abnormal 24-hour Holter monitor demonstrating intermittent bradycardia with occasional PACs, May 2014.   • Lower extremity edema     Intermittent lower extremity edema felt secondary to antihypertensive therapy.    • Nephrolithiasis     Remote- 15 years ago   • MINO on CPAP    • MINO on CPAP     doesn't know setting    • Prostate troubles     enlarged   • Seasonal rhinitis     intermittent       Past Surgical History:   Procedure Laterality Date   • CARDIAC CATHETERIZATION      no stents   • CARDIAC ELECTROPHYSIOLOGY PROCEDURE N/A 10/24/2018    Procedure: Pacemaker DC new with Biotronik. Hold Eliquis one day prior.;  Surgeon: Harley Dacosta MD;  Location: [facility] INVASIVE LOCATION;  Service: Cardiology   • COLONOSCOPY     • ENDOSCOPY     • INSERT / REPLACE / REMOVE PACEMAKER         Pediatric History   Patient Parents   • Not on file     Other Topics Concern   • Not on file   Social History Narrative    PT DRINKS 2 SERVINGS OF CAFFEINE PER DAY.    PT LIVES AT HOME WITH WIFE.       family history includes No Known Problems in his mother; Other in his father.    No Known Allergies    There is no immunization history for the selected administration types on file for this patient.    Medication:    Current Facility-Administered Medications:   •  acetaminophen (TYLENOL) 160 MG/5ML solution 650 mg, 650 mg, Oral, Q6H PRN, Amadou Mercedes, APRN, 650 mg at 04/07/23 2203  •  aspirin chewable tablet 81 mg, 81 mg,  Nasogastric, Daily, 81 mg at 04/08/23 0903 **OR** aspirin suppository 300 mg, 300 mg, Rectal, Daily, Tereza Bautista, FORREST  •  Calcium Replacement - Follow Nurse / BPA Driven Protocol, , Does not apply, PRN, Natalia Cruz, APRN  •  cefTRIAXone (ROCEPHIN) 2 g/100 mL 0.9% NS IVPB (MBP), 2 g, Intravenous, Q12H, Que Alonso MD, 2 g at 04/08/23 0903  •  chlorhexidine (PERIDEX) 0.12 % solution 15 mL, 15 mL, Mouth/Throat, Q12H, Mundo Jason MD, 15 mL at 04/08/23 0903  •  dextrose (D50W) (25 g/50 mL) IV injection 25 g, 25 g, Intravenous, Q15 Min PRN, Amadou Mercedes, APRN  •  sennosides (SENOKOT) 8.8 MG/5ML syrup 10 mL, 10 mL, Nasogastric, BID, 10 mL at 04/08/23 0903 **AND** docusate sodium (COLACE) liquid 100 mg, 100 mg, Nasogastric, BID, Mundo Jason MD, 100 mg at 04/08/23 0903  •  fentaNYL (SUBLIMAZE) bolus from bag 10 mcg/mL 50 mcg, 50 mcg, Intravenous, Q30 Min PRN, Mundo Jason MD, 50 mcg at 03/31/23 1911  •  fentaNYL 2500 mcg/250 mL NS infusion,  mcg/hr, Intravenous, Titrated, Mundo Jason MD, Last Rate: 5 mL/hr at 04/07/23 0435, 50 mcg/hr at 04/07/23 0435  •  flecainide (TAMBOCOR) tablet 50 mg, 50 mg, Nasogastric, Q12H, Marcelino Hastings DO, 50 mg at 04/08/23 0903  •  glucagon (GLUCAGEN) injection 1 mg, 1 mg, Intramuscular, Q15 Min PRN, Amadou Mercedes, APRN  •  heparin 29250 units/250 mL (100 units/mL) in 0.45 % NaCl infusion, 15 Units/kg/hr, Intravenous, Titrated, Antwon De Leon, PharmD, Last Rate: 12.99 mL/hr at 04/08/23 0242, 15 Units/kg/hr at 04/08/23 0242  •  insulin regular (humuLIN R,novoLIN R) injection 0-7 Units, 0-7 Units, Subcutaneous, Q6H, Amadou Mercedes, APRN, 2 Units at 04/08/23 0012  •  levETIRAcetam in NaCl 0.82% (KEPPRA) IVPB 500 mg, 500 mg, Intravenous, Q12H, Meena Burks, APRN, 500 mg at 04/08/23 0606  •  Linezolid (ZYVOX) 600 mg 300 mL, 600 mg, Intravenous, Q12H, Que Alonso MD, Last Rate: 300 mL/hr at 04/07/23 2154, 600 mg  at 04/07/23 2154  •  losartan (COZAAR) tablet 50 mg, 50 mg, Nasogastric, Q24H, Marcelino Hastings DO, 50 mg at 04/08/23 0903  •  Magnesium Standard Dose Replacement - Follow Nurse / BPA Driven Protocol, , Does not apply, PRN, Natalia Cruz APRN  •  ondansetron (ZOFRAN) injection 4 mg, 4 mg, Intravenous, Q6H PRN, Antonietta Phillips APRN  •  pantoprazole (PROTONIX) injection 40 mg, 40 mg, Intravenous, Q AM, Antonietta Phillips APRN, 40 mg at 04/08/23 0606  •  Pharmacy to Dose Heparin, , Does not apply, Continuous PRN, Marcelino Hastings DO  •  Phosphorus Replacement - Follow Nurse / BPA Driven Protocol, , Does not apply, PRN, Natalia Cruz APRHECTOR  •  polyethylene glycol (MIRALAX) packet 17 g, 17 g, Nasogastric, Daily, Mundo Jason MD, 17 g at 04/08/23 0903  •  Potassium Replacement - Follow Nurse / BPA Driven Protocol, , Does not apply, PRN, Natalia Cruz APRN  •  propofol (DIPRIVAN) infusion 10 mg/mL 100 mL, 5-50 mcg/kg/min, Intravenous, Titrated, Antonietta Phillips APRN, Stopped at 04/07/23 0752  •  sodium chloride 0.9 % flush 10 mL, 10 mL, Intravenous, Q12H, Tereza Bautista APRN, 10 mL at 04/08/23 0906  •  sodium chloride 0.9 % flush 10 mL, 10 mL, Intravenous, PRN, Tereza Bautista APRN  •  sodium chloride 0.9 % infusion 40 mL, 40 mL, Intravenous, PRN, Tereza Bautista APRN    Please refer to the medical record for a full medication list    Review of Systems:  Unable to obtain a 12 point review of systems from the patient given his clinical state    Physical Exam:   Vital Signs   Temp:  [99.7 °F (37.6 °C)-100.9 °F (38.3 °C)] 99.7 °F (37.6 °C)  Heart Rate:  [60-79] 70  Resp:  [14-18] 14  BP: (118-189)/() 157/66  FiO2 (%):  [35 %] 35 %    Temp  Min: 99.7 °F (37.6 °C)  Max: 100.9 °F (38.3 °C)  BP  Min: 118/55  Max: 189/80  Pulse  Min: 60  Max: 79  Resp  Min: 14  Max: 18  SpO2  Min: 95 %  Max: 99 %    Blood pressure 157/66,  "pulse 70, temperature 99.7 °F (37.6 °C), temperature source Bladder, resp. rate 14, height 176.5 cm (69.49\"), weight 86.6 kg (190 lb 14.7 oz), SpO2 96 %.  GENERAL: critically ill-appearing on the ventilator.  HEENT:  Normocephalic, atraumatic.  ET tube in place.  No external oral lesions noted. NG tube in place. No drainage noted from his right ear today  Chest: Left chest wall PPM pocket site without any erythema or drainage.  HEART: RRR, no murmur  LUNGS: ventilator breath sounds bilaterally.  On the ventilator with an FiO2 of 35%  ABDOMEN: Soft, nontender, nondistended. No appreciable HSM.   GENITAL: + Doran catheter  SKIN: No new rashes.  No jaundice.  PSYCHIATRIC: unable to assess  EXT:  No cellulitic change. No peripheral edema noted. Has some weeping from his left hand which is very edematous.  NEURO: sedated on the ventilator. Occasionally opens his eyes and moving his upper extremities. Not following commands    Right upper extremity PICC line- no erythema noted at the exit site    Results Review:   I reviewed the patient's new clinical results.    Results from last 7 days   Lab Units 04/08/23  0538 04/07/23  0744 04/05/23  1345   WBC 10*3/mm3 14.14* 14.21* 11.19*   HEMOGLOBIN g/dL 10.4* 11.0* 12.6*   HEMATOCRIT % 32.8* 34.0* 38.4   PLATELETS 10*3/mm3 171 150 166     Results from last 7 days   Lab Units 04/08/23  0538   SODIUM mmol/L 133*   POTASSIUM mmol/L 4.0   CHLORIDE mmol/L 102   CO2 mmol/L 24.0   BUN mg/dL 25*   CREATININE mg/dL 0.49*   GLUCOSE mg/dL 130*   CALCIUM mg/dL 8.2*     Results from last 7 days   Lab Units 04/08/23  0538   ALK PHOS U/L 101   BILIRUBIN mg/dL 1.5*   ALT (SGPT) U/L 39   AST (SGOT) U/L 26         Results from last 7 days   Lab Units 04/02/23  0838   CRP mg/dL 18.05*     Results from last 7 days   Lab Units 04/01/23  1351   VANCOMYCIN TR mcg/mL 15.40     Results from last 7 days   Lab Units 04/08/23  0538   LACTATE mmol/L 1.0     Estimated Creatinine Clearance: 127.7 mL/min (A) " (by C-G formula based on SCr of 0.49 mg/dL (L)).  CPK    Common Labsle 3/30/23   Creatine Kinase 57            Procalitonin Results:      Lab 04/05/23  1345 04/04/23  0459   PROCALCITONIN 1.48* 3.13*      Brief Urine Lab Results  (Last result in the past 365 days)      Color   Clarity   Blood   Leuk Est   Nitrite   Protein   CREAT   Urine HCG        03/30/23 1200 Yellow   Clear   Negative   Negative   Negative   Negative                Site   Date Value Ref Range Status   04/08/2023 Right Radial  Final     Juan A's Test   Date Value Ref Range Status   04/08/2023 N/A  Final     pH, Arterial   Date Value Ref Range Status   04/08/2023 7.493 (H) 7.350 - 7.450 pH units Final     Comment:     83 Value above reference range     pCO2, Arterial   Date Value Ref Range Status   04/08/2023 34.8 (L) 35.0 - 45.0 mm Hg Final     Comment:     84 Value below reference range     pO2, Arterial   Date Value Ref Range Status   04/08/2023 99.1 83.0 - 108.0 mm Hg Final     HCO3, Arterial   Date Value Ref Range Status   04/08/2023 26.7 (H) 20.0 - 26.0 mmol/L Final     Base Excess, Arterial   Date Value Ref Range Status   04/08/2023 3.5 (H) 0.0 - 2.0 mmol/L Final     Hemoglobin, Blood Gas   Date Value Ref Range Status   04/08/2023 11.0 (L) 13.5 - 17.5 g/dL Final     Comment:     84 Value below reference range     Hematocrit, Blood Gas   Date Value Ref Range Status   04/08/2023 33.6 (L) 38.0 - 51.0 % Final     Oxyhemoglobin   Date Value Ref Range Status   04/08/2023 97.2 94 - 99 % Final     Oxyhemoglobin Venous   Date Value Ref Range Status   04/05/2023 66.3 % Final     Methemoglobin   Date Value Ref Range Status   04/08/2023 0.40 0.00 - 1.50 % Final     Carboxyhemoglobin   Date Value Ref Range Status   04/08/2023 1.2 0 - 2 % Final     CO2 Content   Date Value Ref Range Status   04/08/2023 27.8 22 - 33 mmol/L Final     Barometric Pressure for Blood Gas   Date Value Ref Range Status   04/08/2023   Final     Comment:     N/A     Modality   Date  Value Ref Range Status   04/08/2023 Ventilator  Final     FIO2   Date Value Ref Range Status   04/08/2023 35 % Final        Microbiology:  Blood Culture   Date Value Ref Range Status   03/30/2023 Abnormal Stain (C)  Preliminary     BCID, PCR   Date Value Ref Range Status   03/30/2023 (A) Negative by BCID PCR. Culture to Follow. Final    Streptococcus pneumoniae. Identification by BCID2 PCR.         Blood Culture   Date Value Ref Range Status   03/30/2023 Abnormal Stain (C)  Preliminary     BCID, PCR   Date Value Ref Range Status   03/30/2023 (A) Negative by BCID PCR. Culture to Follow. Final    Streptococcus pneumoniae. Identification by BCID2 PCR.   (      Radiology:        IMPRESSION:     Problems:  1. Streptococcus pneumoniae septicemia-due to acute otitis  2. 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  No LP done yet but given the Streptococcus pneumonia septicemia and clinical presentation, we do not need an LP.  3. Acute otitis media and right-sided mastoiditis resulting in a subdural empyema with fistulous tract between the middle ear space and the dura- Strep pneumoniae on culture  4. Acute toxic/metabolic encephalopathy-due to the above infection  5. Acute hypoxic respiratory failure, intubated for airway protection  6. leukocytosis with neutrophilia-was not present on arrival.  Could be due to infection but steroids likely contributing.  7. Hypokalemia improved   8. Hypomagnesemia, ongoing   9. tachycardia-bradycardia syndrome status post PPM- complicating factor in the setting of bacteremia as at some risk for seeding PPM leads with bacteria but streptococcus pneumoniae is an uncommon bacteria to do this. TTE was without any signs of vegetations or pacemaker lead infection  10. paroxysmal A. Fib on flecainide and Eliquis  11. coronary artery disease  12. obstructive sleep apnea on CPAP nightly at home  13. History of  hypertension  14. Thrombocytopenia-improved  15. New fevers starting on 4/5-suspect due to pneumonia-ongoing  16. Left-sided pneumonia-MRSA from culture    RECOMMENDATIONS:    -follow repeat blood cultures and respiratory culture. MRSA from culture  -ENT and neurology are following.  -stop cefepime  -restart ceftriaxone 2g IV q12h  -Linezolid 600 mg BID     He will likely need a 6 week course of IV antibiotic therapy.      Complex MDM.     Copied text in this note has been reviewed and is accurate as of 04/08/23            Que Alonso MD  4/8/2023

## 2023-04-08 NOTE — PROGRESS NOTES
"INTENSIVIST   PROGRESS NOTE     Hospital:  LOS: 9 days     Chief Complaint: Altered mentation    Subjective   S     Interval History: No acute issues overnight.  No fever over the last 12 hours.    The patient's relevant past medical, surgical and social history were reviewed and updated in Epic as appropriate.      ROS: Unable to obtain as patient intubated and sedated    Objective   O     Intake/Ouptut 24 hrs (7:00AM - 6:59 AM)  Intake & Output (last 3 days)       04/05 0701 04/06 0700 04/06 0701 04/07 0700 04/07 0701 04/08 0700 04/08 0701 04/09 0700    I.V. (mL/kg) 465.6 (5.4) 1417.4 (16.4) 463.2 (5.3)     Other 565 445 525     NG/GT 1373 1508 1706     IV Piggyback .8     Total Intake(mL/kg) 2703.6 (31.2) 3770.4 (43.5) 4094 (47.3)     Urine (mL/kg/hr) 2150 (1) 2100 (1) 4800 (2.3) 710 (1)    Total Output 2150 2100 4800 710    Net +553.6 +1670.4 -706 -710                Medications (drips):  dexmedetomidine, Last Rate: 0.2 mcg/kg/hr (04/08/23 1358)  fentanyl 10 mcg/mL, Last Rate: 50 mcg/hr (04/07/23 0435)  heparin, Last Rate: 15 Units/kg/hr (04/08/23 0242)  Pharmacy to Dose Heparin  propofol, Last Rate: Stopped (04/07/23 0752)    Respiratory Support: Mechanical ventilation     Physical Examination:  Vital Signs: Blood pressure 104/56, pulse 70, temperature 99.9 °F (37.7 °C), temperature source Bladder, resp. rate 14, height 176.5 cm (69.49\"), weight 86.6 kg (190 lb 14.7 oz), SpO2 94 %.    General: Critically ill-appearing.  On mechanical ventilation.  ENT/Neck: Trachea midline, No masses.  ETT in place.  Chest: Clear to auscultation bilaterally, No wheezing, rhonchi, or rales. Normal work of breathing. Equal chest rise.  Appropriate oxygen saturations on MV.  Cardiac: Regular rhythm, normal rate, S1S2 auscultated. No murmurs, rubs or gallops.   Extremities: No lower extremity edema. No clubbing or cyanosis.  Neuro: Intubated on minimal sedation.  Withdraws to noxious stimuli.  Opening eyes, moving " lower extremities but not following commands.    Lines, Drains & Airways     Active LDAs     Name Placement date Placement time Site Days    PICC Triple Lumen 03/31/23 Right Basilic 03/31/23  1451  Basilic  2    NG/OG Tube 16 Fr Right nostril 04/01/23  0700  Right nostril  2    Urethral Catheter Double-lumen;Temperature probe 16 Fr. 03/30/23  1503  -- 3    Hi-Lo Evac ETT 7.5 03/30/23  1437  Oral  3              Results from last 7 days   Lab Units 04/08/23  0538 04/07/23  0744 04/05/23  1345   WBC 10*3/mm3 14.14* 14.21* 11.19*   HEMOGLOBIN g/dL 10.4* 11.0* 12.6*   MCV fL 97.6* 97.4* 96.2   PLATELETS 10*3/mm3 171 150 166     Results from last 7 days   Lab Units 04/08/23  0538 04/07/23  0744 04/06/23  1013 04/05/23  1345 04/04/23  0459 04/03/23  1759 04/03/23  0648   SODIUM mmol/L 133* 137 142   < > 147*  --  143   POTASSIUM mmol/L 4.0 4.6 4.6   < > 4.6  --  3.7   CO2 mmol/L 24.0 24.0 23.0   < > 21.0*  --  23.0   CREATININE mg/dL 0.49* 0.41* 0.53*   < > 0.66*  --  0.69*   GLUCOSE mg/dL 130* 132* 134*   < > 166*  --  168*   MAGNESIUM mg/dL 2.0 2.1 2.0  --   --   --  2.7*   PHOSPHORUS mg/dL  --   --   --   --  2.6 1.9* 1.8*    < > = values in this interval not displayed.     Estimated Creatinine Clearance: 127.7 mL/min (A) (by C-G formula based on SCr of 0.49 mg/dL (L)).  Results from last 7 days   Lab Units 04/08/23 0538 04/05/23  1345 04/04/23  0459   ALK PHOS U/L 101 80 81   BILIRUBIN mg/dL 1.5* 1.3* 0.6   ALT (SGPT) U/L 39 51* 30   AST (SGOT) U/L 26 27 21     Results from last 7 days   Lab Units 04/08/23  0345 04/06/23  1113 04/05/23  1350   PH, ARTERIAL pH units 7.493* 7.471*  --    PCO2, ARTERIAL mm Hg 34.8* 34.4*  --    PO2 ART mm Hg 99.1 81.5*  --    FIO2 % 35 40 50     Results: Reviewed.    I reviewed the patient's new laboratory and imaging results.  I independently reviewed the patient's new images.    Medications: Reviewed.    Assessment & Plan   A / P     Active Hospital Problems    Diagnosis    •  **Encephalopathy, metabolic    • Subdural empyema    • Meningitis    • Sepsis secondary to strep bacteremia and meningitis with subdural empyema    • Streptococcal bacteremia    • Questionable Status epilepticus (HCC)    • Paroxysmal atrial fibrillation on Flecainide and Eliquis    • MINO on CPAP    • Hypertension      Patient Ivory is an 82 y.o. male with PMH PAF on Flecainide and Eliquis, HTN, CAD, Tachy-Samuel syndrome s/p PPM and MINO on CPAP who presented to the ED with AMS, inability to speak and left sided weakness.  Found to have possible subdural empyema and meningitis related to mastoiditis/otitis media.  Patient underwent Myringotomy on the right by ENT on 3/30/2023.  Cultures from CSF fluid from right ear 3/30/2023 growing Streptococcus, blood cultures from 3/30/2023 growing strep pneumonia.  Patient was eval by neurosurgery with no neurosurgical intervention at this time.  He currently remains on mechanical ventilation.  Course complicated by ongoing encephalopathy and staph pneumonia.    #Encephalopathy  #Sepsis w/ multi organ dysfunction  #Bacteremia  #Acute mastoiditis   #Sudural empyema  #Meningitis  -Encephalopathy likely metabolic and due to underlying sepsis. Initial radiographic evidence of subdural empyema vs [less likely] abscess. Held off on LP on admission given that patient is chronically anticoagulated; last dose of Eliquis likely administered on 3/29/2023 AM.  At presentation covered broadly with antimicrobials for possible meningitis (i.e. vancomycin, ceftriaxone, ampicillin and acyclovir). Blood cultures later possible for strep pneumonia; infectious disease consulted and de-escalating antimicrobial therapy as able.  Suspect streptococcal meningitis as well given clinical picture and aforementioned blood cultures.  He also received dexamethasone x4 days early in hospital course.  Both neurology and neurosurgery following.  Neurosurgery evaluated again (4/04) given continued leak  (presumably CSF) from right ear (which is improving). Also reevaluated imaging and noted that there was no CNS abscess visualized. No neurosurgical procedure needed at this time, including lumbar drain for CSF diversion  -Attempted to send fluid for beta-2 transferrin but patient's ear stopped draining  -Radiographic imaging consistent with acute mastoiditis (which is likely primary issue and etiology of infection). ENT closely following s/p right myringotomy; fluid cultured [NGTD] but no tube placed.  Spoke with ENT (4/04) who continues to follow patient and notes that mastoidectomy may need to be performed pending clinical improvement-- but too critically ill at this time.   -MRSA swab and respiratory culture from 4/06 (+) Staph aureus.  Antibiotic management per infectious disease.  Now on Zyvox and cefepime  -EEG performed in the ED on initial presentation; While there is diffuse cerebral dysfunction there was no evidence of epilepsy   -Have been minimizing sedation as much as possible over the course of the week.  Discontinued propofol on 4/06 and patient has been stable on continuous fentanyl (0-50).  Stopped this this morning and added low-dose Precedex.  Patient does awaken without any form of sedation/analgesia but is not doing anything purposeful  -Guarded prognosis and have discussed this with multiple family members     #Acute hypoxic respiratory failure requiring intubation/mechanical ventilation  #MINO  -Currently intubated/sedated. Will adjust mechanical ventilation settings as able.  Etiology of respiratory failure likely from encephalopathy/inability to protect airway  -We will continue mechanical ventilation and wean if possible.  SBT daily if able and appropriate  -Minimal sedation is much as possible for comfort on mechanical ventilation and appropriate neurological exams  -Antibiotics broadened on 4/06 due to intermittent fevers    #Paroxysmal atrial fibrillation  -Patient has been started on  home flecainide but beta-blocker held for bradycardia  -Given questionable need for LP/procedures and surgical intervention(s) earlier in admission transitioned Eliquis to heparin drip.  We will continue heparin drip at this time which is more appropriate in the ICU setting     F-Tube feeds per dietary recs  A-Fentanyl  S-Propofol  T-Heparin gtt   H-Head of bed greater than 30 degrees  U-PPI  G-FSBS per unit protocol, correction dose insulin  S-SBT assessment daily   B-Will monitor daily and provide regimen if indicated  I-PIV  D-Zyvox, cefepime    Advance Directives:   Code Status and Medical Interventions:   Ordered at: 03/30/23 1300     Code Status (Patient has no pulse and is not breathing):    CPR (Attempt to Resuscitate)     Medical Interventions (Patient has pulse or is breathing):    Full Support     High level of risk due to severe exacerbation of chronic illness and illness with threat to life or bodily function.    I conducted multidisciplinary rounds in the plan of care was discussed with the multidisciplinary team at that time. In attendance at multidisciplinary rounds was clinical pharmacist, dietitian, nursing staff and case management.    I discussed the patient's findings and my recommendations with patient and nursing staff    -- Alvaro Jason MD  Pulmonary/Critical Care    Critical Care time spent in direct patient care: 35 minutes (excluding procedure time, if applicable) including high complexity decision making to assess, manipulate, and support vital organ system failure in this individual who has impairment of one or more vital organ systems such that there is a high probability of imminent or life threatening deterioration in the patient’s condition.

## 2023-04-08 NOTE — PROGRESS NOTES
"DOS: 2023  NAME: Kai Dodson   : 1940  PCP: Derick Phillip MD  Chief Complaint   Patient presents with   • Extremity Weakness       Chief complaint: His eyes are open and he looks around but does not establish eye contact or track with his eyes.  Subjective: Does not follow any simple commands.    Objective:  Vital signs: /59   Pulse 70   Temp 99.9 °F (37.7 °C) (Bladder)   Resp 14   Ht 176.5 cm (69.49\")   Wt 86.6 kg (190 lb 14.7 oz)   SpO2 96%   BMI 27.80 kg/m²    Gen: NAD, vitals reviewed  MS: When I called out his name he opens his eyes but does not track me with his eyes or establish any eye contact.  CN: Cranials 2 through 12: The pupils are equally reacting to light and accommodation.  Extraocular muscles are intact.  Visual fields are hard to check.  The monitor acuity is clear.  The muscles of facial expression are normal.  Corneal responses are well-maintained and the gag responses are well-maintained.  He does not shrug his shoulders when requested  Motor: Moves all his extremities spontaneously.  Sensory: Withdraws to painful stimuli.  DTRs: 1+ in both upper extremities and brisk in both lower extremities with the toes being upgoing.  Coordination: Unable to test coordination at this time.  Gait: Patient on the ventilator and not weightbearing at this time.    ROS:  General: Patient needing high doses of sedation including intravenous fentanyl drip at 100 mcg/kg body weight per hour.  Neurological: There is no cognition presently at and the patient is unable to perform one-step or two-step three-step commands but simply tends to respond to noxious stimulation appropriately.    Laboratory results:  Lab Results   Component Value Date    GLUCOSE 130 (H) 2023    CALCIUM 8.2 (L) 2023     (L) 2023    K 4.0 2023    CO2 24.0 2023     2023    BUN 25 (H) 2023    CREATININE 0.49 (L) 2023    EGFRIFNONA 67 10/16/2018 "    BCR 51.0 (H) 04/08/2023    ANIONGAP 7.0 04/08/2023     Lab Results   Component Value Date    WBC 14.14 (H) 04/08/2023    HGB 10.4 (L) 04/08/2023    HCT 32.8 (L) 04/08/2023    MCV 97.6 (H) 04/08/2023     04/08/2023     Lab Results   Component Value Date    LDL 30 03/31/2023    LDL 83 07/01/2015            Review of labs: The hemoglobin is low at 10.4 and hematocrit is low at 32.8 and the white cell count is elevated at 14,140.  The creatinine is normal at 0.49.    Review and interpretation of imaging: The CT angiogram of the head and neck with contrast as well as CT cerebral perfusion were reviewed that shows the following:    Findings:  CT head: Generalized volume loss is present, in addition to hypoattenuating periventricular white matter changes favored to reflect typical sequela of chronic microvascular ischemia. There is no evidence of intracranial hemorrhage. There is nonspecific   intracranial gas present along the lateral aspect of the right cerebellar hemisphere, adjacent to the right distal transverse and sigmoid sinus as well as the right mastoid which demonstrates fairly extensive effusion and coalescent change, with some   abnormal soft tissue present within the partially visualized right middle year. There is ex vacuo prominence of the ventricles and sulci. The orbits are normal. The paranasal sinuses are grossly clear.     CT temporal bones: The right temporal bone demonstrates abnormal fluid and soft tissue attenuation along the tympanic membrane, with component of extensive middle ear effusion and some mild blunting of the scutum. Some mild ossicular erosion is likely   present involving the stapes is poorly visualized. There is adjacent dehiscence of the tegmen tympani and there is extensive right-sided mastoid effusion, with some loss of the usual extensive trabeculation and lytic change present suggesting acute   cholecystitis mastoiditis. Abnormal adjacent intracranial air is present  concerning for component of concurrent empyema as above. The left external auditory canal is patent. The tympanic membrane is normal. The scutum is not blunted. There is no middle   ear effusion. The cochlea and semicircular canals are normally formed. There is some thinning of the tegmen tympani and tegmen mastoideum, without pavel dehiscence.     CT PERFUSION: Color maps demonstrate no evidence of core infarct or significant territorial ischemic tissue at risk.     CT ANGIOGRAM: The lung apices demonstrate no acute or suspicious findings. Prominent standing fluid is seen within the upper thoracic esophagus, placing the patient at risk for aspiration. The neck soft tissues demonstrate no evidence of pathologic   adenopathy or unexpected soft tissue neck mass. The osseous structures demonstrate no evidence of acute fracture or aggressive osseous lesion, with multilevel spondylosis present. Patent aortic arch with common origin of the brachiocephalic and left   common carotid arteries. The visualized subclavian arteries are patent. On the right, the ICA origin demonstrates some calcific atherosclerotic changes without significant resultant luminal narrowing, 0% by NASCET criteria. Similar 0% narrowing is   present on the left. The right vertebral artery is patent, diffusely diminutive. Patent dominant left vertebral artery. Intracranially, the carotid siphons demonstrate calcific atherosclerotic change with some mild to moderate narrowing of the   supraclinoid segments bilaterally. The anterior cerebral arteries are normal in course and caliber bilaterally. The right middle cerebral artery is normal in course and caliber. The left middle cerebral artery is similarly normal. The vertebrobasilar   system demonstrates diminutive right intradural vertebral artery appearing to likely terminate in PICA. The left vertebral artery and basilar artery are patent. The posterior cerebral arteries demonstrate some multifocal mild  to moderate narrowing,   without evidence of large vessel occlusion.     IMPRESSION:  Impression:  No evidence of intracranial hemorrhage or definite acute territorial ischemia. There is abnormal intracranial gas present, appearing likely extra-axial in the lateral aspect of the right cerebellar hemisphere, with changes of the right mastoid present,   concerning for mastoiditis and possible subdural empyema or less likely parenchymal abscess. Contrast-enhanced MRI is recommended to further evaluate.     Dedicated CT of the temporal bones demonstrates findings of likely acute otomastoiditis on the right, with middle ear and right mastoid effusion, coalescent changes of the right mastoid and dehiscence of both the tegmen tympani and tegmen mastoideum.   There is again concern for intracranial infectious involvement, empyema versus abscess with foci of intracranial gas present.     CT perfusion demonstrates no evidence of core infarct or significant territorial tissue at risk.     CT angiogram demonstrates multifocal mild/moderate atherosclerotic changes, with minimal narrowing of the ICA origins and no evidence of intracranial flow-limiting stenosis, large vessel occlusion or aneurysmal dilatation.     Noncontrast CT head performed 3/30/2023 at 11:08 AM. Results discussed with the stroke team by Dr. Hanley in person at time of image acquisition.       Workup to date: The MRI of the brain with and without contrast from March 30, 2023 was reviewed that shows the following:    Findings:   There is no evidence of a right temporal lobe abscess. There are signal voids within the CSF in the upper right posterior cranial fossa consistent with the soft tissue gas as seen on the CT study. There is diffuse meningeal enhancement. This includes   enhancement of the  meninges along the brainstem. This is consistent with meningitis. There is abnormal fluid/mucosal thickening in the right mastoid sinus and middle ear cavity with the  associated enhancement consistent with acute otomastoiditis. The   patient has chronic volume loss. There is prominence of the sulci, fissures, ventricles, and basal cisterns. There are abnormal subcortical and periventricular white matter signal changes consistent with the chronic microvascular ischemia. There are   normal signal voids within the intracranial arteries and the major dural sinuses. There is no acute hemorrhage or midline shift. There is no restricted diffusion to indicate acute ischemia. The orbital contents are normal. The paranasal and left mastoid   sinuses are clear.     IMPRESSION:  Impression:      1. No evidence of a right temporal lobe abscess.  2. Findings consistent with diffuse meningitis. There is right otomastoiditis as seen on the CT of the temporal bones. There are signal voids corresponding to gas bubbles within the CSF in the upper right posterior cranial fossa.  3. Volume loss secondary to cerebral atrophy.  4. Chronic microvascular ischemia    Diagnoses: Patient with most probable bacterial meningitis from the otitis media.      Comment: Patient currently being followed by infectious disease specialist.    Plan:  1.  Patient has not shown any remarkable improvement as of yet.  2.  Continue aggressive antibiotic therapy.  3.  If no improvement by coming Monday, he will need repeat MRI of the brain with and without contrast as well as CSF evaluation.    Discussed with the ICU registered nurse and also discussed about cutting down the intravenous fentanyl drip and starting him on intravenous Precedex drip.  We will follow him up tomorrow.    Spent a total of 30 minutes in face-to-face evaluation and management of the patient.    Electronically signed by Toan Lee MD, 04/08/23, 1:32 PM EDT.

## 2023-04-09 NOTE — PLAN OF CARE
Problem: Adult Inpatient Plan of Care  Goal: Plan of Care Review  Outcome: Ongoing, Progressing  Flowsheets (Taken 4/9/2023 1740)  Progress: improving  Plan of Care Reviewed With: family   Goal Outcome Evaluation:  Plan of Care Reviewed With: family        Progress: improving   Patient has been awake most of day. Was on spontaneous trial until 1700. Patient began getting agitated and restless. Precedex restarted. Can move all extremities but does not do to command. Reflexes present. PERRTL 2mm sluggish. Sounds like has  ETT cuff leak. RT aware and checked pressure in cuff. Thick oral secretions. Paced rhythm  today, sbp 110-140. Edema in hands, arms, feet. Active bowel sounds, bowel protocol given today. No drainage from right ear. Right hand weeping; Rt upper inside lip with ulcer present.

## 2023-04-09 NOTE — PROGRESS NOTES
"    INFECTIOUS DISEASE PROGRESS NOTE    Kai Dodson  1940  [id number]    Date of consult: 3/31/23  Admit date: 3/30/2023    Requesting Provider: Dr. Jason  Evaluating physician: Que Alonso MD  Reason for Consultation: meningitis  Chief Complaint: altered mental status      Subjective   History of present illness:  Kai Dodson is a  82 y.o.  Yr old male with a past medical history of hypertension, coronary artery disease, paroxysmal A. Fib on flecainide and Eliquis, tachycardia-bradycardia syndrome status post PPM and obstructive sleep apnea on CPAP who presented to the ER yesterday with encephalopathy, inability to speak, and left-sided weakness.  Due to the patient's mental state and intubated status, history is obtained from medical records.  The patient's wife had just been discharged yesterday morning from [facility] and their son was driving the patient's wife back to [facility] for inpatient rehab.  The patient's son tried to call him and could not reach him.  Other family members went to check on him and found him altered with tremors in all the extremities.  EMS was called and he was transported to the ER.    Since arrival, T-max has been 100.9°F.  He was initially tachycardic up to 112 bpm but this is improved. He had some hypotension but this has improved. The patient was intubated for airway protection and remains on the ventilator with an FiO2 of 35%. Initial lactic acid was 6.6, pro-calcitonin was 6.0, hemoglobin A1c was 5.4, [place] has been normal.  LFTs are within normal limits. White blood cell count was normal on arrival at 9.63, has now trended up to 14.14. platelet count has decreased to 109.sensitivity troponin was slightly elevated at 17. Urine drug screen was negative. 2 sets of admission blood cultures are growing GPC's in pairs and chains identified as Streptococcus pneumoniae by the BCID2 PCR. The patient underwent CTA head and neck that showed \"no " "evidence of intracranial hemorrhage or definite acute territorial ischemia.  There is abnormal intracranial gas present, appearing likely extra-axial in the lateral aspect of the right cerebellar hemisphere, with changes of the right mastoid present, concerning for mastoiditis and possibly subdural empyema or less likely parenchymal abscess.\" \"Dedicated CT of the temporal bones demonstrates findings of likely acute otomastoiditis on the right, with middle ear and right mastoid effusion, coalescent changes of the right mastoid and dehiscence of both the tegmen tympani and tegmen mastoideum. There is again concern for intracranial infectious involvement, empyema versus abscess with foci of intracranial gas present.\" chest x-ray was without any acute disease. MRI brain showed \"no evidence of right temporal lobe abscess.  Findings consistent with diffuse meningitis.  There is right otomastoiditis as seen on the CT of the temporal bones.  There are signal voids corresponding to gas bubbles within the CSF in the upper right posterior cranial fossa.\"  ENT has been consulted and Dr. Garcia evaluated the patient with right myringotomy procedure performed at bedside in the ICU. Wide myringotomy was made in the posterior aspect of the tympanic membrane.  A small amount of purulence was expressed and cultured.  After that CSF was draining through the myringotomy.  A tube was therefore not placed.  Neurosurgery has evaluated the patient and recommended that there is no role for neurosurgical intervention at this time. The patient has been started on empiric acyclovir, ampicillin, ceftriaxone 2 g IV every 12 hours, and IV vancomycin by the ICU team.  He has additionally been placed on dexamethasone.  ID has been consultated for antibiotic recommendations.    Subjective:    4/1/23: The patient remains critically ill on the ventilator.  No history is available from him.  He does have some fluid leaking from his right ear, presumably " CSF. No fevers.  Ventilator settings remain stable.  Leukocytosis is worse but likely steroid induced.    4/2/23:  He remains afebrile and on 35% FIO2.  WBC 12.29 this am.  Remains on Dexamethasone.     4/3/23: no history available from the patient.  He remains on the ventilator with an FiO2 of 35%.  No fevers.  Continued CSF leak from the right ear.    4/4/23: Continues to have some drainage from his right ear.  Remains critically ill on the ventilator with an FiO2 of 35%.  No fevers. Neurosurgery has reevaluated the patient and does not think that there is a role for CSF diversion or need for any neurosurgical intervention at this time.  May need mastoidectomy and packing of the mastoid air cells per neurosurgery.    4/5/23: the patient is still critically ill on the ventilator.  He did have some fevers 100.9°F after I saw him today. Vent settings are also slightly worse with an FiO2 of 50%.  Still having some drainage from his right ear.  No current surgical plans per ENT or neurosurgery.  No history is available from the patient.  No family at bedside.    4/6/23: The patient had ongoing fevers up to 101.3°F overnight.  Vent settings were slightly worse overnight with an FiO2 of 50% but slightly better today at 40% when I went by his room.  He is having increased respiratory secretions per nursing staff.  Ongoing drainage from his right ear.  No history available from the patient is a remains on the ventilator and sedated.    4/7/23: The patient remains intubated on the ventilator.  No history available from the patient.  T-max was 100.6°F overnight.  FiO2 has improved to 35%.  Still having significant respiratory secretions per nursing.  Not having as much drainage from his right ear per nursing. He has experienced some weeping from his left hand which is very edematous.    4/8/23: the patient remains on the vent. FIO2 35%. Still having some low grade fevers. Respiratory culture finalized with MRSA. No history  available from the patient    4/9/23: The patient is still on vent but was on SBT this morning and is responding to some simple commands and trying to mouth words to his son at bedside. Still having some low grade fevers up to 100.8F overnight. WBC slightly worse at 14.77 today. CT head without contrast was repeated this morning with no significant new changes noted.    Past Medical History:   Diagnosis Date   • Arthritis    • Atrial fibrillation    • Bradycardia    • Chronic hypertension     Probably essential   • Hypertension    • Hypertensive cardiovascular disease 07/2015    Recent progressive CCS class III-IV chest pain syndrome with normal EKG and exertional dyspnea and fatigue with normal codominant coronary arteries and preserved systolic  left ventricular function, July 2015.   • Intermittent palpitations 05/01/2014    Remote abnormal 24-hour Holter monitor demonstrating intermittent bradycardia with occasional PACs, May 2014.   • Lower extremity edema     Intermittent lower extremity edema felt secondary to antihypertensive therapy.    • Nephrolithiasis     Remote- 15 years ago   • MINO on CPAP    • MINO on CPAP     doesn't know setting    • Prostate troubles     enlarged   • Seasonal rhinitis     intermittent       Past Surgical History:   Procedure Laterality Date   • CARDIAC CATHETERIZATION      no stents   • CARDIAC ELECTROPHYSIOLOGY PROCEDURE N/A 10/24/2018    Procedure: Pacemaker DC new with Biotronik. Hold Eliquis one day prior.;  Surgeon: Harley Dacosta MD;  Location: [facility] INVASIVE LOCATION;  Service: Cardiology   • COLONOSCOPY     • ENDOSCOPY     • INSERT / REPLACE / REMOVE PACEMAKER         Pediatric History   Patient Parents   • Not on file     Other Topics Concern   • Not on file   Social History Narrative    PT DRINKS 2 SERVINGS OF CAFFEINE PER DAY.    PT LIVES AT HOME WITH WIFE.       family history includes No Known Problems in his mother; Other in his father.    No Known  Allergies    There is no immunization history for the selected administration types on file for this patient.    Medication:    Current Facility-Administered Medications:   •  acetaminophen (TYLENOL) 160 MG/5ML solution 650 mg, 650 mg, Oral, Q6H PRN, Amadou eMrcedes APRN, 650 mg at 04/09/23 0959  •  aspirin chewable tablet 81 mg, 81 mg, Nasogastric, Daily, 81 mg at 04/09/23 0958 **OR** aspirin suppository 300 mg, 300 mg, Rectal, Daily, Tereza Bautista, FORREST  •  Calcium Replacement - Follow Nurse / BPA Driven Protocol, , Does not apply, PRN, Natalia Cruz APRN  •  cefTRIAXone (ROCEPHIN) 2 g/100 mL 0.9% NS IVPB (MBP), 2 g, Intravenous, Q12H, Que Alonso MD, 2 g at 04/09/23 0959  •  chlorhexidine (PERIDEX) 0.12 % solution 15 mL, 15 mL, Mouth/Throat, Q12H, Mundo Jason MD, 15 mL at 04/09/23 0959  •  dexmedetomidine (PRECEDEX) 400 mcg in 100 mL NS infusion, 0.2-1.5 mcg/kg/hr, Intravenous, Titrated, Toan Lee MD, Last Rate: 4.3 mL/hr at 04/08/23 1358, 0.2 mcg/kg/hr at 04/08/23 1358  •  dextrose (D50W) (25 g/50 mL) IV injection 25 g, 25 g, Intravenous, Q15 Min PRN, Amadou Mercedes APRN  •  sennosides (SENOKOT) 8.8 MG/5ML syrup 10 mL, 10 mL, Nasogastric, BID, 10 mL at 04/09/23 0959 **AND** docusate sodium (COLACE) liquid 100 mg, 100 mg, Nasogastric, BID, Mundo Jason MD, 100 mg at 04/09/23 0959  •  fentaNYL (SUBLIMAZE) bolus from bag 10 mcg/mL 50 mcg, 50 mcg, Intravenous, Q30 Min PRN, Mundo Jason MD, 50 mcg at 03/31/23 1911  •  fentaNYL 2500 mcg/250 mL NS infusion,  mcg/hr, Intravenous, Titrated, Mundo Jason MD, Stopped at 04/08/23 1430  •  flecainide (TAMBOCOR) tablet 50 mg, 50 mg, Nasogastric, Q12H, Marcelino Hastings, , 50 mg at 04/09/23 0958  •  glucagon (GLUCAGEN) injection 1 mg, 1 mg, Intramuscular, Q15 Min PRN, Amadou Mercedes, APRN  •  heparin 07770 units/250 mL (100 units/mL) in 0.45 % NaCl infusion, 15 Units/kg/hr, Intravenous, Titrated,  Antwon De Leon, PharmD, Last Rate: 13.85 mL/hr at 04/08/23 2257, 16 Units/kg/hr at 04/08/23 2257  •  insulin regular (humuLIN R,novoLIN R) injection 0-7 Units, 0-7 Units, Subcutaneous, Q6H, Amadou Mercedes, APRN, 2 Units at 04/09/23 0018  •  levETIRAcetam in NaCl 0.82% (KEPPRA) IVPB 500 mg, 500 mg, Intravenous, Q12H, Meena Burks, APRN, 500 mg at 04/09/23 0540  •  Linezolid (ZYVOX) 600 mg 300 mL, 600 mg, Intravenous, Q12H, Que Alonso MD, Last Rate: 300 mL/hr at 04/09/23 0959, 600 mg at 04/09/23 0959  •  losartan (COZAAR) tablet 50 mg, 50 mg, Nasogastric, Q24H, Marcelino Hastings, , 50 mg at 04/09/23 0958  •  Magnesium Standard Dose Replacement - Follow Nurse / BPA Driven Protocol, , Does not apply, PRN, Natalia Cruz, APRN  •  ondansetron (ZOFRAN) injection 4 mg, 4 mg, Intravenous, Q6H PRN, Antonietta Phillips, APRN  •  pantoprazole (PROTONIX) injection 40 mg, 40 mg, Intravenous, Q AM, Antonietta Phillips APRHECTOR, 40 mg at 04/09/23 0540  •  Pharmacy to Dose Heparin, , Does not apply, Continuous PRN, Marcelino Hastings, DO  •  Phosphorus Replacement - Follow Nurse / BPA Driven Protocol, , Does not apply, PRN, Natalia Cruz, APRN  •  polyethylene glycol (MIRALAX) packet 17 g, 17 g, Nasogastric, Daily, Mundo Jason MD, 17 g at 04/09/23 0959  •  Potassium Replacement - Follow Nurse / BPA Driven Protocol, , Does not apply, PRN, Natalia Cruz, APRN  •  propofol (DIPRIVAN) infusion 10 mg/mL 100 mL, 5-50 mcg/kg/min, Intravenous, Titrated, Antonietta Phillips APRN, Stopped at 04/07/23 0752  •  sodium chloride 0.9 % flush 10 mL, 10 mL, Intravenous, Q12H, Tereza Bautista APRN, 10 mL at 04/09/23 1000  •  sodium chloride 0.9 % flush 10 mL, 10 mL, Intravenous, PRN, Tereza Bautista, FORREST  •  sodium chloride 0.9 % infusion 40 mL, 40 mL, Intravenous, PRN, Tereza Bautista, FORREST    Please refer to the medical record for a full  "medication list    Review of Systems:  Unable to obtain a 12 point review of systems from the patient given his clinical state    Physical Exam:   Vital Signs   Temp:  [99.9 °F (37.7 °C)-100.8 °F (38.2 °C)] 100 °F (37.8 °C)  Heart Rate:  [56-73] 70  Resp:  [21-27] 21  BP: ()/(48-99) 131/76  FiO2 (%):  [35 %] 35 %    Temp  Min: 99.9 °F (37.7 °C)  Max: 100.8 °F (38.2 °C)  BP  Min: 89/59  Max: 147/77  Pulse  Min: 56  Max: 73  Resp  Min: 21  Max: 27  SpO2  Min: 94 %  Max: 100 %    Blood pressure 131/76, pulse 70, temperature 100 °F (37.8 °C), temperature source Bladder, resp. rate 21, height 176.5 cm (69.49\"), weight 86.6 kg (190 lb 14.7 oz), SpO2 97 %.  GENERAL: critically ill-appearing on the ventilator. More alert today  HEENT:  Normocephalic, atraumatic.  ET tube in place.  No external oral lesions noted. NG tube in place. No drainage noted from his right ear today  Chest: Left chest wall PPM pocket site without any erythema or drainage.  HEART: RRR, no murmur  LUNGS: ventilator breath sounds bilaterally.  On the ventilator with an FiO2 of 35%  ABDOMEN: Soft, nontender, nondistended. No appreciable HSM.   GENITAL: + Doran catheter  SKIN: No new rashes.  No jaundice.  PSYCHIATRIC: unable to assess  EXT:  No cellulitic change. No peripheral edema noted. Has some weeping from his left hand which is very edematous, slightly better today  NEURO: more alert. Following some simple commands and trying to mouth words. Moving all of his extremities.     Right upper extremity PICC line- no erythema noted at the exit site    Results Review:   I reviewed the patient's new clinical results.    Results from last 7 days   Lab Units 04/09/23  0537 04/08/23  0538 04/07/23  0744   WBC 10*3/mm3 14.77* 14.14* 14.21*   HEMOGLOBIN g/dL 10.6* 10.4* 11.0*   HEMATOCRIT % 33.5* 32.8* 34.0*   PLATELETS 10*3/mm3 205 171 150     Results from last 7 days   Lab Units 04/09/23  0537   SODIUM mmol/L 133*   POTASSIUM mmol/L 4.3   CHLORIDE " mmol/L 100   CO2 mmol/L 25.0   BUN mg/dL 31*   CREATININE mg/dL 0.57*   GLUCOSE mg/dL 108*   CALCIUM mg/dL 8.3*     Results from last 7 days   Lab Units 04/09/23  0537   ALK PHOS U/L 113   BILIRUBIN mg/dL 0.9   ALT (SGPT) U/L 50*   AST (SGOT) U/L 43*                 Results from last 7 days   Lab Units 04/09/23  0537   LACTATE mmol/L 1.0     Estimated Creatinine Clearance: 109.8 mL/min (A) (by C-G formula based on SCr of 0.57 mg/dL (L)).  CPK    Common Labsle 3/30/23   Creatine Kinase 57            Procalitonin Results:      Lab 04/09/23  0537 04/05/23  1345 04/04/23  0459   PROCALCITONIN 0.63* 1.48* 3.13*      Brief Urine Lab Results  (Last result in the past 365 days)      Color   Clarity   Blood   Leuk Est   Nitrite   Protein   CREAT   Urine HCG        03/30/23 1200 Yellow   Clear   Negative   Negative   Negative   Negative                Site   Date Value Ref Range Status   04/08/2023 Right Radial  Final     Juan A's Test   Date Value Ref Range Status   04/08/2023 N/A  Final     pH, Arterial   Date Value Ref Range Status   04/08/2023 7.493 (H) 7.350 - 7.450 pH units Final     Comment:     83 Value above reference range     pCO2, Arterial   Date Value Ref Range Status   04/08/2023 34.8 (L) 35.0 - 45.0 mm Hg Final     Comment:     84 Value below reference range     pO2, Arterial   Date Value Ref Range Status   04/08/2023 99.1 83.0 - 108.0 mm Hg Final     HCO3, Arterial   Date Value Ref Range Status   04/08/2023 26.7 (H) 20.0 - 26.0 mmol/L Final     Base Excess, Arterial   Date Value Ref Range Status   04/08/2023 3.5 (H) 0.0 - 2.0 mmol/L Final     Hemoglobin, Blood Gas   Date Value Ref Range Status   04/08/2023 11.0 (L) 13.5 - 17.5 g/dL Final     Comment:     84 Value below reference range     Hematocrit, Blood Gas   Date Value Ref Range Status   04/08/2023 33.6 (L) 38.0 - 51.0 % Final     Oxyhemoglobin   Date Value Ref Range Status   04/08/2023 97.2 94 - 99 % Final     Methemoglobin   Date Value Ref Range Status    04/08/2023 0.40 0.00 - 1.50 % Final     Carboxyhemoglobin   Date Value Ref Range Status   04/08/2023 1.2 0 - 2 % Final     CO2 Content   Date Value Ref Range Status   04/08/2023 27.8 22 - 33 mmol/L Final     Barometric Pressure for Blood Gas   Date Value Ref Range Status   04/08/2023   Final     Comment:     N/A     Modality   Date Value Ref Range Status   04/08/2023 Ventilator  Final     FIO2   Date Value Ref Range Status   04/08/2023 35 % Final        Microbiology:  Blood Culture   Date Value Ref Range Status   03/30/2023 Abnormal Stain (C)  Preliminary     BCID, PCR   Date Value Ref Range Status   03/30/2023 (A) Negative by BCID PCR. Culture to Follow. Final    Streptococcus pneumoniae. Identification by BCID2 PCR.         Blood Culture   Date Value Ref Range Status   03/30/2023 Abnormal Stain (C)  Preliminary     BCID, PCR   Date Value Ref Range Status   03/30/2023 (A) Negative by BCID PCR. Culture to Follow. Final    Streptococcus pneumoniae. Identification by BCID2 PCR.   (      Radiology:    I independently read the patient's chest xray from 4/9/23- improved left-sided pulmonary airspace disease.    IMPRESSION:     Problems:  1. Streptococcus pneumoniae septicemia-due to acute otitis  2. 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  No LP done yet but given the Streptococcus pneumonia septicemia and clinical presentation, we do not need an LP.  3. Acute otitis media and right-sided mastoiditis resulting in a subdural empyema with fistulous tract between the middle ear space and the dura- Strep pneumoniae on culture  4. Acute toxic/metabolic encephalopathy-due to the above infection  5. Acute hypoxic respiratory failure, intubated for airway protection  6. leukocytosis with neutrophilia-ongoing and slightly worse  7. Hypokalemia improved   8. Hypomagnesemia, ongoing   9. tachycardia-bradycardia syndrome status post PPM- complicating factor in the setting of  bacteremia as at some risk for seeding PPM leads with bacteria but streptococcus pneumoniae is an uncommon bacteria to do this. TTE was without any signs of vegetations or pacemaker lead infection  10. paroxysmal A. Fib on flecainide and Eliquis  11. coronary artery disease  12. obstructive sleep apnea on CPAP nightly at home  13. History of hypertension  14. Thrombocytopenia-improved  15. New fevers starting on 4/5-suspect due to pneumonia-ongoing  16. Left-sided pneumonia-MRSA from culture. Improving by chest xray and vent settings minimal    RECOMMENDATIONS:    -continue to monitor cbc with diff closely  -ENT and neurology are following.  -continue ceftriaxone 2g IV q12h  -Linezolid 600 mg BID  -CT head without contrast was without any significant new changes. may need to consider repeat MRI brain at some point in the near future depending on clinical course. Mental status does seem to be improving. Still with ongoing leukocytosis and low grade fevers.     Seems to be improving somewhat today from a neurologic standpoint. Does have some ongoing fevers and leukocytosis.     He will likely need a 6 week course of IV antibiotic therapy.    I discussed with the patient's son at bedside today    I discussed with Dr. Lee and Dr. Jason today    Complex MDM.     Copied text in this note has been reviewed and is accurate as of 04/09/23            Que Alonso MD  4/9/2023

## 2023-04-09 NOTE — PROGRESS NOTES
"DOS: 2023  NAME: Kai Dodson   : 1940  PCP: Derick Phillip MD  Chief Complaint   Patient presents with   • Extremity Weakness       Chief complaint: The patient is more awake and responsive compared to yesterday.  Subjective: As per his son debbie who spoke to him on the left side he was able to get responses from him and he would not his head appropriately during different questions and answers.  He would even try to say he while on the ventilator.    Objective:  Vital signs: /76 (BP Location: Left arm, Patient Position: Lying)   Pulse 70   Temp 100 °F (37.8 °C) (Bladder)   Resp 26   Ht 176.5 cm (69.49\")   Wt 86.6 kg (190 lb 14.7 oz)   SpO2 97%   BMI 27.80 kg/m²    Gen: NAD, vitals reviewed  MS: Improved since yesterday patient responding to questions appropriately.  CN: Cranials 2-12: No focal deficits.  Motor: Moving his extremities spontaneously.  Sensory: Withdraws to painful stimuli.  DTRs: 1+ bilaterally with downgoing toes.  Coordination: Unable to perform finger-to-nose or heel-to-shin testing.  Gait: Gait and Romberg could not be tested.    ROS:  General: Pleasant gentleman currently improving but still had some pneumonia which is being treated aggressively.  He is on spontaneous mode trial and he also has a low-grade temperature.  Neurological: He has been improving compared to yesterday.  Noted a lot of improvement since yesterday as he is responding better to questions and also nodding his head appropriately.    Laboratory results:  Lab Results   Component Value Date    GLUCOSE 108 (H) 2023    CALCIUM 8.3 (L) 2023     (L) 2023    K 4.3 2023    CO2 25.0 2023     2023    BUN 31 (H) 2023    CREATININE 0.57 (L) 2023    EGFRIFNONA 67 10/16/2018    BCR 54.4 (H) 2023    ANIONGAP 8.0 2023     Lab Results   Component Value Date    WBC 14.77 (H) 2023    HGB 10.6 (L) 2023    HCT 33.5 (L) " 04/09/2023    MCV 96.5 04/09/2023     04/09/2023     Lab Results   Component Value Date    LDL 30 03/31/2023    LDL 83 07/01/2015            Review of labs: The white cell count is improving to 14,770 and the hemoglobin is low at 10.6 and hematocrit is low at 33.5 with platelet count of 205,000 and the BUN being elevated at 31 and a creatinine normal at 0.57.    Review and interpretation of imaging: CT of the head performed today was evaluated on the PACS that shows the following:    Findings:  There is generalized prominence of the ventricles and CSF containing spaces with decreased attenuation in the periventricular white matter which is stable. No mass lesions, mass effect, acute hemorrhage or edema. No intra or extra-axial fluid collections   are identified. There is a nasoenteric tube and endotracheal tube in place. There is mucosal disease in the right maxillary sphenoid and ethmoid sinuses. There is opacification of the right mastoid air cells        IMPRESSION:  Impression:     1. Generalized atrophy with chronic small vessel ischemic changes.  2. Right mastoid air cell disease, unchanged.  3. Maxillary sphenoid and ethmoid sinus disease possibly related to nasoenteric tube placement and intubation.       Workup to date: The MRI of the brain performed on March 30, 2023 showed the following:    Findings:   There is no evidence of a right temporal lobe abscess. There are signal voids within the CSF in the upper right posterior cranial fossa consistent with the soft tissue gas as seen on the CT study. There is diffuse meningeal enhancement. This includes   enhancement of the  meninges along the brainstem. This is consistent with meningitis. There is abnormal fluid/mucosal thickening in the right mastoid sinus and middle ear cavity with the associated enhancement consistent with acute otomastoiditis. The   patient has chronic volume loss. There is prominence of the sulci, fissures, ventricles, and basal  cisterns. There are abnormal subcortical and periventricular white matter signal changes consistent with the chronic microvascular ischemia. There are   normal signal voids within the intracranial arteries and the major dural sinuses. There is no acute hemorrhage or midline shift. There is no restricted diffusion to indicate acute ischemia. The orbital contents are normal. The paranasal and left mastoid   sinuses are clear.     IMPRESSION:  Impression:      1. No evidence of a right temporal lobe abscess.  2. Findings consistent with diffuse meningitis. There is right otomastoiditis as seen on the CT of the temporal bones. There are signal voids corresponding to gas bubbles within the CSF in the upper right posterior cranial fossa.  3. Volume loss secondary to cerebral atrophy.  4. Chronic microvascular ischemia.    Diagnoses: Patient with mastoiditis and central nervous system depression which is currently improving with appropriate antibiotics.      Comment: Discussed with the patient's son at the bedside as well as the infectious disease specialist Dr. Que Alonso and the intensivist Dr. Jason that we will continue the aggressive antibiotic therapy as before.    Plan:  1.  As he is clinically improving we will hold off doing another MRI or spinal tap at this point.  2.  Also discussed about lessening the sedation and patient is currently on spontaneous mode for possible extubation.  3.  He is also febrile with a temperature 100.5 °F and discussed with the nursing staff about using a cooling blanket if his temperature crosses 101 °F.    Discussed with the patient's son at the bedside as well as ICU staff and will follow him up tomorrow.    Spent a total of 30 minutes in face-to-face evaluation and management of the patient.    Electronically signed by Toan Lee MD, 04/09/23, 12:23 PM EDT.

## 2023-04-09 NOTE — PROGRESS NOTES
"INTENSIVIST   PROGRESS NOTE     Hospital:  LOS: 10 days     Chief Complaint: Altered mentation    Subjective   S     Interval History: Overall improved mental status this morning.  Continues to spike intermittent (low-grade) fevers.  He is overall hemodynamically stable.  Off sedation this morning during assessment.  Family at bedside with questions.    The patient's relevant past medical, surgical and social history were reviewed and updated in Epic as appropriate.      ROS: Unable to obtain as patient intubated and sedated    Objective   O     Intake/Ouptut 24 hrs (7:00AM - 6:59 AM)  Intake & Output (last 3 days)       04/06 0701 04/07 0700 04/07 0701  04/08 0700 04/08 0701  04/09 0700 04/09 0701  04/10 0700    I.V. (mL/kg) 1417.4 (16.4) 463.2 (5.3) 1784.5 (20.6) 81.8 (0.9)    Other 445 525 411 120    NG/GT 1508 1706 1730 399    IV Piggyback 400 1399.8  415.9    Total Intake(mL/kg) 3770.4 (43.5) 4094 (47.3) 3925.5 (45.3) 1016.7 (11.7)    Urine (mL/kg/hr) 2100 (1) 4800 (2.3) 5185 (2.5) 590 (1.2)    Total Output 2100 4800 5185 590    Net +1670.4 -706 -1259.5 +426.7                Medications (drips):  dexmedetomidine, Last Rate: 0.2 mcg/kg/hr (04/08/23 1358)  fentanyl 10 mcg/mL, Last Rate: Stopped (04/08/23 1430)  heparin, Last Rate: 16 Units/kg/hr (04/08/23 2257)  Pharmacy to Dose Heparin  propofol, Last Rate: Stopped (04/07/23 3112)    Respiratory Support: Mechanical ventilation     Physical Examination:  Vital Signs: Blood pressure 131/76, pulse 70, temperature 100 °F (37.8 °C), temperature source Bladder, resp. rate 26, height 176.5 cm (69.49\"), weight 86.6 kg (190 lb 14.7 oz), SpO2 97 %.    General: Critically ill-appearing.  On mechanical ventilation.  ENT/Neck: Trachea midline, No masses.  ETT in place.  Chest: Clear to auscultation bilaterally, No wheezing, rhonchi, or rales. Normal work of breathing. Equal chest rise.  Appropriate oxygen saturations on MV.  Cardiac: Regular rhythm, normal rate, S1S2 " auscultated. No murmurs, rubs or gallops.   Extremities: No lower extremity edema. No clubbing or cyanosis.  Neuro: Intubated on minimal sedation.  Opens eyes to loud voice.  Occasionally will nod head when asked questions.    Lines, Drains & Airways     Active LDAs     Name Placement date Placement time Site Days    PICC Triple Lumen 03/31/23 Right Basilic 03/31/23  1451  Basilic  2    NG/OG Tube 16 Fr Right nostril 04/01/23  0700  Right nostril  2    Urethral Catheter Double-lumen;Temperature probe 16 Fr. 03/30/23  1503  -- 3    Hi-Lo Evac ETT 7.5 03/30/23  1437  Oral  3              Results from last 7 days   Lab Units 04/09/23  0537 04/08/23  0538 04/07/23  0744   WBC 10*3/mm3 14.77* 14.14* 14.21*   HEMOGLOBIN g/dL 10.6* 10.4* 11.0*   MCV fL 96.5 97.6* 97.4*   PLATELETS 10*3/mm3 205 171 150     Results from last 7 days   Lab Units 04/09/23  0537 04/08/23  0538 04/07/23  0744 04/05/23  1345 04/04/23  0459 04/03/23  1759 04/03/23  0648   SODIUM mmol/L 133* 133* 137   < > 147*  --  143   POTASSIUM mmol/L 4.3 4.0 4.6   < > 4.6  --  3.7   CO2 mmol/L 25.0 24.0 24.0   < > 21.0*  --  23.0   CREATININE mg/dL 0.57* 0.49* 0.41*   < > 0.66*  --  0.69*   GLUCOSE mg/dL 108* 130* 132*   < > 166*  --  168*   MAGNESIUM mg/dL 2.2 2.0 2.1   < >  --   --  2.7*   PHOSPHORUS mg/dL  --   --   --   --  2.6 1.9* 1.8*    < > = values in this interval not displayed.     Estimated Creatinine Clearance: 109.8 mL/min (A) (by C-G formula based on SCr of 0.57 mg/dL (L)).  Results from last 7 days   Lab Units 04/09/23  0537 04/08/23  0538 04/05/23  1345   ALK PHOS U/L 113 101 80   BILIRUBIN mg/dL 0.9 1.5* 1.3*   ALT (SGPT) U/L 50* 39 51*   AST (SGOT) U/L 43* 26 27     Results from last 7 days   Lab Units 04/08/23  0345 04/06/23  1113 04/05/23  1350   PH, ARTERIAL pH units 7.493* 7.471*  --    PCO2, ARTERIAL mm Hg 34.8* 34.4*  --    PO2 ART mm Hg 99.1 81.5*  --    FIO2 % 35 40 50     Results: Reviewed.    I reviewed the patient's new laboratory  and imaging results.  I independently reviewed the patient's new images.    Medications: Reviewed.    Assessment & Plan   A / P     Active Hospital Problems    Diagnosis    • **Encephalopathy, metabolic    • Subdural empyema    • Meningitis    • Sepsis secondary to strep bacteremia and meningitis with subdural empyema    • Streptococcal bacteremia    • Questionable Status epilepticus (HCC)    • Paroxysmal atrial fibrillation on Flecainide and Eliquis    • MINO on CPAP    • Hypertension      Patient Ivory is an 82 y.o. male with PMH PAF on Flecainide and Eliquis, HTN, CAD, Tachy-Samuel syndrome s/p PPM and MINO on CPAP who presented to the ED with AMS, inability to speak and left sided weakness.  Found to have possible subdural empyema and meningitis related to mastoiditis/otitis media.  Patient underwent Myringotomy on the right by ENT on 3/30/2023.  Cultures from CSF fluid from right ear 3/30/2023 growing Streptococcus, blood cultures from 3/30/2023 growing strep pneumonia.  Patient was eval by neurosurgery with no neurosurgical intervention at this time.  He currently remains on mechanical ventilation.  Course complicated by ongoing encephalopathy and staph pneumonia.    #Encephalopathy  #Sepsis w/ multi organ dysfunction  #Bacteremia  #Acute mastoiditis   #Sudural empyema  #Meningitis  -Encephalopathy likely metabolic and due to underlying sepsis. Initial radiographic evidence of subdural empyema vs [less likely] abscess. Held off on LP on admission given that patient is chronically anticoagulated; last dose of Eliquis likely administered on 3/29/2023 AM.  At presentation covered broadly with antimicrobials for possible meningitis (i.e. vancomycin, ceftriaxone, ampicillin and acyclovir). Blood cultures later possible for strep pneumonia; infectious disease consulted and de-escalating antimicrobial therapy as able.  Suspect streptococcal meningitis as well given clinical picture and aforementioned blood cultures.   He also received dexamethasone x4 days early in hospital course.  Both neurology and neurosurgery following.  Neurosurgery evaluated again (4/04) given continued leak (presumably CSF) from right ear (which is improving). Also reevaluated imaging and noted that there was no CNS abscess visualized. No neurosurgical procedure needed at this time, including lumbar drain for CSF diversion  -Attempted to send fluid for beta-2 transferrin but patient's ear stopped draining  -Radiographic imaging consistent with acute mastoiditis (which is likely primary issue and etiology of infection). ENT closely following s/p right myringotomy; fluid cultured [NGTD] but no tube placed.  Spoke with ENT (4/04) who continues to follow patient and notes that mastoidectomy may need to be performed pending clinical improvement-- but too critically ill at this time.  -MRSA swab and respiratory culture from 4/06 (+) Staph aureus.  Antibiotic management per infectious disease.  Now on Zyvox and cefepime.  Infectious disease following for antibiotic management.  -EEG performed in the ED on initial presentation; While there is diffuse cerebral dysfunction there was no evidence of epilepsy   -Have been minimizing sedation as much as possible over the course of the week.  Discontinued propofol on 4/06 and patient has been stable on continuous fentanyl (0-50).  Stopped morning (4/08) and added low-dose Precedex which was held this morning for neurological exam and pressure support trial.  Patient currently tolerating pressure support trial and nodding to some questions.  Would not be able to extubate today given weakness but will continue pressure support trial until fatigued and then placed back on rate.  -Guarded prognosis and have discussed this with multiple family members  -Did repeat CT head on 4/09 checking for acute, complicating issues (i.e. stroke, bleed) and monitor ongoing infection given persistent encephalopathy.  Repeat CT head overall  stable with continued inflammation around mastoid.  Given overall improvement just this morning [clinically] we will hold off on MRI (per neurology)     #Acute hypoxic respiratory failure requiring intubation/mechanical ventilation  #MINO  -Currently intubated/sedated. Will adjust mechanical ventilation settings as able.  Etiology of respiratory failure likely from encephalopathy/inability to protect airway  -We will continue mechanical ventilation and wean if possible.  SBT daily if able and appropriate  -Minimal sedation is much as possible for comfort on mechanical ventilation and appropriate neurological exams  -Antibiotics broadened on 4/06 due to intermittent fevers  -Diuresing daily     #Paroxysmal atrial fibrillation  -Patient has been started on home flecainide but beta-blocker held for bradycardia  -Given questionable need for LP/procedures and surgical intervention(s) earlier in admission transitioned Eliquis to heparin drip.  We will continue heparin drip at this time which is more appropriate in the ICU setting    F-Tube feeds per dietary recs  A-NA  S-Precedex  T-Heparin gtt   H-Head of bed greater than 30 degrees  U-PPI  G-FSBS per unit protocol, correction dose insulin  S-SBT assessment daily   B-Will monitor daily and provide regimen if indicated  I-PIV  D-Zyvox, cefepime (per ID)     Advance Directives:   Code Status and Medical Interventions:   Ordered at: 03/30/23 1300     Code Status (Patient has no pulse and is not breathing):    CPR (Attempt to Resuscitate)     Medical Interventions (Patient has pulse or is breathing):    Full Support     High level of risk due to severe exacerbation of chronic illness and illness with threat to life or bodily function.    I conducted multidisciplinary rounds in the plan of care was discussed with the multidisciplinary team at that time. In attendance at multidisciplinary rounds was clinical pharmacist, dietitian, nursing staff and case management.    I  discussed the patient's findings and my recommendations with patient and nursing staff    -- Alvaro Jason MD  Pulmonary/Critical Care    Critical Care time spent in direct patient care: 35 minutes (excluding procedure time, if applicable) including high complexity decision making to assess, manipulate, and support vital organ system failure in this individual who has impairment of one or more vital organ systems such that there is a high probability of imminent or life threatening deterioration in the patient’s condition.

## 2023-04-10 NOTE — NURSING NOTE
"                             Wound, Ostomy and Continence (WOC) Note    Reason for WOC Consultation: \"Right upper lip pressure injury\"     Identified small scab and nonviable tissue on the right upper lip.  Able to gently debride with fingers.  Epithelial tissue noted beneath.  Did not observe pressure injury.  Please continue to reposition ET tube per protocol.  Apply mouth moisturizer to lips and mucosal tissue.    Pressure Injury Prevention Measures (initiate for a Rui Scale Score of <18):     Most recent Rui Scale score:  Sensory Perception: 2-->very limited  Moisture: 4-->rarely moist  Activity: 1-->bedfast  Mobility: 2-->very limited  Nutrition: 3-->adequate  Friction and Shear: 2-->potential problem  Rui Score: 14 (04/10/23 0800)     -Turn q 2 hr. using an offloading foam wedge, keep heels elevated and offloaded with offloading heel boots.    -Raise knee-gatch before elevating HOB to reduce shearing   -Follow C.A.R.E protocol if medical devices (Bipap, rasmussen, Ng tube, etc) are being used.  -Apply moisture barrier cream to bottom BID & PRN, if incontinent.  -If incontinent, consider applying an external catheter. External catheters are finicky and should be checked every few hours for placement.   -Clean skin gently with no-rinse PH-balanced foam cleanser and a soft, disposable cloth (barrier wipes-blue pack).   -Reduce layers under patient (one sheet as drawsheet and two incontinence pads)    Thank you for consulting the WOC Nurse.  WOC Team will sign off.  Please re consult if the wound(s) worsens.     Geoffrey Hoff RN, BSN, CCRN, CWOCN  Wound, Ostomy and Continence (WOC) Department  [facility]            "

## 2023-04-10 NOTE — PROGRESS NOTES
Clinical Nutrition     Nutrition Support Assessment  Reason for Visit: MDR, Follow-up protocol, EN      Patient Name: Kai Dodson  YOB: 1940  MRN: [id number]  Date of Encounter: 04/10/23 17:14 EDT  Admission date: 3/30/2023    Comments:  Pt tolerating EN support, lg BM reported this morning, pt has received 113% of daily goal volume of Peptamen AF over the past 3 day review. Infusion rate reduced to 70 ml/hr to achieve optimal delivery. RD will monitor  Nutrition Assessmen t   Admission Diagnosis:  AMS (altered mental status) [R41.82]      Problem List:    Encephalopathy, metabolic    MINO on CPAP    Hypertension    Paroxysmal atrial fibrillation on Flecainide and Eliquis    Subdural empyema    Meningitis    Questionable Status epilepticus (HCC)    Sepsis secondary to strep bacteremia and meningitis with subdural empyema    Streptococcal bacteremia   Acute Resp Failure- Invasive mechanical ventilation     PMH:  He  has a past medical history of Arthritis, Atrial fibrillation, Bradycardia, Chronic hypertension, Hypertension, Hypertensive cardiovascular disease (07/2015), Intermittent palpitations (05/01/2014), Lower extremity edema, Nephrolithiasis, MINO on CPAP, MINO on CPAP, Prostate troubles, and Seasonal rhinitis.    PSH: He  has a past surgical history that includes Colonoscopy; Cardiac catheterization; Esophagogastroduodenoscopy; Cardiac electrophysiology procedure (N/A, 10/24/2018); and Insert / replace / remove pacemaker.    Applicable Nutrition Concerns:   Skin:  Oral:  GI:    Applicable Interval History:   3/30 incision/drainage from middle ear  3/30 intubated for mechanical ventilaton    Reported/Observed/Food/Nutrition Related History:   4/10) RN reports pt w/ lg BM this morning, follows commands and moves everything off sedation, has significant secretions. Tolerating TF.    4/7) in volume overload, plan for diuretics.  No BM since EN started, bowel regiment started  yesterday, no BM yet.  EN @ goal rate. Sedated on the vent.      4/3) Remains intubated and sedated.  EN stated on 4/1, at goal rate this morning.  No issues with intolerance with EN.  Running @ goal rate at bedside at time of visit. No family at bed side  Bed weight: 97.7kg   Remains on propofol for sedation.     Labs    Labs Reviewed: Yes     Results from last 7 days   Lab Units 04/10/23  0449 04/09/23  0537 04/08/23  0538 04/05/23  1345 04/04/23  0459 04/03/23  1759   GLUCOSE mg/dL 102* 108* 130*   < > 166*  --    BUN mg/dL 31* 31* 25*   < > 31*  --    CREATININE mg/dL 0.65* 0.57* 0.49*   < > 0.66*  --    SODIUM mmol/L 133* 133* 133*   < > 147*  --    CHLORIDE mmol/L 101 100 102   < > 113*  --    POTASSIUM mmol/L 3.9 4.3 4.0   < > 4.6  --    PHOSPHORUS mg/dL 2.7  --   --   --  2.6 1.9*   MAGNESIUM mg/dL 2.2 2.2 2.0   < >  --   --    ALT (SGPT) U/L 60* 50* 39   < > 30  --     < > = values in this interval not displayed.     Results from last 7 days   Lab Units 04/10/23  0449 04/09/23  0537 04/08/23  0538   ALBUMIN g/dL 2.4* 2.2* 2.2*   PREALBUMIN mg/dL 15.7*  --   --    CRP mg/dL 16.22*  --   --    TRIGLYCERIDES mg/dL 75  --   --      Results from last 7 days   Lab Units 04/10/23  1711 04/10/23  1110 04/10/23  0532 04/09/23  2323 04/09/23  1753 04/09/23  1128   GLUCOSE mg/dL 133* 143* 112 194* 107 134*     Lab Results   Lab Value Date/Time    HGBA1C 5.40 03/30/2023 1121    HGBA1C 5.2 07/01/2015 0436             Medications    Medications Reviewed: Yes  Pertinent : insulin, keppra, protonix, senokot   Infusion: Fentanyl 50mcg/hr, heparin 14units/kg/hr. precedex  PRN: zofran    Intake/Ouptut 24 hrs (0701 - 0700)   I&O's Reviewed: Yes     Intake & Output (last day)       04/09 0701  04/10 0700 04/10 0701  04/11 0700    I.V. (mL/kg) 1100.8 (12.7) 384.4 (4.4)    Other 360 120    NG/GT 1692 620    IV Piggyback 415.9 300    Total Intake(mL/kg) 3568.7 (41.2) 1424.4 (16.4)    Urine (mL/kg/hr) 3835 (1.8) 1225 (1.4)    Stool  " 0    Total Output 3835 1225    Net -266.3 +199.4          Stool Unmeasured Occurrence  1 x      Net I & O since admit: ~ + 5.9 L    Anthropometrics     Admission Height 176.5 cm (69.5\") Documented at 03/30/2023 1116   Admission Weight 88.5 kg (195 lb) Documented at 03/30/2023 1116     Height: Height: 176.5 cm (69.49\")  Last Filed Weight: Weight: 86.6 kg (190 lb 14.7 oz) (04/02/23 0600)  Method: Weight Method: Bed scale  BMI: BMI (Calculated): 27.8  BMI classification: Overweight: 25.0-29.9kg/m2     UBW: per EMR rpt of 195 lbs on 10/11/23 further data req.  Weight change:     Nutrition Focused Physical Exam     Date: 4./    Unable to perform exam due to: Defer pending indication    Needs Assessment   Date: 4/3, 4/10    Height used:Height: 176.5 cm (69.49\")  Weights used: 86.1 Kg bed wt     Estimated Calorie needs: ~ 1700 Kcal/day  Method:  Kcals/KG 20 = 1722  Method:  PSU on wt 86.1 temp 36.8, VE 6.33 =1626     Estimated Protein needs: ~ 112 g PRO/day  Method: g/Kg1.3    Estimated Fluid needs: < 25 ml/kg ml/day   Hyponatremia noted    Current Nutrition Prescription     PO: NPO Diet NPO Type: Strict NPO, Tube Feeding  Oral Nutrition Supplement:   Intake: N/A     EN: Peptamen AF  Goal Rate: 70 ml/hr    Water Flushes: 30 ml ev 2 hr  Modular: None  Route: NG   Tube: Large bore    At goal over: 20Hrs/day    Rx will supply:   Goal Volume 1400 mL/day     Flush Volume 300 mL/day     Energy 1680 Kcal/day 98 % Est Need   Protein 106 g/day 95 % Est Need   Fiber 8.4 g/day     Water in  EN 1135 mL     Total Water 1435 mL     Meet DRI Yes          --------------------------------------------------------------------------  Product/Rate verified at bedside: Yes  Infusing Rate at time of visit: @ 75ml/hr, free water @ 3 0ml every 2 hrs    Average Delivery from Charting: 3 Days:  Volume 1592 mL/day 113  % Goal Vol.   Flush Volume 360 mL/day     Energy 1910 Kcal/day 112 % Est Need   Protein 121 g/day 108 % Est Need   Fiber 9.6 g/day   "   Water in  EN 1290 mL     Total Water 1722 mL     Meet DRI Yes        Nutrition Diagnosis   Date: 3/31 Updated:  4/3  Problem Inadequate oral intake   Etiology Mechanical ventilation   Signs/Symptoms NPO   Status: EN started 4/1; at goal rate this morning.       Goal:   General: Nutrition support treatment  PO: N/A  EN/PN: Adjust EN no longer on propofol     Nutrition Intervention      Follow treatment progress, Care plan reviewed, Nutrition support order placed    Infusion rate reduced to 70 ml/hr    EN: Peptamen AF  Goal Rate: 70 ml/hr    Water Flushes: 30 ml ev 2 hr  Modular: None  Route: NG   Tube: Large bore    At goal over: 20Hrs/day    Rx will supply:   Goal Volume 1400 mL/day     Flush Volume 300 mL/day     Energy 1680 Kcal/day 98 % Est Need   Protein 106 g/day 95 % Est Need   Fiber 8.4 g/day     Water in  EN 1135 mL     Total Water 1435 mL     Meet DRI Yes        -  Monitoring/Evaluation:   Per protocol, I&O, Pertinent labs, Weight, Symptoms, POC/GOC      Sravani Deleon, MS,RD,LD  Time Spent: 45 min

## 2023-04-10 NOTE — PROGRESS NOTES
"    INFECTIOUS DISEASE PROGRESS NOTE    Kai Dodson  1940  [id number]    Date of consult: 3/31/23  Admit date: 3/30/2023    Requesting Provider: Dr. Jason  Evaluating physician: Que Alonso MD  Reason for Consultation: meningitis  Chief Complaint: altered mental status      Subjective   History of present illness:  Kai Dodson is a  82 y.o.  Yr old male with a past medical history of hypertension, coronary artery disease, paroxysmal A. Fib on flecainide and Eliquis, tachycardia-bradycardia syndrome status post PPM and obstructive sleep apnea on CPAP who presented to the ER yesterday with encephalopathy, inability to speak, and left-sided weakness.  Due to the patient's mental state and intubated status, history is obtained from medical records.  The patient's wife had just been discharged yesterday morning from [facility] and their son was driving the patient's wife back to [facility] for inpatient rehab.  The patient's son tried to call him and could not reach him.  Other family members went to check on him and found him altered with tremors in all the extremities.  EMS was called and he was transported to the ER.    Since arrival, T-max has been 100.9°F.  He was initially tachycardic up to 112 bpm but this is improved. He had some hypotension but this has improved. The patient was intubated for airway protection and remains on the ventilator with an FiO2 of 35%. Initial lactic acid was 6.6, pro-calcitonin was 6.0, hemoglobin A1c was 5.4, [place] has been normal.  LFTs are within normal limits. White blood cell count was normal on arrival at 9.63, has now trended up to 14.14. platelet count has decreased to 109.sensitivity troponin was slightly elevated at 17. Urine drug screen was negative. 2 sets of admission blood cultures are growing GPC's in pairs and chains identified as Streptococcus pneumoniae by the BCID2 PCR. The patient underwent CTA head and neck that showed \"no " "evidence of intracranial hemorrhage or definite acute territorial ischemia.  There is abnormal intracranial gas present, appearing likely extra-axial in the lateral aspect of the right cerebellar hemisphere, with changes of the right mastoid present, concerning for mastoiditis and possibly subdural empyema or less likely parenchymal abscess.\" \"Dedicated CT of the temporal bones demonstrates findings of likely acute otomastoiditis on the right, with middle ear and right mastoid effusion, coalescent changes of the right mastoid and dehiscence of both the tegmen tympani and tegmen mastoideum. There is again concern for intracranial infectious involvement, empyema versus abscess with foci of intracranial gas present.\" chest x-ray was without any acute disease. MRI brain showed \"no evidence of right temporal lobe abscess.  Findings consistent with diffuse meningitis.  There is right otomastoiditis as seen on the CT of the temporal bones.  There are signal voids corresponding to gas bubbles within the CSF in the upper right posterior cranial fossa.\"  ENT has been consulted and Dr. Garcia evaluated the patient with right myringotomy procedure performed at bedside in the ICU. Wide myringotomy was made in the posterior aspect of the tympanic membrane.  A small amount of purulence was expressed and cultured.  After that CSF was draining through the myringotomy.  A tube was therefore not placed.  Neurosurgery has evaluated the patient and recommended that there is no role for neurosurgical intervention at this time. The patient has been started on empiric acyclovir, ampicillin, ceftriaxone 2 g IV every 12 hours, and IV vancomycin by the ICU team.  He has additionally been placed on dexamethasone.  ID has been consultated for antibiotic recommendations.    Subjective:    4/1/23: The patient remains critically ill on the ventilator.  No history is available from him.  He does have some fluid leaking from his right ear, presumably " CSF. No fevers.  Ventilator settings remain stable.  Leukocytosis is worse but likely steroid induced.    4/2/23:  He remains afebrile and on 35% FIO2.  WBC 12.29 this am.  Remains on Dexamethasone.     4/3/23: no history available from the patient.  He remains on the ventilator with an FiO2 of 35%.  No fevers.  Continued CSF leak from the right ear.    4/4/23: Continues to have some drainage from his right ear.  Remains critically ill on the ventilator with an FiO2 of 35%.  No fevers. Neurosurgery has reevaluated the patient and does not think that there is a role for CSF diversion or need for any neurosurgical intervention at this time.  May need mastoidectomy and packing of the mastoid air cells per neurosurgery.    4/5/23: the patient is still critically ill on the ventilator.  He did have some fevers 100.9°F after I saw him today. Vent settings are also slightly worse with an FiO2 of 50%.  Still having some drainage from his right ear.  No current surgical plans per ENT or neurosurgery.  No history is available from the patient.  No family at bedside.    4/6/23: The patient had ongoing fevers up to 101.3°F overnight.  Vent settings were slightly worse overnight with an FiO2 of 50% but slightly better today at 40% when I went by his room.  He is having increased respiratory secretions per nursing staff.  Ongoing drainage from his right ear.  No history available from the patient is a remains on the ventilator and sedated.    4/7/23: The patient remains intubated on the ventilator.  No history available from the patient.  T-max was 100.6°F overnight.  FiO2 has improved to 35%.  Still having significant respiratory secretions per nursing.  Not having as much drainage from his right ear per nursing. He has experienced some weeping from his left hand which is very edematous.    4/8/23: the patient remains on the vent. FIO2 35%. Still having some low grade fevers. Respiratory culture finalized with MRSA. No history  available from the patient    4/9/23: The patient is still on vent but was on SBT this morning and is responding to some simple commands and trying to mouth words to his son at bedside. Still having some low grade fevers up to 100.8F overnight. WBC slightly worse at 14.77 today. CT head without contrast was repeated this morning with no significant new changes noted.    4/10/23: Tmax 101.5 this morning. Was on SBT until late afternoon. He had thick oral secretions and continued to have edema in BUE with right hand weeping/pedal edema.  Remains ill on vent with 35% FiO2/PEEP 5.  CXR with improving pneumonia. Sedation is off and he appears very anxious and agitated.      Past Medical History:   Diagnosis Date   • Arthritis    • Atrial fibrillation    • Bradycardia    • Chronic hypertension     Probably essential   • Hypertension    • Hypertensive cardiovascular disease 07/2015    Recent progressive CCS class III-IV chest pain syndrome with normal EKG and exertional dyspnea and fatigue with normal codominant coronary arteries and preserved systolic  left ventricular function, July 2015.   • Intermittent palpitations 05/01/2014    Remote abnormal 24-hour Holter monitor demonstrating intermittent bradycardia with occasional PACs, May 2014.   • Lower extremity edema     Intermittent lower extremity edema felt secondary to antihypertensive therapy.    • Nephrolithiasis     Remote- 15 years ago   • MINO on CPAP    • MINO on CPAP     doesn't know setting    • Prostate troubles     enlarged   • Seasonal rhinitis     intermittent       Past Surgical History:   Procedure Laterality Date   • CARDIAC CATHETERIZATION      no stents   • CARDIAC ELECTROPHYSIOLOGY PROCEDURE N/A 10/24/2018    Procedure: Pacemaker DC new with Biotronik. Hold Eliquis one day prior.;  Surgeon: Harley Dacosta MD;  Location: [facility] INVASIVE LOCATION;  Service: Cardiology   • COLONOSCOPY     • ENDOSCOPY     • INSERT / REPLACE / REMOVE PACEMAKER          Pediatric History   Patient Parents   • Not on file     Other Topics Concern   • Not on file   Social History Narrative    PT DRINKS 2 SERVINGS OF CAFFEINE PER DAY.    PT LIVES AT HOME WITH WIFE.       family history includes No Known Problems in his mother; Other in his father.    No Known Allergies    There is no immunization history for the selected administration types on file for this patient.    Medication:    Current Facility-Administered Medications:   •  acetaminophen (TYLENOL) 160 MG/5ML solution 650 mg, 650 mg, Oral, Q6H PRN, Amadou Mercedes APRN, 650 mg at 04/10/23 2004  •  aspirin chewable tablet 81 mg, 81 mg, Nasogastric, Daily, 81 mg at 04/10/23 0911 **OR** aspirin suppository 300 mg, 300 mg, Rectal, Daily, Tereza Bautista APRN  •  cefTRIAXone (ROCEPHIN) 2 g/100 mL 0.9% NS IVPB (MBP), 2 g, Intravenous, Q12H, Que Alonso MD, 2 g at 04/10/23 2004  •  chlorhexidine (PERIDEX) 0.12 % solution 15 mL, 15 mL, Mouth/Throat, Q12H, Mundo Jason MD, 15 mL at 04/10/23 2009  •  dexmedetomidine (PRECEDEX) 400 mcg in 100 mL NS infusion, 0.2-1.5 mcg/kg/hr, Intravenous, Titrated, Toan Lee MD, Last Rate: 13 mL/hr at 04/10/23 2005, 0.6 mcg/kg/hr at 04/10/23 2005  •  dextrose (D50W) (25 g/50 mL) IV injection 25 g, 25 g, Intravenous, Q15 Min PRN, Amadou Mercedes, APRN  •  fentaNYL (SUBLIMAZE) bolus from bag 10 mcg/mL 50 mcg, 50 mcg, Intravenous, Q30 Min PRN, Adair Will MD, 50 mcg at 03/31/23 1911  •  fentaNYL 2500 mcg/250 mL NS infusion,  mcg/hr, Intravenous, Titrated, Mundo Jason MD, Stopped at 04/08/23 1430  •  fentaNYL 2500 mcg/250 mL NS infusion,  mcg/hr, Intravenous, Titrated, Adair Will MD  •  flecainide (TAMBOCOR) tablet 50 mg, 50 mg, Nasogastric, Q12H, Marcelino Hastings, DO, 50 mg at 04/10/23 2004  •  heparin 10412 units/250 mL (100 units/mL) in 0.45 % NaCl infusion, 15 Units/kg/hr, Intravenous, Titrated, Antwon De Leon, PharmD,  Last Rate: 12.99 mL/hr at 04/10/23 1404, 15 Units/kg/hr at 04/10/23 1404  •  insulin regular (humuLIN R,novoLIN R) injection 0-7 Units, 0-7 Units, Subcutaneous, Q6H, Amadou Mercedes, APRN, 2 Units at 04/10/23 0023  •  levETIRAcetam (KEPPRA) 500 mg in sodium chloride 0.9 % 100 mL IVPB, 500 mg, Intravenous, Q12H, Antwon Lorenzana, [facility], Last Rate: 400 mL/hr at 04/10/23 1735, 500 mg at 04/10/23 1735  •  Linezolid (ZYVOX) 600 mg 300 mL, 600 mg, Intravenous, Q12H, Que Alonso MD, Last Rate: 300 mL/hr at 04/10/23 0911, 600 mg at 04/10/23 0911  •  losartan (COZAAR) tablet 50 mg, 50 mg, Nasogastric, Q24H, Marcelino Hastings, , 50 mg at 04/10/23 0911  •  ondansetron (ZOFRAN) injection 4 mg, 4 mg, Intravenous, Q6H PRN, Antonietta Phillips APRN  •  pantoprazole (PROTONIX) injection 40 mg, 40 mg, Intravenous, Q AM, Antonietta Phillips APRN, 40 mg at 04/10/23 0502  •  Pharmacy to Dose Heparin, , Does not apply, Continuous PRN, Marcelino Hastings DO  •  Potassium Replacement - Follow Nurse / BPA Driven Protocol, , Does not apply, PRN, Natalia Cruz APRN  •  sennosides (SENOKOT) 8.8 MG/5ML syrup 10 mL, 10 mL, Nasogastric, BID, 10 mL at 04/10/23 0925 **AND** [DISCONTINUED] docusate sodium (COLACE) liquid 100 mg, 100 mg, Nasogastric, BID, Mundo Jasno MD, 100 mg at 04/10/23 0911  •  sodium chloride 0.9 % flush 10 mL, 10 mL, Intravenous, Q12H, Tereza Bautista APRN, 10 mL at 04/10/23 2005  •  sodium chloride 0.9 % flush 10 mL, 10 mL, Intravenous, PRN, Tereza Bautista APRN  •  sodium chloride 0.9 % infusion 40 mL, 40 mL, Intravenous, PRN, Tereza Bautista APRN    Please refer to the medical record for a full medication list    Review of Systems:  Unable to obtain a 12 point review of systems from the patient given his clinical state    Physical Exam:   Vital Signs   Temp:  [99.5 °F (37.5 °C)-101.5 °F (38.6 °C)] 100.6 °F (38.1 °C)  Heart Rate:   "[49-71] 70  Resp:  [15-23] 16  BP: ()/(46-89) 129/68  FiO2 (%):  [35 %] 35 %    Temp  Min: 99.5 °F (37.5 °C)  Max: 101.5 °F (38.6 °C)  BP  Min: 86/46  Max: 150/66  Pulse  Min: 49  Max: 71  Resp  Min: 15  Max: 23  SpO2  Min: 96 %  Max: 100 %    Blood pressure 129/68, pulse 70, temperature (!) 100.6 °F (38.1 °C), temperature source Bladder, resp. rate 16, height 176.5 cm (69.49\"), weight 86.6 kg (190 lb 14.7 oz), SpO2 100 %.  GENERAL: critically ill-appearing on the ventilator. More alert today  HEENT:  Normocephalic, atraumatic.  ET tube in place.  No external oral lesions noted. NG tube in place. No drainage noted from his right ear today  Chest: Left chest wall PPM pocket site without any erythema or drainage.  HEART: RRR, no murmur  LUNGS: ventilator breath sounds bilaterally.  On the ventilator with an FiO2 of 35%  ABDOMEN: Soft, nontender, nondistended..   GENITAL: + Doran catheter  SKIN: No new rashes.  No jaundice.  PSYCHIATRIC: unable to assess  EXT:  No cellulitic change. No peripheral edema noted. Has some weeping from his right hand which is very edematous, slightly better today  NEURO: more alert. Following some simple commands and trying to mouth words. Moving all of his extremities.     Right upper extremity PICC line- no erythema noted at the exit site    Results Review:   I reviewed the patient's new clinical results.    Results from last 7 days   Lab Units 04/10/23  0449 04/09/23  0537 04/08/23  0538   WBC 10*3/mm3 13.28* 14.77* 14.14*   HEMOGLOBIN g/dL 10.0* 10.6* 10.4*   HEMATOCRIT % 31.3* 33.5* 32.8*   PLATELETS 10*3/mm3 218 205 171     Results from last 7 days   Lab Units 04/10/23  0449   SODIUM mmol/L 133*   POTASSIUM mmol/L 3.9   CHLORIDE mmol/L 101   CO2 mmol/L 24.0   BUN mg/dL 31*   CREATININE mg/dL 0.65*   GLUCOSE mg/dL 102*   CALCIUM mg/dL 8.6     Results from last 7 days   Lab Units 04/10/23  0449   ALK PHOS U/L 131*   BILIRUBIN mg/dL 0.7   ALT (SGPT) U/L 60*   AST (SGOT) U/L 44* "         Results from last 7 days   Lab Units 04/10/23  0449   CRP mg/dL 16.22*         Results from last 7 days   Lab Units 04/09/23  0537   LACTATE mmol/L 1.0     Estimated Creatinine Clearance: 96.3 mL/min (A) (by C-G formula based on SCr of 0.65 mg/dL (L)).  CPK    Common Labsle 3/30/23   Creatine Kinase 57            Procalitonin Results:      Lab 04/09/23  0537 04/05/23  1345 04/04/23  0459   PROCALCITONIN 0.63* 1.48* 3.13*      Brief Urine Lab Results  (Last result in the past 365 days)      Color   Clarity   Blood   Leuk Est   Nitrite   Protein   CREAT   Urine HCG        03/30/23 1200 Yellow   Clear   Negative   Negative   Negative   Negative                Site   Date Value Ref Range Status   04/10/2023 Right Radial  Final     Juan A's Test   Date Value Ref Range Status   04/10/2023 N/A  Final     pH, Arterial   Date Value Ref Range Status   04/10/2023 7.502 (H) 7.350 - 7.450 pH units Final     Comment:     83 Value above reference range     pCO2, Arterial   Date Value Ref Range Status   04/10/2023 34.0 (L) 35.0 - 45.0 mm Hg Final     Comment:     84 Value below reference range     pO2, Arterial   Date Value Ref Range Status   04/10/2023 97.5 83.0 - 108.0 mm Hg Final     HCO3, Arterial   Date Value Ref Range Status   04/10/2023 26.6 (H) 20.0 - 26.0 mmol/L Final     Base Excess, Arterial   Date Value Ref Range Status   04/10/2023 3.5 (H) 0.0 - 2.0 mmol/L Final     Hemoglobin, Blood Gas   Date Value Ref Range Status   04/10/2023 10.2 (L) 13.5 - 17.5 g/dL Final     Comment:     84 Value below reference range     Hematocrit, Blood Gas   Date Value Ref Range Status   04/10/2023 31.2 (L) 38.0 - 51.0 % Final     Oxyhemoglobin   Date Value Ref Range Status   04/10/2023 97.2 94 - 99 % Final     Methemoglobin   Date Value Ref Range Status   04/10/2023 0.40 0.00 - 1.50 % Final     Carboxyhemoglobin   Date Value Ref Range Status   04/10/2023 1.2 0 - 2 % Final     CO2 Content   Date Value Ref Range Status   04/10/2023  27.6 22 - 33 mmol/L Final     Barometric Pressure for Blood Gas   Date Value Ref Range Status   04/10/2023   Final     Comment:     N/A     Modality   Date Value Ref Range Status   04/10/2023 Ventilator  Final     FIO2   Date Value Ref Range Status   04/10/2023 35 % Final        Microbiology:  Microbiology Results (last 10 days)     Procedure Component Value - Date/Time    Blood Culture - Blood, Arm, Left [[id number]]  (Normal) Collected: 04/06/23 1426    Lab Status: Preliminary result Specimen: Blood from Arm, Left Updated: 04/10/23 1500     Blood Culture No growth at 4 days    Blood Culture - Blood, Hand, Left [[id number]]  (Normal) Collected: 04/06/23 1013    Lab Status: Preliminary result Specimen: Blood from Hand, Left Updated: 04/10/23 1045     Blood Culture No growth at 4 days    MRSA Screen, PCR (Inpatient) - Swab, Nares [[id number]]  (Abnormal) Collected: 04/06/23 0952    Lab Status: Final result Specimen: Swab from Nares Updated: 04/06/23 1109     MRSA PCR Positive    Narrative:      The negative predictive value of this diagnostic test is high and should only be used to consider de-escalating anti-MRSA therapy. A positive result may indicate colonization with MRSA and must be correlated clinically.    Respiratory Culture - Sputum, ET Suction [[id number]]  (Abnormal)  (Susceptibility) Collected: 04/06/23 0952    Lab Status: Final result Specimen: Sputum from ET Suction Updated: 04/08/23 0932     Respiratory Culture Moderate growth (3+) Staphylococcus aureus, MRSA     Comment:   Methicillin resistant Staphylococcus aureus, Patient may be an isolation risk.        Gram Stain Many (4+) WBCs per low power field      Rare (1+) Epithelial cells per low power field      Few (2+) Gram positive cocci in pairs, chains and clusters    Susceptibility      Staphylococcus aureus, MRSA      JOAQUÍN      Clindamycin Susceptible      Inducible Clindamycin Resistance Negative      Linezolid Susceptible      Oxacillin Resistant      Tetracycline Susceptible      Trimethoprim + Sulfamethoxazole Susceptible      Vancomycin Susceptible                       Susceptibility Comments     Staphylococcus aureus, MRSA    This isolate does not demonstrate inducible clindamycin resistance in vitro.               Blood Culture - Blood, Arm, Left [[id number]]  (Normal) Collected: 04/01/23 1144    Lab Status: Final result Specimen: Blood from Arm, Left Updated: 04/06/23 1216     Blood Culture No growth at 5 days    Blood Culture - Blood, Wrist, Left [[id number]]  (Normal) Collected: 04/01/23 1129    Lab Status: Final result Specimen: Blood from Wrist, Left Updated: 04/06/23 1216     Blood Culture No growth at 5 days              Radiology:  CT Head Without Contrast    Result Date: 4/9/2023  Impression: 1. Generalized atrophy with chronic small vessel ischemic changes. 2. Right mastoid air cell disease, unchanged. 3. Maxillary sphenoid and ethmoid sinus disease possibly related to nasoenteric tube placement and intubation. Electronically Signed: Jose Newton  4/9/2023 9:51 AM EDT  Workstation ID: [id number]    XR Chest 1 View    Result Date: 4/10/2023  Impression: Improvement in right basilar airspace disease likely related to improving pneumonia. Similar right midlung and left basilar airspace disease likely related to residual pneumonia and atelectasis. Electronically Signed: Jazmin Gutierrez  4/10/2023 5:49 AM EDT  Workstation ID: [id number]    XR Chest 1 View    Result Date: 4/9/2023  Impression: 1. Overall improvement in the left-sided pulmonary infiltrates with no significant change in the right-sided infiltrates. 2. No pleural fluid identified on the current study. 3. Tubes and lines in good position. Electronically Signed: Jose Newton  4/9/2023 8:58 AM EDT  Workstation ID: [id number]        I independently read the patient's chest xray from 4/10/23- improved right-sided pulmonary airspace disease.    IMPRESSION:     Problems:  1. Streptococcus pneumoniae  septicemia-due to acute otitis  2. 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  No LP done yet but given the Streptococcus pneumonia septicemia and clinical presentation, we do not need an LP.  3. Acute otitis media and right-sided mastoiditis resulting in a subdural empyema with fistulous tract between the middle ear space and the dura- Strep pneumoniae on culture  4. Acute toxic/metabolic encephalopathy-due to the above infection  5. Acute hypoxic respiratory failure, intubated for airway protection  6. leukocytosis with neutrophilia-ongoing and slightly improved  7. Hypokalemia improved   8. Hypomagnesemia, improved  9. tachycardia-bradycardia syndrome status post PPM- complicating factor in the setting of bacteremia as at some risk for seeding PPM leads with bacteria but streptococcus pneumoniae is an uncommon bacteria to do this. TTE was without any signs of vegetations or pacemaker lead infection  10. paroxysmal A. Fib on flecainide and Eliquis  11. coronary artery disease  12. obstructive sleep apnea on CPAP nightly at home  13. History of hypertension  14. Thrombocytopenia-resolved  15. New fevers starting on 4/5-suspect due to pneumonia-ongoing  16. Left-sided pneumonia-MRSA from culture. Improving by chest xray and vent settings minimal, SBT ongoing.  On sedation vacation on 4/10.    RECOMMENDATIONS:    -continue to monitor cbc with diff closely  -ENT and neurology are following.  -continue ceftriaxone 2g IV q12h  -Linezolid 600 mg BID  -CT head without contrast was without any significant new changes. may need to consider repeat MRI brain at some point in the near future depending on clinical course. Mental status does seem to be improving. Still with ongoing leukocytosis, but slightly improving and ongoing fevers.       He will likely need a 6 week course of IV antibiotic therapy.      Complex MDM.     Copied text in this note has been reviewed and is  accurate as of 04/10/23    Que Alonso MD saw and examined patient, verified hx and PE, read all radiographic studies, reviewed labs and micro data, and formulated dx, plan for treatment and all medical decision making.      Rod Granda PA-C for Que Alonso MD    I (Dr Alonso) discussed the patient's case with Dr. Will today. I also discussed with nursing and with the patient's son at bedside today.      Que Alonso MD  4/10/2023

## 2023-04-10 NOTE — PROGRESS NOTES
HEPARIN INFUSION  Kai Dodson is a  82 y.o. male receiving heparin infusion.     Therapy for (VTE/Cardiac):  Cardiac  Patient Weight: 86.6kg  Initial Bolus (Y/N):   Yes  Any Bolus (Y/N):   Yes    Signs or Symptoms of Bleeding: none noted  Cardiac or Other (Not VTE)   Initial Bolus: 60 units/kg (Max 4,000 units)  Initial rate: 12 units/kg/hr (Max 1,000 units/hr)   Anti Xa Rebolus Infusion Hold time Change infusion Dose (Units/kg/hr) Next Anti Xa or aPTT Level Due   < 0.11 50 Units/kg  (4000 Units Max) None Increase by  3 Units/kg/hr 6 hours   0.11- 0.19 25 Units/kg  (2000 Units Max) None Increase by  2 Units/kg/hr 6 hours   0.2 - 0.29 0 None Increase by  1 Units/kg/hr 6 hours   0.3 - 0.5 0 None No Change 6 hours (after 2 consecutive levels in range check qAM)   0.51 - 0.6 0 None Decrease by  1 Units/kg/hr 6 hours   0.61 - 0.8 0 30 Minutes Decrease by  2 Units/kg/hr 6 hours   0.81 - 1 0 60 Minutes Decrease by  3 Units/kg/hr 6 hours   >1 0 Hold  After Anti Xa less than 0.5 decrease previous rate by  4 Units/kg/hr  Every 2 hours until Anti Xa  less than 0.5 then when infusion restarts in 6 hours     Results from last 7 days   Lab Units 04/10/23  0449 04/09/23  0537 04/08/23  0538   HEMOGLOBIN g/dL 10.0* 10.6* 10.4*   HEMATOCRIT % 31.3* 33.5* 32.8*   PLATELETS 10*3/mm3 218 205 171        Date   Time   Anti-Xa Current Rate (Unit/kg/hr) Bolus   (Units) Rate Change   (Unit/kg/hr) New Rate (Unit/kg/hr) Next   Anti-Xa Comments  Pump Check Daily   4/2 1200 0.35 -- -- +11.5 11.5 1800 No initial bolus given baseline Xa. Dw RN   4/2 1757 0.55 11.5 -- -0.5 11 0100 D/W GOSIA Walters     4/3 0115 0.60 11 -- -1 10 0600 D/w RN   4/3 0648 0.57 10 -- -1 9 1400 Will d/w RN   4/3 1415 0.43 9 -- -- 9 2200 D/w RN   4/3 2330 0.36 9 -- -- 9 0600 D/w RN   4/4 0630 0.38 9 -- -- 9 0600 4/5 D/w RN   4/5 0515 0.22 9 -- +1 10 1400 Will d/w RN   4/5 1345 0.23 10 -- +1 11 2200 D/w [facility]   4/5 2330 0.28 11 -- +1 12 0600 D/w RN   4/6 0600 0.33 12 -- --  12 1400 D/w RN   4/6 1400 0.29 12 -- +1 13 2200 D/w RN   4/6 2228 0.29 13 -- +1 14 0600 Priyank 24[address] -[facility]   4/7 0600 0.35 14 -- -- 14 1400 D/w RN   4/7 1400 0.25 14 -- +1 15 2200 D/w RN   4/7 2205 0.36 15 -- -- 15 0600 Priyank [address] [facility]   4/8 0538 0.27 15  +1 16 1400 DW RN   4/8 1403 0.31 16 -- -- 16 2000 DW RN   4/8 2035 0.43 16 -- -- 16 0600 [address]   4/9 0537 0.32 16 -- -- 16 0600 DW RN   4/10 0449 0.34 16 -- -- 16 AM labs Steven [phone] -[id number]                                    Sondra Trejo, PharmD  4/10/2023  11:08 EDT

## 2023-04-10 NOTE — CASE MANAGEMENT/SOCIAL WORK
Continued Stay Note  [facility]     Patient Name: Kai Dodson  MRN: [id number]  Today's Date: 4/10/2023    Admit Date: 3/30/2023    Plan: Ongoing   Discharge Plan     Row Name 04/10/23 1617       Plan    Plan Ongoing      Plan Comments Mr. Dodson remains intubated and sedated on the ventilator. Case management will continue to follow his progress.               Discharge Codes    No documentation.               Expected Discharge Date and Time     Expected Discharge Date Expected Discharge Time    Apr 18, 2023             Keily Rodriguez RN

## 2023-04-10 NOTE — PROGRESS NOTES
INTENSIVIST   PROGRESS NOTE        SUBJECTIVE     Kai 82 y.o. male is followed for: Extremity Weakness       Encephalopathy, metabolic    Hypertension    Paroxysmal atrial fibrillation on Flecainide and Eliquis    Subdural empyema    Meningitis    Sepsis secondary to strep bacteremia and meningitis with subdural empyema    Streptococcal bacteremia    As an Intensivist, we provide an integrated approach to the ICU patient and family, medical management of comorbid conditions, including but not limited to electrolytes, glycemic control, organ dysfunction, lead interdisciplinary rounds and coordinate the care with all other services, including those from other specialists.     Interval History:  POD: 11 Days Post-Op    Off Dexmedetomidine since last night.  Following simple commands.  He started becoming restless this morning and Dexmedetomidine re-started.    Temp  Min: 99.7 °F (37.6 °C)  Max: 101.5 °F (38.6 °C)       History     Last Reviewed by Adair Will MD on 4/10/2023 at  1:06 PM    Sections Reviewed    Medical, Family, Surgical, Tobacco, Alcohol, Drug Use, Sexual Activity,   Social Documentation    Problem list reviewed by Adair Will MD on 4/10/2023 at  1:06 PM  Medicines reviewed by Adair Will MD on 4/10/2023 at  1:06 PM  Allergies reviewed by Adair Will MD on 4/10/2023 at  1:06 PM       The patient's relevant past medical, surgical and social history were reviewed and updated in Epic as appropriate.        OBJECTIVE     Vitals:  Temp: 100 °F (37.8 °C) (04/10/23 1200) Temp  Min: 99.7 °F (37.6 °C)  Max: 101.5 °F (38.6 °C)   Temp core:      BP: 141/68 (04/10/23 1200) BP  Min: 86/46  Max: 150/66   MAP (non-invasive) Noninvasive MAP (mmHg): 97 (04/10/23 1200) Noninvasive MAP (mmHg)  Av.9  Min: 51  Max: 157   Pulse: 70 (04/10/23 1200) Pulse  Min: 49  Max: 71   Resp: 21 (04/10/23 1001) Resp  Min: 15  Max: 23   SpO2: 98 % (04/10/23 1200) SpO2  Min: 96 %  Max: 100 %   Device: ventilator  (04/10/23 1200)    Flow Rate:   No data recorded         04/01/23  0400 04/02/23  0600   Weight: 84.5 kg (186 lb 4.6 oz) 86.6 kg (190 lb 14.7 oz)        Intake/Ouptut 24 hrs (7:00AM - 6:59 AM)  Intake & Output (last 3 days)       04/07 0701 04/08 0700 04/08 0701 04/09 0700 04/09 0701  04/10 0700 04/10 0701  04/11 0700    I.V. (mL/kg) 463.2 (5.3) 1784.5 (20.6) 1100.8 (12.7) 264.6 (3.1)    Other 525 411 360 60    NG/GT 1706 1730 1692 404    IV Piggyback 1399.8  415.9 300    Total Intake(mL/kg) 4094 (47.3) 3925.5 (45.3) 3568.7 (41.2) 1028.6 (11.9)    Urine (mL/kg/hr) 4800 (2.3) 5185 (2.5) 3835 (1.8) 625 (1.2)    Stool    0    Total Output 4800 5185 3835 625    Net -706 -1259.5 -266.3 +403.6            Stool Unmeasured Occurrence    1 x          Medications (drips):  dexmedetomidine, Last Rate: 0.6 mcg/kg/hr (04/10/23 1223)  fentanyl 10 mcg/mL, Last Rate: Stopped (04/08/23 1430)  heparin, Last Rate: 15 Units/kg/hr (04/09/23 1457)  Pharmacy to Dose Heparin        Invasive Mechanical Ventilator   Settings: Observed:   Mode: VC+/AC (04/10/23 1001)    Resp Rate (Set): 14 (04/10/23 1001) Resp Rate (Observed) Vent: 19 (04/10/23 1200)   Vt (Set, mL): 400 mL (04/10/23 1001) Vt, Exp (observed, mL): 458 mL (04/10/23 1001)    Minute Ventilation (L/min) (Obs): 9.66 L/min (04/10/23 1001)    I:E Ratio (Obs): 1.1:1 (04/10/23 1001)       FiO2 (%): 35 % (04/10/23 1001) Plateau Pressure (cm H2O): (S) 23 cm H2O (04/10/23 0713)   PEEP/CPAP (cm H2O): 5 cm H20 (04/10/23 1001) Driving Pressure (cm H2O): 18.4 cm H2O (04/10/23 0713)    Static Compliance (L/cm H2O): 64 (04/08/23 1910)      Physical Examination  Telemetry:  Rhythm: normal sinus rhythm (04/10/23 1200)  Atrial Rhythm: atrial fibrillation (04/08/23 2200)      Constitutional:  No acute distress.   Cardiovascular: RRR.    Respiratory: Normal breath sounds  No adventitious sounds   Abdominal:  Soft with no tenderness.   Extremities: No Edema   Neurological:   Awake.Follows simple  commands off sedatives.  Best Eye Response: 4-->(E4) spontaneous (04/10/23 1200)  Best Motor Response: 6-->(M6) obeys commands (04/10/23 1200)  Best Verbal Response: 1-->(V1) none (04/10/23 1200)  [place] Coma Scale Score: 11 (04/10/23 1200)     Results Reviewed:  Laboratory  Microbiology  Radiology  Pathology    Hematology:  Results from last 7 days   Lab Units 04/10/23  0449 04/09/23  0537 04/08/23  0538 04/07/23  0744 04/05/23  1345   WBC 10*3/mm3 13.28* 14.77* 14.14*   < > 11.19*   NEUTROS ABS 10*3/mm3 10.02*  --  10.75*  --  8.67*   IMM GRAN % % 2.8*  --  4.3*  --  4.4*   LYMPHS ABS 10*3/mm3 1.12  --  1.29  --  1.18   MONOS ABS 10*3/mm3 1.60*  --  1.19*  --  0.81   EOS ABS 10*3/mm3 0.14  --  0.28  --  0.01   BASOS ABS 10*3/mm3 0.03  --  0.02  --  0.03   HEMOGLOBIN g/dL 10.0* 10.6* 10.4*   < > 12.6*   MCV fL 97.5* 96.5 97.6*   < > 96.2   RDW % 13.0 13.0 13.1   < > 13.1   PLATELETS 10*3/mm3 218 205 171   < > 166    < > = values in this interval not displayed.       Chemistry:  Estimated Creatinine Clearance: 96.3 mL/min (A) (by C-G formula based on SCr of 0.65 mg/dL (L)).    Results from last 7 days   Lab Units 04/10/23  0449 04/09/23  0537   SODIUM mmol/L 133* 133*   POTASSIUM mmol/L 3.9 4.3   CHLORIDE mmol/L 101 100   CO2 mmol/L 24.0 25.0   BUN mg/dL 31* 31*   CREATININE mg/dL 0.65* 0.57*   GLUCOSE mg/dL 102* 108*     Results from last 7 days   Lab Units 04/10/23  0449 04/09/23  0537 04/05/23  1345 04/04/23  0459   CALCIUM mg/dL 8.6 8.3*   < > 7.9*   MAGNESIUM mg/dL 2.2 2.2   < >  --    PHOSPHORUS mg/dL 2.7  --   --  2.6    < > = values in this interval not displayed.       Hepatic Panel:  Results from last 7 days   Lab Units 04/10/23  0449 04/09/23  0537 04/08/23  0538   ALBUMIN g/dL 2.4* 2.2* 2.2*   BILIRUBIN mg/dL 0.7 0.9 1.5*   AST (SGOT) U/L 44* 43* 26   ALT (SGPT) U/L 60* 50* 39   ALK PHOS U/L 131* 113 101        Biomarkers:  Results from last 7 days   Lab Units 04/10/23  0449 04/09/23  0537  04/08/23  0538 04/06/23  1013 04/05/23  1345 04/04/23  0857 04/04/23  0459   CRP mg/dL 16.22*  --   --   --   --   --   --    LACTATE mmol/L  --  1.0 1.0 1.6 1.9   < >  --    PROCALCITONIN ng/mL  --  0.63*  --   --  1.48*  --  3.13*    < > = values in this interval not displayed.     Arterial Blood Gases:  Results from last 7 days   Lab Units 04/10/23  0350 04/08/23  0345 04/06/23  1113   PH, ARTERIAL pH units 7.502* 7.493* 7.471*   PCO2, ARTERIAL mm Hg 34.0* 34.8* 34.4*   PO2 ART mm Hg 97.5 99.1 81.5*   FIO2 % 35 35 40       Images:  CT Head Without Contrast    Result Date: 4/9/2023  Impression: 1. Generalized atrophy with chronic small vessel ischemic changes. 2. Right mastoid air cell disease, unchanged. 3. Maxillary sphenoid and ethmoid sinus disease possibly related to nasoenteric tube placement and intubation. Electronically Signed: Jose Newton  4/9/2023 9:51 AM EDT  Workstation ID: [id number]    XR Chest 1 View    Result Date: 4/10/2023  Impression: Improvement in right basilar airspace disease likely related to improving pneumonia. Similar right midlung and left basilar airspace disease likely related to residual pneumonia and atelectasis. Electronically Signed: Jazmin Gutierrez  4/10/2023 5:49 AM EDT  Workstation ID: [id number]    XR Chest 1 View    Result Date: 4/9/2023  Impression: 1. Overall improvement in the left-sided pulmonary infiltrates with no significant change in the right-sided infiltrates. 2. No pleural fluid identified on the current study. 3. Tubes and lines in good position. Electronically Signed: Jose Newton  4/9/2023 8:58 AM EDT  Workstation ID: [id number]      Echo:  Results for orders placed during the hospital encounter of 03/30/23    Adult Transthoracic Echo Complete W/ Cont if Necessary Per Protocol    Interpretation Summary  •  Estimated left ventricular EF = 60%  •  The cardiac valves are anatomically and functionally normal.  •  There is a small (<1cm) pericardial effusion. There is no  evidence of cardiac tamponade.      Results: Reviewed.  I reviewed the patient's new laboratory and imaging results.  I independently reviewed the patient's new images.    Medications: Reviewed.    Assessment   A/P     Hospital:  LOS: 11 days   ICU: 10d 19h      Diagnosis   • **Encephalopathy, metabolic [G93.41]   • Streptococcal bacteremia [R78.81, B95.5]   • Subdural empyema [G06.2]   • Meningitis [G03.9]   • Questionable Status epilepticus (HCC) [G40.901]   • Sepsis secondary to strep bacteremia and meningitis with subdural empyema [A41.9]   • Paroxysmal atrial fibrillation on Flecainide and Eliquis [I48.0]   • MINO on CPAP [G47.33, Z99.89]   • Hypertension [I10]     Kai is a 82 y.o. male admitted on 3/30/2023 with AMS (altered mental status) [R41.82]    Assessment/Management/Treatment Plan:    1. CNS infection  a. Encephalopathy  b. Meningitis  c. Strep bacteremia and meningitis  d. Subdural empyema  e. Mastoiditis } ENT following  i. CSF leak has stopped since 04/08/23  f. Levetiracetam for seizures  2. Pulmonary   a. Respiratory Failure  b. Invasive Mechanical Ventilation  c. MRSA Pneumonia per Respiratory Cx 04/06/23  d. MINO on CPAP  3. Cardiovascular  a. P A Fib - Flecainide, Anticoagulation with UFH continuous intravenous infusion   b. HTN - Losartan  4. ID/Antibiotics  a. Ceftriaxone + Linezolid  5. Nutrition Support  Diet, Tube Feeding Tube Feeding Formula: Peptamen AF (Peptide Based, Critical Care, ALI)  Peptamen AF (1.2 kcal/mL, 76 g protein/L, 6 g fiber/L)    NG/OG Tube 16 Fr Right nostril-Tube Feeding Rate (mL): 75 mL/h (Based on a feeding duration of 20 h/d)  Patient doesn't have any tube feeding modular orders      Lab Results   Component Value Date    PREALBUMIN 15.7 (L) 04/10/2023    PREALBUMIN 13.6 (L) 04/03/2023    CRP 16.22 (H) 04/10/2023    CRP 18.05 (H) 04/02/2023       6. Endocrine  a. Body mass index is 27.8 kg/m². Overweight: 25.0-29.9kg/m2   b. No h/o T2 Diabetes    Lab Results   Lab  Value Date/Time    HGBA1C 5.40 03/30/2023 1121    HGBA1C 5.2 07/01/2015 0436     Results from last 7 days   Lab Units 04/10/23  1110 04/10/23  0532 04/09/23  2323 04/09/23  1753 04/09/23  1128 04/09/23  0534 04/08/23  2348 04/08/23  1739   GLUCOSE mg/dL 143* 112 194* 107 134* 106 187* 131*       Diet: NPO Diet NPO Type: Strict NPO, Tube Feeding  Orders Placed This Encounter      Diet, Tube Feeding Tube Feeding Formula: Peptamen AF (Peptide Based, Critical Care, ALI)      Advance Directives: Code Status and Medical Interventions:   Ordered at: 03/30/23 1300     Code Status (Patient has no pulse and is not breathing):    CPR (Attempt to Resuscitate)     Medical Interventions (Patient has pulse or is breathing):    Full Support        DVT prophylaxis:  Medical and mechanical DVT prophylaxis orders are present.     In brief:  1. Continue UFH continuous intravenous infusion  2. Abs per ID  3. Dexmedetomidine prn  4. PT/OT  5. Discussed with Dr. NICCI Alonso and Dr. Garcia  6. Disposition: Keep in ICU.    Plan of care and goals reviewed during interdisciplinary rounds.  I discussed the patient's findings and my recommendations with patient and nursing staff    MDM:    Problem(s) High due to: Acute or Chronic illness or injury that may poses a threat to life or bodily function  Data: High due to: Review of prior external records from each unique source, Review of the result(s) of each unique test, Ordering of each unique test and Discuss management or test interpretation with external physician or NPP (not separately reported)    High      [x] Primary Attending Intensive Care Medicine - Nutrition Support   [] Consultant    Copied text in this note has been reviewed and is accurate as of 04/10/23

## 2023-04-10 NOTE — PROGRESS NOTES
[facility] Neurology    Progress Note    Patient Name: Kai Dodson  : 1940  MRN: [id number]  Primary Care Physician:  Derick Phillip MD  Date of admission: 3/30/2023    Subjective     Chief Complaint: Meningitis    History of Present Illness   Per RN patient became dyssynchronous with the vent and required increase in sedation.  He is still able to follow commands while oxygenating appropriately.    Review of Systems   Unable to assess due to mechanical ventilation    Objective     Physical Exam  Vitals and nursing note reviewed.   Constitutional:       General: He is not in acute distress.     Interventions: He is intubated.   Eyes:      Pupils: Pupils are equal, round, and reactive to light.   Pulmonary:      Effort: He is intubated.   Neurological:      Mental Status: He is alert.      Cranial Nerves: No facial asymmetry.      Sensory: No sensory deficit.      Motor: Weakness present.      Deep Tendon Reflexes: Reflexes are normal and symmetric.          Vitals:   Temp:  [99.5 °F (37.5 °C)-101.5 °F (38.6 °C)] 99.5 °F (37.5 °C)  Heart Rate:  [49-71] 70  Resp:  [15-23] 21  BP: ()/(46-89) 102/53  FiO2 (%):  [35 %] 35 %    Current Medications    Current Facility-Administered Medications:   •  acetaminophen (TYLENOL) 160 MG/5ML solution 650 mg, 650 mg, Oral, Q6H PRN, Amadou Mercedes, APRN, 650 mg at 04/10/23 0921  •  aspirin chewable tablet 81 mg, 81 mg, Nasogastric, Daily, 81 mg at 04/10/23 0911 **OR** aspirin suppository 300 mg, 300 mg, Rectal, Daily, Tereza Bautista, FORREST  •  cefTRIAXone (ROCEPHIN) 2 g/100 mL 0.9% NS IVPB (MBP), 2 g, Intravenous, Q12H, Que Alonso MD, 2 g at 04/10/23 0911  •  chlorhexidine (PERIDEX) 0.12 % solution 15 mL, 15 mL, Mouth/Throat, Q12H, Mundo Jason MD, 15 mL at 04/10/23 0911  •  dexmedetomidine (PRECEDEX) 400 mcg in 100 mL NS infusion, 0.2-1.5 mcg/kg/hr, Intravenous, Titrated, Toan Lee MD, Last Rate: 4.3 mL/hr at  04/10/23 1509, 0.2 mcg/kg/hr at 04/10/23 1509  •  dextrose (D50W) (25 g/50 mL) IV injection 25 g, 25 g, Intravenous, Q15 Min PRN, Amadou Mercedes, APRN  •  fentaNYL (SUBLIMAZE) bolus from bag 10 mcg/mL 50 mcg, 50 mcg, Intravenous, Q30 Min PRN, Adair Will MD, 50 mcg at 03/31/23 1911  •  fentaNYL 2500 mcg/250 mL NS infusion,  mcg/hr, Intravenous, Titrated, Mundo Jason MD, Stopped at 04/08/23 1430  •  fentaNYL 2500 mcg/250 mL NS infusion,  mcg/hr, Intravenous, Titrated, Adair Will MD  •  flecainide (TAMBOCOR) tablet 50 mg, 50 mg, Nasogastric, Q12H, Marcelino Hastings DO, 50 mg at 04/10/23 0911  •  heparin 84267 units/250 mL (100 units/mL) in 0.45 % NaCl infusion, 15 Units/kg/hr, Intravenous, Titrated, Antwon De Leon PharmD, Last Rate: 12.99 mL/hr at 04/10/23 1404, 15 Units/kg/hr at 04/10/23 1404  •  insulin regular (humuLIN R,novoLIN R) injection 0-7 Units, 0-7 Units, Subcutaneous, Q6H, mAadou Mercedes, FORREST, 2 Units at 04/10/23 0023  •  levETIRAcetam (KEPPRA) 500 mg in sodium chloride 0.9 % 100 mL IVPB, 500 mg, Intravenous, Q12H, Antwon Lorenzana, [facility]  •  Linezolid (ZYVOX) 600 mg 300 mL, 600 mg, Intravenous, Q12H, Que Alonso MD, Last Rate: 300 mL/hr at 04/10/23 0911, 600 mg at 04/10/23 0911  •  losartan (COZAAR) tablet 50 mg, 50 mg, Nasogastric, Q24H, Marcelino Hastings, DO, 50 mg at 04/10/23 0911  •  ondansetron (ZOFRAN) injection 4 mg, 4 mg, Intravenous, Q6H PRN, Antonietta Phillips APRN  •  pantoprazole (PROTONIX) injection 40 mg, 40 mg, Intravenous, Q AM, Antonietta Phillips APRN, 40 mg at 04/10/23 0502  •  Pharmacy to Dose Heparin, , Does not apply, Continuous PRN, Marcelino Hastings, DO  •  Potassium Replacement - Follow Nurse / BPA Driven Protocol, , Does not apply, PRN, Natalia Cruz, APRN  •  sennosides (SENOKOT) 8.8 MG/5ML syrup 10 mL, 10 mL, Nasogastric, BID, 10 mL at 04/10/23 0925 **AND** [DISCONTINUED] docusate  sodium (COLACE) liquid 100 mg, 100 mg, Nasogastric, BID, Mundo Jason MD, 100 mg at 04/10/23 0911  •  sodium chloride 0.9 % flush 10 mL, 10 mL, Intravenous, Q12H, Tereza Bautista APRN, 10 mL at 04/09/23 1000  •  sodium chloride 0.9 % flush 10 mL, 10 mL, Intravenous, PRN, Tereza Bautista APRN  •  sodium chloride 0.9 % infusion 40 mL, 40 mL, Intravenous, PRN, Tereza Bautista APRN    Laboratory Results:   Lab Results   Component Value Date    GLUCOSE 102 (H) 04/10/2023    CALCIUM 8.6 04/10/2023     (L) 04/10/2023    K 3.9 04/10/2023    CO2 24.0 04/10/2023     04/10/2023    BUN 31 (H) 04/10/2023    CREATININE 0.65 (L) 04/10/2023    EGFRIFNONA 67 10/16/2018    BCR 47.7 (H) 04/10/2023    ANIONGAP 8.0 04/10/2023     Lab Results   Component Value Date    WBC 13.28 (H) 04/10/2023    HGB 10.0 (L) 04/10/2023    HCT 31.3 (L) 04/10/2023    MCV 97.5 (H) 04/10/2023     04/10/2023     Lab Results   Component Value Date    CHOL 149 04/02/2023    CHOL 100 03/31/2023     Lab Results   Component Value Date    HDL 54 03/31/2023    HDL 48 07/01/2015     Lab Results   Component Value Date    LDL 30 03/31/2023    LDL 83 07/01/2015     Lab Results   Component Value Date    TRIG 75 04/10/2023    TRIG 185 (H) 04/02/2023    TRIG 77 03/31/2023     Lab Results   Component Value Date    HGBA1C 5.40 03/30/2023     Lab Results   Component Value Date    INR 1.14 (H) 04/02/2023    INR 1.5 (H) 03/30/2023    INR 1.02 06/30/2015    PROTIME 14.5 (H) 04/02/2023    PROTIME 17.9 (H) 03/30/2023    PROTIME 10.7 06/30/2015     No results found for: FOLATE  No results found for: [id number]    MRI Brain With & Without Contrast    Result Date: 3/30/2023  MRI BRAIN W WO CONTRAST Date of Exam: 3/30/2023 3:32 PM EDT Indication: History of a right sided acute otomastoiditis with concern of an empyema versus abscess in the right temporal lobe.  Clinically the patient has a history of falls and confusion  Comparison:  CT the head and CT of the temporal bones performed on 3/30/2023. Technique:  Routine multiplanar/multisequence sequence images of the brain were obtained before and after the uneventful administration of  18 cc of Multihance. Findings: There is no evidence of a right temporal lobe abscess. There are signal voids within the CSF in the upper right posterior cranial fossa consistent with the soft tissue gas as seen on the CT study. There is diffuse meningeal enhancement. This includes enhancement of the  meninges along the brainstem. This is consistent with meningitis. There is abnormal fluid/mucosal thickening in the right mastoid sinus and middle ear cavity with the associated enhancement consistent with acute otomastoiditis. The patient has chronic volume loss. There is prominence of the sulci, fissures, ventricles, and basal cisterns. There are abnormal subcortical and periventricular white matter signal changes consistent with the chronic microvascular ischemia. There are normal signal voids within the intracranial arteries and the major dural sinuses. There is no acute hemorrhage or midline shift. There is no restricted diffusion to indicate acute ischemia. The orbital contents are normal. The paranasal and left mastoid sinuses are clear.     Impression: Impression: 1. No evidence of a right temporal lobe abscess. 2. Findings consistent with diffuse meningitis. There is right otomastoiditis as seen on the CT of the temporal bones. There are signal voids corresponding to gas bubbles within the CSF in the upper right posterior cranial fossa. 3. Volume loss secondary to cerebral atrophy. 4. Chronic microvascular ischemia. Electronically Signed: Juan Torrez  3/30/2023 5:48 PM EDT  Workstation ID: [id number]       Assessment / Plan     Active Hospital Problems:    Encephalopathy, metabolic    MINO on CPAP    Hypertension    Paroxysmal atrial fibrillation on Flecainide and Eliquis    Subdural empyema    Meningitis     Questionable Status epilepticus (HCC)    Sepsis secondary to strep bacteremia and meningitis with subdural empyema    Streptococcal bacteremia       Brief Patient Summary:  Kai Dodson is a 82 y.o. male who has a past medical history of PAF on flecainide and Eliquis, HTN, CAD, tachybradycardia syndrome s/p PPM for and MINO who presented to [facility] ED for altered mental status and left-sided weakness.  MRI revealed no right temporal lobe abscess but did find right otomastoiditis with signs of gas bubbles in CSF in the right upper posterior cranial fossa which was consistent with diffuse meningitis.  EEG showed no ongoing seizure-like activity was positive for diffuse cerebral dysfunction of moderate degree.  ENT performed a bedside right myringotomy procedure in which a small amount of purulent fluid was removed and cultured.  CSF was thought to be draining from the myringotomy site therefore tube was not placed.  Neurosurgery was consulted and recommended no surgical intervention at this time.  ID recommended cefepime 2 g every 8 hours and IV vancomycin.    Plan:   Mastoiditis with possible CSF leak  Meningitis  Sepsis  1.  Continue to wean sedation as tolerated by patient.  2.  Patient able to follow commands and displays clinical improvement.  We will hold off on repeating an MRI at this time.  3.  General neurology will continue to follow    I have discussed the above with the patient bedside RN and   Time spent with patient: 35 minutes in face-to-face evaluation and management of the patient.      FORREST Pinto

## 2023-04-11 NOTE — PROGRESS NOTES
INTENSIVIST   PROGRESS NOTE        SUBJECTIVE     Kai 82 y.o. male is followed for: Extremity Weakness       Encephalopathy, metabolic    Hypertension    Paroxysmal atrial fibrillation on Flecainide and Eliquis    Subdural empyema    Meningitis    Sepsis secondary to strep bacteremia and meningitis with subdural empyema    Streptococcal bacteremia    As an Intensivist, we provide an integrated approach to the ICU patient and family, medical management of comorbid conditions, including but not limited to electrolytes, glycemic control, organ dysfunction, lead interdisciplinary rounds and coordinate the care with all other services, including those from other specialists.     Interval History:  POD: 12 Days Post-Op    Back on Dexmedetomidine  Follows commands.  Weak.  Hard of hearing.    Fevers.    Temp  Min: 99 °F (37.2 °C)  Max: 100.6 °F (38.1 °C)       History     Last Reviewed by Adair Will MD on 4/10/2023 at  1:06 PM    Sections Reviewed    Medical, Family, Surgical, Tobacco, Alcohol, Drug Use, Sexual Activity,   Social Documentation      Problem list reviewed by Adair Will MD on 4/10/2023 at  1:06 PM  Medicines reviewed by Adair Will MD on 4/10/2023 at  1:06 PM  Allergies reviewed by Adair Will MD on 4/10/2023 at  1:06 PM       The patient's relevant past medical, surgical and social history were reviewed and updated in Epic as appropriate.        OBJECTIVE     Vitals:  Temp: 99.1 °F (37.3 °C) (23 1200) Temp  Min: 99 °F (37.2 °C)  Max: 100.6 °F (38.1 °C)   Temp core:      BP: 129/61 (23 1200) BP  Min: 77/58  Max: 146/71   MAP (non-invasive) Noninvasive MAP (mmHg): 92 (23 1200) Noninvasive MAP (mmHg)  Av.8  Min: 51  Max: 157   Pulse: 70 (23 1200) Pulse  Min: 49  Max: 71   Resp: 22 (23 0600) Resp  Min: 16  Max: 22   SpO2: 99 % (23 1200) SpO2  Min: 98 %  Max: 100 %   Device: ventilator (23 0800)    Flow Rate:   No data recorded          04/01/23  0400 04/02/23  0600   Weight: 84.5 kg (186 lb 4.6 oz) 86.6 kg (190 lb 14.7 oz)        Intake/Ouptut 24 hrs (7:00AM - 6:59 AM)  Intake & Output (last 3 days)       04/08 0701 04/09 0700 04/09 0701  04/10 0700 04/10 0701 04/11 0700 04/11 0701 04/12 0700    I.V. (mL/kg) 1784.5 (20.6) 1100.8 (12.7) 1219.1 (14.1)     Other 411 360 270     NG/GT 1730 1692 1530     IV Piggyback  415.9 300     Total Intake(mL/kg) 3925.5 (45.3) 3568.7 (41.2) 3319.1 (38.3)     Urine (mL/kg/hr) 5185 (2.5) 3835 (1.8) 2600 (1.3) 1025 (1.9)    Stool   0     Total Output 5185 3835 2600 1025    Net -1259.5 -266.3 +719.1 -1025            Stool Unmeasured Occurrence   1 x           Medications (drips):  dexmedetomidine, Last Rate: 0.4 mcg/kg/hr (04/11/23 1105)  fentanyl 10 mcg/mL  heparin, Last Rate: 15 Units/kg/hr (04/11/23 1257)  Pharmacy to Dose Heparin        Invasive Mechanical Ventilator   Settings: Observed:   Mode: VC+/AC (04/11/23 1109)    Resp Rate (Set): 14 (04/11/23 1109) Resp Rate (Observed) Vent: 20 (04/11/23 1200)   Vt (Set, mL): 400 mL (04/11/23 1109) Vt, Exp (observed, mL): 625 mL (04/11/23 1109)    Minute Ventilation (L/min) (Obs): 12.3 L/min (04/11/23 1109)    I:E Ratio (Obs): 1:3.3 (04/11/23 1109)       FiO2 (%): 30 % (04/11/23 1109) Plateau Pressure (cm H2O): 9 cm H2O (04/11/23 0708)   PEEP/CPAP (cm H2O): 5 cm H20 (04/11/23 1109) Driving Pressure (cm H2O): 4.4 cm H2O (04/11/23 0708)    Static Compliance (L/cm H2O): 28 (04/11/23 0708)      Physical Examination  Telemetry:  Rhythm: paced rhythm (04/11/23 1200)  Atrial Rhythm: atrial fibrillation (04/08/23 2200)      Constitutional:  No acute distress.   Cardiovascular: RRR.    Respiratory: Normal breath sounds  No adventitious sounds   Abdominal:  Soft with no tenderness.   Extremities: No Edema   Neurological:   Awake.Follows simple commands off sedatives.  Best Eye Response: 4-->(E4) spontaneous (04/11/23 1200)  Best Motor Response: 6-->(M6) obeys commands (04/11/23  1200)  Best Verbal Response: 1-->(V1) none (04/11/23 1200)  Bonnie Coma Scale Score: 11 (04/11/23 1200)     Results Reviewed:  Laboratory  Microbiology  Radiology  Pathology    Hematology:  Results from last 7 days   Lab Units 04/11/23  0403 04/10/23  0449 04/09/23  0537 04/08/23  0538   WBC 10*3/mm3 9.66 13.28* 14.77* 14.14*   NEUTROS ABS 10*3/mm3 7.05* 10.02*  --  10.75*   IMM GRAN % % 2.4* 2.8*  --  4.3*   LYMPHS ABS 10*3/mm3 0.87 1.12  --  1.29   MONOS ABS 10*3/mm3 1.38* 1.60*  --  1.19*   EOS ABS 10*3/mm3 0.11 0.14  --  0.28   BASOS ABS 10*3/mm3 0.02 0.03  --  0.02   HEMOGLOBIN g/dL 9.4* 10.0* 10.6* 10.4*   MCV fL 98.0* 97.5* 96.5 97.6*   RDW % 13.2 13.0 13.0 13.1   PLATELETS 10*3/mm3 217 218 205 171       Chemistry:  Estimated Creatinine Clearance: 120.4 mL/min (A) (by C-G formula based on SCr of 0.52 mg/dL (L)).    Results from last 7 days   Lab Units 04/11/23  0403 04/10/23  0449   SODIUM mmol/L 134* 133*   POTASSIUM mmol/L 4.2 3.9   CHLORIDE mmol/L 104 101   CO2 mmol/L 22.0 24.0   BUN mg/dL 26* 31*   CREATININE mg/dL 0.52* 0.65*   GLUCOSE mg/dL 104* 102*     Results from last 7 days   Lab Units 04/11/23  0403 04/10/23  0449 04/09/23  0537   CALCIUM mg/dL 8.4* 8.6 8.3*   MAGNESIUM mg/dL  --  2.2 2.2   PHOSPHORUS mg/dL  --  2.7  --        Hepatic Panel:  Results from last 7 days   Lab Units 04/10/23  0449 04/09/23  0537 04/08/23  0538   ALBUMIN g/dL 2.4* 2.2* 2.2*   BILIRUBIN mg/dL 0.7 0.9 1.5*   AST (SGOT) U/L 44* 43* 26   ALT (SGPT) U/L 60* 50* 39   ALK PHOS U/L 131* 113 101        Biomarkers:  Results from last 7 days   Lab Units 04/10/23  0449 04/09/23  0537 04/08/23  0538 04/06/23  1013 04/05/23  1345   CRP mg/dL 16.22*  --   --   --   --    LACTATE mmol/L  --  1.0 1.0 1.6 1.9   PROCALCITONIN ng/mL  --  0.63*  --   --  1.48*     Arterial Blood Gases:  Results from last 7 days   Lab Units 04/11/23  0322 04/10/23  0350 04/08/23  0345   PH, ARTERIAL pH units 7.492* 7.502* 7.493*   PCO2, ARTERIAL mm Hg  32.2* 34.0* 34.8*   PO2 ART mm Hg 96.2 97.5 99.1   FIO2 % 35 35 35       Images:  XR Chest 1 View    Result Date: 4/10/2023  Impression: Improvement in right basilar airspace disease likely related to improving pneumonia. Similar right midlung and left basilar airspace disease likely related to residual pneumonia and atelectasis. Electronically Signed: Jazmin Gutierrez  4/10/2023 5:49 AM EDT  Workstation ID: [id number]      Echo:  Results for orders placed during the hospital encounter of 03/30/23    Adult Transthoracic Echo Complete W/ Cont if Necessary Per Protocol    Interpretation Summary  •  Estimated left ventricular EF = 60%  •  The cardiac valves are anatomically and functionally normal.  •  There is a small (<1cm) pericardial effusion. There is no evidence of cardiac tamponade.      Results: Reviewed.  I reviewed the patient's new laboratory and imaging results.  I independently reviewed the patient's new images.    Medications: Reviewed.    Assessment   A/P     Hospital:  LOS: 12 days   ICU: 11d 19h      Diagnosis   • **Encephalopathy, metabolic [G93.41]   • Streptococcal bacteremia [R78.81, B95.5]   • Subdural empyema [G06.2]   • Meningitis [G03.9]   • Questionable Status epilepticus (HCC) [G40.901]   • Sepsis secondary to strep bacteremia and meningitis with subdural empyema [A41.9]   • Paroxysmal atrial fibrillation on Flecainide and Eliquis [I48.0]   • MINO on CPAP [G47.33, Z99.89]   • Hypertension [I10]     Kai is a 82 y.o. male admitted on 3/30/2023 with AMS (altered mental status) [R41.82]    Assessment/Management/Treatment Plan:    1. CNS infection  a. Encephalopathy  b. Meningitis  c. Strep bacteremia and meningitis  d. Subdural empyema  e. Mastoiditis } ENT following  i. CSF leak has stopped since 04/08/23  f. Levetiracetam for seizures  2. Pulmonary   a. Respiratory Failure  b. Invasive Mechanical Ventilation  c. MRSA Pneumonia per Respiratory Cx 04/06/23  d. MINO on CPAP  3. Cardiovascular  a. P A  Fib - Flecainide, Anticoagulation with UFH continuous intravenous infusion   b. HTN - Losartan  4. Macrocytic Anemia, stable.  5. ID/Antibiotics  a. Ceftriaxone + Linezolid  6. Nutrition Support  Diet, Tube Feeding Tube Feeding Formula: Peptamen AF (Peptide Based, Critical Care, ALI)  Peptamen AF (1.2 kcal/mL, 76 g protein/L, 6 g fiber/L)    NG/OG Tube 16 Fr Right nostril-Tube Feeding Rate (mL): 70 mL/h (Based on a feeding duration of 20 h/d)  Patient doesn't have any tube feeding modular orders      Lab Results   Component Value Date    PREALBUMIN 15.7 (L) 04/10/2023    PREALBUMIN 13.6 (L) 04/03/2023    CRP 16.22 (H) 04/10/2023    CRP 18.05 (H) 04/02/2023       7. Endocrine  a. Body mass index is 27.8 kg/m². Overweight: 25.0-29.9kg/m2   b. No h/o T2 Diabetes    Lab Results   Lab Value Date/Time    HGBA1C 5.40 03/30/2023 1121    HGBA1C 5.2 07/01/2015 0436     Results from last 7 days   Lab Units 04/11/23  1155 04/11/23  0552 04/11/23  0012 04/10/23  1711 04/10/23  1110 04/10/23  0532 04/09/23  2323 04/09/23  1753   GLUCOSE mg/dL 129 123 165* 133* 143* 112 194* 107       Diet: NPO Diet NPO Type: Strict NPO, Tube Feeding  Orders Placed This Encounter      Diet, Tube Feeding Tube Feeding Formula: Peptamen AF (Peptide Based, Critical Care, ALI)      Advance Directives: Code Status and Medical Interventions:   Ordered at: 03/30/23 1300     Code Status (Patient has no pulse and is not breathing):    CPR (Attempt to Resuscitate)     Medical Interventions (Patient has pulse or is breathing):    Full Support        DVT prophylaxis:  Medical and mechanical DVT prophylaxis orders are present.     In brief:  1. Discussed with Dr. Que Alonso (Infectious Diseases)   1. MRI - f/u meningitis  2. Discussed with Dr. Kahn.  3. Leukocytosis better, but still fevers.  4. Continue UFH continuous intravenous infusion  5. PT/OT  6. Disposition: Keep in ICU.    Plan of care and goals reviewed during interdisciplinary rounds.  I  discussed the patient's findings and my recommendations with patient and nursing staff    MDM:    Problem(s) High due to: Acute or Chronic illness or injury that may poses a threat to life or bodily function  Data: High due to: Review of prior external records from each unique source, Review of the result(s) of each unique test, Ordering of each unique test and Discuss management or test interpretation with external physician or NPP (not separately reported)    High      [x] Primary Attending Intensive Care Medicine - Nutrition Support   [] Consultant    Copied text in this note has been reviewed and is accurate as of 04/11/23

## 2023-04-11 NOTE — PROGRESS NOTES
"Neurology       Patient Care Team:  Derick Phillip MD as PCP - General (Family Medicine)  Nino Castillo MD as Consulting Physician (Cardiology)    Chief complaint: Meningitis    History: Patient is calm and cooperative but does require some Precedex.    Following simple commands  Past Medical History:   Diagnosis Date   • Arthritis    • Atrial fibrillation    • Bradycardia    • Chronic hypertension     Probably essential   • Hypertension    • Hypertensive cardiovascular disease 07/2015    Recent progressive CCS class III-IV chest pain syndrome with normal EKG and exertional dyspnea and fatigue with normal codominant coronary arteries and preserved systolic  left ventricular function, July 2015.   • Intermittent palpitations 05/01/2014    Remote abnormal 24-hour Holter monitor demonstrating intermittent bradycardia with occasional PACs, May 2014.   • Lower extremity edema     Intermittent lower extremity edema felt secondary to antihypertensive therapy.    • Nephrolithiasis     Remote- 15 years ago   • MINO on CPAP    • MINO on CPAP     doesn't know setting    • Prostate troubles     enlarged   • Seasonal rhinitis     intermittent       Vital Signs   Vitals:    04/11/23 0708 04/11/23 0800 04/11/23 0900 04/11/23 1000   BP:  132/63 122/57 116/53   BP Location:  Left arm     Patient Position:  Lying     Pulse: 70 70 70 70   Resp:       Temp:  99.5 °F (37.5 °C)     TempSrc:  Bladder     SpO2: 98% 99% 98% 98%   Weight:       Height: 176.5 cm (69.49\")          Physical Exam:   General: Awake and alert              Neuro: Regards me when I will talk to him.    He will blink his eyes twice on request.    He has faint  no movement in his lower extremities.        Results Review:  Reviewed  Results from last 7 days   Lab Units 04/11/23  0403   WBC 10*3/mm3 9.66   HEMOGLOBIN g/dL 9.4*   HEMATOCRIT % 29.3*   PLATELETS 10*3/mm3 217     Results from last 7 days   Lab Units 04/11/23  0403 04/10/23  0449 " 04/09/23  0537 04/08/23  0538   SODIUM mmol/L 134* 133* 133* 133*   POTASSIUM mmol/L 4.2 3.9 4.3 4.0   CHLORIDE mmol/L 104 101 100 102   CO2 mmol/L 22.0 24.0 25.0 24.0   BUN mg/dL 26* 31* 31* 25*   CREATININE mg/dL 0.52* 0.65* 0.57* 0.49*   CALCIUM mg/dL 8.4* 8.6 8.3* 8.2*   BILIRUBIN mg/dL  --  0.7 0.9 1.5*   ALK PHOS U/L  --  131* 113 101   ALT (SGPT) U/L  --  60* 50* 39   AST (SGOT) U/L  --  44* 43* 26   GLUCOSE mg/dL 104* 102* 108* 130*       Imaging Results (Last 24 Hours)     ** No results found for the last 24 hours. **          Assessment:  Streptococcal meningitis and septicemia secondary to mastoiditis.    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        Plan:  Patient seems to be making steady progress..  He remains critically ill    Comment:  Guarded prognosis         I discussed the patients findings and my recommendations with patient, nursing staff and primary care team    Chaz Kahn MD  04/11/23  11:20 EDT

## 2023-04-11 NOTE — PROGRESS NOTES
"    INFECTIOUS DISEASE PROGRESS NOTE    Kai Dodson  1940  [id number]    Date of consult: 3/31/23  Admit date: 3/30/2023    Requesting Provider: Dr. Jason  Evaluating physician: Que Alonso MD  Reason for Consultation: meningitis  Chief Complaint: altered mental status      Subjective   History of present illness:  Kai Dodson is a  82 y.o.  Yr old male with a past medical history of hypertension, coronary artery disease, paroxysmal A. Fib on flecainide and Eliquis, tachycardia-bradycardia syndrome status post PPM and obstructive sleep apnea on CPAP who presented to the ER yesterday with encephalopathy, inability to speak, and left-sided weakness.  Due to the patient's mental state and intubated status, history is obtained from medical records.  The patient's wife had just been discharged yesterday morning from [facility] and their son was driving the patient's wife back to [facility] for inpatient rehab.  The patient's son tried to call him and could not reach him.  Other family members went to check on him and found him altered with tremors in all the extremities.  EMS was called and he was transported to the ER.    Since arrival, T-max has been 100.9°F.  He was initially tachycardic up to 112 bpm but this is improved. He had some hypotension but this has improved. The patient was intubated for airway protection and remains on the ventilator with an FiO2 of 35%. Initial lactic acid was 6.6, pro-calcitonin was 6.0, hemoglobin A1c was 5.4, Temelec has been normal.  LFTs are within normal limits. White blood cell count was normal on arrival at 9.63, has now trended up to 14.14. platelet count has decreased to 109.sensitivity troponin was slightly elevated at 17. Urine drug screen was negative. 2 sets of admission blood cultures are growing GPC's in pairs and chains identified as Streptococcus pneumoniae by the BCID2 PCR. The patient underwent CTA head and neck that showed \"no " "evidence of intracranial hemorrhage or definite acute territorial ischemia.  There is abnormal intracranial gas present, appearing likely extra-axial in the lateral aspect of the right cerebellar hemisphere, with changes of the right mastoid present, concerning for mastoiditis and possibly subdural empyema or less likely parenchymal abscess.\" \"Dedicated CT of the temporal bones demonstrates findings of likely acute otomastoiditis on the right, with middle ear and right mastoid effusion, coalescent changes of the right mastoid and dehiscence of both the tegmen tympani and tegmen mastoideum. There is again concern for intracranial infectious involvement, empyema versus abscess with foci of intracranial gas present.\" chest x-ray was without any acute disease. MRI brain showed \"no evidence of right temporal lobe abscess.  Findings consistent with diffuse meningitis.  There is right otomastoiditis as seen on the CT of the temporal bones.  There are signal voids corresponding to gas bubbles within the CSF in the upper right posterior cranial fossa.\"  ENT has been consulted and Dr. Garcia evaluated the patient with right myringotomy procedure performed at bedside in the ICU. Wide myringotomy was made in the posterior aspect of the tympanic membrane.  A small amount of purulence was expressed and cultured.  After that CSF was draining through the myringotomy.  A tube was therefore not placed.  Neurosurgery has evaluated the patient and recommended that there is no role for neurosurgical intervention at this time. The patient has been started on empiric acyclovir, ampicillin, ceftriaxone 2 g IV every 12 hours, and IV vancomycin by the ICU team.  He has additionally been placed on dexamethasone.  ID has been consultated for antibiotic recommendations.    Subjective:    4/1/23: The patient remains critically ill on the ventilator.  No history is available from him.  He does have some fluid leaking from his right ear, presumably " CSF. No fevers.  Ventilator settings remain stable.  Leukocytosis is worse but likely steroid induced.    4/2/23:  He remains afebrile and on 35% FIO2.  WBC 12.29 this am.  Remains on Dexamethasone.     4/3/23: no history available from the patient.  He remains on the ventilator with an FiO2 of 35%.  No fevers.  Continued CSF leak from the right ear.    4/4/23: Continues to have some drainage from his right ear.  Remains critically ill on the ventilator with an FiO2 of 35%.  No fevers. Neurosurgery has reevaluated the patient and does not think that there is a role for CSF diversion or need for any neurosurgical intervention at this time.  May need mastoidectomy and packing of the mastoid air cells per neurosurgery.    4/5/23: the patient is still critically ill on the ventilator.  He did have some fevers 100.9°F after I saw him today. Vent settings are also slightly worse with an FiO2 of 50%.  Still having some drainage from his right ear.  No current surgical plans per ENT or neurosurgery.  No history is available from the patient.  No family at bedside.    4/6/23: The patient had ongoing fevers up to 101.3°F overnight.  Vent settings were slightly worse overnight with an FiO2 of 50% but slightly better today at 40% when I went by his room.  He is having increased respiratory secretions per nursing staff.  Ongoing drainage from his right ear.  No history available from the patient is a remains on the ventilator and sedated.    4/7/23: The patient remains intubated on the ventilator.  No history available from the patient.  T-max was 100.6°F overnight.  FiO2 has improved to 35%.  Still having significant respiratory secretions per nursing.  Not having as much drainage from his right ear per nursing. He has experienced some weeping from his left hand which is very edematous.    4/8/23: the patient remains on the vent. FIO2 35%. Still having some low grade fevers. Respiratory culture finalized with MRSA. No history  available from the patient    4/9/23: The patient is still on vent but was on SBT this morning and is responding to some simple commands and trying to mouth words to his son at bedside. Still having some low grade fevers up to 100.8F overnight. WBC slightly worse at 14.77 today. CT head without contrast was repeated this morning with no significant new changes noted.    4/10/23: Tmax 101.5 this morning. Was on SBT until late afternoon. He had thick oral secretions and continued to have edema in BUE with right hand weeping/pedal edema.  Remains ill on vent with 35% FiO2/PEEP 5.  CXR with improving pneumonia. Sedation is off and he appears very anxious and agitated.      4/11/23: Sedation has been weaned off.  Patient is on spontaneous breathing trial and doing well.  Still having low-grade fevers up to 100.6°F overnight.  Unable to obtain any history from the patient given he is still intubated on the ventilator.     Past Medical History:   Diagnosis Date   • Arthritis    • Atrial fibrillation    • Bradycardia    • Chronic hypertension     Probably essential   • Hypertension    • Hypertensive cardiovascular disease 07/2015    Recent progressive CCS class III-IV chest pain syndrome with normal EKG and exertional dyspnea and fatigue with normal codominant coronary arteries and preserved systolic  left ventricular function, July 2015.   • Intermittent palpitations 05/01/2014    Remote abnormal 24-hour Holter monitor demonstrating intermittent bradycardia with occasional PACs, May 2014.   • Lower extremity edema     Intermittent lower extremity edema felt secondary to antihypertensive therapy.    • Nephrolithiasis     Remote- 15 years ago   • MINO on CPAP    • MINO on CPAP     doesn't know setting    • Prostate troubles     enlarged   • Seasonal rhinitis     intermittent       Past Surgical History:   Procedure Laterality Date   • CARDIAC CATHETERIZATION      no stents   • CARDIAC ELECTROPHYSIOLOGY PROCEDURE N/A 10/24/2018     Procedure: Pacemaker DC new with Biotronik. Hold Eliquis one day prior.;  Surgeon: Harley Dacosta MD;  Location: [facility] INVASIVE LOCATION;  Service: Cardiology   • COLONOSCOPY     • ENDOSCOPY     • INSERT / REPLACE / REMOVE PACEMAKER         Pediatric History   Patient Parents   • Not on file     Other Topics Concern   • Not on file   Social History Narrative    PT DRINKS 2 SERVINGS OF CAFFEINE PER DAY.    PT LIVES AT HOME WITH WIFE.       family history includes No Known Problems in his mother; Other in his father.    No Known Allergies    There is no immunization history for the selected administration types on file for this patient.    Medication:    Current Facility-Administered Medications:   •  acetaminophen (TYLENOL) 160 MG/5ML solution 650 mg, 650 mg, Nasogastric, Q6H PRN, Amadou Mercedes APRN, 650 mg at 04/11/23 1805  •  aspirin chewable tablet 81 mg, 81 mg, Nasogastric, Daily, 81 mg at 04/11/23 0852 **OR** aspirin suppository 300 mg, 300 mg, Rectal, Daily, Tereza Bautista APRN  •  cefTRIAXone (ROCEPHIN) 2 g/100 mL 0.9% NS IVPB (MBP), 2 g, Intravenous, Q12H, Que Alonso MD, 2 g at 04/11/23 0852  •  chlorhexidine (PERIDEX) 0.12 % solution 15 mL, 15 mL, Mouth/Throat, Q12H, Mundo Jason MD, 15 mL at 04/11/23 0852  •  dexmedetomidine (PRECEDEX) 400 mcg in 100 mL NS infusion, 0.2-1.5 mcg/kg/hr, Intravenous, Titrated, Toan Lee MD, Last Rate: 8.7 mL/hr at 04/11/23 1105, 0.4 mcg/kg/hr at 04/11/23 1105  •  dextrose (D50W) (25 g/50 mL) IV injection 25 g, 25 g, Intravenous, Q15 Min PRN, Amadou Mercedes APRN  •  fentaNYL (SUBLIMAZE) bolus from bag 10 mcg/mL 50 mcg, 50 mcg, Intravenous, Q30 Min PRN, Adair Will MD, 50 mcg at 03/31/23 1911  •  fentaNYL 2500 mcg/250 mL NS infusion,  mcg/hr, Intravenous, Titrated, Adair Will MD  •  flecainide (TAMBOCOR) tablet 50 mg, 50 mg, Nasogastric, Q12H, Marcelino Hastings DO, 50 mg at 04/11/23 0852  •   heparin 94768 units/250 mL (100 units/mL) in 0.45 % NaCl infusion, 15 Units/kg/hr, Intravenous, Titrated, Antwon De Leon, Armond, Last Rate: 12.99 mL/hr at 04/11/23 1257, 15 Units/kg/hr at 04/11/23 1257  •  insulin regular (humuLIN R,novoLIN R) injection 0-7 Units, 0-7 Units, Subcutaneous, Q6H, Amadou Mercedes, APRN, 2 Units at 04/11/23 0016  •  levETIRAcetam (KEPPRA) 500 mg in sodium chloride 0.9 % 100 mL IVPB, 500 mg, Intravenous, Q12H, Antwon Lorenzana, RP, Last Rate: 400 mL/hr at 04/11/23 1805, 500 mg at 04/11/23 1805  •  Linezolid (ZYVOX) 600 mg 300 mL, 600 mg, Intravenous, Q12H, Que Alonso MD, Last Rate: 300 mL/hr at 04/11/23 0934, 600 mg at 04/11/23 0934  •  losartan (COZAAR) tablet 50 mg, 50 mg, Nasogastric, Q24H, Marcelino Hastings, , 50 mg at 04/11/23 0852  •  ondansetron (ZOFRAN) injection 4 mg, 4 mg, Intravenous, Q6H PRN, Antonietta Phillips APRN  •  pantoprazole (PROTONIX) injection 40 mg, 40 mg, Intravenous, Q AM, Antonietta Phillips APRN, 40 mg at 04/11/23 0609  •  Pharmacy to Dose Heparin, , Does not apply, Continuous PRN, Marcelino Hastings DO  •  Potassium Replacement - Follow Nurse / BPA Driven Protocol, , Does not apply, PRN, Natalia Cruz APRN  •  sennosides (SENOKOT) 8.8 MG/5ML syrup 10 mL, 10 mL, Nasogastric, BID, 10 mL at 04/10/23 0925 **AND** [DISCONTINUED] docusate sodium (COLACE) liquid 100 mg, 100 mg, Nasogastric, BID, Mundo Jason MD, 100 mg at 04/10/23 0911  •  sodium chloride 0.9 % flush 10 mL, 10 mL, Intravenous, Q12H, Tereza Bautista APRN, 10 mL at 04/11/23 0859  •  sodium chloride 0.9 % flush 10 mL, 10 mL, Intravenous, PRN, Tereza Bautista APRN  •  sodium chloride 0.9 % infusion 40 mL, 40 mL, Intravenous, PRN, Tereza Bautista APRN    Please refer to the medical record for a full medication list    Review of Systems:  Unable to obtain a 12 point review of systems from the patient given his  "clinical state    Physical Exam:   Vital Signs   Temp:  [99 °F (37.2 °C)-100.6 °F (38.1 °C)] 100 °F (37.8 °C)  Heart Rate:  [49-70] 70  Resp:  [16-22] 22  BP: ()/(45-88) 136/65  FiO2 (%):  [30 %-35 %] 30 %    Temp  Min: 99 °F (37.2 °C)  Max: 100.6 °F (38.1 °C)  BP  Min: 77/58  Max: 146/71  Pulse  Min: 49  Max: 70  Resp  Min: 16  Max: 22  SpO2  Min: 98 %  Max: 100 %    Blood pressure 136/65, pulse 70, temperature 100 °F (37.8 °C), temperature source Bladder, resp. rate 22, height 176.5 cm (69.49\"), weight 86.6 kg (190 lb 14.7 oz), SpO2 98 %.  GENERAL: awake and alert.  Still on the ventilator on spontaneous breathing trial  HEENT:  Normocephalic, atraumatic.  ET tube in place.  No external oral lesions noted. NG tube in place. No drainage noted from his right ear today  Chest: Left chest wall PPM pocket site without any erythema or drainage.  HEART: RRR, no murmur  LUNGS: ventilator breath sounds bilaterally.  On the ventilator on spontaneous breathing trial  ABDOMEN: Soft, nontender, nondistended..   GENITAL: + Doran catheter  SKIN: No new rashes.  No jaundice.  PSYCHIATRIC: unable to assess  EXT:  No cellulitic change. No peripheral edema noted. Has some weeping from his right hand which is very edematous, improving  NEURO: more alert. Not following any commands today for me but has been recently per nursing.     Right upper extremity PICC line- no erythema noted at the exit site    Results Review:   I reviewed the patient's new clinical results.    Results from last 7 days   Lab Units 04/11/23  0403 04/10/23  0449 04/09/23  0537   WBC 10*3/mm3 9.66 13.28* 14.77*   HEMOGLOBIN g/dL 9.4* 10.0* 10.6*   HEMATOCRIT % 29.3* 31.3* 33.5*   PLATELETS 10*3/mm3 217 218 205     Results from last 7 days   Lab Units 04/11/23  0403   SODIUM mmol/L 134*   POTASSIUM mmol/L 4.2   CHLORIDE mmol/L 104   CO2 mmol/L 22.0   BUN mg/dL 26*   CREATININE mg/dL 0.52*   GLUCOSE mg/dL 104*   CALCIUM mg/dL 8.4*     Results from last 7 days "   Lab Units 04/10/23  0449   ALK PHOS U/L 131*   BILIRUBIN mg/dL 0.7   ALT (SGPT) U/L 60*   AST (SGOT) U/L 44*         Results from last 7 days   Lab Units 04/10/23  0449   CRP mg/dL 16.22*         Results from last 7 days   Lab Units 04/09/23  0537   LACTATE mmol/L 1.0     Estimated Creatinine Clearance: 120.4 mL/min (A) (by C-G formula based on SCr of 0.52 mg/dL (L)).  CPK        3/30/2023    11:21   Common Labsle   Creatine Kinase 57        Procalitonin Results:      Lab 04/09/23  0537 04/05/23  1345   PROCALCITONIN 0.63* 1.48*      Brief Urine Lab Results  (Last result in the past 365 days)      Color   Clarity   Blood   Leuk Est   Nitrite   Protein   CREAT   Urine HCG        03/30/23 1200 Yellow   Clear   Negative   Negative   Negative   Negative                Site   Date Value Ref Range Status   04/11/2023 Left Radial  Final     Juan A's Test   Date Value Ref Range Status   04/11/2023 N/A  Final     pH, Arterial   Date Value Ref Range Status   04/11/2023 7.492 (H) 7.350 - 7.450 pH units Final     Comment:     83 Value above reference range     pCO2, Arterial   Date Value Ref Range Status   04/11/2023 32.2 (L) 35.0 - 45.0 mm Hg Final     Comment:     84 Value below reference range     pO2, Arterial   Date Value Ref Range Status   04/11/2023 96.2 83.0 - 108.0 mm Hg Final     HCO3, Arterial   Date Value Ref Range Status   04/11/2023 24.7 20.0 - 26.0 mmol/L Final     Base Excess, Arterial   Date Value Ref Range Status   04/11/2023 1.6 0.0 - 2.0 mmol/L Final     Hemoglobin, Blood Gas   Date Value Ref Range Status   04/11/2023 9.6 (L) 13.5 - 17.5 g/dL Final     Comment:     84 Value below reference range     Hematocrit, Blood Gas   Date Value Ref Range Status   04/11/2023 29.3 (L) 38.0 - 51.0 % Final     Oxyhemoglobin   Date Value Ref Range Status   04/11/2023 97.4 94 - 99 % Final     Methemoglobin   Date Value Ref Range Status   04/11/2023 0.20 0.00 - 1.50 % Final     Carboxyhemoglobin   Date Value Ref Range Status    04/11/2023 1.1 0 - 2 % Final     CO2 Content   Date Value Ref Range Status   04/11/2023 25.6 22 - 33 mmol/L Final     Barometric Pressure for Blood Gas   Date Value Ref Range Status   04/11/2023   Final     Comment:     N/A     Modality   Date Value Ref Range Status   04/11/2023 Ventilator  Final     FIO2   Date Value Ref Range Status   04/11/2023 35 % Final        Microbiology:  Microbiology Results (last 10 days)     Procedure Component Value - Date/Time    Blood Culture - Blood, Arm, Left [[id number]]  (Normal) Collected: 04/06/23 1426    Lab Status: Final result Specimen: Blood from Arm, Left Updated: 04/11/23 1500     Blood Culture No growth at 5 days    Blood Culture - Blood, Hand, Left [[id number]]  (Normal) Collected: 04/06/23 1013    Lab Status: Final result Specimen: Blood from Hand, Left Updated: 04/11/23 1045     Blood Culture No growth at 5 days    MRSA Screen, PCR (Inpatient) - Swab, Nares [[id number]]  (Abnormal) Collected: 04/06/23 0952    Lab Status: Final result Specimen: Swab from Nares Updated: 04/06/23 1109     MRSA PCR Positive    Narrative:      The negative predictive value of this diagnostic test is high and should only be used to consider de-escalating anti-MRSA therapy. A positive result may indicate colonization with MRSA and must be correlated clinically.    Respiratory Culture - Sputum, ET Suction [[id number]]  (Abnormal)  (Susceptibility) Collected: 04/06/23 0952    Lab Status: Final result Specimen: Sputum from ET Suction Updated: 04/08/23 0932     Respiratory Culture Moderate growth (3+) Staphylococcus aureus, MRSA     Comment:   Methicillin resistant Staphylococcus aureus, Patient may be an isolation risk.        Gram Stain Many (4+) WBCs per low power field      Rare (1+) Epithelial cells per low power field      Few (2+) Gram positive cocci in pairs, chains and clusters    Susceptibility      Staphylococcus aureus, MRSA      JOAQUÍN      Clindamycin Susceptible      Inducible  Clindamycin Resistance Negative      Linezolid Susceptible      Oxacillin Resistant     Tetracycline Susceptible      Trimethoprim + Sulfamethoxazole Susceptible      Vancomycin Susceptible                       Susceptibility Comments     Staphylococcus aureus, MRSA    This isolate does not demonstrate inducible clindamycin resistance in vitro.                         Radiology:  XR Chest 1 View    Result Date: 4/10/2023  Impression: Improvement in right basilar airspace disease likely related to improving pneumonia. Similar right midlung and left basilar airspace disease likely related to residual pneumonia and atelectasis. Electronically Signed: Jazmin Gutierrez  4/10/2023 5:49 AM EDT  Workstation ID: [id number]          IMPRESSION:     Problems:  1. Streptococcus pneumoniae septicemia-due to acute otitis  2. 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  No LP done yet but given the Streptococcus pneumonia septicemia and clinical presentation, we do not need an LP.  3. Acute otitis media and right-sided mastoiditis resulting in a subdural empyema with fistulous tract between the middle ear space and the dura- Strep pneumoniae on culture  4. Acute toxic/metabolic encephalopathy-due to the above infection  5. Acute hypoxic respiratory failure, intubated for airway protection  6. leukocytosis with neutrophilia-improved  7. Hypokalemia improved   8. Hypomagnesemia, improved  9. tachycardia-bradycardia syndrome status post PPM- complicating factor in the setting of bacteremia as at some risk for seeding PPM leads with bacteria but streptococcus pneumoniae is an uncommon bacteria to do this. TTE was without any signs of vegetations or pacemaker lead infection  10. paroxysmal A. Fib on flecainide and Eliquis  11. coronary artery disease  12. obstructive sleep apnea on CPAP nightly at home  13. History of hypertension  14. Thrombocytopenia-resolved  15. New fevers starting on  4/5-suspect due to pneumonia-ongoing grade fevers  16. Left-sided pneumonia-MRSA from culture. Improving by chest xray and vent settings minimal, SBT ongoing.  On sedation vacation on 4/11.    RECOMMENDATIONS:    -continue to monitor cbc with diff closely  -ENT and neurology are following.  -continue ceftriaxone 2g IV q12h  -Linezolid 600 mg BID  -we will plan for repeat MRI brain.  I discussed this with ICU team today    Doing well on spontaneous breathing trial.  Possible extubation soon.    He will likely need a 6 week course of IV antibiotic therapy.    I discussed with nursing today    I discussed with Dr. Will today    Complex MDM.     Copied text in this note has been reviewed and is accurate as of 04/11/23      Que Alonso MD  4/11/2023

## 2023-04-12 NOTE — PROGRESS NOTES
"Clinical Nutrition Note      Patient Name: Kai Dodson  MRN: [id number]  Admission date: 3/30/2023      Multidisciplinary Rounds/EN Support Monitor    Additional information obtained during MDR:  Pt remains intubated and sedated, today is day 14 on vent, he follows most commands off sedation. MD will have discussion w/ family for trach/PEG and or GOC change he is currently full support.     Current diet: NPO Diet NPO Type: Strict NPO, Tube Feeding    EN Support:    Product: Peptamen AF  Goal rate:  70 ml/hr  Goal volume (0701-0700): 1400 mL  Based on a feeding duration of 20 hrs/d  Modulars: None  Water flushes: 30 mL every 2 hrs  Tube/Route: NGT  Verified at bedside: yes, infusion at 70 ml/hr    Pertinent medical data reviewed:  yes  Last filed wt: Weight: 86.6 kg (190 lb 14.7 oz) (04/02/23 0600)  Weight Method: Bed scale    BMI: BMI (Calculated): 27.8    Nutrition risk identified on nursing screen; MST score \"0\"    Labs Reviewed: yes  Pertinent: nutrition labs stable    Medications Reviewed: yes  Pertinent: insulin, senokot, protonix, abx    I & O documentation (0701-0700):  1519 ml EN (108% daily goal volume) and 270 mL water    Estimated/Assessed Needs (4/10/23):      Energy:  1700 kcal  Protein:  112 gm Pro  Fluids: 1700 ml per RDA method     Intervention:  EN support per MD  Plan of care and goals of care reviewed    Monitor:  Per protocol, I&O, Pertinent labs, EN delivery/tolerance, Symptoms, POC/GOC      Sravani Deleon MS,RD,LD  04/12/23 13:33 EDT  Time: 20  mins       "

## 2023-04-12 NOTE — PLAN OF CARE
Goal Outcome Evaluation:  Plan of Care Reviewed With: patient        Progress: no change  Outcome Evaluation: VSS. Remains intubated, FiO2 30%. Precedex gtt and Heparin gtt continued. HR dropped from 70 to 49 at 2300 the past 3 nights. Sustains HR of 49 for several hrs. BP stable during that time. UOP adequate. No BM.

## 2023-04-12 NOTE — PROGRESS NOTES
Clinical Nutrition     Nutrition Support Assessment  Reason for Visit: Follow-up protocol, EN      Patient Name: Kai Dodson  YOB: 1940  MRN: [id number]  Date of Encounter: 04/11/23 22:43 EDT  Admission date: 3/30/2023    Comments:  EN delivery evaluated w EN rate adj to 70 ml/hr for 4/10/23, see below. Continue to monitor for delivery.    Nutrition Assessmen t   Admission Diagnosis:  AMS (altered mental status) [R41.82]      Problem List:    Encephalopathy, metabolic    MINO on CPAP    Hypertension    Paroxysmal atrial fibrillation on Flecainide and Eliquis    Subdural empyema    Meningitis    Questionable Status epilepticus (HCC)    Sepsis secondary to strep bacteremia and meningitis with subdural empyema    Streptococcal bacteremia   Acute Resp Failure- Invasive mechanical ventilation     PMH:  He  has a past medical history of Arthritis, Atrial fibrillation, Bradycardia, Chronic hypertension, Hypertension, Hypertensive cardiovascular disease (07/2015), Intermittent palpitations (05/01/2014), Lower extremity edema, Nephrolithiasis, MINO on CPAP, MINO on CPAP, Prostate troubles, and Seasonal rhinitis.    PSH: He  has a past surgical history that includes Colonoscopy; Cardiac catheterization; Esophagogastroduodenoscopy; Cardiac electrophysiology procedure (N/A, 10/24/2018); and Insert / replace / remove pacemaker.    Applicable Nutrition Concerns:   Skin:  Oral:  GI:    Applicable Interval History:   3/30 incision/drainage from middle ear  3/30 intubated for mechanical ventilaton    Reported/Observed/Food/Nutrition Related History:     4/11) EN at adj goal rate at time of visit.    4/10) RN reports pt w/ lg BM this morning, follows commands and moves everything off sedation, has significant secretions. Tolerating TF.    4/7) in volume overload, plan for diuretics.  No BM since EN started, bowel regiment started yesterday, no BM yet.  EN @ goal rate. Sedated on the vent.      4/3)  "Remains intubated and sedated.  EN stated on 4/1, at goal rate this morning.  No issues with intolerance with EN.  Running @ goal rate at bedside at time of visit. No family at bed side  Bed weight: 97.7kg   Remains on propofol for sedation.     Labs    Labs Reviewed: Yes     Results from last 7 days   Lab Units 04/11/23  0403 04/10/23  0449 04/09/23  0537 04/08/23  0538   GLUCOSE mg/dL 104* 102* 108* 130*   BUN mg/dL 26* 31* 31* 25*   CREATININE mg/dL 0.52* 0.65* 0.57* 0.49*   SODIUM mmol/L 134* 133* 133* 133*   CHLORIDE mmol/L 104 101 100 102   POTASSIUM mmol/L 4.2 3.9 4.3 4.0   PHOSPHORUS mg/dL  --  2.7  --   --    MAGNESIUM mg/dL  --  2.2 2.2 2.0   ALT (SGPT) U/L  --  60* 50* 39     Results from last 7 days   Lab Units 04/10/23  0449 04/09/23  0537 04/08/23  0538   ALBUMIN g/dL 2.4* 2.2* 2.2*   PREALBUMIN mg/dL 15.7*  --   --    CRP mg/dL 16.22*  --   --    TRIGLYCERIDES mg/dL 75  --   --      Results from last 7 days   Lab Units 04/11/23  1821 04/11/23  1155 04/11/23  0552 04/11/23  0012 04/10/23  1711 04/10/23  1110   GLUCOSE mg/dL 139* 129 123 165* 133* 143*     Lab Results   Lab Value Date/Time    HGBA1C 5.40 03/30/2023 1121    HGBA1C 5.2 07/01/2015 0436             Medications    Medications Reviewed: Yes  Pertinent : abx, insulin, keppra, protonix, senokot   Infusion: Precedex, Fentanyl on order, heparin 15 units/kg/hr.  PRN: zofran    Intake/Ouptut 24 hrs (0701 - 0700)   I&O's Reviewed: Yes     Intake & Output (last day)       04/11 0701 04/12 0700    I.V. (mL/kg) 435.3 (5)    Other 210    NG/GT 1159    IV Piggyback 500    Total Intake(mL/kg) 2304.3 (26.6)    Urine (mL/kg/hr) 2515 (1.8)    Stool     Total Output 2515    Net -210.7             Stool x 1 4/10    Net I & O since admit: ~ + 6.4 L on 4/10    Anthropometrics     Admission Height 176.5 cm (69.5\") Documented at 03/30/2023 1116   Admission Weight 88.5 kg (195 lb) Documented at 03/30/2023 1116     Height: Height: 176.5 cm (69.49\")  Last Filed " "Weight: Weight: 86.6 kg (190 lb 14.7 oz) (23 0600)  Method: Weight Method: Bed scale  BMI: BMI (Calculated): 27.8  BMI classification: Overweight: 25.0-29.9kg/m2     UBW: per EMR rpt of 195 lbs on 10/11/23 further data req.  Weight change:     Nutrition Focused Physical Exam     Date: 4./    Unable to perform exam due to: Defer pending indication    Needs Assessment   Date: 4/3, 4/10    Height used:Height: 176.5 cm (69.49\")  Weights used: 86.1 Kg bed wt     Estimated Calorie needs: ~ 1700 Kcal/day  Method:  Kcals/KG 20 = 1722  Method:  PSU on wt 86.1 temp 36.8, VE 6.33 =1626                  ( PSU on wt 86 Kg, temp 37.3, VE 6.27 = 1707 no change in est need)    Estimated Protein needs: ~ 112 g PRO/day  Method: g/Kg1.3    Estimated Fluid needs: < 25 ml/kg ml/day   Hyponatremia noted    Current Nutrition Prescription     PO: NPO Diet NPO Type: Strict NPO, Tube Feeding  Oral Nutrition Supplement:   Intake: N/A     EN: Peptamen AF  Goal Rate: 70 ml/hr    Water Flushes: 30 ml ev 2 hr  Modular: None  Route: NG   Tube: Large bore    At goal over: 20Hrs/day    Rx will supply:   Goal Volume 1400 mL/day     Flush Volume 300 mL/day     Energy 1680 Kcal/day 98 % Est Need   Protein 106 g/day 95 % Est Need   Fiber 8.4 g/day     Water in  EN 1135 mL     Total Water 1435 mL     Meet DRI Yes          --------------------------------------------------------------------------  Product/Rate verified at bedside: Yes  Infusing Rate at time of visit: @ 70ml/hr, Water @ 30 ml every 2 hrs    Average Delivery from [facility]:  Volume 1450 mL/day 104  % Goal Vol.   Flush Volume 270 mL/day     Energy 1740 Kcal/day 102 % Est Need   Protein 110 g/day 98 % Est Need   Fiber 8.7 g/day     Water in  EN 1175 mL     Total Water 1445 mL     Meet DRI Yes        Nutrition Diagnosis   Date: 3/31 Updated:  4/3,   Problem Inadequate oral intake   Etiology Mechanical ventilation   Signs/Symptoms NPO   Status: EN infusing at goal "       Goal:   General: Nutrition support treatment  PO: N/A  EN/PN: Maintain EN     Nutrition Intervention      Follow treatment progress, Care plan reviewed  -  Monitoring/Evaluation:   Per protocol, I&O, Pertinent labs, Weight, Symptoms, POC/GOC      Dorie Lewis RD  Time Spent: 30 min

## 2023-04-12 NOTE — PROGRESS NOTES
INTENSIVIST   PROGRESS NOTE        SUBJECTIVE     Kai 82 y.o. male is followed for: Extremity Weakness       Encephalopathy, metabolic    Hypertension    Paroxysmal atrial fibrillation on Flecainide and Eliquis    Subdural empyema    Meningitis    Sepsis secondary to strep bacteremia and meningitis with subdural empyema    Streptococcal bacteremia    As an Intensivist, we provide an integrated approach to the ICU patient and family, medical management of comorbid conditions, including but not limited to electrolytes, glycemic control, organ dysfunction, lead interdisciplinary rounds and coordinate the care with all other services, including those from other specialists.     Interval History:  POD: 13 Days Post-Op    Follows commands.  Weak.  Hard of hearing.    Bloody respiratory secretions. (He is on anticoagulation with UFH).    Fevers trending down.    Temp  Min: 98.1 °F (36.7 °C)  Max: 99.5 °F (37.5 °C)       History     Last Reviewed by Adair Will MD on 4/10/2023 at  1:06 PM    Sections Reviewed    Medical, Family, Surgical, Tobacco, Alcohol, Drug Use, Sexual Activity,   Social Documentation      Problem list reviewed by Adair Will MD on 4/10/2023 at  1:06 PM  Medicines reviewed by Adair Will MD on 4/10/2023 at  1:06 PM  Allergies reviewed by Adair Will MD on 4/10/2023 at  1:06 PM       The patient's relevant past medical, surgical and social history were reviewed and updated in Epic as appropriate.        OBJECTIVE     Vitals:  Temp: 98.6 °F (37 °C) (23 0830) Temp  Min: 98.1 °F (36.7 °C)  Max: 99.5 °F (37.5 °C)   Temp core:      BP: 127/68 (23 1455) BP  Min: 88/52  Max: 153/92   MAP (non-invasive) Noninvasive MAP (mmHg): 98 (23 1300) Noninvasive MAP (mmHg)  Av.4  Min: 51  Max: 157   Pulse: 70 (23 1455) Pulse  Min: 49  Max: 75   Resp: 19 (23 0830) Resp  Min: 15  Max: 19   SpO2: 98 % (23 1300) SpO2  Min: 97 %  Max: 100 %   Device: ventilator  (04/12/23 1000)    Flow Rate:   No data recorded         04/01/23  0400 04/02/23  0600   Weight: 84.5 kg (186 lb 4.6 oz) 86.6 kg (190 lb 14.7 oz)        Intake/Ouptut 24 hrs (7:00AM - 6:59 AM)  Intake & Output (last 3 days)       04/09 0701  04/10 0700 04/10 0701 04/11 0700 04/11 0701 04/12 0700 04/12 0701 04/13 0700    I.V. (mL/kg) 1100.8 (12.7) 1219.1 (14.1) 681.1 (7.9)     Other 360 270 270 60    NG/GT 1692 1530 1579 362    IV Piggyback 415.9 300 897.8 100    Total Intake(mL/kg) 3568.7 (41.2) 3319.1 (38.3) 3427.9 (39.6) 522 (6)    Urine (mL/kg/hr) 3835 (1.8) 2600 (1.3) 3315 (1.6) 225 (0.3)    Stool  0      Total Output 3835 2600 3315 225    Net -266.3 +719.1 +112.9 +297            Stool Unmeasured Occurrence  1 x            Medications (drips):  dexmedetomidine, Last Rate: 0.7 mcg/kg/hr (04/12/23 1455)  fentanyl 10 mcg/mL  heparin, Last Rate: 15 Units/kg/hr (04/12/23 1326)  Pharmacy to Dose Heparin        Invasive Mechanical Ventilator   Settings: Observed:   Mode: VC+/AC (04/12/23 1455)    Resp Rate (Set): 14 (04/12/23 1455) Resp Rate (Observed) Vent: 23 (04/12/23 1455)   Vt (Set, mL): 400 mL (04/12/23 1455) Vt, Exp (observed, mL): 473 mL (04/12/23 1455)    Minute Ventilation (L/min) (Obs): 9.77 L/min (04/12/23 1455)    I:E Ratio (Obs): 1:1 (04/12/23 1455)       FiO2 (%): 30 % (04/12/23 1455) Plateau Pressure (cm H2O): (S) 23 cm H2O (04/12/23 0743)   PEEP/CPAP (cm H2O): 5 cm H20 (04/12/23 1455) Driving Pressure (cm H2O): 18.5 cm H2O (04/12/23 0743)    Static Compliance (L/cm H2O): 16 (04/11/23 1945)      Physical Examination  Telemetry:  Rhythm: paced rhythm (04/12/23 1000)  Atrial Rhythm: atrial fibrillation (04/08/23 2200)      Constitutional:  No acute distress.   Cardiovascular: RRR.    Respiratory: Normal breath sounds  No adventitious sounds   Abdominal:  Soft with no tenderness.   Extremities: No Edema   Neurological:   Awake.Follows simple commands off sedatives.  Best Eye Response: 4-->(E4)  spontaneous (04/12/23 1000)  Best Motor Response: 6-->(M6) obeys commands (04/12/23 1000)  Best Verbal Response: 1-->(V1) none (04/12/23 1000)  [place] Coma Scale Score: 11 (04/12/23 1000)     Results Reviewed:  Laboratory  Microbiology  Radiology  Pathology    Hematology:  Results from last 7 days   Lab Units 04/12/23  0403 04/11/23  0403 04/10/23  0449   WBC 10*3/mm3 6.50 9.66 13.28*   NEUTROS ABS 10*3/mm3 4.50 7.05* 10.02*   IMM GRAN % % 2.9* 2.4* 2.8*   LYMPHS ABS 10*3/mm3 0.89 0.87 1.12   MONOS ABS 10*3/mm3 0.82 1.38* 1.60*   EOS ABS 10*3/mm3 0.09 0.11 0.14   BASOS ABS 10*3/mm3 0.01 0.02 0.03   HEMOGLOBIN g/dL 9.0* 9.4* 10.0*   MCV fL 99.0* 98.0* 97.5*   RDW % 13.2 13.2 13.0   PLATELETS 10*3/mm3 212 217 218       Chemistry:  Estimated Creatinine Clearance: 113.8 mL/min (A) (by C-G formula based on SCr of 0.55 mg/dL (L)).    Results from last 7 days   Lab Units 04/12/23 0403 04/11/23 0403   SODIUM mmol/L 136 134*   POTASSIUM mmol/L 4.2 4.2   CHLORIDE mmol/L 107 104   CO2 mmol/L 21.0* 22.0   BUN mg/dL 22 26*   CREATININE mg/dL 0.55* 0.52*   GLUCOSE mg/dL 158* 104*     Results from last 7 days   Lab Units 04/12/23  0403 04/11/23  0403 04/10/23  0449 04/09/23  0537   CALCIUM mg/dL 8.4* 8.4* 8.6 8.3*   MAGNESIUM mg/dL  --   --  2.2 2.2   PHOSPHORUS mg/dL  --   --  2.7  --        Hepatic Panel:  Results from last 7 days   Lab Units 04/10/23  0449 04/09/23  0537 04/08/23  0538   ALBUMIN g/dL 2.4* 2.2* 2.2*   BILIRUBIN mg/dL 0.7 0.9 1.5*   AST (SGOT) U/L 44* 43* 26   ALT (SGPT) U/L 60* 50* 39   ALK PHOS U/L 131* 113 101        Biomarkers:  Results from last 7 days   Lab Units 04/10/23  0449 04/09/23  0537 04/08/23  0538 04/06/23  1013   CRP mg/dL 16.22*  --   --   --    LACTATE mmol/L  --  1.0 1.0 1.6   PROCALCITONIN ng/mL  --  0.63*  --   --      Arterial Blood Gases:  Results from last 7 days   Lab Units 04/12/23  0352 04/11/23  0322 04/10/23  0350   PH, ARTERIAL pH units 7.447 7.492* 7.502*   PCO2, ARTERIAL mm Hg  35.0 32.2* 34.0*   PO2 ART mm Hg 94.7 96.2 97.5   FIO2 % 30 35 35       Images:  XR Abdomen KUB    Result Date: 4/12/2023  Impression: 1. Pacemaker leads noted. 2. Incidentally noted left renal calculi. 3. No evidence of metallic foreign body in the abdomen or pelvis. Electronically Signed: Avni Solorzanod  4/12/2023 12:43 PM EDT  Workstation ID: [id number]      Echo:  Results for orders placed during the hospital encounter of 03/30/23    Adult Transthoracic Echo Complete W/ Cont if Necessary Per Protocol    Interpretation Summary  •  Estimated left ventricular EF = 60%  •  The cardiac valves are anatomically and functionally normal.  •  There is a small (<1cm) pericardial effusion. There is no evidence of cardiac tamponade.      Results: Reviewed.  I reviewed the patient's new laboratory and imaging results.  I independently reviewed the patient's new images.    Medications: Reviewed.    Assessment   A/P     Hospital:  LOS: 13 days   ICU: 12d 23h      Diagnosis   • **Encephalopathy, metabolic [G93.41]   • Streptococcal bacteremia [R78.81, B95.5]   • Subdural empyema [G06.2]   • Meningitis [G03.9]   • Questionable Status epilepticus (HCC) [G40.901]   • Sepsis secondary to strep bacteremia and meningitis with subdural empyema [A41.9]   • Paroxysmal atrial fibrillation on Flecainide and Eliquis [I48.0]   • MINO on CPAP [G47.33, Z99.89]   • Hypertension [I10]     Kai is a 82 y.o. male admitted on 3/30/2023 with AMS (altered mental status) [R41.82]    Assessment/Management/Treatment Plan:    1. CNS infection  a. Encephalopathy  b. Meningitis  c. Strep bacteremia and meningitis  d. Subdural empyema  e. Mastoiditis } ENT following  i. CSF leak has stopped since 04/08/23  f. Levetiracetam for seizures  2. Pulmonary   a. Respiratory Failure  b. Invasive Mechanical Ventilation  c. MRSA Pneumonia per Respiratory Cx 04/06/23  d. MINO on CPAP  3. Cardiovascular  a. P A Fib - Flecainide, Anticoagulation with UFH continuous intravenous  infusion   b. HTN - Losartan  4. Macrocytic Anemia, stable.  5. ID/Antibiotics  a. Ceftriaxone + Linezolid  6. Nutrition Support  Diet, Tube Feeding Tube Feeding Formula: Peptamen AF (Peptide Based, Critical Care, ALI)  Peptamen AF (1.2 kcal/mL, 76 g protein/L, 6 g fiber/L)    NG/OG Tube 16 Fr Right nostril-Tube Feeding Rate (mL): 70 mL/h (Based on a feeding duration of 20 h/d)  Patient doesn't have any tube feeding modular orders      Lab Results   Component Value Date    PREALBUMIN 15.7 (L) 04/10/2023    PREALBUMIN 13.6 (L) 04/03/2023    CRP 16.22 (H) 04/10/2023    CRP 18.05 (H) 04/02/2023       7. Endocrine  a. Body mass index is 27.8 kg/m². Overweight: 25.0-29.9kg/m2   b. No h/o T2 Diabetes    Lab Results   Lab Value Date/Time    HGBA1C 5.40 03/30/2023 1121    HGBA1C 5.2 07/01/2015 0436     Results from last 7 days   Lab Units 04/12/23  1154 04/12/23  0513 04/11/23  2321 04/11/23  1821 04/11/23  1155 04/11/23  0552 04/11/23  0012 04/10/23  1711   GLUCOSE mg/dL 128 143* 152* 139* 129 123 165* 133*       Diet: NPO Diet NPO Type: Strict NPO, Tube Feeding  Orders Placed This Encounter      Diet, Tube Feeding Tube Feeding Formula: Peptamen AF (Peptide Based, Critical Care, ALI)      Advance Directives: Code Status and Medical Interventions:   Ordered at: 03/30/23 1300     Code Status (Patient has no pulse and is not breathing):    CPR (Attempt to Resuscitate)     Medical Interventions (Patient has pulse or is breathing):    Full Support        DVT prophylaxis:  Medical and mechanical DVT prophylaxis orders are present.     In brief:  1. Discussed with Dr. Kahn.   1. MRI - f/u meningitis } Pending  2. Continue UFH continuous intravenous infusion  3. PT/OT  4. RSBI: 98 (04/11/23 1627)  5. Discussed with family member  1. Almost 2 weeks on Invasive Mechanical Ventilation. Time to consider trach. He will let us know after talking to rest of family.  6. Disposition: Keep in ICU.    Plan of care and goals reviewed  during interdisciplinary rounds.  I discussed the patient's findings and my recommendations with patient and nursing staff    MDM:    Problem(s) High due to: Acute or Chronic illness or injury that may poses a threat to life or bodily function  Data: High due to: Review of prior external records from each unique source, Review of the result(s) of each unique test, Ordering of each unique test and Discuss management or test interpretation with external physician or NPP (not separately reported)    High      [x] Primary Attending Intensive Care Medicine - Nutrition Support   [] Consultant    Copied text in this note has been reviewed and is accurate as of 04/12/23

## 2023-04-12 NOTE — PROGRESS NOTES
"    INFECTIOUS DISEASE PROGRESS NOTE    Kai Dodson  1940  [id number]    Date of consult: 3/31/23  Admit date: 3/30/2023    Requesting Provider: Dr. Jason  Evaluating physician: Que Alonso MD  Reason for Consultation: meningitis  Chief Complaint: altered mental status      Subjective   History of present illness:  Kai Dodson is a  82 y.o.  Yr old male with a past medical history of hypertension, coronary artery disease, paroxysmal A. Fib on flecainide and Eliquis, tachycardia-bradycardia syndrome status post PPM and obstructive sleep apnea on CPAP who presented to the ER yesterday with encephalopathy, inability to speak, and left-sided weakness.  Due to the patient's mental state and intubated status, history is obtained from medical records.  The patient's wife had just been discharged yesterday [facility] from [facility] and their son was driving the patient's wife back to [facility] for inpatient rehab.  The patient's son tried to call him and could not reach him.  Other family members went to check on him and found him altered with tremors in all the extremities.  EMS was called and he was transported to the ER.    Since arrival, T-max has been 100.9°F.  He was initially tachycardic up to 112 bpm but this is improved. He had some hypotension but this has improved. The patient was intubated for airway protection and remains on the ventilator with an FiO2 of 35%. Initial lactic acid was 6.6, pro-calcitonin was 6.0, hemoglobin A1c was 5.4, Grimsley has been normal.  LFTs are within normal limits. White blood cell count was normal on arrival at 9.63, has now trended up to 14.14. platelet count has decreased to 109.sensitivity troponin was slightly elevated at 17. Urine drug screen was negative. 2 sets of admission blood cultures are growing GPC's in pairs and chains identified as Streptococcus pneumoniae by the BCID2 PCR. The patient underwent CTA head and neck that showed \"no " "evidence of intracranial hemorrhage or definite acute territorial ischemia.  There is abnormal intracranial gas present, appearing likely extra-axial in the lateral aspect of the right cerebellar hemisphere, with changes of the right mastoid present, concerning for mastoiditis and possibly subdural empyema or less likely parenchymal abscess.\" \"Dedicated CT of the temporal bones demonstrates findings of likely acute otomastoiditis on the right, with middle ear and right mastoid effusion, coalescent changes of the right mastoid and dehiscence of both the tegmen tympani and tegmen mastoideum. There is again concern for intracranial infectious involvement, empyema versus abscess with foci of intracranial gas present.\" chest x-ray was without any acute disease. MRI brain showed \"no evidence of right temporal lobe abscess.  Findings consistent with diffuse meningitis.  There is right otomastoiditis as seen on the CT of the temporal bones.  There are signal voids corresponding to gas bubbles within the CSF in the upper right posterior cranial fossa.\"  ENT has been consulted and Dr. Garcia evaluated the patient with right myringotomy procedure performed at bedside in the ICU. Wide myringotomy was made in the posterior aspect of the tympanic membrane.  A small amount of purulence was expressed and cultured.  After that CSF was draining through the myringotomy.  A tube was therefore not placed.  Neurosurgery has evaluated the patient and recommended that there is no role for neurosurgical intervention at this time. The patient has been started on empiric acyclovir, ampicillin, ceftriaxone 2 g IV every 12 hours, and IV vancomycin by the ICU team.  He has additionally been placed on dexamethasone.  ID has been consultated for antibiotic recommendations.    Subjective:    4/1/23: The patient remains critically ill on the ventilator.  No history is available from him.  He does have some fluid leaking from his right ear, presumably " CSF. No fevers.  Ventilator settings remain stable.  Leukocytosis is worse but likely steroid induced.    4/2/23:  He remains afebrile and on 35% FIO2.  WBC 12.29 this am.  Remains on Dexamethasone.     4/3/23: no history available from the patient.  He remains on the ventilator with an FiO2 of 35%.  No fevers.  Continued CSF leak from the right ear.    4/4/23: Continues to have some drainage from his right ear.  Remains critically ill on the ventilator with an FiO2 of 35%.  No fevers. Neurosurgery has reevaluated the patient and does not think that there is a role for CSF diversion or need for any neurosurgical intervention at this time.  May need mastoidectomy and packing of the mastoid air cells per neurosurgery.    4/5/23: the patient is still critically ill on the ventilator.  He did have some fevers 100.9°F after I saw him today. Vent settings are also slightly worse with an FiO2 of 50%.  Still having some drainage from his right ear.  No current surgical plans per ENT or neurosurgery.  No history is available from the patient.  No family at bedside.    4/6/23: The patient had ongoing fevers up to 101.3°F overnight.  Vent settings were slightly worse overnight with an FiO2 of 50% but slightly better today at 40% when I went by his room.  He is having increased respiratory secretions per nursing staff.  Ongoing drainage from his right ear.  No history available from the patient is a remains on the ventilator and sedated.    4/7/23: The patient remains intubated on the ventilator.  No history available from the patient.  T-max was 100.6°F overnight.  FiO2 has improved to 35%.  Still having significant respiratory secretions per nursing.  Not having as much drainage from his right ear per nursing. He has experienced some weeping from his left hand which is very edematous.    4/8/23: the patient remains on the vent. FIO2 35%. Still having some low grade fevers. Respiratory culture finalized with MRSA. No history  available from the patient    4/9/23: The patient is still on vent but was on SBT this morning and is responding to some simple commands and trying to mouth words to his son at bedside. Still having some low grade fevers up to 100.8F overnight. WBC slightly worse at 14.77 today. CT head without contrast was repeated this morning with no significant new changes noted.    4/10/23: Tmax 101.5 this morning. Was on SBT until late afternoon. He had thick oral secretions and continued to have edema in BUE with right hand weeping/pedal edema.  Remains ill on vent with 35% FiO2/PEEP 5.  CXR with improving pneumonia. Sedation is off and he appears very anxious and agitated.      4/11/23: Sedation has been weaned off.  Patient is on spontaneous breathing trial and doing well.  Still having low-grade fevers up to 100.6°F overnight.  Unable to obtain any history from the patient given he is still intubated on the ventilator.     4/12/23: Patient is still on the ventilator. Family considering tracheostomy. T-max of 100°F.  No history is available from the patient.  MRI brain still pending.    Past Medical History:   Diagnosis Date   • Arthritis    • Atrial fibrillation    • Bradycardia    • Chronic hypertension     Probably essential   • Hypertension    • Hypertensive cardiovascular disease 07/2015    Recent progressive CCS class III-IV chest pain syndrome with normal EKG and exertional dyspnea and fatigue with normal codominant coronary arteries and preserved systolic  left ventricular function, July 2015.   • Intermittent palpitations 05/01/2014    Remote abnormal 24-hour Holter monitor demonstrating intermittent bradycardia with occasional PACs, May 2014.   • Lower extremity edema     Intermittent lower extremity edema felt secondary to antihypertensive therapy.    • Nephrolithiasis     Remote- 15 years ago   • MINO on CPAP    • MINO on CPAP     doesn't know setting    • Prostate troubles     enlarged   • Seasonal rhinitis      intermittent       Past Surgical History:   Procedure Laterality Date   • CARDIAC CATHETERIZATION      no stents   • CARDIAC ELECTROPHYSIOLOGY PROCEDURE N/A 10/24/2018    Procedure: Pacemaker DC new with Biotronik. Hold Eliquis one day prior.;  Surgeon: Harley Dacosta MD;  Location: [facility] INVASIVE LOCATION;  Service: Cardiology   • COLONOSCOPY     • ENDOSCOPY     • INSERT / REPLACE / REMOVE PACEMAKER         Pediatric History   Patient Parents   • Not on file     Other Topics Concern   • Not on file   Social History Narrative    PT DRINKS 2 SERVINGS OF CAFFEINE PER DAY.    PT LIVES AT HOME WITH WIFE.       family history includes No Known Problems in his mother; Other in his father.    No Known Allergies    There is no immunization history for the selected administration types on file for this patient.    Medication:    Current Facility-Administered Medications:   •  acetaminophen (TYLENOL) 160 MG/5ML solution 650 mg, 650 mg, Nasogastric, Q6H PRN, Amadou Mercedes, APRN, 650 mg at 04/11/23 1805  •  aspirin chewable tablet 81 mg, 81 mg, Nasogastric, Daily, 81 mg at 04/12/23 0822 **OR** aspirin suppository 300 mg, 300 mg, Rectal, Daily, Tereza Bautista APRN  •  cefTRIAXone (ROCEPHIN) 2 g/100 mL 0.9% NS IVPB (MBP), 2 g, Intravenous, Q12H, Que Alonso MD, 2 g at 04/12/23 0822  •  chlorhexidine (PERIDEX) 0.12 % solution 15 mL, 15 mL, Mouth/Throat, Q12H, Mundo Jason MD, 15 mL at 04/12/23 0822  •  dexmedetomidine (PRECEDEX) 400 mcg in 100 mL NS infusion, 0.2-1.5 mcg/kg/hr, Intravenous, Titrated, Toan Lee MD, Last Rate: 15.2 mL/hr at 04/12/23 1455, 0.7 mcg/kg/hr at 04/12/23 1455  •  dextrose (D50W) (25 g/50 mL) IV injection 25 g, 25 g, Intravenous, Q15 Min PRN, Amadou Mercedes, APRN  •  fentaNYL (SUBLIMAZE) bolus from bag 10 mcg/mL 50 mcg, 50 mcg, Intravenous, Q30 Min PRN, Adair Will MD, 50 mcg at 03/31/23 1911  •  fentaNYL 2500 mcg/250 mL NS infusion,  mcg/hr,  Intravenous, Titrated, Adair Will MD  •  fentaNYL citrate (PF) (SUBLIMAZE) injection 50 mcg, 50 mcg, Intravenous, Once PRN, Artem Bernstein APRN  •  flecainide (TAMBOCOR) tablet 50 mg, 50 mg, Nasogastric, Q12H, Marcelino Hastings DO, 50 mg at 04/12/23 0822  •  heparin 58471 units/250 mL (100 units/mL) in 0.45 % NaCl infusion, 15 Units/kg/hr, Intravenous, Titrated, Antwon De Leon PharmD, Last Rate: 12.99 mL/hr at 04/12/23 1326, 15 Units/kg/hr at 04/12/23 1326  •  insulin regular (humuLIN R,novoLIN R) injection 0-7 Units, 0-7 Units, Subcutaneous, Q6H, Amadou Mercedes, APRHECTOR, 2 Units at 04/12/23 0021  •  levETIRAcetam in NaCl 0.82% (KEPPRA) IVPB 500 mg, 500 mg, Intravenous, Q12H, Meena Burks, APRN  •  Linezolid (ZYVOX) 600 mg 300 mL, 600 mg, Intravenous, Q12H, Que Alonso MD, Last Rate: 300 mL/hr at 04/12/23 1310, 600 mg at 04/12/23 1310  •  LORazepam (ATIVAN) injection 2 mg, 2 mg, Intravenous, Q4H PRN, Artem Bernstein APRN  •  losartan (COZAAR) tablet 50 mg, 50 mg, Nasogastric, Q24H, Marcelino Hastings DO, 50 mg at 04/12/23 0823  •  ondansetron (ZOFRAN) injection 4 mg, 4 mg, Intravenous, Q6H PRN, Antonietta Phillips APRN  •  pantoprazole (PROTONIX) injection 40 mg, 40 mg, Intravenous, Q AM, Antonietta Phillips APRN, 40 mg at 04/12/23 0545  •  Pharmacy to Dose Heparin, , Does not apply, Continuous PRN, Marcelino Hastings DO  •  Potassium Replacement - Follow Nurse / BPA Driven Protocol, , Does not apply, PRN, Natalia Cruz, APRN  •  sennosides (SENOKOT) 8.8 MG/5ML syrup 10 mL, 10 mL, Nasogastric, BID, 10 mL at 04/12/23 0851 **AND** [DISCONTINUED] docusate sodium (COLACE) liquid 100 mg, 100 mg, Nasogastric, BID, Mundo Jason MD, 100 mg at 04/10/23 0911  •  sodium chloride 0.9 % flush 10 mL, 10 mL, Intravenous, Q12H, Tereza Bautista, APRN, 10 mL at 04/12/23 0823  •  sodium chloride 0.9 % flush 10 mL, 10 mL, Intravenous, PRN,  "Tereza Bautista, APRHECTOR  •  sodium chloride 0.9 % infusion 40 mL, 40 mL, Intravenous, PRN, Tereza Bautista, APRN    Please refer to the medical record for a full medication list    Review of Systems:  Unable to obtain a 12 point review of systems from the patient given his clinical state    Physical Exam:   Vital Signs   Temp:  [98.1 °F (36.7 °C)-99.5 °F (37.5 °C)] 98.6 °F (37 °C)  Heart Rate:  [49-75] 70  Resp:  [15-19] 19  BP: ()/() 141/66  FiO2 (%):  [30 %] 30 %    Temp  Min: 98.1 °F (36.7 °C)  Max: 99.5 °F (37.5 °C)  BP  Min: 88/52  Max: 153/92  Pulse  Min: 49  Max: 75  Resp  Min: 15  Max: 19  SpO2  Min: 97 %  Max: 100 %    Blood pressure 141/66, pulse 70, temperature 98.6 °F (37 °C), temperature source Bladder, resp. rate 19, height 176.5 cm (69.49\"), weight 86.6 kg (190 lb 14.7 oz), SpO2 99 %.  GENERAL: sedated on the ventilator.  Ill-appearing  HEENT:  Normocephalic, atraumatic.  ET tube in place.  No external oral lesions noted. NG tube in place. No drainage noted from his right ear today  Chest: Left chest wall PPM pocket site without any erythema or drainage.  HEART: RRR, no murmur  LUNGS: ventilator breath sounds bilaterally.  On the ventilator with an FiO2 of 30%  ABDOMEN: Soft, nontender, nondistended..   GENITAL: + Doran catheter  SKIN: No new rashes.  No jaundice.  PSYCHIATRIC: unable to assess  EXT:  No new cellulitic changes noted.  NEURO: sedated    Right upper extremity PICC line- no erythema noted at the exit site    Results Review:   I reviewed the patient's new clinical results.    Results from last 7 days   Lab Units 04/12/23  0403 04/11/23  0403 04/10/23  0449   WBC 10*3/mm3 6.50 9.66 13.28*   HEMOGLOBIN g/dL 9.0* 9.4* 10.0*   HEMATOCRIT % 28.7* 29.3* 31.3*   PLATELETS 10*3/mm3 212 217 218     Results from last 7 days   Lab Units 04/12/23  0403   SODIUM mmol/L 136   POTASSIUM mmol/L 4.2   CHLORIDE mmol/L 107   CO2 mmol/L 21.0*   BUN mg/dL 22   CREATININE mg/dL " 0.55*   GLUCOSE mg/dL 158*   CALCIUM mg/dL 8.4*     Results from last 7 days   Lab Units 04/10/23  0449   ALK PHOS U/L 131*   BILIRUBIN mg/dL 0.7   ALT (SGPT) U/L 60*   AST (SGOT) U/L 44*         Results from last 7 days   Lab Units 04/10/23  0449   CRP mg/dL 16.22*         Results from last 7 days   Lab Units 04/09/23  0537   LACTATE mmol/L 1.0     Estimated Creatinine Clearance: 113.8 mL/min (A) (by C-G formula based on SCr of 0.55 mg/dL (L)).  CPK        3/30/2023    11:21   Common Labsle   Creatine Kinase 57        Procalitonin Results:      Lab 04/09/23  0537   PROCALCITONIN 0.63*      Brief Urine Lab Results  (Last result in the past 365 days)      Color   Clarity   Blood   Leuk Est   Nitrite   Protein   CREAT   Urine HCG        03/30/23 1200 Yellow   Clear   Negative   Negative   Negative   Negative                Site   Date Value Ref Range Status   04/12/2023 Right Radial  Final     Juan A's Test   Date Value Ref Range Status   04/12/2023 N/A  Final     pH, Arterial   Date Value Ref Range Status   04/12/2023 7.447 7.350 - 7.450 pH units Final     pCO2, Arterial   Date Value Ref Range Status   04/12/2023 35.0 35.0 - 45.0 mm Hg Final     pO2, Arterial   Date Value Ref Range Status   04/12/2023 94.7 83.0 - 108.0 mm Hg Final     HCO3, Arterial   Date Value Ref Range Status   04/12/2023 24.2 20.0 - 26.0 mmol/L Final     Base Excess, Arterial   Date Value Ref Range Status   04/12/2023 0.3 0.0 - 2.0 mmol/L Final     Hemoglobin, Blood Gas   Date Value Ref Range Status   04/12/2023 8.9 (L) 13.5 - 17.5 g/dL Final     Comment:     84 Value below reference range     Hematocrit, Blood Gas   Date Value Ref Range Status   04/12/2023 27.2 (L) 38.0 - 51.0 % Final     Oxyhemoglobin   Date Value Ref Range Status   04/12/2023 97.1 94 - 99 % Final     Methemoglobin   Date Value Ref Range Status   04/12/2023 0.30 0.00 - 1.50 % Final     Carboxyhemoglobin   Date Value Ref Range Status   04/12/2023 1.2 0 - 2 % Final     CO2 Content    Date Value Ref Range Status   04/12/2023 25.2 22 - 33 mmol/L Final     Barometric Pressure for Blood Gas   Date Value Ref Range Status   04/12/2023   Final     Comment:     N/A     Modality   Date Value Ref Range Status   04/12/2023 Ventilator  Final     FIO2   Date Value Ref Range Status   04/12/2023 30 % Final        Microbiology:  Microbiology Results (last 10 days)     Procedure Component Value - Date/Time    Blood Culture - Blood, Arm, Left [[id number]]  (Normal) Collected: 04/06/23 1426    Lab Status: Final result Specimen: Blood from Arm, Left Updated: 04/11/23 1500     Blood Culture No growth at 5 days    Blood Culture - Blood, Hand, Left [[id number]]  (Normal) Collected: 04/06/23 1013    Lab Status: Final result Specimen: Blood from Hand, Left Updated: 04/11/23 1045     Blood Culture No growth at 5 days    MRSA Screen, PCR (Inpatient) - Swab, Nares [[id number]]  (Abnormal) Collected: 04/06/23 0952    Lab Status: Final result Specimen: Swab from Nares Updated: 04/06/23 1109     MRSA PCR Positive    Narrative:      The negative predictive value of this diagnostic test is high and should only be used to consider de-escalating anti-MRSA therapy. A positive result may indicate colonization with MRSA and must be correlated clinically.    Respiratory Culture - Sputum, ET Suction [[id number]]  (Abnormal)  (Susceptibility) Collected: 04/06/23 0952    Lab Status: Final result Specimen: Sputum from ET Suction Updated: 04/08/23 0932     Respiratory Culture Moderate growth (3+) Staphylococcus aureus, MRSA     Comment:   Methicillin resistant Staphylococcus aureus, Patient may be an isolation risk.        Gram Stain Many (4+) WBCs per low power field      Rare (1+) Epithelial cells per low power field      Few (2+) Gram positive cocci in pairs, chains and clusters    Susceptibility      Staphylococcus aureus, MRSA      JOAQUÍN      Clindamycin Susceptible      Inducible Clindamycin Resistance Negative      Linezolid  Susceptible      Oxacillin Resistant     Tetracycline Susceptible      Trimethoprim + Sulfamethoxazole Susceptible      Vancomycin Susceptible                       Susceptibility Comments     Staphylococcus aureus, MRSA    This isolate does not demonstrate inducible clindamycin resistance in vitro.                         Radiology:  XR Abdomen KUB    Result Date: 4/12/2023  Impression: 1. Pacemaker leads noted. 2. Incidentally noted left renal calculi. 3. No evidence of metallic foreign body in the abdomen or pelvis. Electronically Signed: Avni Talon  4/12/2023 12:43 PM EDT  Workstation ID: [id number]          IMPRESSION:     Problems:  1. Streptococcus pneumoniae septicemia-due to acute otitis  2. 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  No LP done yet but given the Streptococcus pneumonia septicemia and clinical presentation, we do not need an LP.  3. Acute otitis media and right-sided mastoiditis resulting in a subdural empyema with fistulous tract between the middle ear space and the dura- Strep pneumoniae on culture  4. Acute toxic/metabolic encephalopathy-due to the above infection  5. Acute hypoxic respiratory failure, intubated for airway protection  6. leukocytosis with neutrophilia-improved  7. Hypokalemia improved   8. Hypomagnesemia, improved  9. tachycardia-bradycardia syndrome status post PPM- complicating factor in the setting of bacteremia as at some risk for seeding PPM leads with bacteria but streptococcus pneumoniae is an uncommon bacteria to do this. TTE was without any signs of vegetations or pacemaker lead infection  10. paroxysmal A. Fib on flecainide and Eliquis  11. coronary artery disease  12. obstructive sleep apnea on CPAP nightly at home  13. History of hypertension  14. Thrombocytopenia-resolved  15. New fevers starting on 4/5-suspect due to pneumonia-fevers are improving  16. Left-sided pneumonia-MRSA from culture. Improving by chest  xray and vent settings minimal, SBT ongoing.  On sedation vacation on 4/11.    RECOMMENDATIONS:    -continue to monitor cbc with diff closely  -ENT and neurology are following.  -continue ceftriaxone 2g IV q12h  -Linezolid 600 mg BID  -we will plan for repeat MRI brain soon- pending    Still on the ventilator.  Family is considering tracheostomy    He will likely need a 6 week course of IV antibiotic therapy.    Complex MDM.     Copied text in this note has been reviewed and is accurate as of 04/12/23      Que Alonso MD  4/12/2023

## 2023-04-12 NOTE — PROGRESS NOTES
HEPARIN INFUSION  Kai Dodson is a  82 y.o. male receiving heparin infusion.     Therapy for (VTE/Cardiac):  Cardiac  Patient Weight: 86.6kg  Initial Bolus (Y/N):   Yes  Any Bolus (Y/N):   Yes    Signs or Symptoms of Bleeding: none noted  Cardiac or Other (Not VTE)   Initial Bolus: 60 units/kg (Max 4,000 units)  Initial rate: 12 units/kg/hr (Max 1,000 units/hr)   Anti Xa Rebolus Infusion Hold time Change infusion Dose (Units/kg/hr) Next Anti Xa or aPTT Level Due   < 0.11 50 Units/kg  (4000 Units Max) None Increase by  3 Units/kg/hr 6 hours   0.11- 0.19 25 Units/kg  (2000 Units Max) None Increase by  2 Units/kg/hr 6 hours   0.2 - 0.29 0 None Increase by  1 Units/kg/hr 6 hours   0.3 - 0.5 0 None No Change 6 hours (after 2 consecutive levels in range check qAM)   0.51 - 0.6 0 None Decrease by  1 Units/kg/hr 6 hours   0.61 - 0.8 0 30 Minutes Decrease by  2 Units/kg/hr 6 hours   0.81 - 1 0 60 Minutes Decrease by  3 Units/kg/hr 6 hours   >1 0 Hold  After Anti Xa less than 0.5 decrease previous rate by  4 Units/kg/hr  Every 2 hours until Anti Xa  less than 0.5 then when infusion restarts in 6 hours     Results from last 7 days   Lab Units 04/12/23  0403 04/11/23  0403 04/10/23  0449   HEMOGLOBIN g/dL 9.0* 9.4* 10.0*   HEMATOCRIT % 28.7* 29.3* 31.3*   PLATELETS 10*3/mm3 212 217 218        Date   Time   Anti-Xa Current Rate (Unit/kg/hr) Bolus   (Units) Rate Change   (Unit/kg/hr) New Rate (Unit/kg/hr) Next   Anti-Xa Comments  Pump Check Daily   4/2 1200 0.35 -- -- +11.5 11.5 1800 No initial bolus given baseline Xa. Dw RN   4/2 1757 0.55 11.5 -- -0.5 11 0100 D/W GOSIA Walters     4/3 0115 0.60 11 -- -1 10 0600 D/w RN   4/3 0648 0.57 10 -- -1 9 1400 Will d/w RN   4/3 1415 0.43 9 -- -- 9 2200 D/w RN   4/3 2330 0.36 9 -- -- 9 0600 D/w RN   4/4 0630 0.38 9 -- -- 9 0600 4/5 D/w RN   4/5 0515 0.22 9 -- +1 10 1400 Will d/w RN   4/5 1345 0.23 10 -- +1 11 2200 D/w [facility]   4/5 2330 0.28 11 -- +1 12 0600 D/w RN   4/6 0600 0.33 12 -- --  12 1400 D/w RN   4/6 1400 0.29 12 -- +1 13 2200 D/w RN   4/6 2228 0.29 13 -- +1 14 0600 Priyank 2461 -[facility]   4/7 0600 0.35 14 -- -- 14 1400 D/w RN   4/7 1400 0.25 14 -- +1 15 2200 D/w RN   4/7 2205 0.36 15 -- -- 15 0600 Priyank 8289 -[facility]   4/8 0538 0.27 15  +1 16 1400 DW RN   4/8 1403 0.31 16 -- -- 16 2000 DW RN   4/8 2035 0.43 16 -- -- 16 0600 Lydia [address] [place] [address]   4/9 0537 0.32 16 -- -- 16 0600 DW RN   4/10 0449 0.34 16 -- -- 16 AM labs Steven [address] [facility]   4/11 0403 0.34 16 -- -- 16 AM labs Michelleyn 5198 -[facility]   4/12 0403 0.49 16 -- -- 16 AM labs Saige [phone] -b                                    Erik Bernal, [facility]  4/12/2023  14:59 EDT

## 2023-04-12 NOTE — CASE MANAGEMENT/SOCIAL WORK
Continued Stay Note  [facility]     Patient Name: Kai Dodson  MRN: [id number]  Today's Date: 4/12/2023    Admit Date: 3/30/2023    Plan: Ongoing   Discharge Plan     Row Name 04/12/23 1305       Plan    Plan Ongoing    Plan Comments Pt was discussed in Medical Rounds today. Mr. Dodson remains intubated and sedated on the mechanical ventilator. Dr. Will confirmed today is day 14 on the ventilator. He states he will discuss Goals of Care with family today. Pt has a nasogastric tube and tube feeding. D/C plan is ongoing.  will continue to follow.    Final Discharge Disposition Code 30 - still a patient               Discharge Codes    No documentation.               Expected Discharge Date and Time     Expected Discharge Date Expected Discharge Time    Apr 18, 2023             Rubi Clarke RN

## 2023-04-13 NOTE — PLAN OF CARE
Goal Outcome Evaluation:  Plan of Care Reviewed With: todd           Outcome Evaluation: VSS.  Remains intubated with fio2 @ 30%.  Precedex and fentanyl gtt infusing for light sedation.  Patient follows all commands.  GILL.  Tracks.  UOP adequate.  No BM during nightshift.

## 2023-04-13 NOTE — PROGRESS NOTES
HEPARIN INFUSION  Kai Dodson is a  82 y.o. male receiving heparin infusion.     Therapy for (VTE/Cardiac):  Cardiac  Patient Weight: 86.6kg  Initial Bolus (Y/N):   Yes  Any Bolus (Y/N):   Yes    Signs or Symptoms of Bleeding: none noted  Cardiac or Other (Not VTE)   Initial Bolus: 60 units/kg (Max 4,000 units)  Initial rate: 12 units/kg/hr (Max 1,000 units/hr)   Anti Xa Rebolus Infusion Hold time Change infusion Dose (Units/kg/hr) Next Anti Xa or aPTT Level Due   < 0.11 50 Units/kg  (4000 Units Max) None Increase by  3 Units/kg/hr 6 hours   0.11- 0.19 25 Units/kg  (2000 Units Max) None Increase by  2 Units/kg/hr 6 hours   0.2 - 0.29 0 None Increase by  1 Units/kg/hr 6 hours   0.3 - 0.5 0 None No Change 6 hours (after 2 consecutive levels in range check qAM)   0.51 - 0.6 0 None Decrease by  1 Units/kg/hr 6 hours   0.61 - 0.8 0 30 Minutes Decrease by  2 Units/kg/hr 6 hours   0.81 - 1 0 60 Minutes Decrease by  3 Units/kg/hr 6 hours   >1 0 Hold  After Anti Xa less than 0.5 decrease previous rate by  4 Units/kg/hr  Every 2 hours until Anti Xa  less than 0.5 then when infusion restarts in 6 hours     Results from last 7 days   Lab Units 04/13/23  0427 04/12/23  0403 04/11/23  0403   HEMOGLOBIN g/dL 8.8* 9.0* 9.4*   HEMATOCRIT % 28.3* 28.7* 29.3*   PLATELETS 10*3/mm3 224 212 217        Date   Time   Anti-Xa Current Rate (Unit/kg/hr) Bolus   (Units) Rate Change   (Unit/kg/hr) New Rate (Unit/kg/hr) Next   Anti-Xa Comments  Pump Check Daily   4/2 1200 0.35 -- -- +11.5 11.5 1800 No initial bolus given baseline Xa. Dw RN   4/2 1757 0.55 11.5 -- -0.5 11 0100 D/W GOSIA Walters     4/3 0115 0.60 11 -- -1 10 0600 D/w RN   4/3 0648 0.57 10 -- -1 9 1400 Will d/w RN   4/3 1415 0.43 9 -- -- 9 2200 D/w RN   4/3 2330 0.36 9 -- -- 9 0600 D/w RN   4/4 0630 0.38 9 -- -- 9 0600 4/5 D/w RN   4/5 0515 0.22 9 -- +1 10 1400 Will d/w RN   4/5 1345 0.23 10 -- +1 11 2200 D/w [facility]   4/5 2330 0.28 11 -- +1 12 0600 D/w RN   4/6 0600 0.33 12 -- -- 12  1400 D/w RN   4/6 1400 0.29 12 -- +1 13 2200 D/w RN   4/6 2228 0.29 13 -- +1 14 0600 Priyank 2461 -b   4/7 0600 0.35 14 -- -- 14 1400 D/w RN   4/7 1400 0.25 14 -- +1 15 2200 D/w RN   4/7 2205 0.36 15 -- -- 15 0600 Priyank 8289 -b   4/8 0538 0.27 15  +1 16 1400 DW RN   4/8 1403 0.31 16 -- -- 16 2000 DW RN   4/8 2035 0.43 16 -- -- 16 0600 Lydia [id number] -[facility]   4/9 0537 0.32 16 -- -- 16 0600 DW RN   4/10 0449 0.34 16 -- -- 16 AM labs Steven 5433 -b   4/11 0403 0.34 16 -- -- 16 AM labs Dallyn 5198 -b   4/12 0403 0.49 16 -- -- 16 AM labs Saige 5198 -b   4/13 0427 0.40 16 -- -- 16 AM labs Ledy 8289 -b                         Sondra Trejo, PharmD  4/13/2023  14:49 EDT

## 2023-04-13 NOTE — PROGRESS NOTES
Neurology       Patient Care Team:  Derick Phillip MD as PCP - General (Family Medicine)  Nino Castillo MD as Consulting Physician (Cardiology)    Chief complaint: Meningitis/sepsis    History: Patient had a good day earlier this week with his son visiting over the weekend.  He was moving his legs and is interactive.    He continues to remain quite weak.      Past Medical History:   Diagnosis Date   • Arthritis    • Atrial fibrillation    • Bradycardia    • Chronic hypertension     Probably essential   • Hypertension    • Hypertensive cardiovascular disease 07/2015    Recent progressive CCS class III-IV chest pain syndrome with normal EKG and exertional dyspnea and fatigue with normal codominant coronary arteries and preserved systolic  left ventricular function, July 2015.   • Intermittent palpitations 05/01/2014    Remote abnormal 24-hour Holter monitor demonstrating intermittent bradycardia with occasional PACs, May 2014.   • Lower extremity edema     Intermittent lower extremity edema felt secondary to antihypertensive therapy.    • Nephrolithiasis     Remote- 15 years ago   • MINO on CPAP    • MINO on CPAP     doesn't know setting    • Prostate troubles     enlarged   • Seasonal rhinitis     intermittent       Vital Signs   Vitals:    04/13/23 0700 04/13/23 0800 04/13/23 0837 04/13/23 0900   BP: 103/58 118/65 110/61 99/55   BP Location:  Left arm     Patient Position:  Lying     Pulse: 70 71 70 70   Resp:       Temp:  97.4 °F (36.3 °C)     TempSrc:  Axillary     SpO2: 96% 98%  97%   Weight:       Height:           Physical Exam:   General: Sedated on the vent              Neuro: Sedated    Results Review:  Reviewed  Results from last 7 days   Lab Units 04/13/23  0427   WBC 10*3/mm3 6.02   HEMOGLOBIN g/dL 8.8*   HEMATOCRIT % 28.3*   PLATELETS 10*3/mm3 224     Results from last 7 days   Lab Units 04/13/23  0427 04/12/23  0403 04/11/23  0403 04/10/23  0449 04/09/23  0537   SODIUM mmol/L 133* 136 134*  133* 133*   POTASSIUM mmol/L 4.3 4.2 4.2 3.9 4.3   CHLORIDE mmol/L 105 107 104 101 100   CO2 mmol/L 23.0 21.0* 22.0 24.0 25.0   BUN mg/dL 22 22 26* 31* 31*   CREATININE mg/dL 0.58* 0.55* 0.52* 0.65* 0.57*   CALCIUM mg/dL 8.3* 8.4* 8.4* 8.6 8.3*   BILIRUBIN mg/dL 0.5  --   --  0.7 0.9   ALK PHOS U/L 146*  --   --  131* 113   ALT (SGPT) U/L 36  --   --  60* 50*   AST (SGOT) U/L 20  --   --  44* 43*   GLUCOSE mg/dL 131* 158* 104* 102* 108*       Imaging Results (Last 24 Hours)     Procedure Component Value Units Date/Time    XR Abdomen KUB [[id number]] Collected: 04/12/23 1241     Updated: 04/12/23 1251    Narrative:      XR ABDOMEN KUB    Date of Exam: 4/12/2023 12:20 PM EDT    Indication: MRI clearance.    Comparison: None available.    Findings:  There appears to be a urinary catheter in place superimposed over the pelvic midline. NG tube is seen in the stomach. Pacing wires are seen, apparently from a dual-lead pacemaker. There are apparent left renal calculi. No metallic foreign body is seen   elsewhere. Bowel gas pattern is nonobstructive.      Impression:      Impression:    1. Pacemaker leads noted.    2. Incidentally noted left renal calculi.    3. No evidence of metallic foreign body in the abdomen or pelvis.    Electronically Signed: Avni Hanley    4/12/2023 12:43 PM EDT    Workstation ID: [id number]          Assessment:  Streptococcal meningitis with strep septicemia    Plan:  Discussion with the patient's son regarding goals of care.    Patient will need a trach and PEG if he continue full support    Comment:  Spent 25 minutes with the patient's son discussing the severity of his illness, his age, and the extended recovery.  Which would include major surgery on his mastoid bone if his infection is to be prevent it from recurring.  He will confer with family members and make decision.         I discussed the patients findings and my recommendations with family, nursing staff and primary care team    Chaz HATFIELD  MD Femi  04/13/23  09:51 EDT

## 2023-04-13 NOTE — PROGRESS NOTES
"    INFECTIOUS DISEASE PROGRESS NOTE    Kai Dodson  1940  [id number]    Date of consult: 3/31/23  Admit date: 3/30/2023    Requesting Provider: Dr. Jason  Evaluating physician: Que Alonso MD  Reason for Consultation: meningitis  Chief Complaint: altered mental status      Subjective   History of present illness:  Kai Dodson is a  82 y.o.  Yr old male with a past medical history of hypertension, coronary artery disease, paroxysmal A. Fib on flecainide and Eliquis, tachycardia-bradycardia syndrome status post PPM and obstructive sleep apnea on CPAP who presented to the ER yesterday with encephalopathy, inability to speak, and left-sided weakness.  Due to the patient's mental state and intubated status, history is obtained from medical records.  The patient's wife had just been discharged yesterday morning from [facility] and their son was driving the patient's wife back to [facility] for inpatient rehab.  The patient's son tried to call him and could not reach him.  Other family members went to check on him and found him altered with tremors in all the extremities.  EMS was called and he was transported to the ER.    Since arrival, T-max has been 100.9°F.  He was initially tachycardic up to 112 bpm but this is improved. He had some hypotension but this has improved. The patient was intubated for airway protection and remains on the ventilator with an FiO2 of 35%. Initial lactic acid was 6.6, pro-calcitonin was 6.0, hemoglobin A1c was 5.4, [place] has been normal.  LFTs are within normal limits. White blood cell count was normal on arrival at 9.63, has now trended up to 14.14. platelet count has decreased to 109.sensitivity troponin was slightly elevated at 17. Urine drug screen was negative. 2 sets of admission blood cultures are growing GPC's in pairs and chains identified as Streptococcus pneumoniae by the BCID2 PCR. The patient underwent CTA head and neck that showed \"no " "evidence of intracranial hemorrhage or definite acute territorial ischemia.  There is abnormal intracranial gas present, appearing likely extra-axial in the lateral aspect of the right cerebellar hemisphere, with changes of the right mastoid present, concerning for mastoiditis and possibly subdural empyema or less likely parenchymal abscess.\" \"Dedicated CT of the temporal bones demonstrates findings of likely acute otomastoiditis on the right, with middle ear and right mastoid effusion, coalescent changes of the right mastoid and dehiscence of both the tegmen tympani and tegmen mastoideum. There is again concern for intracranial infectious involvement, empyema versus abscess with foci of intracranial gas present.\" chest x-ray was without any acute disease. MRI brain showed \"no evidence of right temporal lobe abscess.  Findings consistent with diffuse meningitis.  There is right otomastoiditis as seen on the CT of the temporal bones.  There are signal voids corresponding to gas bubbles within the CSF in the upper right posterior cranial fossa.\"  ENT has been consulted and Dr. Garcia evaluated the patient with right myringotomy procedure performed at bedside in the ICU. Wide myringotomy was made in the posterior aspect of the tympanic membrane.  A small amount of purulence was expressed and cultured.  After that CSF was draining through the myringotomy.  A tube was therefore not placed.  Neurosurgery has evaluated the patient and recommended that there is no role for neurosurgical intervention at this time. The patient has been started on empiric acyclovir, ampicillin, ceftriaxone 2 g IV every 12 hours, and IV vancomycin by the ICU team.  He has additionally been placed on dexamethasone.  ID has been consultated for antibiotic recommendations.    Subjective:    4/1/23: The patient remains critically ill on the ventilator.  No history is available from him.  He does have some fluid leaking from his right ear, presumably " CSF. No fevers.  Ventilator settings remain stable.  Leukocytosis is worse but likely steroid induced.    4/2/23:  He remains afebrile and on 35% FIO2.  WBC 12.29 this am.  Remains on Dexamethasone.     4/3/23: no history available from the patient.  He remains on the ventilator with an FiO2 of 35%.  No fevers.  Continued CSF leak from the right ear.    4/4/23: Continues to have some drainage from his right ear.  Remains critically ill on the ventilator with an FiO2 of 35%.  No fevers. Neurosurgery has reevaluated the patient and does not think that there is a role for CSF diversion or need for any neurosurgical intervention at this time.  May need mastoidectomy and packing of the mastoid air cells per neurosurgery.    4/5/23: the patient is still critically ill on the ventilator.  He did have some fevers 100.9°F after I saw him today. Vent settings are also slightly worse with an FiO2 of 50%.  Still having some drainage from his right ear.  No current surgical plans per ENT or neurosurgery.  No history is available from the patient.  No family at bedside.    4/6/23: The patient had ongoing fevers up to 101.3°F overnight.  Vent settings were slightly worse overnight with an FiO2 of 50% but slightly better today at 40% when I went by his room.  He is having increased respiratory secretions per nursing staff.  Ongoing drainage from his right ear.  No history available from the patient is a remains on the ventilator and sedated.    4/7/23: The patient remains intubated on the ventilator.  No history available from the patient.  T-max was 100.6°F overnight.  FiO2 has improved to 35%.  Still having significant respiratory secretions per nursing.  Not having as much drainage from his right ear per nursing. He has experienced some weeping from his left hand which is very edematous.    4/8/23: the patient remains on the vent. FIO2 35%. Still having some low grade fevers. Respiratory culture finalized with MRSA. No history  available from the patient    4/9/23: The patient is still on vent but was on SBT this morning and is responding to some simple commands and trying to mouth words to his son at bedside. Still having some low grade fevers up to 100.8F overnight. WBC slightly worse at 14.77 today. CT head without contrast was repeated this morning with no significant new changes noted.    4/10/23: Tmax 101.5 this morning. Was on SBT until late afternoon. He had thick oral secretions and continued to have edema in BUE with right hand weeping/pedal edema.  Remains ill on vent with 35% FiO2/PEEP 5.  CXR with improving pneumonia. Sedation is off and he appears very anxious and agitated.      4/11/23: Sedation has been weaned off.  Patient is on spontaneous breathing trial and doing well.  Still having low-grade fevers up to 100.6°F overnight.  Unable to obtain any history from the patient given he is still intubated on the ventilator.     4/12/23: Patient is still on the ventilator. Family considering tracheostomy. T-max of 100°F.  No history is available from the patient.  MRI brain still pending.    4/13/23: the patient remains on the vent with minimal settings. He is afebrile. No history is available from him. MRI still pending.     Past Medical History:   Diagnosis Date   • Arthritis    • Atrial fibrillation    • Bradycardia    • Chronic hypertension     Probably essential   • Hypertension    • Hypertensive cardiovascular disease 07/2015    Recent progressive CCS class III-IV chest pain syndrome with normal EKG and exertional dyspnea and fatigue with normal codominant coronary arteries and preserved systolic  left ventricular function, July 2015.   • Intermittent palpitations 05/01/2014    Remote abnormal 24-hour Holter monitor demonstrating intermittent bradycardia with occasional PACs, May 2014.   • Lower extremity edema     Intermittent lower extremity edema felt secondary to antihypertensive therapy.    • Nephrolithiasis      Remote- 15 years ago   • MINO on CPAP    • MINO on CPAP     doesn't know setting    • Prostate troubles     enlarged   • Seasonal rhinitis     intermittent       Past Surgical History:   Procedure Laterality Date   • CARDIAC CATHETERIZATION      no stents   • CARDIAC ELECTROPHYSIOLOGY PROCEDURE N/A 10/24/2018    Procedure: Pacemaker DC new with Biotronik. Hold Eliquis one day prior.;  Surgeon: Harley Dacosta MD;  Location: [facility] INVASIVE LOCATION;  Service: Cardiology   • COLONOSCOPY     • ENDOSCOPY     • INSERT / REPLACE / REMOVE PACEMAKER         Pediatric History   Patient Parents   • Not on file     Other Topics Concern   • Not on file   Social History Narrative    PT DRINKS 2 SERVINGS OF CAFFEINE PER DAY.    PT LIVES AT HOME WITH WIFE.       family history includes No Known Problems in his mother; Other in his father.    No Known Allergies    There is no immunization history for the selected administration types on file for this patient.    Medication:    Current Facility-Administered Medications:   •  acetaminophen (TYLENOL) 160 MG/5ML solution 650 mg, 650 mg, Nasogastric, Q6H PRN, Amadou Mercedes APRN, 650 mg at 04/11/23 1805  •  aspirin chewable tablet 81 mg, 81 mg, Nasogastric, Daily, 81 mg at 04/13/23 0837 **OR** aspirin suppository 300 mg, 300 mg, Rectal, Daily, Tereza Bautista APRN  •  cefTRIAXone (ROCEPHIN) 2 g/100 mL 0.9% NS IVPB (MBP), 2 g, Intravenous, Q12H, Que Alonso MD, 2 g at 04/13/23 0840  •  chlorhexidine (PERIDEX) 0.12 % solution 15 mL, 15 mL, Mouth/Throat, Q12H, Mundo Jason MD, 15 mL at 04/13/23 0839  •  dexmedetomidine (PRECEDEX) 400 mcg in 100 mL NS infusion, 0.2-1.5 mcg/kg/hr, Intravenous, Titrated, Toan Lee MD, Last Rate: 17.3 mL/hr at 04/13/23 0544, 0.8 mcg/kg/hr at 04/13/23 0544  •  dextrose (D50W) (25 g/50 mL) IV injection 25 g, 25 g, Intravenous, Q15 Min PRN, Amadou Mercedes APRN  •  fentaNYL (SUBLIMAZE) bolus from bag 10 mcg/mL 50  mcg, 50 mcg, Intravenous, Q30 Min PRN, Adair Will MD, 50 mcg at 03/31/23 1911  •  fentaNYL 2500 mcg/250 mL NS infusion,  mcg/hr, Intravenous, Titrated, Adair Will MD, Last Rate: 10 mL/hr at 04/12/23 2300, 100 mcg/hr at 04/12/23 2300  •  fentaNYL citrate (PF) (SUBLIMAZE) injection 50 mcg, 50 mcg, Intravenous, Once PRN, Artem Bernstein APRN  •  flecainide (TAMBOCOR) tablet 50 mg, 50 mg, Nasogastric, Q12H, Marcelino Hastings, DO, 50 mg at 04/13/23 0837  •  heparin 42697 units/250 mL (100 units/mL) in 0.45 % NaCl infusion, 15 Units/kg/hr, Intravenous, Titrated, Antwon De Leon PharmD, Last Rate: 12.99 mL/hr at 04/12/23 1326, 15 Units/kg/hr at 04/12/23 1326  •  insulin regular (humuLIN R,novoLIN R) injection 0-7 Units, 0-7 Units, Subcutaneous, Q6H, Amadou Mercedes, APRN, 2 Units at 04/12/23 2352  •  levETIRAcetam in NaCl 0.82% (KEPPRA) IVPB 500 mg, 500 mg, Intravenous, Q12H, Meena Burks, APRN, 500 mg at 04/13/23 0544  •  Linezolid (ZYVOX) 600 mg 300 mL, 600 mg, Intravenous, Q12H, Que Alonso MD, Last Rate: 300 mL/hr at 04/12/23 2147, 600 mg at 04/12/23 2147  •  LORazepam (ATIVAN) injection 2 mg, 2 mg, Intravenous, Q4H PRN, Artem Bernstein APRN  •  losartan (COZAAR) tablet 50 mg, 50 mg, Nasogastric, Q24H, Marcelino Hastings, DO, 50 mg at 04/13/23 0837  •  ondansetron (ZOFRAN) injection 4 mg, 4 mg, Intravenous, Q6H PRN, Antonietta Phillips APRN  •  pantoprazole (PROTONIX) injection 40 mg, 40 mg, Intravenous, Q AM, Antonietta Phillips APRN, 40 mg at 04/13/23 0544  •  Pharmacy to Dose Heparin, , Does not apply, Continuous PRN, Marcelino Hastings, DO  •  Potassium Replacement - Follow Nurse / BPA Driven Protocol, , Does not apply, PRN, Natalia Cruz, APRN  •  sennosides (SENOKOT) 8.8 MG/5ML syrup 10 mL, 10 mL, Nasogastric, BID, 10 mL at 04/13/23 0837 **AND** [DISCONTINUED] docusate sodium (COLACE) liquid 100 mg, 100 mg, Nasogastric, BID,  "Mundo Jason MD, 100 mg at 04/10/23 0911  •  sodium chloride 0.9 % flush 10 mL, 10 mL, Intravenous, Q12H, Tereza Bautista APRN, 10 mL at 04/13/23 0840  •  sodium chloride 0.9 % flush 10 mL, 10 mL, Intravenous, PRN, Tereza Bautista APRN  •  sodium chloride 0.9 % infusion 40 mL, 40 mL, Intravenous, PRN, Tereza Bautista APRN    Please refer to the medical record for a full medication list    Review of Systems:  Unable to obtain a 12 point review of systems from the patient given his clinical state    Physical Exam:   Vital Signs   Temp:  [97.3 °F (36.3 °C)-98.8 °F (37.1 °C)] 98.2 °F (36.8 °C)  Heart Rate:  [49-70] 70  Resp:  [16-18] 16  BP: ()/() 110/61  FiO2 (%):  [30 %] 30 %    Temp  Min: 97.3 °F (36.3 °C)  Max: 98.8 °F (37.1 °C)  BP  Min: 97/50  Max: 151/72  Pulse  Min: 49  Max: 70  Resp  Min: 16  Max: 18  SpO2  Min: 97 %  Max: 100 %    Blood pressure 110/61, pulse 70, temperature 98.2 °F (36.8 °C), temperature source Axillary, resp. rate 16, height 176.5 cm (69.49\"), weight 89.3 kg (196 lb 13.9 oz), SpO2 99 %.  GENERAL: sedated on the ventilator.  Ill-appearing  HEENT:  Normocephalic, atraumatic.  ET tube in place.  No external oral lesions noted. NG tube in place. No drainage noted from his right ear today  Chest: Left chest wall PPM pocket site without any erythema or drainage.  HEART: bradycardia, regular, no murmur  LUNGS: ventilator breath sounds bilaterally.  On the ventilator with an FiO2 of 30%  ABDOMEN: Soft, nontender, nondistended..   GENITAL: + Doran catheter  SKIN: No new rashes.  No jaundice.  PSYCHIATRIC: unable to assess  EXT:  No new cellulitic changes noted.  NEURO: sedated    Right upper extremity PICC line- no erythema noted at the exit site    Results Review:   I reviewed the patient's new clinical results.    Results from last 7 days   Lab Units 04/13/23  0427 04/12/23  0403 04/11/23  0403   WBC 10*3/mm3 6.02 6.50 9.66   HEMOGLOBIN g/dL 8.8* 9.0* " 9.4*   HEMATOCRIT % 28.3* 28.7* 29.3*   PLATELETS 10*3/mm3 224 212 217     Results from last 7 days   Lab Units 04/13/23  0427   SODIUM mmol/L 133*   POTASSIUM mmol/L 4.3   CHLORIDE mmol/L 105   CO2 mmol/L 23.0   BUN mg/dL 22   CREATININE mg/dL 0.58*   GLUCOSE mg/dL 131*   CALCIUM mg/dL 8.3*     Results from last 7 days   Lab Units 04/13/23  0427   ALK PHOS U/L 146*   BILIRUBIN mg/dL 0.5   ALT (SGPT) U/L 36   AST (SGOT) U/L 20         Results from last 7 days   Lab Units 04/10/23  0449   CRP mg/dL 16.22*         Results from last 7 days   Lab Units 04/09/23  0537   LACTATE mmol/L 1.0     Estimated Creatinine Clearance: 109.4 mL/min (A) (by C-G formula based on SCr of 0.58 mg/dL (L)).  CPK        3/30/2023    11:21   Common Labsle   Creatine Kinase 57        Procalitonin Results:      Lab 04/13/23  0427 04/09/23  0537   PROCALCITONIN 0.16 0.63*      Brief Urine Lab Results  (Last result in the past 365 days)      Color   Clarity   Blood   Leuk Est   Nitrite   Protein   CREAT   Urine HCG        03/30/23 1200 Yellow   Clear   Negative   Negative   Negative   Negative                Site   Date Value Ref Range Status   04/13/2023 Right Radial  Final     Juan A's Test   Date Value Ref Range Status   04/13/2023 N/A  Final     pH, Arterial   Date Value Ref Range Status   04/13/2023 7.426 7.350 - 7.450 pH units Final     pCO2, Arterial   Date Value Ref Range Status   04/13/2023 35.2 35.0 - 45.0 mm Hg Final     pO2, Arterial   Date Value Ref Range Status   04/13/2023 91.2 83.0 - 108.0 mm Hg Final     HCO3, Arterial   Date Value Ref Range Status   04/13/2023 23.1 20.0 - 26.0 mmol/L Final     Base Excess, Arterial   Date Value Ref Range Status   04/13/2023 -1.0 (L) 0.0 - 2.0 mmol/L Final     Hemoglobin, Blood Gas   Date Value Ref Range Status   04/13/2023 9.1 (L) 13.5 - 17.5 g/dL Final     Comment:     84 Value below reference range     Hematocrit, Blood Gas   Date Value Ref Range Status   04/13/2023 27.8 (L) 38.0 - 51.0 %  Final     Oxyhemoglobin   Date Value Ref Range Status   04/13/2023 96.7 94 - 99 % Final     Methemoglobin   Date Value Ref Range Status   04/13/2023 0.50 0.00 - 1.50 % Final     Carboxyhemoglobin   Date Value Ref Range Status   04/13/2023 1.2 0 - 2 % Final     CO2 Content   Date Value Ref Range Status   04/13/2023 24.2 22 - 33 mmol/L Final     Barometric Pressure for Blood Gas   Date Value Ref Range Status   04/13/2023   Final     Comment:     N/A     Modality   Date Value Ref Range Status   04/13/2023 Ventilator  Final     FIO2   Date Value Ref Range Status   04/13/2023 30 % Final        Microbiology:  Microbiology Results (last 10 days)     Procedure Component Value - Date/Time    Blood Culture - Blood, Arm, Left [[id number]]  (Normal) Collected: 04/06/23 1426    Lab Status: Final result Specimen: Blood from Arm, Left Updated: 04/11/23 1500     Blood Culture No growth at 5 days    Blood Culture - Blood, Hand, Left [[id number]]  (Normal) Collected: 04/06/23 1013    Lab Status: Final result Specimen: Blood from Hand, Left Updated: 04/11/23 1045     Blood Culture No growth at 5 days    MRSA Screen, PCR (Inpatient) - Swab, Nares [[id number]]  (Abnormal) Collected: 04/06/23 0952    Lab Status: Final result Specimen: Swab from Nares Updated: 04/06/23 1109     MRSA PCR Positive    Narrative:      The negative predictive value of this diagnostic test is high and should only be used to consider de-escalating anti-MRSA therapy. A positive result may indicate colonization with MRSA and must be correlated clinically.    Respiratory Culture - Sputum, ET Suction [[id number]]  (Abnormal)  (Susceptibility) Collected: 04/06/23 0952    Lab Status: Final result Specimen: Sputum from ET Suction Updated: 04/08/23 0932     Respiratory Culture Moderate growth (3+) Staphylococcus aureus, MRSA     Comment:   Methicillin resistant Staphylococcus aureus, Patient may be an isolation risk.        Gram Stain Many (4+) WBCs per low power field       Rare (1+) Epithelial cells per low power field      Few (2+) Gram positive cocci in pairs, chains and clusters    Susceptibility      Staphylococcus aureus, MRSA      JOAQUÍN      Clindamycin Susceptible      Inducible Clindamycin Resistance Negative      Linezolid Susceptible      Oxacillin Resistant     Tetracycline Susceptible      Trimethoprim + Sulfamethoxazole Susceptible      Vancomycin Susceptible                       Susceptibility Comments     Staphylococcus aureus, MRSA    This isolate does not demonstrate inducible clindamycin resistance in vitro.                         Radiology:  XR Abdomen KUB    Result Date: 4/12/2023  Impression: 1. Pacemaker leads noted. 2. Incidentally noted left renal calculi. 3. No evidence of metallic foreign body in the abdomen or pelvis. Electronically Signed: Avni Hanley  4/12/2023 12:43 PM EDT  Workstation ID: [id number]          IMPRESSION:     Problems:  1. Streptococcus pneumoniae septicemia-due to acute otitis  2. 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  No LP done yet but given the Streptococcus pneumonia septicemia and clinical presentation, we do not need an LP.  3. Acute otitis media and right-sided mastoiditis resulting in a subdural empyema with fistulous tract between the middle ear space and the dura- Strep pneumoniae on culture  4. Acute toxic/metabolic encephalopathy-due to the above infection  5. Acute hypoxic respiratory failure, intubated for airway protection- ongoing. May need trach soon  6. leukocytosis with neutrophilia-improved  7. Hypokalemia improved   8. Hypomagnesemia, improved  9. tachycardia-bradycardia syndrome status post PPM- complicating factor in the setting of bacteremia as at some risk for seeding PPM leads with bacteria but streptococcus pneumoniae is an uncommon bacteria to do this. TTE was without any signs of vegetations or pacemaker lead infection  10. paroxysmal A. Fib on flecainide and  Eliquis  11. coronary artery disease  12. obstructive sleep apnea on CPAP nightly at home  13. History of hypertension  14. Thrombocytopenia-resolved  15. New fevers starting on 4/5-suspect due to pneumonia-fevers are improved  16. Left-sided pneumonia-MRSA from culture. Improving by chest xray and vent settings minimal.     RECOMMENDATIONS:    -continue to monitor cbc with diff closely  -ENT and neurology are following.  -continue ceftriaxone 2g IV q12h  -Linezolid 600 mg BID. May consider stopping in 24 hrs if doing well.  -we will plan for repeat MRI brain soon- pending    Still on the ventilator.  Family is considering tracheostomy    He will likely need a 6 week course of IV antibiotic therapy with ceftriaxone. Will re-evaluate with repeat MRI brain as noted above    Complex MDM.     Copied text in this note has been reviewed and is accurate as of 04/13/23      Que Alonso MD  4/13/2023

## 2023-04-13 NOTE — PROGRESS NOTES
"Clinical Nutrition Note      Patient Name: Kai Dodson  MRN: [id number]  Admission date: 3/30/2023      Multidisciplinary Rounds/EN Support Monitor    Additional information obtained during MDR:  RN reports pt remains on vent , he is very [place]  But he does follow commands on sedation vacation, he is tolerating TF. MD will assess for readiness to wean and then discuss GOC w/ family and plan for trach and PEG.     Current diet: NPO Diet NPO Type: Strict NPO, Tube Feeding    EN Support:    Product: Peptamen AF  Goal rate:  70 ml/hr  Goal volume (0701-0700): 1400 mL  Based on a feeding duration of 20 hrs/d  Modulars: None  Water flushes: 30 mL every 2 hrs  Tube/Route: NGT  Verified at bedside: yes, infusion at 70 ml/hr    Pertinent medical data reviewed:  yes  Last filed wt: Weight: 89.3 kg (196 lb 13.9 oz) (04/13/23 0430)  Weight Method: Bed scale    BMI: BMI (Calculated): 28.7    Nutrition risk identified on nursing screen; MST score \"0\"    Labs Reviewed: yes  Pertinent: Na+ 133    Medications Reviewed: yes  Pertinent: insulin, senokot, protonix, abx    I & O documentation (0701-0700):  1548 ml EN (111% daily goal volume) and 330 mL water    Estimated/Assessed Needs (4/10/23):      Energy:  1700 kcal  Protein:  112 gm Pro  Fluids: 1700 ml per RDA method     Intervention:  EN support per MD  Plan of care and goals of care reviewed    Monitor:  Per protocol, I&O, Pertinent labs, EN delivery/tolerance, Symptoms, POC/GOC      Sravani Deleon MS,RD,LD  04/13/23 13:33 EDT  Time: 20  mins       "

## 2023-04-13 NOTE — PROGRESS NOTES
INTENSIVIST   PROGRESS NOTE        SUBJECTIVE     Kai 82 y.o. male is followed for: Extremity Weakness       Encephalopathy, metabolic    Hypertension    Paroxysmal atrial fibrillation on Flecainide and Eliquis    Subdural empyema    Meningitis    Sepsis secondary to strep bacteremia and meningitis with subdural empyema    Streptococcal bacteremia    As an Intensivist, we provide an integrated approach to the ICU patient and family, medical management of comorbid conditions, including but not limited to electrolytes, glycemic control, organ dysfunction, lead interdisciplinary rounds and coordinate the care with all other services, including those from other specialists.     Interval History:  POD: 14 Days Post-Op    Awake.  Open eyes.  Follows commands.  Periods of restlessness.  Trying to talk.  [place].    Temp  Min: 97.3 °F (36.3 °C)  Max: 98.2 °F (36.8 °C)       History     Last Reviewed by Adair Will MD on 4/10/2023 at  1:06 PM    Sections Reviewed    Medical, Family, Surgical, Tobacco, Alcohol, Drug Use, Sexual Activity,   Social Documentation      Problem list reviewed by Adair Will MD on 4/10/2023 at  1:06 PM  Medicines reviewed by Adair Will MD on 4/10/2023 at  1:06 PM  Allergies reviewed by Adair Will MD on 4/10/2023 at  1:06 PM       The patient's relevant past medical, surgical and social history were reviewed and updated in Epic as appropriate.        OBJECTIVE     Vitals:  Temp: 97.4 °F (36.3 °C) (23 0800) Temp  Min: 97.3 °F (36.3 °C)  Max: 98.2 °F (36.8 °C)   Temp core:      BP: 117/62 (23 1400) BP  Min: 97/50  Max: 151/72   MAP (non-invasive) Noninvasive MAP (mmHg): 83 (23) Noninvasive MAP (mmHg)  Av.6  Min: 51  Max: 157   Pulse: 70 (23) Pulse  Min: 49  Max: 71   Resp: 16 (23) Resp  Min: 16  Max: 16   SpO2: 99 % (23) SpO2  Min: 96 %  Max: 100 %   Device: ventilator (23 1404)    Flow Rate:   No data recorded          04/02/23  0600 04/13/23  0430   Weight: 86.6 kg (190 lb 14.7 oz) 89.3 kg (196 lb 13.9 oz)        Intake/Ouptut 24 hrs (7:00AM - 6:59 AM)  Intake & Output (last 3 days)       04/10 0701 04/11 0700 04/11 0701  04/12 0700 04/12 0701 04/13 0700 04/13 0701 04/14 0700    I.V. (mL/kg) 1219.1 (14.1) 681.1 (7.9) 918.3 (10.3) 309.6 (3.5)    Other 270 270 330 60    NG/GT 1530 1579 1638 296    IV Piggyback 300 897.8 632 100    Total Intake(mL/kg) 3319.1 (38.3) 3427.9 (39.6) 3518.3 (39.4) 765.6 (8.6)    Urine (mL/kg/hr) 2600 (1.3) 3315 (1.6) 1940 (0.9) 300 (0.4)    Stool 0  0     Total Output 2600 3315 1940 300    Net +719.1 +112.9 +1578.3 +465.6            Stool Unmeasured Occurrence 1 x  1 x           Medications (drips):  dexmedetomidine, Last Rate: 1 mcg/kg/hr (04/13/23 1233)  fentanyl 10 mcg/mL, Last Rate: Stopped (04/13/23 1415)  heparin, Last Rate: 15 Units/kg/hr (04/13/23 1158)  Pharmacy to Dose Heparin        Invasive Mechanical Ventilator   Settings: Observed:   Mode: (S) PS (04/13/23 1404)    Resp Rate (Set): 14 (04/13/23 1404) Resp Rate (Observed) Vent: 18 (04/13/23 1404)   Vt (Set, mL): 310 mL (04/13/23 1404) Vt, Exp (observed, mL): 386 mL (04/13/23 1404)    Minute Ventilation (L/min) (Obs): 6.28 L/min (04/13/23 1404)    I:E Ratio (Obs): 1:8.9 (04/13/23 1404)       FiO2 (%): 30 % (04/13/23 1404) Plateau Pressure (cm H2O): (S) 13 cm H2O (04/13/23 0700)   PEEP/CPAP (cm H2O): (S) 5 cm H20 (04/13/23 1411) Driving Pressure (cm H2O): 7.8 cm H2O (04/13/23 0700)    Static Compliance (L/cm H2O): 33 (04/13/23 0700)      Physical Examination  Telemetry:  Rhythm: paced rhythm (04/13/23 0800)  Atrial Rhythm: atrial fibrillation (04/08/23 2200)      Constitutional:  No acute distress.   Cardiovascular: RRR.    Respiratory: Normal breath sounds  No adventitious sounds   Abdominal:  Soft with no tenderness.   Extremities: No Edema   Neurological:   Awake.Follows simple commands off sedatives.  Best Eye Response: 3-->(E3) to  speech (04/13/23 0800)  Best Motor Response: 6-->(M6) obeys commands (04/13/23 0800)  Best Verbal Response: 1-->(V1) none (04/13/23 0800)  [place] Coma Scale Score: 10 (04/13/23 0800)     Results Reviewed:  Laboratory  Microbiology  Radiology  Pathology    Hematology:  Results from last 7 days   Lab Units 04/13/23 0427 04/12/23 0403 04/11/23 0403   WBC 10*3/mm3 6.02 6.50 9.66   NEUTROS ABS 10*3/mm3 4.06 4.50 7.05*   IMM GRAN % % 2.8* 2.9* 2.4*   LYMPHS ABS 10*3/mm3 0.97 0.89 0.87   MONOS ABS 10*3/mm3 0.70 0.82 1.38*   EOS ABS 10*3/mm3 0.10 0.09 0.11   BASOS ABS 10*3/mm3 0.02 0.01 0.02   HEMOGLOBIN g/dL 8.8* 9.0* 9.4*   MCV fL 100.4* 99.0* 98.0*   RDW % 13.2 13.2 13.2   PLATELETS 10*3/mm3 224 212 217       Chemistry:  Estimated Creatinine Clearance: 109.4 mL/min (A) (by C-G formula based on SCr of 0.58 mg/dL (L)).    Results from last 7 days   Lab Units 04/13/23 0427 04/12/23 0403   SODIUM mmol/L 133* 136   POTASSIUM mmol/L 4.3 4.2   CHLORIDE mmol/L 105 107   CO2 mmol/L 23.0 21.0*   BUN mg/dL 22 22   CREATININE mg/dL 0.58* 0.55*   GLUCOSE mg/dL 131* 158*     Results from last 7 days   Lab Units 04/13/23  0427 04/12/23  0403 04/11/23  0403 04/10/23  0449   CALCIUM mg/dL 8.3* 8.4*   < > 8.6   MAGNESIUM mg/dL 2.0  --   --  2.2   PHOSPHORUS mg/dL 3.1  --   --  2.7    < > = values in this interval not displayed.       Hepatic Panel:  Results from last 7 days   Lab Units 04/13/23  0427 04/10/23  0449 04/09/23  0537   ALBUMIN g/dL 2.4* 2.4* 2.2*   BILIRUBIN mg/dL 0.5 0.7 0.9   AST (SGOT) U/L 20 44* 43*   ALT (SGPT) U/L 36 60* 50*   ALK PHOS U/L 146* 131* 113        Biomarkers:  Results from last 7 days   Lab Units 04/13/23  0427 04/10/23  0449 04/09/23  0537 04/08/23  0538   CRP mg/dL  --  16.22*  --   --    LACTATE mmol/L  --   --  1.0 1.0   PROCALCITONIN ng/mL 0.16  --  0.63*  --      Arterial Blood Gases:  Results from last 7 days   Lab Units 04/13/23  0405 04/12/23  0352 04/11/23  0322   PH, ARTERIAL pH units 7.426  7.447 7.492*   PCO2, ARTERIAL mm Hg 35.2 35.0 32.2*   PO2 ART mm Hg 91.2 94.7 96.2   FIO2 % 30 30 35       Images:  XR Abdomen KUB    Result Date: 4/12/2023  Impression: 1. Pacemaker leads noted. 2. Incidentally noted left renal calculi. 3. No evidence of metallic foreign body in the abdomen or pelvis. Electronically Signed: Avni Hanley  4/12/2023 12:43 PM EDT  Workstation ID: [id number]      Echo:  Results for orders placed during the hospital encounter of 03/30/23    Adult Transthoracic Echo Complete W/ Cont if Necessary Per Protocol    Interpretation Summary  •  Estimated left ventricular EF = 60%  •  The cardiac valves are anatomically and functionally normal.  •  There is a small (<1cm) pericardial effusion. There is no evidence of cardiac tamponade.      Results: Reviewed.  I reviewed the patient's new laboratory and imaging results.  I independently reviewed the patient's new images.    Medications: Reviewed.    Assessment   A/P     Hospital:  LOS: 14 days   ICU: 13d 21h      Diagnosis   • **Encephalopathy, metabolic [G93.41]   • Streptococcal bacteremia [R78.81, B95.5]   • Subdural empyema [G06.2]   • Meningitis [G03.9]   • Questionable Status epilepticus (HCC) [G40.901]   • Sepsis secondary to strep bacteremia and meningitis with subdural empyema [A41.9]   • Paroxysmal atrial fibrillation on Flecainide and Eliquis [I48.0]   • MINO on CPAP [G47.33, Z99.89]   • Hypertension [I10]     Kai is a 82 y.o. male admitted on 3/30/2023 with AMS (altered mental status) [R41.82]    Assessment/Management/Treatment Plan:    1. CNS infection  a. Encephalopathy  b. Meningitis  c. Strep bacteremia and meningitis  d. Subdural empyema  e. Mastoiditis } ENT following  i. CSF leak has stopped since 04/08/23  f. Levetiracetam for seizures  2. Pulmonary   a. Respiratory Failure  b. Invasive Mechanical Ventilation  c. MRSA Pneumonia per Respiratory Cx 04/06/23  d. MINO on CPAP  3. Cardiovascular  a. P A Fib - Flecainide,  Anticoagulation with UFH continuous intravenous infusion   b. HTN - Losartan  4. Macrocytic Anemia, stable.  5. ID/Antibiotics  a. Ceftriaxone + Linezolid  6. Nutrition Support  Diet, Tube Feeding Tube Feeding Formula: Peptamen AF (Peptide Based, Critical Care, ALI)  Peptamen AF (1.2 kcal/mL, 76 g protein/L, 6 g fiber/L)    NG/OG Tube 16 Fr Right nostril-Tube Feeding Rate (mL): 70 mL/h (Based on a feeding duration of 20 h/d)  Patient doesn't have any tube feeding modular orders      Lab Results   Component Value Date    PREALBUMIN 15.7 (L) 04/10/2023    PREALBUMIN 13.6 (L) 04/03/2023    CRP 16.22 (H) 04/10/2023    CRP 18.05 (H) 04/02/2023       7. Endocrine  a. Body mass index is 28.67 kg/m². Overweight: 25.0-29.9kg/m2   b. No h/o T2 Diabetes    Lab Results   Lab Value Date/Time    HGBA1C 5.40 03/30/2023 1121    HGBA1C 5.2 07/01/2015 0436     Results from last 7 days   Lab Units 04/13/23  1131 04/13/23  0518 04/12/23  2247 04/12/23  1727 04/12/23  1154 04/12/23  0513 04/11/23  2321 04/11/23  1821   GLUCOSE mg/dL 122 124 186* 145* 128 143* 152* 139*       Diet: NPO Diet NPO Type: Strict NPO, Tube Feeding  Orders Placed This Encounter      Diet, Tube Feeding Tube Feeding Formula: Peptamen AF (Peptide Based, Critical Care, ALI)      Advance Directives: Code Status and Medical Interventions:   Ordered at: 03/30/23 1300     Code Status (Patient has no pulse and is not breathing):    CPR (Attempt to Resuscitate)     Medical Interventions (Patient has pulse or is breathing):    Full Support        DVT prophylaxis:  Medical and mechanical DVT prophylaxis orders are present.     In brief:  1. MRI } pending. I have been told that the machine was out of order.  2. Continue UFH continuous intravenous infusion  3. PT/OT  RSBI: 55 (04/13/23 1240)   4. Discussed with family member  1. Almost 2 weeks on Invasive Mechanical Ventilation. Time to consider trach. He will let us know after talking to rest of family.  2. RSBI better  today, SBT today and re-assess if he meets criteria for extubation trial.  5. Labs and PCXR in AM  6. Disposition: Keep in ICU.    Plan of care and goals reviewed during interdisciplinary rounds.  I discussed the patient's findings and my recommendations with patient and nursing staff    MDM:    Problem(s) High due to: Acute or Chronic illness or injury that may poses a threat to life or bodily function  Data: High due to: Review of prior external records from each unique source, Review of the result(s) of each unique test, Ordering of each unique test and Discuss management or test interpretation with external physician or NPP (not separately reported)    High      [x] Primary Attending Intensive Care Medicine - Nutrition Support   [] Consultant    Copied text in this note has been reviewed and is accurate as of 04/13/23

## 2023-04-14 NOTE — CASE MANAGEMENT/SOCIAL WORK
Continued Stay Note  [facility]     Patient Name: Kai Dodson  MRN: [id number]  Today's Date: 4/14/2023    Admit Date: 3/30/2023    Plan: Comfort measures   Discharge Plan     Row Name 04/14/23 1327       Plan    Plan Comfort measures    Patient/Family in Agreement with Plan yes    Plan Comments Mr. Dodson chose not to be reintubated.   Palliative care to see.   CM following.    Final Discharge Disposition Code 30 - still a patient               Discharge Codes    No documentation.               Expected Discharge Date and Time     Expected Discharge Date Expected Discharge Time    Apr 18, 2023             Shania Acuña RN

## 2023-04-14 NOTE — PROGRESS NOTES
HEPARIN INFUSION  Kai Dodson is a  82 y.o. male receiving heparin infusion.     Therapy for (VTE/Cardiac):  Cardiac  Patient Weight: 86.6kg  Initial Bolus (Y/N):   Yes  Any Bolus (Y/N):   Yes    Signs or Symptoms of Bleeding: none noted  Cardiac or Other (Not VTE)   Initial Bolus: 60 units/kg (Max 4,000 units)  Initial rate: 12 units/kg/hr (Max 1,000 units/hr)   Anti Xa Rebolus Infusion Hold time Change infusion Dose (Units/kg/hr) Next Anti Xa or aPTT Level Due   < 0.11 50 Units/kg  (4000 Units Max) None Increase by  3 Units/kg/hr 6 hours   0.11- 0.19 25 Units/kg  (2000 Units Max) None Increase by  2 Units/kg/hr 6 hours   0.2 - 0.29 0 None Increase by  1 Units/kg/hr 6 hours   0.3 - 0.5 0 None No Change 6 hours (after 2 consecutive levels in range check qAM)   0.51 - 0.6 0 None Decrease by  1 Units/kg/hr 6 hours   0.61 - 0.8 0 30 Minutes Decrease by  2 Units/kg/hr 6 hours   0.81 - 1 0 60 Minutes Decrease by  3 Units/kg/hr 6 hours   >1 0 Hold  After Anti Xa less than 0.5 decrease previous rate by  4 Units/kg/hr  Every 2 hours until Anti Xa  less than 0.5 then when infusion restarts in 6 hours     Results from last 7 days   Lab Units 04/14/23  0408 04/13/23  0427 04/12/23  0403   HEMOGLOBIN g/dL 9.7* 8.8* 9.0*   HEMATOCRIT % 30.6* 28.3* 28.7*   PLATELETS 10*3/mm3 256 224 212        Date   Time   Anti-Xa Current Rate (Unit/kg/hr) Bolus   (Units) Rate Change   (Unit/kg/hr) New Rate (Unit/kg/hr) Next   Anti-Xa Comments  Pump Check Daily   4/2 1200 0.35 -- -- +11.5 11.5 1800 No initial bolus given baseline Xa. Dw RN   4/2 1757 0.55 11.5 -- -0.5 11 0100 D/W GOSIA Walters     4/3 0115 0.60 11 -- -1 10 0600 D/w RN   4/3 0648 0.57 10 -- -1 9 1400 Will d/w RN   4/3 1415 0.43 9 -- -- 9 2200 D/w RN   4/3 2330 0.36 9 -- -- 9 0600 D/w RN   4/4 0630 0.38 9 -- -- 9 0600 4/5 D/w RN   4/5 0515 0.22 9 -- +1 10 1400 Will d/w RN   4/5 1345 0.23 10 -- +1 11 2200 D/w [facility]   4/5 2330 0.28 11 -- +1 12 0600 D/w RN   4/6 0600 0.33 12 -- -- 12  1400 D/w RN   4/6 1400 0.29 12 -- +1 13 2200 D/w RN   4/6 2228 0.29 13 -- +1 14 0600 Priyank 2461 -acb   4/7 0600 0.35 14 -- -- 14 1400 D/w RN   4/7 1400 0.25 14 -- +1 15 2200 D/w RN   4/7 2205 0.36 15 -- -- 15 0600 Priyank 8289 -acb   4/8 0538 0.27 15  +1 16 1400 DW RN   4/8 1403 0.31 16 -- -- 16 2000 DW RN   4/8 2035 0.43 16 -- -- 16 0600 Lydia 8289 -acb   4/9 0537 0.32 16 -- -- 16 0600 DW RN   4/10 0449 0.34 16 -- -- 16 AM labs Steven 5433 -acb   4/11 0403 0.34 16 -- -- 16 AM labs Dallyn 5198 -acb   4/12 0403 0.49 16 -- -- 16 AM labs Saige 5198 -acb   4/13 0427 0.40 16 -- -- 16 AM labs Ledy 8289 -acb     0.64 16 -- -1 15 1400 D/w RN, hold 30 min   4/14 1715 0.51 14 (entered incorrectly previously) -- -- 14 0200 DW RN   Adalid Soto, [facility]  4/14/2023  18:31 EDT

## 2023-04-14 NOTE — PROGRESS NOTES
"INTENSIVIST   PROGRESS NOTE        SUBJECTIVE     Kai 82 y.o. male is followed for: Extremity Weakness       Encephalopathy, metabolic    Hypertension    Paroxysmal atrial fibrillation on Flecainide and Eliquis    Subdural empyema    Meningitis    Sepsis secondary to strep bacteremia and meningitis with subdural empyema    Streptococcal bacteremia    As an Intensivist, we provide an integrated approach to the ICU patient and family, medical management of comorbid conditions, including but not limited to electrolytes, glycemic control, organ dysfunction, lead interdisciplinary rounds and coordinate the care with all other services, including those from other specialists.     Interval History:  POD: 15 Days Post-Op    Extubated yesterday.  He was placed on Bipap.  (+) Respiratory secretions.  Talks some. He states: \"walk\" (He wants to walk).    Then this morning he became very hypoxic while on BiPAP and Code was called. He had a good pulse, but was in respiratory distress. He was Ambu'ed, and sats improved.  Then switched back to BiPAP and HiFlow. Still some abdominal paradox.    Temp  Min: 97.2 °F (36.2 °C)  Max: 99.3 °F (37.4 °C)       History     Last Reviewed by Adair Will MD on 4/10/2023 at  1:06 PM    Sections Reviewed    Medical, Family, Surgical, Tobacco, Alcohol, Drug Use, Sexual Activity,   Social Documentation      Problem list reviewed by Adair Will MD on 4/10/2023 at  1:06 PM  Medicines reviewed by Adair Will MD on 4/10/2023 at  1:06 PM  Allergies reviewed by Adair Will MD on 4/10/2023 at  1:06 PM       The patient's relevant past medical, surgical and social history were reviewed and updated in Epic as appropriate.        OBJECTIVE     Vitals:  Temp: 97.2 °F (36.2 °C) (04/14/23 0800) Temp  Min: 97.2 °F (36.2 °C)  Max: 99.3 °F (37.4 °C)   Temp core:      BP: 120/93 (04/14/23 1100) BP  Min: 94/56  Max: 173/88   MAP (non-invasive) Noninvasive MAP (mmHg): 106 (04/14/23 1100) " Noninvasive MAP (mmHg)  Av.5  Min: 51  Max: 157   Pulse: 70 (23 1100) Pulse  Min: 49  Max: 73   Resp: 24 (23 0800) Resp  Min: 16  Max: 40   SpO2: 100 % (23 1100) SpO2  Min: 86 %  Max: 100 %   Device: NPPV/NIV (23 0600)    Flow Rate: 3 (23 2230) Flow (L/min)  Min: 3  Max: 3         23  0600 23  0430   Weight: 86.6 kg (190 lb 14.7 oz) 89.3 kg (196 lb 13.9 oz)        Intake/Ouptut 24 hrs (7:00AM - 6:59 AM)  Intake & Output (last 3 days)        0701   07 0701   07 0701   07 0701  04/15 0700    I.V. (mL/kg) 681.1 (7.9) 918.3 (10.3) 1099 (12.3) 237.7 (2.7)    Other 270 330 240 30    NG/GT 1579 1638 1512 327    IV Piggyback 897.8 632 400     Total Intake(mL/kg) 3427.9 (39.6) 3518.3 (39.4) 3251 (36.4) 594.7 (6.7)    Urine (mL/kg/hr) 3315 (1.6) 1940 (0.9) 3600 (1.7) 950 (1.3)    Stool  0 0     Total Output 3315 1940 3600 950    Net +112.9 +1578.3 -349 -355.3            Stool Unmeasured Occurrence  1 x 2 x           Medications (drips):  dexmedetomidine, Last Rate: 1.5 mcg/kg/hr (23 0937)  heparin, Last Rate: 14 Units/kg/hr (23 0716)  Pharmacy to Dose Heparin      Physical Examination  Telemetry:  Rhythm: paced rhythm (23 1000)  Atrial Rhythm: atrial fibrillation (23 2200)      Constitutional:  Moderate distress.   Cardiovascular: RRR.    Respiratory: Normal breath sounds  (+) Rhonchi   Abdominal:  Soft with no tenderness.   Extremities: No Edema   Neurological:   Awake.  Best Eye Response: 2-->(E2) to pain (23 1000)  Best Motor Response: 4-->(M4) withdraws from pain (23 1000)  Best Verbal Response: 2-->(V2) incomprehensible speech (23 1000)  [place] Coma Scale Score: 8 (23 1000)     Results Reviewed:  Laboratory  Microbiology  Radiology  Pathology    Hematology:  Results from last 7 days   Lab Units 23  0408 23  0427 23  0403   WBC 10*3/mm3 8.20 6.02 6.50   NEUTROS ABS  10*3/mm3 6.58 4.06 4.50   IMM GRAN % % 1.3* 2.8* 2.9*   LYMPHS ABS 10*3/mm3 0.88 0.97 0.89   MONOS ABS 10*3/mm3 0.60 0.70 0.82   EOS ABS 10*3/mm3 0.01 0.10 0.09   BASOS ABS 10*3/mm3 0.02 0.02 0.01   HEMOGLOBIN g/dL 9.7* 8.8* 9.0*   MCV fL 99.7* 100.4* 99.0*   RDW % 13.2 13.2 13.2   PLATELETS 10*3/mm3 256 224 212       Chemistry:  Estimated Creatinine Clearance: 122.1 mL/min (A) (by C-G formula based on SCr of 0.52 mg/dL (L)).    Results from last 7 days   Lab Units 04/14/23  0408 04/13/23  0427   SODIUM mmol/L 136 133*   POTASSIUM mmol/L 4.8 4.3   CHLORIDE mmol/L 104 105   CO2 mmol/L 22.0 23.0   BUN mg/dL 24* 22   CREATININE mg/dL 0.52* 0.58*   GLUCOSE mg/dL 137* 131*     Results from last 7 days   Lab Units 04/14/23  0408 04/13/23  0427   CALCIUM mg/dL 8.4* 8.3*   MAGNESIUM mg/dL 2.0 2.0   PHOSPHORUS mg/dL 3.5 3.1       Hepatic Panel:  Results from last 7 days   Lab Units 04/14/23  0408 04/13/23  0427 04/10/23  0449 04/09/23  0537   ALBUMIN g/dL 2.7* 2.4* 2.4* 2.2*   BILIRUBIN mg/dL  --  0.5 0.7 0.9   AST (SGOT) U/L  --  20 44* 43*   ALT (SGPT) U/L  --  36 60* 50*   ALK PHOS U/L  --  146* 131* 113        Images:  XR Chest 1 View    Result Date: 4/14/2023  Impression: Patchy airspace disease in the right mid to lower lung zone appears worse compared to 4/10/2023. However, the left basilar airspace disease appears slightly improved. Correlate for symptoms of waxing and waning pneumonia. Electronically Signed: Keily Flowers  4/14/2023 8:09 AM EDT  Workstation ID: [id number]      Echo:  Results for orders placed during the hospital encounter of 03/30/23    Adult Transthoracic Echo Complete W/ Cont if Necessary Per Protocol    Interpretation Summary  •  Estimated left ventricular EF = 60%  •  The cardiac valves are anatomically and functionally normal.  •  There is a small (<1cm) pericardial effusion. There is no evidence of cardiac tamponade.      Results: Reviewed.  I reviewed the patient's new laboratory and imaging  results.  I independently reviewed the patient's new images.    Medications: Reviewed.    Assessment   A/P     Hospital:  LOS: 15 days   ICU: 14d 21h      Diagnosis   • **Encephalopathy, metabolic [G93.41]   • Streptococcal bacteremia [R78.81, B95.5]   • Subdural empyema [G06.2]   • Meningitis [G03.9]   • Questionable Status epilepticus (HCC) [G40.901]   • Sepsis secondary to strep bacteremia and meningitis with subdural empyema [A41.9]   • Paroxysmal atrial fibrillation on Flecainide and Eliquis [I48.0]   • MINO on CPAP [G47.33, Z99.89]   • Hypertension [I10]     Kai is a 82 y.o. male admitted on 3/30/2023 with AMS (altered mental status) [R41.82]    Assessment/Management/Treatment Plan:    1. CNS infection  a. Encephalopathy  b. Meningitis  c. Strep bacteremia and meningitis  d. Subdural empyema  e. Mastoiditis } ENT following  i. CSF leak has stopped since 04/08/23  f. Levetiracetam for seizures  2. Pulmonary   a. Respiratory Failure  b. Invasive Mechanical Ventilation  c. MRSA Pneumonia per Respiratory Cx 04/06/23  d. MINO on CPAP  3. Cardiovascular  a. P A Fib - Flecainide, Anticoagulation with UFH continuous intravenous infusion   b. HTN - Losartan  4. Macrocytic Anemia, stable.  5. ID/Antibiotics  a. Ceftriaxone + Linezolid  6. Nutrition Support  Diet, Tube Feeding Tube Feeding Formula: Peptamen AF (Peptide Based, Critical Care, ALI)  Peptamen AF (1.2 kcal/mL, 76 g protein/L, 6 g fiber/L)    NG/OG Tube 16 Fr Right nostril-Tube Feeding Rate (mL): 70 mL/h (Based on a feeding duration of 20 h/d)  Patient doesn't have any tube feeding modular orders      Lab Results   Component Value Date    PREALBUMIN 15.7 (L) 04/10/2023    PREALBUMIN 13.6 (L) 04/03/2023    CRP 16.22 (H) 04/10/2023    CRP 18.05 (H) 04/02/2023       7. Endocrine  a. Body mass index is 28.67 kg/m². Overweight: 25.0-29.9kg/m2   b. No h/o T2 Diabetes    Lab Results   Lab Value Date/Time    HGBA1C 5.40 03/30/2023 1121    HGBA1C 5.2 07/01/2015 0436      Results from last 7 days   Lab Units 04/14/23  1219 04/14/23  0556 04/13/23  2337 04/13/23  1701 04/13/23  1131 04/13/23  0518 04/12/23  2247 04/12/23  1727   GLUCOSE mg/dL 70 167* 111 104 122 124 186* 145*       Diet: NPO Diet NPO Type: Strict NPO, Tube Feeding  Orders Placed This Encounter      Diet, Tube Feeding Tube Feeding Formula: Peptamen AF (Peptide Based, Critical Care, ALI)      Advance Directives: Code Status and Medical Interventions:   Ordered at: 04/14/23 1058     Medical Intervention Limits:    NO intubation (DNI)     Code Status (Patient has no pulse and is not breathing):    No CPR (Do Not Attempt to Resuscitate)     Medical Interventions (Patient has pulse or is breathing):    Limited Support        DVT prophylaxis:  Medical and mechanical DVT prophylaxis orders are present.     In brief:  1. Discussed with Family (POAs)   1. They have decided: DNAR and DNI  2. They want to continue with antibiotics  3. They are in agreement with a Palliative Consult  2. We will hold Enteral Nutrition today due to his respiratory status.  3. Continue UFH continuous intravenous infusion  4. Disposition: Keep in ICU.    Plan of care and goals reviewed during interdisciplinary rounds.  I discussed the patient's findings and my recommendations with patient and nursing staff    MDM:    Problem(s) High due to: Acute or Chronic illness or injury that may poses a threat to life or bodily function  Data: High due to: Review of prior external records from each unique source, Review of the result(s) of each unique test, Ordering of each unique test and Discuss management or test interpretation with external physician or NPP (not separately reported)  Risk: High due to: decision for Do Not Attempt to Resuscitate    High      [x] Primary Attending Intensive Care Medicine - Nutrition Support   [] Consultant    Copied text in this note has been reviewed and is accurate as of 04/14/23

## 2023-04-14 NOTE — PROGRESS NOTES
supported family at bedside. Patient requested prayer.  prayed as requested.  provided supportive conversation, active listening and spiritual encouragement during this patient encouragement.

## 2023-04-14 NOTE — PROGRESS NOTES
"    INFECTIOUS DISEASE PROGRESS NOTE    Kai Dodson  1940  [id number]    Date of consult: 3/31/23  Admit date: 3/30/2023    Requesting Provider: Dr. Jason  Evaluating physician: Que Alonso MD  Reason for Consultation: meningitis  Chief Complaint: altered mental status      Subjective   History of present illness:  Kai Dodson is a  82 y.o.  Yr old male with a past medical history of hypertension, coronary artery disease, paroxysmal A. Fib on flecainide and Eliquis, tachycardia-bradycardia syndrome status post PPM and obstructive sleep apnea on CPAP who presented to the ER yesterday with encephalopathy, inability to speak, and left-sided weakness.  Due to the patient's mental state and intubated status, history is obtained from medical records.  The patient's wife had just been discharged yesterday morning from [facility] and their son was driving the patient's wife back to [facility] for inpatient rehab.  The patient's son tried to call him and could not reach him.  Other family members went to check on him and found him altered with tremors in all the extremities.  EMS was called and he was transported to the ER.    Since arrival, T-max has been 100.9°F.  He was initially tachycardic up to 112 bpm but this is improved. He had some hypotension but this has improved. The patient was intubated for airway protection and remains on the ventilator with an FiO2 of 35%. Initial lactic acid was 6.6, pro-calcitonin was 6.0, hemoglobin A1c was 5.4, [place] has been normal.  LFTs are within normal limits. White blood cell count was normal on arrival at 9.63, has now trended up to 14.14. platelet count has decreased to 109.sensitivity troponin was slightly elevated at 17. Urine drug screen was negative. 2 sets of admission blood cultures are growing GPC's in pairs and chains identified as Streptococcus pneumoniae by the BCID2 PCR. The patient underwent CTA head and neck that showed \"no " "evidence of intracranial hemorrhage or definite acute territorial ischemia.  There is abnormal intracranial gas present, appearing likely extra-axial in the lateral aspect of the right cerebellar hemisphere, with changes of the right mastoid present, concerning for mastoiditis and possibly subdural empyema or less likely parenchymal abscess.\" \"Dedicated CT of the temporal bones demonstrates findings of likely acute otomastoiditis on the right, with middle ear and right mastoid effusion, coalescent changes of the right mastoid and dehiscence of both the tegmen tympani and tegmen mastoideum. There is again concern for intracranial infectious involvement, empyema versus abscess with foci of intracranial gas present.\" chest x-ray was without any acute disease. MRI brain showed \"no evidence of right temporal lobe abscess.  Findings consistent with diffuse meningitis.  There is right otomastoiditis as seen on the CT of the temporal bones.  There are signal voids corresponding to gas bubbles within the CSF in the upper right posterior cranial fossa.\"  ENT has been consulted and Dr. Garcia evaluated the patient with right myringotomy procedure performed at bedside in the ICU. Wide myringotomy was made in the posterior aspect of the tympanic membrane.  A small amount of purulence was expressed and cultured.  After that CSF was draining through the myringotomy.  A tube was therefore not placed.  Neurosurgery has evaluated the patient and recommended that there is no role for neurosurgical intervention at this time. The patient has been started on empiric acyclovir, ampicillin, ceftriaxone 2 g IV every 12 hours, and IV vancomycin by the ICU team.  He has additionally been placed on dexamethasone.  ID has been consultated for antibiotic recommendations.    Subjective:    4/1/23: The patient remains critically ill on the ventilator.  No history is available from him.  He does have some fluid leaking from his right ear, presumably " CSF. No fevers.  Ventilator settings remain stable.  Leukocytosis is worse but likely steroid induced.    4/2/23:  He remains afebrile and on 35% FIO2.  WBC 12.29 this am.  Remains on Dexamethasone.     4/3/23: no history available from the patient.  He remains on the ventilator with an FiO2 of 35%.  No fevers.  Continued CSF leak from the right ear.    4/4/23: Continues to have some drainage from his right ear.  Remains critically ill on the ventilator with an FiO2 of 35%.  No fevers. Neurosurgery has reevaluated the patient and does not think that there is a role for CSF diversion or need for any neurosurgical intervention at this time.  May need mastoidectomy and packing of the mastoid air cells per neurosurgery.    4/5/23: the patient is still critically ill on the ventilator.  He did have some fevers 100.9°F after I saw him today. Vent settings are also slightly worse with an FiO2 of 50%.  Still having some drainage from his right ear.  No current surgical plans per ENT or neurosurgery.  No history is available from the patient.  No family at bedside.    4/6/23: The patient had ongoing fevers up to 101.3°F overnight.  Vent settings were slightly worse overnight with an FiO2 of 50% but slightly better today at 40% when I went by his room.  He is having increased respiratory secretions per nursing staff.  Ongoing drainage from his right ear.  No history available from the patient is a remains on the ventilator and sedated.    4/7/23: The patient remains intubated on the ventilator.  No history available from the patient.  T-max was 100.6°F overnight.  FiO2 has improved to 35%.  Still having significant respiratory secretions per nursing.  Not having as much drainage from his right ear per nursing. He has experienced some weeping from his left hand which is very edematous.    4/8/23: the patient remains on the vent. FIO2 35%. Still having some low grade fevers. Respiratory culture finalized with MRSA. No history  available from the patient    4/9/23: The patient is still on vent but was on SBT this morning and is responding to some simple commands and trying to mouth words to his son at bedside. Still having some low grade fevers up to 100.8F overnight. WBC slightly worse at 14.77 today. CT head without contrast was repeated this morning with no significant new changes noted.    4/10/23: Tmax 101.5 this morning. Was on SBT until late afternoon. He had thick oral secretions and continued to have edema in BUE with right hand weeping/pedal edema.  Remains ill on vent with 35% FiO2/PEEP 5.  CXR with improving pneumonia. Sedation is off and he appears very anxious and agitated.      4/11/23: Sedation has been weaned off.  Patient is on spontaneous breathing trial and doing well.  Still having low-grade fevers up to 100.6°F overnight.  Unable to obtain any history from the patient given he is still intubated on the ventilator.     4/12/23: Patient is still on the ventilator. Family considering tracheostomy. T-max of 100°F.  No history is available from the patient.  MRI brain still pending.    4/13/23: the patient remains on the vent with minimal settings. He is afebrile. No history is available from him. MRI still pending.     4/14/23: Patient has been extubated since yesterday.  He is on nasal cannula.  Not having any fevers. Patient denied any pain.  He asked me to pray with him at bedside today.    Past Medical History:   Diagnosis Date   • Arthritis    • Atrial fibrillation    • Bradycardia    • Chronic hypertension     Probably essential   • Hypertension    • Hypertensive cardiovascular disease 07/2015    Recent progressive CCS class III-IV chest pain syndrome with normal EKG and exertional dyspnea and fatigue with normal codominant coronary arteries and preserved systolic  left ventricular function, July 2015.   • Intermittent palpitations 05/01/2014    Remote abnormal 24-hour Holter monitor demonstrating intermittent  bradycardia with occasional PACs, May 2014.   • Lower extremity edema     Intermittent lower extremity edema felt secondary to antihypertensive therapy.    • Nephrolithiasis     Remote- 15 years ago   • MINO on CPAP    • MINO on CPAP     doesn't know setting    • Prostate troubles     enlarged   • Seasonal rhinitis     intermittent       Past Surgical History:   Procedure Laterality Date   • CARDIAC CATHETERIZATION      no stents   • CARDIAC ELECTROPHYSIOLOGY PROCEDURE N/A 10/24/2018    Procedure: Pacemaker DC new with Biotronik. Hold Eliquis one day prior.;  Surgeon: Harley Dacosta MD;  Location: [facility] INVASIVE LOCATION;  Service: Cardiology   • COLONOSCOPY     • ENDOSCOPY     • INSERT / REPLACE / REMOVE PACEMAKER         Pediatric History   Patient Parents   • Not on file     Other Topics Concern   • Not on file   Social History Narrative    PT DRINKS 2 SERVINGS OF CAFFEINE PER DAY.    PT LIVES AT HOME WITH WIFE.       family history includes No Known Problems in his mother; Other in his father.    No Known Allergies    There is no immunization history for the selected administration types on file for this patient.    Medication:    Current Facility-Administered Medications:   •  acetaminophen (TYLENOL) 160 MG/5ML solution 650 mg, 650 mg, Nasogastric, Q6H PRN, Amadou Mercedes, APRN, 650 mg at 04/11/23 1805  •  aspirin chewable tablet 81 mg, 81 mg, Nasogastric, Daily, 81 mg at 04/14/23 0852 **OR** aspirin suppository 300 mg, 300 mg, Rectal, Daily, Tereza Bautista, FORREST  •  cefTRIAXone (ROCEPHIN) 2 g/100 mL 0.9% NS IVPB (MBP), 2 g, Intravenous, Q12H, Que Alonso MD, 2 g at 04/14/23 0851  •  chlorhexidine (PERIDEX) 0.12 % solution 15 mL, 15 mL, Mouth/Throat, Q12H, Mundo Jason MD, 15 mL at 04/14/23 0851  •  dexmedetomidine (PRECEDEX) 400 mcg in 100 mL NS infusion, 0.2-1.5 mcg/kg/hr, Intravenous, Titrated, Toan Lee MD, Last Rate: 32.5 mL/hr at 04/14/23 0937, 1.5 mcg/kg/hr at  04/14/23 0937  •  dextrose (D50W) (25 g/50 mL) IV injection 25 g, 25 g, Intravenous, Q15 Min PRN, Amadou Mercedes, FORREST  •  fentaNYL (SUBLIMAZE) bolus from bag 10 mcg/mL 50 mcg, 50 mcg, Intravenous, Q30 Min PRN, Adair Will MD, 50 mcg at 03/31/23 1911  •  fentaNYL citrate (PF) (SUBLIMAZE) injection 50 mcg, 50 mcg, Intravenous, Once PRN, Artem Bernstein, APRN  •  flecainide (TAMBOCOR) tablet 50 mg, 50 mg, Nasogastric, Q12H, Marcelino Hastings, DO, 50 mg at 04/14/23 0852  •  glycopyrrolate (ROBINUL) injection 0.4 mg, 0.4 mg, Intravenous, Q4H PRN, Nik Louie, DO, 0.4 mg at 04/14/23 1332  •  haloperidol lactate (HALDOL) injection 5 mg, 5 mg, Intravenous, Q6H PRN, Adair Will MD, 5 mg at 04/14/23 1402  •  heparin 81828 units/250 mL (100 units/mL) in 0.45 % NaCl infusion, 14 Units/kg/hr, Intravenous, Titrated, Derick Mercedes, PharmD, Last Rate: 12.12 mL/hr at 04/14/23 0716, 14 Units/kg/hr at 04/14/23 0716  •  HYDROmorphone (DILAUDID) injection 0.5 mg, 0.5 mg, Intravenous, Q2H PRN, Nik Louie DO, 0.5 mg at 04/14/23 1332  •  insulin regular (humuLIN R,novoLIN R) injection 0-7 Units, 0-7 Units, Subcutaneous, Q6H, Amadou Mercedes APRN, 2 Units at 04/14/23 0628  •  levETIRAcetam in NaCl 0.82% (KEPPRA) IVPB 500 mg, 500 mg, Intravenous, Q12H, Chaz Kahn MD, 500 mg at 04/14/23 0628  •  LORazepam (ATIVAN) injection 0.5 mg, 0.5 mg, Intravenous, Q4H PRN, Nik Louie DO, 0.5 mg at 04/14/23 1332  •  losartan (COZAAR) tablet 50 mg, 50 mg, Nasogastric, Q24H, Marcelino Hastings, , 50 mg at 04/14/23 0852  •  ondansetron (ZOFRAN) injection 4 mg, 4 mg, Intravenous, Q6H PRN, Antonietta Phillips APRN  •  pantoprazole (PROTONIX) injection 40 mg, 40 mg, Intravenous, Q AM, Antonietta Phillips APRN, 40 mg at 04/14/23 0630  •  Pharmacy to Dose Heparin, , Does not apply, Continuous PRN, Marcelino Hastings DO  •  Potassium Replacement - Follow Nurse /  "BPA Driven Protocol, , Does not apply, PRN, Natalia Cruz, APRN  •  sennosides (SENOKOT) 8.8 MG/5ML syrup 10 mL, 10 mL, Nasogastric, BID, 10 mL at 04/14/23 0444 **AND** [DISCONTINUED] docusate sodium (COLACE) liquid 100 mg, 100 mg, Nasogastric, BID, Mundo Jason MD, 100 mg at 04/10/23 0911  •  sodium chloride 0.9 % flush 10 mL, 10 mL, Intravenous, Q12H, Tereza Bautista APRN, 10 mL at 04/13/23 2049  •  sodium chloride 0.9 % flush 10 mL, 10 mL, Intravenous, PRN, Tereza Bautista APRN  •  sodium chloride 0.9 % infusion 40 mL, 40 mL, Intravenous, PRN, Tereza Bautista APRN    Please refer to the medical record for a full medication list    Review of Systems:  Unable to obtain a 12 point review of systems from the patient given his clinical state    Physical Exam:   Vital Signs   Temp:  [97.2 °F (36.2 °C)-99.3 °F (37.4 °C)] 97.2 °F (36.2 °C)  Heart Rate:  [49-73] 70  Resp:  [16-40] 24  BP: ()/(56-93) 120/93  FiO2 (%):  [30 %] 30 %    Temp  Min: 97.2 °F (36.2 °C)  Max: 99.3 °F (37.4 °C)  BP  Min: 94/56  Max: 173/88  Pulse  Min: 49  Max: 73  Resp  Min: 16  Max: 40  SpO2  Min: 86 %  Max: 100 %    Blood pressure 120/93, pulse 70, temperature 97.2 °F (36.2 °C), temperature source Axillary, resp. rate 24, height 176.5 cm (69.49\"), weight 89.3 kg (196 lb 13.9 oz), SpO2 100 %.  GENERAL: sedated on the ventilator.  Ill-appearing  HEENT:  Normocephalic, atraumatic.  ET tube in place.  No external oral lesions noted. NG tube in place. No drainage noted from his right ear today  Chest: Left chest wall PPM pocket site without any erythema or drainage.  HEART: bradycardia, regular, no murmur  LUNGS: ventilator breath sounds bilaterally.  On the ventilator with an FiO2 of 30%  ABDOMEN: Soft, nontender, nondistended..   GENITAL: + Doran catheter  SKIN: No new rashes.  No jaundice.  PSYCHIATRIC: unable to assess  EXT:  No new cellulitic changes noted.  NEURO: sedated    Right upper extremity PICC " line- no erythema noted at the exit site    Results Review:   I reviewed the patient's new clinical results.    Results from last 7 days   Lab Units 04/14/23  0408 04/13/23  0427 04/12/23  0403   WBC 10*3/mm3 8.20 6.02 6.50   HEMOGLOBIN g/dL 9.7* 8.8* 9.0*   HEMATOCRIT % 30.6* 28.3* 28.7*   PLATELETS 10*3/mm3 256 224 212     Results from last 7 days   Lab Units 04/14/23  0408   SODIUM mmol/L 136   POTASSIUM mmol/L 4.8   CHLORIDE mmol/L 104   CO2 mmol/L 22.0   BUN mg/dL 24*   CREATININE mg/dL 0.52*   GLUCOSE mg/dL 137*   CALCIUM mg/dL 8.4*     Results from last 7 days   Lab Units 04/13/23  0427   ALK PHOS U/L 146*   BILIRUBIN mg/dL 0.5   ALT (SGPT) U/L 36   AST (SGOT) U/L 20         Results from last 7 days   Lab Units 04/10/23  0449   CRP mg/dL 16.22*         Results from last 7 days   Lab Units 04/09/23  0537   LACTATE mmol/L 1.0     Estimated Creatinine Clearance: 122.1 mL/min (A) (by C-G formula based on SCr of 0.52 mg/dL (L)).  CPK        3/30/2023    11:21   Common Labsle   Creatine Kinase 57        Procalitonin Results:      Lab 04/13/23  0427 04/09/23  0537   PROCALCITONIN 0.16 0.63*      Brief Urine Lab Results  (Last result in the past 365 days)      Color   Clarity   Blood   Leuk Est   Nitrite   Protein   CREAT   Urine HCG        03/30/23 1200 Yellow   Clear   Negative   Negative   Negative   Negative                Site   Date Value Ref Range Status   04/13/2023 Right Radial  Final     Juan A's Test   Date Value Ref Range Status   04/13/2023 N/A  Final     pH, Arterial   Date Value Ref Range Status   04/13/2023 7.424 7.350 - 7.450 pH units Final     pCO2, Arterial   Date Value Ref Range Status   04/13/2023 34.4 (L) 35.0 - 45.0 mm Hg Final     Comment:     84 Value below reference range     pO2, Arterial   Date Value Ref Range Status   04/13/2023 79.8 (L) 83.0 - 108.0 mm Hg Final     Comment:     84 Value below reference range     HCO3, Arterial   Date Value Ref Range Status   04/13/2023 22.5 20.0 - 26.0  mmol/L Final     Base Excess, Arterial   Date Value Ref Range Status   04/13/2023 -1.5 (L) 0.0 - 2.0 mmol/L Final     Hemoglobin, Blood Gas   Date Value Ref Range Status   04/13/2023 9.9 (L) 13.5 - 17.5 g/dL Final     Comment:     84 Value below reference range     Hematocrit, Blood Gas   Date Value Ref Range Status   04/13/2023 30.2 (L) 38.0 - 51.0 % Final     Oxyhemoglobin   Date Value Ref Range Status   04/13/2023 95.3 94 - 99 % Final     Methemoglobin   Date Value Ref Range Status   04/13/2023 0.50 0.00 - 1.50 % Final     Carboxyhemoglobin   Date Value Ref Range Status   04/13/2023 1.2 0 - 2 % Final     CO2 Content   Date Value Ref Range Status   04/13/2023 23.6 22 - 33 mmol/L Final     Barometric Pressure for Blood Gas   Date Value Ref Range Status   04/13/2023   Final     Comment:     N/A     Modality   Date Value Ref Range Status   04/13/2023 Nasal Cannula  Final     FIO2   Date Value Ref Range Status   04/13/2023 32 % Final        Microbiology:  Microbiology Results (last 10 days)     Procedure Component Value - Date/Time    Blood Culture - Blood, Arm, Left [[id number]]  (Normal) Collected: 04/06/23 1426    Lab Status: Final result Specimen: Blood from Arm, Left Updated: 04/11/23 1500     Blood Culture No growth at 5 days    Blood Culture - Blood, Hand, Left [[id number]]  (Normal) Collected: 04/06/23 1013    Lab Status: Final result Specimen: Blood from Hand, Left Updated: 04/11/23 1045     Blood Culture No growth at 5 days    MRSA Screen, PCR (Inpatient) - Swab, Nares [[id number]]  (Abnormal) Collected: 04/06/23 0952    Lab Status: Final result Specimen: Swab from Nares Updated: 04/06/23 1109     MRSA PCR Positive    Narrative:      The negative predictive value of this diagnostic test is high and should only be used to consider de-escalating anti-MRSA therapy. A positive result may indicate colonization with MRSA and must be correlated clinically.    Respiratory Culture - Sputum, ET Suction [[id number]]   (Abnormal)  (Susceptibility) Collected: 04/06/23 0952    Lab Status: Final result Specimen: Sputum from ET Suction Updated: 04/08/23 0932     Respiratory Culture Moderate growth (3+) Staphylococcus aureus, MRSA     Comment:   Methicillin resistant Staphylococcus aureus, Patient may be an isolation risk.        Gram Stain Many (4+) WBCs per low power field      Rare (1+) Epithelial cells per low power field      Few (2+) Gram positive cocci in pairs, chains and clusters    Susceptibility      Staphylococcus aureus, MRSA      JOAQUÍN      Clindamycin Susceptible      Inducible Clindamycin Resistance Negative      Linezolid Susceptible      Oxacillin Resistant     Tetracycline Susceptible      Trimethoprim + Sulfamethoxazole Susceptible      Vancomycin Susceptible                       Susceptibility Comments     Staphylococcus aureus, MRSA    This isolate does not demonstrate inducible clindamycin resistance in vitro.                         Radiology:  XR Chest 1 View    Result Date: 4/14/2023  Impression: Patchy airspace disease in the right mid to lower lung zone appears worse compared to 4/10/2023. However, the left basilar airspace disease appears slightly improved. Correlate for symptoms of waxing and waning pneumonia. Electronically Signed: Keily Flowers  4/14/2023 8:09 AM EDT  Workstation ID: [id number]    I read his chest x-ray today      IMPRESSION:     Problems:  1. Streptococcus pneumoniae septicemia-due to acute otitis  2. 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  No LP done yet but given the Streptococcus pneumonia septicemia and clinical presentation, we do not need an LP.  3. Acute otitis media and right-sided mastoiditis resulting in a subdural empyema with fistulous tract between the middle ear space and the dura- Strep pneumoniae on culture  4. Acute toxic/metabolic encephalopathy-due to the above infection  5. Acute hypoxic respiratory failure, intubated  for airway protection-improved.  Now extubated to nasal cannula  6. leukocytosis with neutrophilia-improved  7. Hypokalemia improved   8. Hypomagnesemia, improved  9. tachycardia-bradycardia syndrome status post PPM- complicating factor in the setting of bacteremia as at some risk for seeding PPM leads with bacteria but streptococcus pneumoniae is an uncommon bacteria to do this. TTE was without any signs of vegetations or pacemaker lead infection  10. paroxysmal A. Fib on flecainide and Eliquis  11. coronary artery disease  12. obstructive sleep apnea on CPAP nightly at home  13. History of hypertension  14. Thrombocytopenia-resolved  15. New fevers starting on 4/5-suspect due to pneumonia-fevers are improved  16. Left-sided pneumonia-MRSA from culture. Improving by chest xray and vent settings minimal.     RECOMMENDATIONS:    -continue to monitor cbc with diff closely  -continue ceftriaxone 2g IV q12h  -stop Linezolid  -repeat MRI brain still pending    Patient has been extubated    I prayed with the patient at bedside today at his request. It does appear that since my exam, the patient has unfortunately clinically declined and may be actively dying.  Palliative care is following.    I discussed with nursing today    Complex MDM.     Copied text in this note has been reviewed and is accurate as of 04/14/23      Que Alonso MD  4/14/2023

## 2023-04-14 NOTE — PROGRESS NOTES
Neurology       Patient Care Team:  Derick Phillip MD as PCP - General (Family Medicine)  Nino Castillo MD as Consulting Physician (Cardiology)    Chief complaint: Meningitis/    History: The patient failed his time off the ventilator and had a respiratory arrest with preserved pulse.    He apparently awoke enough to indicate that he did not want to be put back on the respirator and the son and the second POA were present.    They have agreed to palliative care and DNR and do not reintubate.  Past Medical History:   Diagnosis Date   • Arthritis    • Atrial fibrillation    • Bradycardia    • Chronic hypertension     Probably essential   • Hypertension    • Hypertensive cardiovascular disease 07/2015    Recent progressive CCS class III-IV chest pain syndrome with normal EKG and exertional dyspnea and fatigue with normal codominant coronary arteries and preserved systolic  left ventricular function, July 2015.   • Intermittent palpitations 05/01/2014    Remote abnormal 24-hour Holter monitor demonstrating intermittent bradycardia with occasional PACs, May 2014.   • Lower extremity edema     Intermittent lower extremity edema felt secondary to antihypertensive therapy.    • Nephrolithiasis     Remote- 15 years ago   • MINO on CPAP    • MINO on CPAP     doesn't know setting    • Prostate troubles     enlarged   • Seasonal rhinitis     intermittent       Vital Signs   Vitals:    04/14/23 0800 04/14/23 0830 04/14/23 0900 04/14/23 1100   BP:  140/74 165/74 120/93   BP Location:       Patient Position:       Pulse: 70 70 70 70   Resp: 24      Temp: 97.2 °F (36.2 °C)      TempSrc: Axillary      SpO2: 97% 98% 98% 100%   Weight:       Height:           Physical Exam:   General: Awake    Increased secretions.              Neuro: Uncomfortable on BiPAP    Results Review:  Reviewed  Results from last 7 days   Lab Units 04/14/23  0408   WBC 10*3/mm3 8.20   HEMOGLOBIN g/dL 9.7*   HEMATOCRIT % 30.6*   PLATELETS 10*3/mm3  256     Results from last 7 days   Lab Units 04/14/23  0408 04/13/23  0427 04/12/23  0403 04/11/23  0403 04/10/23  0449 04/09/23  0537   SODIUM mmol/L 136 133* 136   < > 133* 133*   POTASSIUM mmol/L 4.8 4.3 4.2   < > 3.9 4.3   CHLORIDE mmol/L 104 105 107   < > 101 100   CO2 mmol/L 22.0 23.0 21.0*   < > 24.0 25.0   BUN mg/dL 24* 22 22   < > 31* 31*   CREATININE mg/dL 0.52* 0.58* 0.55*   < > 0.65* 0.57*   CALCIUM mg/dL 8.4* 8.3* 8.4*   < > 8.6 8.3*   BILIRUBIN mg/dL  --  0.5  --   --  0.7 0.9   ALK PHOS U/L  --  146*  --   --  131* 113   ALT (SGPT) U/L  --  36  --   --  60* 50*   AST (SGOT) U/L  --  20  --   --  44* 43*   GLUCOSE mg/dL 137* 131* 158*   < > 102* 108*    < > = values in this interval not displayed.       Imaging Results (Last 24 Hours)     Procedure Component Value Units Date/Time    XR Chest 1 View [[id number]] Collected: 04/14/23 0806     Updated: 04/14/23 0812    Narrative:      XR CHEST 1 VW    Date of Exam: 4/14/2023 3:55 AM EDT    Indication: respiratory failure.    Comparison: AP portable chest 4/10/2023.    Findings:  Right hemidiaphragm elevation is again noted. There is increased patchy airspace disease in the right mid to lower lung zone. Previously described left lower lobe airspace disease appears improved, however. NG tube extends below the diaphragm with the   tip not included in the imaging field of view. Right arm approach PICC tip terminates at the cavoatrial junction. No visible pneumothorax. Left chest wall pacemaker and leads appear similarly positioned. Heart size is likely within normal limits,   allowing for differences in patient positioning.      Impression:      Impression:  Patchy airspace disease in the right mid to lower lung zone appears worse compared to 4/10/2023. However, the left basilar airspace disease appears slightly improved. Correlate for symptoms of waxing and waning pneumonia.    Electronically Signed: Keily Flowers    4/14/2023 8:09 AM EDT    Workstation ID:  [id number]          Assessment:  Streptococcal meningitis/septicemia.    Mastoiditis.    Failure to thrive    Plan:  Palliative care to see    Comment:  Spoke to the family regarding his poor prognosis for survival.         I discussed the patients findings and my recommendations with family, nursing staff and primary care team    Chaz Kahn MD  04/14/23  12:04 EDT

## 2023-04-14 NOTE — PROGRESS NOTES
Clinical Nutrition     Nutrition Support Assessment  Reason for Visit: Follow-up protocol, EN      Patient Name: Kai Dodson  YOB: 1940  MRN: [id number]  Date of Encounter: 04/14/23 10:31 EDT  Admission date: 3/30/2023    Comments:  Pt tolerating EN support of Peptamen AF at 70 ml/hr, daily goal volume is 1400 mL, delivery average over past 4 days has been 102% goal volume.  Pt is now extubated and has slightly increased energy needs , he has been reassessed. RD will monitor.    Nutrition Assessmen t   Admission Diagnosis:  AMS (altered mental status) [R41.82]      Problem List:    Meningitis    MINO on CPAP    Hypertension    Paroxysmal atrial fibrillation on Flecainide and Eliquis    Subdural empyema    Streptococcal bacteremia   Acute Resp Failure- Invasive mechanical ventilation     PMH:  He  has a past medical history of Arthritis, Atrial fibrillation, Bradycardia, Chronic hypertension, Hypertension, Hypertensive cardiovascular disease (07/2015), Intermittent palpitations (05/01/2014), Lower extremity edema, Nephrolithiasis, MINO on CPAP, MINO on CPAP, Prostate troubles, and Seasonal rhinitis.    PSH: He  has a past surgical history that includes Colonoscopy; Cardiac catheterization; Esophagogastroduodenoscopy; Cardiac electrophysiology procedure (N/A, 10/24/2018); and Insert / replace / remove pacemaker.    Applicable Nutrition Concerns:   Skin:  Oral:  GI:    Applicable Interval History:   4/13 extubated  3/30 incision/drainage from middle ear  3/30 intubated for mechanical ventilaton    Reported/Observed/Food/Nutrition Related History:   4/13 RN reports pt is tolerating well agt goal rate    4/11) EN at adj goal rate at time of visit.    4/10) RN reports pt w/ lg BM this morning, follows commands and moves everything off sedation, has significant secretions. Tolerating TF.    4/7) in volume overload, plan for diuretics.  No BM since EN started, bowel regiment started  yesterday, no BM yet.  EN @ goal rate. Sedated on the vent.      4/3) Remains intubated and sedated.  EN stated on 4/1, at goal rate this morning.  No issues with intolerance with EN.  Running @ goal rate at bedside at time of visit. No family at bed side  Bed weight: 97.7kg   Remains on propofol for sedation.     Labs    Labs Reviewed: Yes     Results from last 7 days   Lab Units 04/14/23 0408 04/13/23 0427 04/12/23  0403 04/11/23  0403 04/10/23  0449 04/09/23  0537   GLUCOSE mg/dL 137* 131* 158*   < > 102* 108*   BUN mg/dL 24* 22 22   < > 31* 31*   CREATININE mg/dL 0.52* 0.58* 0.55*   < > 0.65* 0.57*   SODIUM mmol/L 136 133* 136   < > 133* 133*   CHLORIDE mmol/L 104 105 107   < > 101 100   POTASSIUM mmol/L 4.8 4.3 4.2   < > 3.9 4.3   PHOSPHORUS mg/dL 3.5 3.1  --   --  2.7  --    MAGNESIUM mg/dL 2.0 2.0  --   --  2.2 2.2   ALT (SGPT) U/L  --  36  --   --  60* 50*    < > = values in this interval not displayed.     Results from last 7 days   Lab Units 04/14/23  0408 04/13/23  0427 04/10/23  0449   ALBUMIN g/dL 2.7* 2.4* 2.4*   PREALBUMIN mg/dL  --   --  15.7*   CRP mg/dL  --   --  16.22*   TRIGLYCERIDES mg/dL  --   --  75     Results from last 7 days   Lab Units 04/14/23  0556 04/13/23  2337 04/13/23  1701 04/13/23  1131 04/13/23  0518 04/12/23  2247   GLUCOSE mg/dL 167* 111 104 122 124 186*     Lab Results   Lab Value Date/Time    HGBA1C 5.40 03/30/2023 1121    HGBA1C 5.2 07/01/2015 0436             Medications    Medications Reviewed: Yes  Pertinent : abx, insulin, keppra, protonix, senokot   Infusion: Precedex, heparin    Intake/Ouptut 24 hrs (0701 - 0700)   I&O's Reviewed: Yes     Intake & Output (last day)       04/13 0701  04/14 0700 04/14 0701  04/15 0700    I.V. (mL/kg) 1099 (12.3) 237.7 (2.7)    Other 240 30    NG/GT 1512 327    IV Piggyback 400     Total Intake(mL/kg) 3251 (36.4) 594.7 (6.7)    Urine (mL/kg/hr) 3600 (1.7) 700 (2.2)    Stool 0     Total Output 3600 700    Net -349 -105.3          Stool  "Unmeasured Occurrence 2 x         Stool x 1 4/10    Net I & O since 3/31; ahead ~ 3.1 L    Anthropometrics     Admission Height 176.5 cm (69.5\") Documented at 2023 1116   Admission Weight 88.5 kg (195 lb) Documented at 2023 111     Height: Height: 176.5 cm (69.49\")  Last Filed Weight: Weight: 89.3 kg (196 lb 13.9 oz) (23 0430)  Method: Weight Method: Bed scale  BMI: BMI (Calculated): 28.7  BMI classification: Overweight: 25.0-29.9kg/m2     UBW:   Weight change: stable since adm    Nutrition Focused Physical Exam     Date:     Unable to perform exam due to: Pt unable to participate at time of visit, Defer pending indication    Needs Assessment   Date: 4/3, 4/10,     Height used: 176.5 cm  Weights used: 89.3 kg    Estimated Calorie needs: ~ 2000 Kcal/day  Method:  Kcals/KG 12-25 kcal/kg for critical illness = 8415-8872  Method:  MSJ = 1567 x 1.25 = 1989 kcal    Estimated Protein needs: ~ 116 g PRO/day  1.3 gm/kg = 116    Estimated Fluid needs: 2000 ml/day   Per RDA method    Current Nutrition Prescription     PO: NPO Diet NPO Type: Strict NPO, Tube Feeding  Oral Nutrition Supplement:   Intake: N/A     EN: Peptamen AF  Goal Rate: 70 ml/hr    Water Flushes: 30 ml ev 2 hr  Modular: None  Route: NG   Tube: Large bore    At goal over: 20Hrs/day    Rx will supply:   Goal Volume 1400 mL/day     Flush Volume 300 mL/day     Energy 1680 Kcal/day 98 % Est Need   Protein 106 g/day 95 % Est Need   Fiber 8.4 g/day     Water in  EN 1135 mL     Total Water 1435 mL     Meet DRI Yes          --------------------------------------------------------------------------  Product/Rate verified at bedside: Yes  Infusing Rate at time of visit: @ 70ml/hr, Water @ 30 ml every 2 hrs    Average Delivery from [facility] Day:  Volume 1432 mL/day 102  % Goal Vol.   Flush Volume 277 mL/day     Energy 1718 Kcal/day 86 % Est Need   Protein 108 g/day 93 % Est Need   Fiber 8.6 g/day     Water in  EN 1160 mL     Total Water 1437 " mL     Meet DRI Yes        Nutrition Diagnosis   Date: 3/31 Updated:  4/3, 4/11,4/14  Problem Inadequate oral intake   Etiology Mechanical ventilation   Signs/Symptoms NPO   Status: EN infusing at goal - pt extubated    Date:  4/14 Updated:  Problem Increased nutrient needs   Etiology S/p extubation from mechanical ventilation;bacteremia   Signs/Symptoms 86% energy and 93% of protein delivered w/ current prescription   Status:    Goal:   General: Nutrition support treatment  PO: N/A  EN/PN: Adjust EN, Deliver estimated needs     Nutrition Intervention      Follow treatment progress, Care plan reviewed, Nutrition support recommendations provided, orders per MD  -  Increase Peptamen AF to 75 ml/hr- 24 hr goal volume is 1500 mL this provides 1800 kcal (20kcal/kg 90% of est needs) and 114 gm Pro (98% of est needs) 9 gm soluble fiber, 1215 mL FW and 1515 mL total water w/ ordered flushes.  Monitoring/Evaluation:   Per protocol, I&O, Pertinent labs, Weight, Symptoms, POC/GOC      Sravani Deleon, MS,RD,LD  Time Spent: 45 min

## 2023-04-14 NOTE — PLAN OF CARE
"Goal Outcome Evaluation:  Plan of Care Reviewed With: family        Progress: declining  Outcome Evaluation: Palliative consult for GOC/ACP; seen by Dr. Louie. Pt appears actively dying, family agreeable to some medications for relief of dyspnea and restlessness, but requested \"smaller doses\" in an effort to minimize sedation. Palliative following for management of symptoms in EOL care, support to pt and family.    1300 Palliative IDT meeting: FORREST BOYKIN, RN, , SW    After hours, weekends and holidays, contact Palliative Provider by calling [phone]    Problem: Palliative Care  Goal: Enhanced Quality of Life  Outcome: Ongoing, Progressing  Intervention: Promote Advance Care Planning  Flowsheets (Taken 4/14/2023 1513)  Life Transition/Adjustment:   palliative care discussed   palliative care initiated  Intervention: Maximize Comfort  Flowsheets (Taken 4/14/2023 1513)  Pain Management Interventions: (no pain evident per Dr. Louie; d/w family medications for comfort in EOL care) other (see comments)  Intervention: Optimize Function  Flowsheets (Taken 4/14/2023 1513)  Sleep/Rest Enhancement:   awakenings minimized   family presence promoted  Intervention: Optimize Psychosocial Wellbeing  Flowsheets (Taken 4/14/2023 1513)  Supportive Measures:   positive reinforcement provided   decision-making supported  Grieving Process Facilitation: reaction to loss explored  Spiritual Activities Assistance: (Spiritual Care consult) other (see comments)  Family/Support System Care:   caregiver stress acknowledged   family care conference arranged   involvement promoted   presence promoted   self-care encouraged   support provided     "

## 2023-04-14 NOTE — CONSULTS
Derick Phillip MD  Consulting physician: Nicolette    Chief Complaint   Patient presents with   • Extremity Weakness       Reason for consult: [facility]    HPI: Patient is a 82-year-old male brought hospital for weakness and was found to have acute mastoiditis, subdural empyema, as well as sepsis with multiorgan failure.  Patient was intubated and admitted to the intensive care unit.  Patient was able to be extubated, however shortly after being extubated he started developing respiratory arrest and ultimately said that he did not want to go back on the ventilator.  Patient has continued to decline interspaced to his mentation as well as becoming increasingly restless throughout the day today.    Pain assesment: Denies     Dyspnea: positive    N/V: Denies    PPS: 10      Past Medical History:   Diagnosis Date   • Arthritis    • Atrial fibrillation    • Bradycardia    • Chronic hypertension     Probably essential   • Hypertension    • Hypertensive cardiovascular disease 07/2015    Recent progressive CCS class III-IV chest pain syndrome with normal EKG and exertional dyspnea and fatigue with normal codominant coronary arteries and preserved systolic  left ventricular function, July 2015.   • Intermittent palpitations 05/01/2014    Remote abnormal 24-hour Holter monitor demonstrating intermittent bradycardia with occasional PACs, May 2014.   • Lower extremity edema     Intermittent lower extremity edema felt secondary to antihypertensive therapy.    • Nephrolithiasis     Remote- 15 years ago   • MINO on CPAP    • MINO on CPAP     doesn't know setting    • Prostate troubles     enlarged   • Seasonal rhinitis     intermittent     Past Surgical History:   Procedure Laterality Date   • CARDIAC CATHETERIZATION      no stents   • CARDIAC ELECTROPHYSIOLOGY PROCEDURE N/A 10/24/2018    Procedure: Pacemaker DC new with Biotronik. Hold Eliquis one day prior.;  Surgeon: Harley Dacosta MD;  Location: [facility]  LOCATION;  Service: Cardiology   • COLONOSCOPY     • ENDOSCOPY     • INSERT / REPLACE / REMOVE PACEMAKER       Current Code Status     Date Active Code Status Order ID Comments User Context       4/14/2023 1058 No CPR (Do Not Attempt to Resuscitate) [id number]  Adair Will MD Inpatient      Question Answer    Code Status (Patient has no pulse and is not breathing) No CPR (Do Not Attempt to Resuscitate)    Medical Interventions (Patient has pulse or is breathing) Limited Support    Medical Intervention Limits: NO intubation (DNI)              Current Facility-Administered Medications   Medication Dose Route Frequency Provider Last Rate Last Admin   • acetaminophen (TYLENOL) 160 MG/5ML solution 650 mg  650 mg Nasogastric Q6H PRN Amadou Mercedes APRN   650 mg at 04/11/23 1805   • aspirin chewable tablet 81 mg  81 mg Nasogastric Daily Tereza Bautista APRN   81 mg at 04/14/23 0852    Or   • aspirin suppository 300 mg  300 mg Rectal Daily Tereza Bautista APRN       • cefTRIAXone (ROCEPHIN) 2 g/100 mL 0.9% NS IVPB (MBP)  2 g Intravenous Q12H Que Alonso MD   2 g at 04/14/23 0851   • chlorhexidine (PERIDEX) 0.12 % solution 15 mL  15 mL Mouth/Throat Q12H Mundo Jason MD   15 mL at 04/14/23 0851   • dexmedetomidine (PRECEDEX) 400 mcg in 100 mL NS infusion  0.2-1.5 mcg/kg/hr Intravenous Titrated Toan Lee MD 32.5 mL/hr at 04/14/23 0937 1.5 mcg/kg/hr at 04/14/23 0937   • dextrose (D50W) (25 g/50 mL) IV injection 25 g  25 g Intravenous Q15 Min PRN Amadou Mercedes APRN       • fentaNYL (SUBLIMAZE) bolus from bag 10 mcg/mL 50 mcg  50 mcg Intravenous Q30 Min PRN Adair Will MD   50 mcg at 03/31/23 1911   • fentaNYL citrate (PF) (SUBLIMAZE) injection 50 mcg  50 mcg Intravenous Once PRN Artem Bernstein, APRN       • flecainide (TAMBOCOR) tablet 50 mg  50 mg Nasogastric Q12H Marcelino Hastings DO   50 mg at 04/14/23 0852   • glycopyrrolate (ROBINUL) injection 0.4  mg  0.4 mg Intravenous Q4H PRN Nik Louie, DO       • heparin 52603 units/250 mL (100 units/mL) in 0.45 % NaCl infusion  14 Units/kg/hr Intravenous Titrated Derick Mercedes, PharmD 12.12 mL/hr at 04/14/23 0716 14 Units/kg/hr at 04/14/23 0716   • HYDROmorphone (DILAUDID) injection 0.5 mg  0.5 mg Intravenous Q2H PRN Nik Louie, DO       • insulin regular (humuLIN R,novoLIN R) injection 0-7 Units  0-7 Units Subcutaneous Q6H Amadou Mercedes, APRN   2 Units at 04/14/23 0628   • levETIRAcetam in NaCl 0.82% (KEPPRA) IVPB 500 mg  500 mg Intravenous Q12H Chaz Kahn MD   500 mg at 04/14/23 0628   • LORazepam (ATIVAN) injection 0.5 mg  0.5 mg Intravenous Q4H PRN Nik Louie, DO       • losartan (COZAAR) tablet 50 mg  50 mg Nasogastric Q24H Marcelino Hastings DO   50 mg at 04/14/23 0852   • ondansetron (ZOFRAN) injection 4 mg  4 mg Intravenous Q6H PRN Antonietta Phillips APRHECTOR       • pantoprazole (PROTONIX) injection 40 mg  40 mg Intravenous Q AM Antonietta Phillips APRN   40 mg at 04/14/23 0630   • Pharmacy to Dose Heparin   Does not apply Continuous PRN Marcelino Hastings DO       • Potassium Replacement - Follow Nurse / BPA Driven Protocol   Does not apply PRN Natalia Cruz APRN       • sennosides (SENOKOT) 8.8 MG/5ML syrup 10 mL  10 mL Nasogastric BID Mundo Jason MD   10 mL at 04/14/23 0444   • sodium chloride 0.9 % flush 10 mL  10 mL Intravenous Q12H Tereza Bautista APRN   10 mL at 04/13/23 2049   • sodium chloride 0.9 % flush 10 mL  10 mL Intravenous PRN Tereza Bautista APRN       • sodium chloride 0.9 % infusion 40 mL  40 mL Intravenous PRN Tereza Bautista APRN         dexmedetomidine, 0.2-1.5 mcg/kg/hr, Last Rate: 1.5 mcg/kg/hr (04/14/23 0937)  heparin, 14 Units/kg/hr, Last Rate: 14 Units/kg/hr (04/14/23 0716)  Pharmacy to Dose Heparin,       •  acetaminophen  •  dextrose  •  fentaNYL  •  fentaNYL citrate (PF)  •   "glycopyrrolate  •  HYDROmorphone  •  LORazepam  •  ondansetron  •  Pharmacy to Dose Heparin  •  Potassium Replacement - Follow Nurse / BPA Driven Protocol  •  sodium chloride  •  sodium chloride  No Known Allergies  Family History   Problem Relation Age of Onset   • No Known Problems Mother    • Other Father         cardiac arrhythmia     Social History     Socioeconomic History   • Marital status:    Tobacco Use   • Smoking status: Former     Packs/day: 1.00     Types: Cigarettes     Quit date:      Years since quittin.3   • Smokeless tobacco: Never   Vaping Use   • Vaping Use: Never used   Substance and Sexual Activity   • Alcohol use: No   • Drug use: No   • Sexual activity: Defer     Review of Systems -all others reviewed and found negative except mentioned in the HPI      /93   Pulse 70   Temp 97.2 °F (36.2 °C) (Axillary)   Resp 24   Ht 176.5 cm (69.49\")   Wt 89.3 kg (196 lb 13.9 oz)   SpO2 100%   BMI 28.67 kg/m²     Intake/Output Summary (Last 24 hours) at 2023 1324  Last data filed at 2023 1000  Gross per 24 hour   Intake 2705.9 ml   Output 3575 ml   Net -869.1 ml     Physical Exam:      General Appearance:     Restless appearing   Head:    Normocephalic, without obvious abnormality, atraumatic   Eyes:            Lids and lashes normal, conjunctivae and sclerae normal, no   icterus, no pallor, corneas clear, PERRLA   Ears:    Ears appear intact with no abnormalities noted   Throat:   No oral lesions, no thrush, oral mucosa moist   Neck:   No adenopathy, supple, trachea midline, no thyromegaly, no     carotid bruit, no JVD   Back:     No kyphosis present, no scoliosis present, no skin lesions,       erythema or scars, no tenderness to percussion or                   palpation,   range of motion normal   Lungs:    Diminished breath sounds throughout    Heart:    Regular rhythm and normal rate, normal S1 and S2, no            murmur, no gallop, no rub, no click   Breast " Exam:    Deferred   Abdomen:     Normal bowel sounds, no masses, no organomegaly, soft        non-tender, non-distended, no guarding, no rebound                 tenderness   Genitalia:    Deferred   Extremities:  Mottling noted in both lower extremities   Pulses:   Pulses palpable and equal bilaterally   Skin:   No bleeding, bruising or rash   Lymph nodes:   No palpable adenopathy           Results from last 7 days   Lab Units 04/14/23  0408   WBC 10*3/mm3 8.20   HEMOGLOBIN g/dL 9.7*   HEMATOCRIT % 30.6*   PLATELETS 10*3/mm3 256     Results from last 7 days   Lab Units 04/14/23  0408 04/13/23  0427   SODIUM mmol/L 136 133*   POTASSIUM mmol/L 4.8 4.3   CHLORIDE mmol/L 104 105   CO2 mmol/L 22.0 23.0   BUN mg/dL 24* 22   CREATININE mg/dL 0.52* 0.58*   CALCIUM mg/dL 8.4* 8.3*   BILIRUBIN mg/dL  --  0.5   ALK PHOS U/L  --  146*   ALT (SGPT) U/L  --  36   AST (SGOT) U/L  --  20   GLUCOSE mg/dL 137* 131*     Results from last 7 days   Lab Units 04/14/23  0408   SODIUM mmol/L 136   POTASSIUM mmol/L 4.8   CHLORIDE mmol/L 104   CO2 mmol/L 22.0   BUN mg/dL 24*   CREATININE mg/dL 0.52*   GLUCOSE mg/dL 137*   CALCIUM mg/dL 8.4*     Imaging Results (Last 72 Hours)     Procedure Component Value Units Date/Time    XR Chest 1 View [[id number]] Collected: 04/14/23 0806     Updated: 04/14/23 0812    Narrative:      XR CHEST 1 VW    Date of Exam: 4/14/2023 3:55 AM EDT    Indication: respiratory failure.    Comparison: AP portable chest 4/10/2023.    Findings:  Right hemidiaphragm elevation is again noted. There is increased patchy airspace disease in the right mid to lower lung zone. Previously described left lower lobe airspace disease appears improved, however. NG tube extends below the diaphragm with the   tip not included in the imaging field of view. Right arm approach PICC tip terminates at the cavoatrial junction. No visible pneumothorax. Left chest wall pacemaker and leads appear similarly positioned. Heart size is likely within  normal limits,   allowing for differences in patient positioning.      Impression:      Impression:  Patchy airspace disease in the right mid to lower lung zone appears worse compared to 4/10/2023. However, the left basilar airspace disease appears slightly improved. Correlate for symptoms of waxing and waning pneumonia.    Electronically Signed: Keiyl Antoniamiranda    4/14/2023 8:09 AM EDT    Workstation ID: [id number]    XR Abdomen KUB [[id number]] Collected: 04/12/23 1241     Updated: 04/12/23 1251    Narrative:      XR ABDOMEN KUB    Date of Exam: 4/12/2023 12:20 PM EDT    Indication: MRI clearance.    Comparison: None available.    Findings:  There appears to be a urinary catheter in place superimposed over the pelvic midline. NG tube is seen in the stomach. Pacing wires are seen, apparently from a dual-lead pacemaker. There are apparent left renal calculi. No metallic foreign body is seen   elsewhere. Bowel gas pattern is nonobstructive.      Impression:      Impression:    1. Pacemaker leads noted.    2. Incidentally noted left renal calculi.    3. No evidence of metallic foreign body in the abdomen or pelvis.    Electronically Signed: Avni Solorzanod    4/12/2023 12:43 PM EDT    Workstation ID: [id number]        Impression: Respiratory failure  Sepsis  Multiorgan failure  Restlessness  Dyspnea  Change mental status  Goals of care  Plan: Did have a goals of care conversation with the patient's family who is at bedside.  Family understand that patient appears to be actively dying at this point in time.  With this in mind the family will honor the patient's wishes for no further reintubation.  The family are somewhat hesitant about giving symptom management regimen as they do not want to sedate the patient.  I did spend a long time talking to family about appropriate medication use and the fact that the patient mentation will decline regardless of medication.  Family seems understand this and are amenable with starting  medication, however they do request to be started on lower doses if possible.        Nik Louie,   04/14/23  13:24 EDT

## 2023-04-15 NOTE — DISCHARGE SUMMARY
Death Summary    Patient name: Kai Dodson  CSN: [id number]  MRN: [id number]  : 1940  Today's date: 2023     Date of Admission: 3/30/2023  Date and Time of Death:  2023 at 2243    Admitting Physician: Mundo Jason MD     Primary Care Provider: Derick Phillip MD    Consultations:   Chaz Kahn MD Neurology   Edwin Garcia MD   Otolaryngology/ENT  Neil Yen MD  Neurosurgery   Que Alonso MD Infectious Disease     Admission Diagnosis: Meningitis     Diagnoses at the Time of Death:     Meningitis    MINO on CPAP    Hypertension    Paroxysmal atrial fibrillation on Flecainide and Eliquis    Subdural empyema    Streptococcal bacteremia    Procedures:  3/30/23: Intubation (performed by ED MD)  3/30/23: Right Myringotomy  3/31/23: PICC Line Insertion       History of Present Illness (copied from H&P note on 3/30/23):  Kai Dodson is a 82 y.o. male with PMH PAF on Flecainide and Eliquis, HTN, CAD, Tachy-Samuel syndrome s/p PPM and MINO on CPAP who presented to the ED with AMS, inability to speak and left sided weakness.  He was LKW at 2300 last night.  Apparently, his wife was just discharged from [facility] this morning.  Their son was driving his mom back to [facility] for inpatient rehab.  He tried to call his dad, but couldn't reach.  Another family member went to check on him and he was found down altered with tremors in all extremities.  EMS was called and was brought to the ED.  CTH, CTA H/N and CTP revealed abnormal intracranial gas present, appearing likely extra-axial in the lateral aspect of the right cerebellar hemisphere, with changes of the right mastoid present, concerning for mastoiditis and possible subdural empyema or less likely parenchymal abscess.  CT temporal bones revealed likely acute otomastoiditis on the right, with middle ear and right mastoid effusion, coalescent changes of the right mastoid and dehiscence of both the tegmen  tympani and tegmen mastoideum, no ischemia or hemorrhage.  He was started on broad spectrum antibiotics.  Significant labs:  LA 6.6, PCT 6, K 3.3, PT 17.9, INR 1.5.  UA was unremarkable.  NS and Neurology were consulted.  There was concern for status epilepticus.  He received 2 mg IV Ativan.  EEG and MRI Brain have been ordered (end of copied text).      While awaiting ICU bed placement in the ED patient had deteriorating neurologic status and was subsequently intubated / placed on mechanical ventilatory support.    LP was not performed due to patient anticoagulated on Eliquis.    Hospital Course:  Patient was admitted to ICU 2* issues discussed in the above HPI and continued on broad spectrum ABX and IV dexamethasone as well as mechanical ventilatory support. He was evaluated by Neurology with MRI brain negative for temporal lobe abscess with findings consistent with diffuse meningitis with right otomastoiditis and signal voids corresponding to gas bubbles within the CSF in the upper right posterior cranial fossa. EEG was negative for seizures with diffuse cerebral dysfunction of moderate degree seen, nonspecific.  He was evaluated by Neurosurgery who felt no role for surgery at that time & recommended medical management. ENT was consulted / patient underwent right-sided myringotomy at bedside - plans were initially to insert an ear tube, however CSF was noted to be draining through myringotomy and tube was not placed. Ultimately blood cultures x2 and wound culture + Streptococcus pneumoniae ABX narrowed to IV Rocephin and Vancomycin per ID.     Patient developed intermittent fevers prompting repeat cultures obtained on 4/6 with ABX broadened to cefepime and Zyvox. Ultimately MRSA swab + and respiratory culture positive for Staph aureus.      CSF leak stopped on 4/8/23.     Ventilator weaning was complicated by persistent encephalopathy / agitation requiring sedatives. CTH was repeated on 4/9 which was negative for  an acute intracranial process with continued inflammation noted around mastoid.     Due to prolonged mechanical ventilation discussions were had with the family regarding wishes regarding trach / PEG placement; ultimately they declined this and elected to change code status to DNI/DNR.     Patient was weaned to minimal ventilatory settings and extubated to BiPAP on . He initially did well, however the following day suffered respiratory arrest. Pulse was preserved and he received BVM-assisted ventilations with improvement. He was reportedly cognizant following the event and told staff members (with family present in room) that he did not want to go back on the ventilator.     Later that evening patient experienced progressive oxygen desaturation despite maximum non-invasive ventilatory settings. Mr. Dodson ultimately  on 23 at 2243, with family surrounding his bedside.     Mya Morel, DNP, APRN, [facility]  Pulmonary and Critical Care Medicine     Please note that portions of this note were completed with a voice recognition program.

## 2023-04-15 NOTE — SIGNIFICANT NOTE
Exam confirms with auscultation zero audible heart tones and zero audible respirations. Mr.Lonnie Letha Dodson was pronounced dead at 2243.  MD notified by Patient's RN.    Jim Smith RN  Clinical House Supervisor  4/14/2023 23:13 EDT

## (undated) DEVICE — DECANT BG O JET

## (undated) DEVICE — INTRO TEAR AWAY/LVD W/SD PRT 7F 13CM

## (undated) DEVICE — ADULT, W/LG. BACK PAD, RADIOTRANSPARENT ELEMENT AND LEAD WIRE: Brand: DEFIBRILLATION ELECTRODES

## (undated) DEVICE — SET PRIMARY GRVTY 10DP MALE LL 104IN

## (undated) DEVICE — TUBING, SUCTION, 1/4" X 10', STRAIGHT: Brand: MEDLINE

## (undated) DEVICE — MAGNETIC DRAPE: Brand: DEVON

## (undated) DEVICE — ST EXT IV SMARTSITE 2VLV SP M LL 5ML IV1

## (undated) DEVICE — LEX ELECTRO PHYSIOLOGY: Brand: MEDLINE INDUSTRIES, INC.

## (undated) DEVICE — ST INF PRI SMRTSTE 20DRP 2VLV 24ML 117

## (undated) DEVICE — MEDI-VAC YANKAUER SUCTION HANDLE W/BULBOUS TIP: Brand: CARDINAL HEALTH

## (undated) DEVICE — SOL NACL 0.9PCT 1000ML

## (undated) DEVICE — PROXIMATE RH ROTATING HEAD SKIN STAPLERS (35 WIDE) CONTAINS 35 STAINLESS STEEL STAPLES: Brand: PROXIMATE

## (undated) DEVICE — DRSNG SURG AQUACEL AG 9X15CM

## (undated) DEVICE — IRRIGATOR BULB ASEPTO 60CC STRL

## (undated) DEVICE — SOL NS 500ML

## (undated) DEVICE — INTRO TEAR AWAY/LVD W/SD PRT 6F 13CM

## (undated) DEVICE — DECANTER: Brand: UNBRANDED

## (undated) DEVICE — CAUTERY TIP POLISHER: Brand: DEVON

## (undated) DEVICE — LIMB HOLDER, WRIST/ANKLE: Brand: DEROYAL

## (undated) DEVICE — CANN NASL CO2 DIVIDED A/

## (undated) DEVICE — PENCL E/S HNDSWCH ROCKRBTN HOLSTR 10FT